# Patient Record
Sex: FEMALE | Race: WHITE | NOT HISPANIC OR LATINO | Employment: UNEMPLOYED | URBAN - METROPOLITAN AREA
[De-identification: names, ages, dates, MRNs, and addresses within clinical notes are randomized per-mention and may not be internally consistent; named-entity substitution may affect disease eponyms.]

---

## 2017-01-13 ENCOUNTER — OFFICE VISIT - HEALTHEAST (OUTPATIENT)
Dept: FAMILY MEDICINE | Facility: CLINIC | Age: 62
End: 2017-01-13

## 2017-01-13 ENCOUNTER — COMMUNICATION - HEALTHEAST (OUTPATIENT)
Dept: FAMILY MEDICINE | Facility: CLINIC | Age: 62
End: 2017-01-13

## 2017-01-13 DIAGNOSIS — Z00.00 ROUTINE GENERAL MEDICAL EXAMINATION AT A HEALTH CARE FACILITY: ICD-10-CM

## 2017-01-13 ASSESSMENT — MIFFLIN-ST. JEOR: SCORE: 1149.17

## 2017-01-18 ENCOUNTER — OFFICE VISIT - HEALTHEAST (OUTPATIENT)
Dept: BEHAVIORAL HEALTH | Facility: CLINIC | Age: 62
End: 2017-01-18

## 2017-01-18 DIAGNOSIS — F41.1 GENERALIZED ANXIETY DISORDER: ICD-10-CM

## 2017-01-18 DIAGNOSIS — F33.1 MODERATE EPISODE OF RECURRENT MAJOR DEPRESSIVE DISORDER (H): ICD-10-CM

## 2017-01-24 ENCOUNTER — RECORDS - HEALTHEAST (OUTPATIENT)
Dept: ADMINISTRATIVE | Facility: OTHER | Age: 62
End: 2017-01-24

## 2017-01-26 ENCOUNTER — OFFICE VISIT - HEALTHEAST (OUTPATIENT)
Dept: BEHAVIORAL HEALTH | Facility: CLINIC | Age: 62
End: 2017-01-26

## 2017-01-26 DIAGNOSIS — F41.1 GENERALIZED ANXIETY DISORDER: ICD-10-CM

## 2017-01-26 DIAGNOSIS — F33.2 SEVERE EPISODE OF RECURRENT MAJOR DEPRESSIVE DISORDER, WITHOUT PSYCHOTIC FEATURES (H): ICD-10-CM

## 2017-01-31 ENCOUNTER — OFFICE VISIT - HEALTHEAST (OUTPATIENT)
Dept: SURGERY | Facility: CLINIC | Age: 62
End: 2017-01-31

## 2017-01-31 DIAGNOSIS — Z85.3 PERSONAL HISTORY OF BREAST CANCER: ICD-10-CM

## 2017-02-02 ENCOUNTER — OFFICE VISIT - HEALTHEAST (OUTPATIENT)
Dept: BEHAVIORAL HEALTH | Facility: CLINIC | Age: 62
End: 2017-02-02

## 2017-02-02 DIAGNOSIS — F41.1 GENERALIZED ANXIETY DISORDER: ICD-10-CM

## 2017-02-02 DIAGNOSIS — F33.2 SEVERE EPISODE OF RECURRENT MAJOR DEPRESSIVE DISORDER, WITHOUT PSYCHOTIC FEATURES (H): ICD-10-CM

## 2017-02-06 ENCOUNTER — COMMUNICATION - HEALTHEAST (OUTPATIENT)
Dept: NURSING | Facility: CLINIC | Age: 62
End: 2017-02-06

## 2017-02-06 ENCOUNTER — AMBULATORY - HEALTHEAST (OUTPATIENT)
Dept: CARE COORDINATION | Facility: CLINIC | Age: 62
End: 2017-02-06

## 2017-02-06 DIAGNOSIS — K59.02 CONSTIPATION BY OUTLET DYSFUNCTION: ICD-10-CM

## 2017-02-07 ENCOUNTER — OFFICE VISIT - HEALTHEAST (OUTPATIENT)
Dept: BEHAVIORAL HEALTH | Facility: CLINIC | Age: 62
End: 2017-02-07

## 2017-02-07 DIAGNOSIS — F33.1 MODERATE EPISODE OF RECURRENT MAJOR DEPRESSIVE DISORDER (H): ICD-10-CM

## 2017-02-07 DIAGNOSIS — F41.1 GENERALIZED ANXIETY DISORDER: ICD-10-CM

## 2017-02-13 ENCOUNTER — AMBULATORY - HEALTHEAST (OUTPATIENT)
Dept: CARE COORDINATION | Facility: CLINIC | Age: 62
End: 2017-02-13

## 2017-02-13 ENCOUNTER — COMMUNICATION - HEALTHEAST (OUTPATIENT)
Dept: CARE COORDINATION | Facility: CLINIC | Age: 62
End: 2017-02-13

## 2017-02-15 ENCOUNTER — OFFICE VISIT - HEALTHEAST (OUTPATIENT)
Dept: BEHAVIORAL HEALTH | Facility: CLINIC | Age: 62
End: 2017-02-15

## 2017-02-15 DIAGNOSIS — F33.1 MODERATE EPISODE OF RECURRENT MAJOR DEPRESSIVE DISORDER (H): ICD-10-CM

## 2017-02-15 DIAGNOSIS — F41.1 GENERALIZED ANXIETY DISORDER: ICD-10-CM

## 2017-02-17 ENCOUNTER — OFFICE VISIT - HEALTHEAST (OUTPATIENT)
Dept: FAMILY MEDICINE | Facility: CLINIC | Age: 62
End: 2017-02-17

## 2017-02-17 DIAGNOSIS — R50.9 FEVER: ICD-10-CM

## 2017-02-17 DIAGNOSIS — J02.9 SORE THROAT: ICD-10-CM

## 2017-02-17 ASSESSMENT — MIFFLIN-ST. JEOR: SCORE: 1171.85

## 2017-02-22 ENCOUNTER — OFFICE VISIT - HEALTHEAST (OUTPATIENT)
Dept: BEHAVIORAL HEALTH | Facility: CLINIC | Age: 62
End: 2017-02-22

## 2017-02-22 ENCOUNTER — RECORDS - HEALTHEAST (OUTPATIENT)
Dept: ADMINISTRATIVE | Facility: OTHER | Age: 62
End: 2017-02-22

## 2017-02-22 ENCOUNTER — OFFICE VISIT - HEALTHEAST (OUTPATIENT)
Dept: FAMILY MEDICINE | Facility: CLINIC | Age: 62
End: 2017-02-22

## 2017-02-22 DIAGNOSIS — F41.1 GENERALIZED ANXIETY DISORDER: ICD-10-CM

## 2017-02-22 DIAGNOSIS — F33.1 MODERATE EPISODE OF RECURRENT MAJOR DEPRESSIVE DISORDER (H): ICD-10-CM

## 2017-02-22 DIAGNOSIS — Z01.818 PRE-OP EVALUATION: ICD-10-CM

## 2017-02-22 LAB — HBA1C MFR BLD: 6.7 % (ref 3.5–6)

## 2017-02-22 ASSESSMENT — MIFFLIN-ST. JEOR: SCORE: 1162.43

## 2017-03-02 ENCOUNTER — COMMUNICATION - HEALTHEAST (OUTPATIENT)
Dept: FAMILY MEDICINE | Facility: CLINIC | Age: 62
End: 2017-03-02

## 2017-03-02 ASSESSMENT — MIFFLIN-ST. JEOR: SCORE: 1158.8

## 2017-03-03 ENCOUNTER — ANESTHESIA - HEALTHEAST (OUTPATIENT)
Dept: SURGERY | Facility: HOSPITAL | Age: 62
End: 2017-03-03

## 2017-03-03 ENCOUNTER — SURGERY - HEALTHEAST (OUTPATIENT)
Dept: SURGERY | Facility: HOSPITAL | Age: 62
End: 2017-03-03

## 2017-04-14 ENCOUNTER — COMMUNICATION - HEALTHEAST (OUTPATIENT)
Dept: SCHEDULING | Facility: CLINIC | Age: 62
End: 2017-04-14

## 2017-05-04 ENCOUNTER — COMMUNICATION - HEALTHEAST (OUTPATIENT)
Dept: BEHAVIORAL HEALTH | Facility: CLINIC | Age: 62
End: 2017-05-04

## 2017-05-16 ENCOUNTER — COMMUNICATION - HEALTHEAST (OUTPATIENT)
Dept: FAMILY MEDICINE | Facility: CLINIC | Age: 62
End: 2017-05-16

## 2017-05-16 ENCOUNTER — RECORDS - HEALTHEAST (OUTPATIENT)
Dept: ADMINISTRATIVE | Facility: OTHER | Age: 62
End: 2017-05-16

## 2017-05-18 ENCOUNTER — COMMUNICATION - HEALTHEAST (OUTPATIENT)
Dept: BEHAVIORAL HEALTH | Facility: CLINIC | Age: 62
End: 2017-05-18

## 2017-06-21 ENCOUNTER — RECORDS - HEALTHEAST (OUTPATIENT)
Dept: ADMINISTRATIVE | Facility: OTHER | Age: 62
End: 2017-06-21

## 2017-06-22 ENCOUNTER — RECORDS - HEALTHEAST (OUTPATIENT)
Dept: ADMINISTRATIVE | Facility: OTHER | Age: 62
End: 2017-06-22

## 2017-06-22 ENCOUNTER — HOSPITAL ENCOUNTER (OUTPATIENT)
Dept: MAMMOGRAPHY | Facility: HOSPITAL | Age: 62
Discharge: HOME | End: 2017-06-22
Attending: PLASTIC SURGERY

## 2017-06-22 DIAGNOSIS — N63.0 BREAST SWELLING: ICD-10-CM

## 2017-09-18 ENCOUNTER — COMMUNICATION - HEALTHEAST (OUTPATIENT)
Dept: FAMILY MEDICINE | Facility: CLINIC | Age: 62
End: 2017-09-18

## 2017-10-09 ENCOUNTER — COMMUNICATION - HEALTHEAST (OUTPATIENT)
Dept: FAMILY MEDICINE | Facility: CLINIC | Age: 62
End: 2017-10-09

## 2017-10-11 ENCOUNTER — OFFICE VISIT - HEALTHEAST (OUTPATIENT)
Dept: FAMILY MEDICINE | Facility: CLINIC | Age: 62
End: 2017-10-11

## 2017-10-11 DIAGNOSIS — M25.511 PAIN IN JOINT OF RIGHT SHOULDER: ICD-10-CM

## 2017-10-11 DIAGNOSIS — M75.81 RIGHT ROTATOR CUFF TENDINITIS: ICD-10-CM

## 2017-10-11 DIAGNOSIS — Z85.3 PERSONAL HISTORY OF BREAST CANCER: ICD-10-CM

## 2017-10-11 DIAGNOSIS — Z23 NEED FOR IMMUNIZATION AGAINST INFLUENZA: ICD-10-CM

## 2017-10-11 DIAGNOSIS — Z01.818 PREOP EXAMINATION: ICD-10-CM

## 2017-10-11 DIAGNOSIS — E11.9 DIABETES MELLITUS (H): ICD-10-CM

## 2017-10-11 LAB — HBA1C MFR BLD: 6.3 % (ref 3.5–6)

## 2017-10-11 ASSESSMENT — MIFFLIN-ST. JEOR: SCORE: 1195.09

## 2017-10-18 ENCOUNTER — RECORDS - HEALTHEAST (OUTPATIENT)
Dept: ADMINISTRATIVE | Facility: OTHER | Age: 62
End: 2017-10-18

## 2017-10-23 ENCOUNTER — RECORDS - HEALTHEAST (OUTPATIENT)
Dept: ADMINISTRATIVE | Facility: OTHER | Age: 62
End: 2017-10-23

## 2017-11-28 ENCOUNTER — OFFICE VISIT - HEALTHEAST (OUTPATIENT)
Dept: FAMILY MEDICINE | Facility: CLINIC | Age: 62
End: 2017-11-28

## 2017-11-28 DIAGNOSIS — Z85.3 PERSONAL HISTORY OF BREAST CANCER: ICD-10-CM

## 2017-11-28 DIAGNOSIS — F41.9 ANXIETY: ICD-10-CM

## 2017-11-28 DIAGNOSIS — E11.9 DIABETES MELLITUS (H): ICD-10-CM

## 2017-11-28 DIAGNOSIS — G89.18 POSTOPERATIVE PAIN: ICD-10-CM

## 2017-11-28 DIAGNOSIS — R07.89 OTHER CHEST PAIN: ICD-10-CM

## 2017-11-28 DIAGNOSIS — S20.01XS: ICD-10-CM

## 2017-11-28 DIAGNOSIS — R47.89: ICD-10-CM

## 2017-11-28 ASSESSMENT — MIFFLIN-ST. JEOR: SCORE: 1196.23

## 2017-12-18 ENCOUNTER — COMMUNICATION - HEALTHEAST (OUTPATIENT)
Dept: FAMILY MEDICINE | Facility: CLINIC | Age: 62
End: 2017-12-18

## 2017-12-18 DIAGNOSIS — E11.9 DIABETES (H): ICD-10-CM

## 2018-01-01 ENCOUNTER — RECORDS - HEALTHEAST (OUTPATIENT)
Dept: ADMINISTRATIVE | Facility: OTHER | Age: 63
End: 2018-01-01

## 2018-01-17 ENCOUNTER — AMBULATORY - HEALTHEAST (OUTPATIENT)
Dept: FAMILY MEDICINE | Facility: CLINIC | Age: 63
End: 2018-01-17

## 2018-01-17 DIAGNOSIS — R73.03 PREDIABETES: ICD-10-CM

## 2018-01-18 ENCOUNTER — OFFICE VISIT - HEALTHEAST (OUTPATIENT)
Dept: FAMILY MEDICINE | Facility: CLINIC | Age: 63
End: 2018-01-18

## 2018-01-18 DIAGNOSIS — F41.9 ANXIETY: ICD-10-CM

## 2018-01-18 DIAGNOSIS — R73.03 PREDIABETES: ICD-10-CM

## 2018-01-18 DIAGNOSIS — C50.211 MALIGNANT NEOPLASM OF UPPER-INNER QUADRANT OF RIGHT FEMALE BREAST, UNSPECIFIED ESTROGEN RECEPTOR STATUS (H): ICD-10-CM

## 2018-01-18 DIAGNOSIS — C50.919 MALIGNANT NEOPLASM OF FEMALE BREAST, UNSPECIFIED ESTROGEN RECEPTOR STATUS, UNSPECIFIED LATERALITY, UNSPECIFIED SITE OF BREAST (H): ICD-10-CM

## 2018-01-18 DIAGNOSIS — E11.9 DIABETES MELLITUS (H): ICD-10-CM

## 2018-01-18 DIAGNOSIS — S20.01XS: ICD-10-CM

## 2018-01-18 DIAGNOSIS — R23.8 SCALP IRRITATION: ICD-10-CM

## 2018-01-18 LAB
BASOPHILS # BLD AUTO: 0 THOU/UL (ref 0–0.2)
BASOPHILS NFR BLD AUTO: 1 % (ref 0–2)
EOSINOPHIL # BLD AUTO: 0.2 THOU/UL (ref 0–0.4)
EOSINOPHIL NFR BLD AUTO: 4 % (ref 0–6)
ERYTHROCYTE [DISTWIDTH] IN BLOOD BY AUTOMATED COUNT: 13.9 % (ref 11–14.5)
HBA1C MFR BLD: 6.2 % (ref 3.5–6)
HCT VFR BLD AUTO: 43.4 % (ref 35–47)
HGB BLD-MCNC: 14.5 G/DL (ref 12–16)
LYMPHOCYTES # BLD AUTO: 2.6 THOU/UL (ref 0.8–4.4)
LYMPHOCYTES NFR BLD AUTO: 40 % (ref 20–40)
MCH RBC QN AUTO: 29.2 PG (ref 27–34)
MCHC RBC AUTO-ENTMCNC: 33.3 G/DL (ref 32–36)
MCV RBC AUTO: 88 FL (ref 80–100)
MONOCYTES # BLD AUTO: 0.5 THOU/UL (ref 0–0.9)
MONOCYTES NFR BLD AUTO: 8 % (ref 2–10)
NEUTROPHILS # BLD AUTO: 3 THOU/UL (ref 2–7.7)
NEUTROPHILS NFR BLD AUTO: 47 % (ref 50–70)
PLATELET # BLD AUTO: 319 THOU/UL (ref 140–440)
PMV BLD AUTO: 6.5 FL (ref 7–10)
RBC # BLD AUTO: 4.96 MILL/UL (ref 3.8–5.4)
WBC: 6.4 THOU/UL (ref 4–11)

## 2018-01-18 ASSESSMENT — MIFFLIN-ST. JEOR: SCORE: 1189.99

## 2018-01-19 ENCOUNTER — COMMUNICATION - HEALTHEAST (OUTPATIENT)
Dept: FAMILY MEDICINE | Facility: CLINIC | Age: 63
End: 2018-01-19

## 2018-01-22 ENCOUNTER — COMMUNICATION - HEALTHEAST (OUTPATIENT)
Dept: FAMILY MEDICINE | Facility: CLINIC | Age: 63
End: 2018-01-22

## 2018-01-30 ENCOUNTER — COMMUNICATION - HEALTHEAST (OUTPATIENT)
Dept: FAMILY MEDICINE | Facility: CLINIC | Age: 63
End: 2018-01-30

## 2018-01-30 RX ORDER — GLUCOSAMINE HCL/CHONDROITIN SU 500-400 MG
1 CAPSULE ORAL PRN
Qty: 100 STRIP | Refills: 2 | Status: SHIPPED | OUTPATIENT
Start: 2018-01-30 | End: 2023-08-18

## 2018-02-27 ENCOUNTER — COMMUNICATION - HEALTHEAST (OUTPATIENT)
Dept: FAMILY MEDICINE | Facility: CLINIC | Age: 63
End: 2018-02-27

## 2018-03-14 ENCOUNTER — COMMUNICATION - HEALTHEAST (OUTPATIENT)
Dept: FAMILY MEDICINE | Facility: CLINIC | Age: 63
End: 2018-03-14

## 2018-03-14 ENCOUNTER — COMMUNICATION - HEALTHEAST (OUTPATIENT)
Dept: SCHEDULING | Facility: CLINIC | Age: 63
End: 2018-03-14

## 2018-03-14 ENCOUNTER — AMBULATORY - HEALTHEAST (OUTPATIENT)
Dept: FAMILY MEDICINE | Facility: CLINIC | Age: 63
End: 2018-03-14

## 2018-03-14 DIAGNOSIS — E11.9 DIABETES (H): ICD-10-CM

## 2018-04-20 ENCOUNTER — AMBULATORY - HEALTHEAST (OUTPATIENT)
Dept: FAMILY MEDICINE | Facility: CLINIC | Age: 63
End: 2018-04-20

## 2018-04-20 ENCOUNTER — OFFICE VISIT - HEALTHEAST (OUTPATIENT)
Dept: FAMILY MEDICINE | Facility: CLINIC | Age: 63
End: 2018-04-20

## 2018-04-20 DIAGNOSIS — C50.919 MALIGNANT NEOPLASM OF FEMALE BREAST, UNSPECIFIED ESTROGEN RECEPTOR STATUS, UNSPECIFIED LATERALITY, UNSPECIFIED SITE OF BREAST (H): ICD-10-CM

## 2018-04-20 DIAGNOSIS — F32.A DEPRESSION: ICD-10-CM

## 2018-04-20 DIAGNOSIS — E11.9 DIABETES MELLITUS (H): ICD-10-CM

## 2018-04-20 DIAGNOSIS — E11.9 DIABETES (H): ICD-10-CM

## 2018-04-20 DIAGNOSIS — F41.9 ANXIETY: ICD-10-CM

## 2018-04-20 DIAGNOSIS — C50.211 MALIGNANT NEOPLASM OF UPPER-INNER QUADRANT OF RIGHT FEMALE BREAST, UNSPECIFIED ESTROGEN RECEPTOR STATUS (H): ICD-10-CM

## 2018-04-20 DIAGNOSIS — S20.01XS: ICD-10-CM

## 2018-04-20 DIAGNOSIS — F32.2 SEVERE MAJOR DEPRESSIVE DISORDER (H): ICD-10-CM

## 2018-04-20 LAB
ANION GAP SERPL CALCULATED.3IONS-SCNC: 14 MMOL/L (ref 5–18)
BASOPHILS # BLD AUTO: 0 THOU/UL (ref 0–0.2)
BASOPHILS NFR BLD AUTO: 1 % (ref 0–2)
BUN SERPL-MCNC: 9 MG/DL (ref 8–22)
CALCIUM SERPL-MCNC: 9.5 MG/DL (ref 8.5–10.5)
CHLORIDE BLD-SCNC: 106 MMOL/L (ref 98–107)
CO2 SERPL-SCNC: 21 MMOL/L (ref 22–31)
CREAT SERPL-MCNC: 0.7 MG/DL (ref 0.6–1.1)
CREAT UR-MCNC: 155 MG/DL
EOSINOPHIL # BLD AUTO: 0.3 THOU/UL (ref 0–0.4)
EOSINOPHIL NFR BLD AUTO: 8 % (ref 0–6)
ERYTHROCYTE [DISTWIDTH] IN BLOOD BY AUTOMATED COUNT: 13.1 % (ref 11–14.5)
GFR SERPL CREATININE-BSD FRML MDRD: >60 ML/MIN/1.73M2
GLUCOSE BLD-MCNC: 129 MG/DL (ref 70–125)
HBA1C MFR BLD: 6.4 % (ref 3.5–6)
HCT VFR BLD AUTO: 43 % (ref 35–47)
HGB BLD-MCNC: 14.2 G/DL (ref 12–16)
LYMPHOCYTES # BLD AUTO: 1.8 THOU/UL (ref 0.8–4.4)
LYMPHOCYTES NFR BLD AUTO: 44 % (ref 20–40)
MCH RBC QN AUTO: 28.4 PG (ref 27–34)
MCHC RBC AUTO-ENTMCNC: 32.9 G/DL (ref 32–36)
MCV RBC AUTO: 86 FL (ref 80–100)
MICROALBUMIN UR-MCNC: 9.41 MG/DL (ref 0–1.99)
MICROALBUMIN/CREAT UR: 60.7 MG/G
MONOCYTES # BLD AUTO: 0.4 THOU/UL (ref 0–0.9)
MONOCYTES NFR BLD AUTO: 9 % (ref 2–10)
NEUTROPHILS # BLD AUTO: 1.5 THOU/UL (ref 2–7.7)
NEUTROPHILS NFR BLD AUTO: 39 % (ref 50–70)
PLATELET # BLD AUTO: 336 THOU/UL (ref 140–440)
PMV BLD AUTO: 6.9 FL (ref 7–10)
POTASSIUM BLD-SCNC: 3.8 MMOL/L (ref 3.5–5)
RBC # BLD AUTO: 4.98 MILL/UL (ref 3.8–5.4)
SODIUM SERPL-SCNC: 141 MMOL/L (ref 136–145)
WBC: 4 THOU/UL (ref 4–11)

## 2018-04-20 ASSESSMENT — MIFFLIN-ST. JEOR: SCORE: 1204.73

## 2018-04-23 LAB
25(OH)D3 SERPL-MCNC: 19.9 NG/ML (ref 30–80)
CHOLEST SERPL-MCNC: 300 MG/DL
HDLC SERPL-MCNC: 68 MG/DL
LDLC SERPL CALC-MCNC: 205 MG/DL
TRIGL SERPL-MCNC: 136 MG/DL
TSH SERPL DL<=0.005 MIU/L-ACNC: 0.29 UIU/ML (ref 0.3–5)

## 2018-04-25 ENCOUNTER — OFFICE VISIT - HEALTHEAST (OUTPATIENT)
Dept: BEHAVIORAL HEALTH | Facility: HOSPITAL | Age: 63
End: 2018-04-25

## 2018-04-25 DIAGNOSIS — F43.12 CHRONIC POST-TRAUMATIC STRESS DISORDER (PTSD): ICD-10-CM

## 2018-04-25 DIAGNOSIS — F33.2 SEVERE EPISODE OF RECURRENT MAJOR DEPRESSIVE DISORDER, WITHOUT PSYCHOTIC FEATURES (H): ICD-10-CM

## 2018-05-17 ENCOUNTER — COMMUNICATION - HEALTHEAST (OUTPATIENT)
Dept: FAMILY MEDICINE | Facility: CLINIC | Age: 63
End: 2018-05-17

## 2018-05-25 ENCOUNTER — OFFICE VISIT - HEALTHEAST (OUTPATIENT)
Dept: FAMILY MEDICINE | Facility: CLINIC | Age: 63
End: 2018-05-25

## 2018-05-25 DIAGNOSIS — Z98.890 H/O BREAST SURGERY: ICD-10-CM

## 2018-05-25 DIAGNOSIS — S20.01XS: ICD-10-CM

## 2018-05-25 DIAGNOSIS — M75.100 ROTATOR CUFF TEAR: ICD-10-CM

## 2018-05-25 DIAGNOSIS — E11.9 DIABETES MELLITUS (H): ICD-10-CM

## 2018-05-25 DIAGNOSIS — C50.919 MALIGNANT NEOPLASM OF FEMALE BREAST, UNSPECIFIED ESTROGEN RECEPTOR STATUS, UNSPECIFIED LATERALITY, UNSPECIFIED SITE OF BREAST (H): ICD-10-CM

## 2018-05-25 DIAGNOSIS — E11.9 DIABETES (H): ICD-10-CM

## 2018-05-25 ASSESSMENT — MIFFLIN-ST. JEOR: SCORE: 1202.69

## 2018-05-31 ENCOUNTER — HOSPITAL ENCOUNTER (OUTPATIENT)
Dept: SURGERY | Facility: CLINIC | Age: 63
Discharge: HOME | End: 2018-05-31
Attending: FAMILY MEDICINE

## 2018-05-31 DIAGNOSIS — Z98.890 S/P BREAST RECONSTRUCTION, BILATERAL: ICD-10-CM

## 2018-05-31 DIAGNOSIS — Z85.3 PERSONAL HISTORY OF BREAST CANCER: ICD-10-CM

## 2018-05-31 RX ORDER — NAPROXEN SODIUM 220 MG
440 TABLET ORAL PRN
Status: SHIPPED | COMMUNITY
Start: 2018-05-31

## 2018-05-31 RX ORDER — PHENOL 1.4 %
10 AEROSOL, SPRAY (ML) MUCOUS MEMBRANE AT BEDTIME
Status: SHIPPED | COMMUNITY
Start: 2018-05-31 | End: 2022-06-08

## 2018-05-31 ASSESSMENT — MIFFLIN-ST. JEOR: SCORE: 1203.6

## 2018-06-11 ENCOUNTER — COMMUNICATION - HEALTHEAST (OUTPATIENT)
Dept: FAMILY MEDICINE | Facility: CLINIC | Age: 63
End: 2018-06-11

## 2018-06-11 ENCOUNTER — OFFICE VISIT - HEALTHEAST (OUTPATIENT)
Dept: BEHAVIORAL HEALTH | Facility: CLINIC | Age: 63
End: 2018-06-11

## 2018-06-11 DIAGNOSIS — E11.9 DIABETES (H): ICD-10-CM

## 2018-06-11 DIAGNOSIS — F43.12 CHRONIC POST-TRAUMATIC STRESS DISORDER (PTSD): ICD-10-CM

## 2018-06-11 DIAGNOSIS — F33.2 SEVERE EPISODE OF RECURRENT MAJOR DEPRESSIVE DISORDER, WITHOUT PSYCHOTIC FEATURES (H): ICD-10-CM

## 2018-06-11 DIAGNOSIS — F41.1 GENERALIZED ANXIETY DISORDER: ICD-10-CM

## 2018-06-18 ENCOUNTER — RECORDS - HEALTHEAST (OUTPATIENT)
Dept: ADMINISTRATIVE | Facility: OTHER | Age: 63
End: 2018-06-18

## 2018-06-22 ENCOUNTER — RECORDS - HEALTHEAST (OUTPATIENT)
Dept: ADMINISTRATIVE | Facility: OTHER | Age: 63
End: 2018-06-22

## 2018-06-22 ENCOUNTER — HOSPITAL ENCOUNTER (OUTPATIENT)
Dept: MRI IMAGING | Facility: HOSPITAL | Age: 63
Discharge: HOME | End: 2018-06-22
Attending: SPECIALIST

## 2018-06-22 DIAGNOSIS — Z98.890 S/P BREAST RECONSTRUCTION, BILATERAL: ICD-10-CM

## 2018-06-22 DIAGNOSIS — Z85.3 PERSONAL HISTORY OF BREAST CANCER: ICD-10-CM

## 2018-06-22 LAB
CREAT BLD-MCNC: 0.7 MG/DL
CREAT BLD-MCNC: 0.7 MG/DL (ref 0.6–1.1)
POC GFR AMER AF HE - HISTORICAL: >60 ML/MIN/1.73M2
POC GFR NON AMER AF HE - HISTORICAL: >60 ML/MIN/1.73M2

## 2018-06-26 ENCOUNTER — AMBULATORY - HEALTHEAST (OUTPATIENT)
Dept: SURGERY | Facility: CLINIC | Age: 63
End: 2018-06-26

## 2018-06-26 DIAGNOSIS — Z85.3 PERSONAL HISTORY OF BREAST CANCER: ICD-10-CM

## 2018-06-26 DIAGNOSIS — R92.8 ABNORMAL MRI, BREAST: ICD-10-CM

## 2018-07-10 ENCOUNTER — HOSPITAL ENCOUNTER (OUTPATIENT)
Dept: MAMMOGRAPHY | Facility: CLINIC | Age: 63
Discharge: HOME | End: 2018-07-10
Attending: SPECIALIST | Admitting: RADIOLOGY

## 2018-07-10 ENCOUNTER — HOSPITAL ENCOUNTER (OUTPATIENT)
Dept: MAMMOGRAPHY | Facility: CLINIC | Age: 63
Discharge: HOME | End: 2018-07-10
Attending: SPECIALIST

## 2018-07-10 DIAGNOSIS — Z85.3 PERSONAL HISTORY OF BREAST CANCER: ICD-10-CM

## 2018-07-10 DIAGNOSIS — R92.8 ABNORMAL MRI, BREAST: ICD-10-CM

## 2018-07-11 LAB
LAB AP CHARGES (HE HISTORICAL CONVERSION): NORMAL
LAB AP INITIAL CYTO EVAL (HE HISTORICAL CONVERSION): NORMAL
LAB MED GENERAL PATH INTERP (HE HISTORICAL CONVERSION): NORMAL
PATH REPORT.COMMENTS IMP SPEC: NORMAL
PATH REPORT.COMMENTS IMP SPEC: NORMAL
PATH REPORT.FINAL DX SPEC: NORMAL
PATH REPORT.MICROSCOPIC SPEC OTHER STN: NORMAL
PATH REPORT.RELEVANT HX SPEC: NORMAL
RESULT FLAG (HE HISTORICAL CONVERSION): NORMAL
SPECIMEN DESCRIPTION: NORMAL

## 2018-07-12 ENCOUNTER — COMMUNICATION - HEALTHEAST (OUTPATIENT)
Dept: MAMMOGRAPHY | Facility: CLINIC | Age: 63
End: 2018-07-12

## 2018-07-13 ENCOUNTER — COMMUNICATION - HEALTHEAST (OUTPATIENT)
Dept: MAMMOGRAPHY | Facility: CLINIC | Age: 63
End: 2018-07-13

## 2018-07-18 ENCOUNTER — AMBULATORY - HEALTHEAST (OUTPATIENT)
Dept: SURGERY | Facility: CLINIC | Age: 63
End: 2018-07-18

## 2018-07-18 DIAGNOSIS — R92.8 ABNORMAL MRI, BREAST: ICD-10-CM

## 2018-07-25 ENCOUNTER — COMMUNICATION - HEALTHEAST (OUTPATIENT)
Dept: FAMILY MEDICINE | Facility: CLINIC | Age: 63
End: 2018-07-25

## 2018-07-26 ENCOUNTER — COMMUNICATION - HEALTHEAST (OUTPATIENT)
Dept: FAMILY MEDICINE | Facility: CLINIC | Age: 63
End: 2018-07-26

## 2018-07-31 ENCOUNTER — OFFICE VISIT - HEALTHEAST (OUTPATIENT)
Dept: BEHAVIORAL HEALTH | Facility: HOSPITAL | Age: 63
End: 2018-07-31

## 2018-07-31 DIAGNOSIS — F32.9 MDD (MAJOR DEPRESSIVE DISORDER): ICD-10-CM

## 2018-07-31 ASSESSMENT — MIFFLIN-ST. JEOR: SCORE: 1208.13

## 2018-08-15 ENCOUNTER — OFFICE VISIT - HEALTHEAST (OUTPATIENT)
Dept: BEHAVIORAL HEALTH | Facility: HOSPITAL | Age: 63
End: 2018-08-15

## 2018-08-15 DIAGNOSIS — F41.1 GENERALIZED ANXIETY DISORDER: ICD-10-CM

## 2018-08-15 DIAGNOSIS — F32.1 MODERATE SINGLE CURRENT EPISODE OF MAJOR DEPRESSIVE DISORDER (H): ICD-10-CM

## 2018-08-15 DIAGNOSIS — F43.12 CHRONIC POST-TRAUMATIC STRESS DISORDER (PTSD): ICD-10-CM

## 2018-08-24 ENCOUNTER — OFFICE VISIT - HEALTHEAST (OUTPATIENT)
Dept: BEHAVIORAL HEALTH | Facility: HOSPITAL | Age: 63
End: 2018-08-24

## 2018-08-24 DIAGNOSIS — F32.1 MODERATE SINGLE CURRENT EPISODE OF MAJOR DEPRESSIVE DISORDER (H): ICD-10-CM

## 2018-08-24 DIAGNOSIS — F41.1 GENERALIZED ANXIETY DISORDER: ICD-10-CM

## 2018-08-24 DIAGNOSIS — F43.12 CHRONIC POST-TRAUMATIC STRESS DISORDER (PTSD): ICD-10-CM

## 2018-09-05 ENCOUNTER — COMMUNICATION - HEALTHEAST (OUTPATIENT)
Dept: BEHAVIORAL HEALTH | Facility: HOSPITAL | Age: 63
End: 2018-09-05

## 2018-09-18 ENCOUNTER — OFFICE VISIT - HEALTHEAST (OUTPATIENT)
Dept: FAMILY MEDICINE | Facility: CLINIC | Age: 63
End: 2018-09-18

## 2018-09-18 DIAGNOSIS — F41.9 ANXIETY: ICD-10-CM

## 2018-09-18 DIAGNOSIS — Z12.31 VISIT FOR SCREENING MAMMOGRAM: ICD-10-CM

## 2018-09-18 DIAGNOSIS — C50.211 MALIGNANT NEOPLASM OF UPPER-INNER QUADRANT OF RIGHT FEMALE BREAST, UNSPECIFIED ESTROGEN RECEPTOR STATUS (H): ICD-10-CM

## 2018-09-18 DIAGNOSIS — E11.9 DIABETES MELLITUS (H): ICD-10-CM

## 2018-09-18 DIAGNOSIS — G89.18 POSTOPERATIVE PAIN: ICD-10-CM

## 2018-09-18 DIAGNOSIS — C50.919 MALIGNANT NEOPLASM OF FEMALE BREAST, UNSPECIFIED ESTROGEN RECEPTOR STATUS, UNSPECIFIED LATERALITY, UNSPECIFIED SITE OF BREAST (H): ICD-10-CM

## 2018-09-18 DIAGNOSIS — Z23 NEED FOR IMMUNIZATION AGAINST INFLUENZA: ICD-10-CM

## 2018-09-18 DIAGNOSIS — M79.2 NEURALGIA: ICD-10-CM

## 2018-09-18 ASSESSMENT — MIFFLIN-ST. JEOR: SCORE: 1189.08

## 2018-09-27 ENCOUNTER — OFFICE VISIT - HEALTHEAST (OUTPATIENT)
Dept: BEHAVIORAL HEALTH | Facility: HOSPITAL | Age: 63
End: 2018-09-27

## 2018-09-27 ENCOUNTER — AMBULATORY - HEALTHEAST (OUTPATIENT)
Dept: BEHAVIORAL HEALTH | Facility: HOSPITAL | Age: 63
End: 2018-09-27

## 2018-09-27 DIAGNOSIS — F32.1 MODERATE SINGLE CURRENT EPISODE OF MAJOR DEPRESSIVE DISORDER (H): ICD-10-CM

## 2018-09-27 DIAGNOSIS — F41.1 GENERALIZED ANXIETY DISORDER: ICD-10-CM

## 2018-09-27 DIAGNOSIS — F43.12 CHRONIC POST-TRAUMATIC STRESS DISORDER (PTSD): ICD-10-CM

## 2018-09-27 DIAGNOSIS — F32.9 MDD (MAJOR DEPRESSIVE DISORDER): ICD-10-CM

## 2018-10-07 ENCOUNTER — COMMUNICATION - HEALTHEAST (OUTPATIENT)
Dept: BEHAVIORAL HEALTH | Facility: HOSPITAL | Age: 63
End: 2018-10-07

## 2018-10-07 DIAGNOSIS — F32.9 MDD (MAJOR DEPRESSIVE DISORDER): ICD-10-CM

## 2018-11-01 ENCOUNTER — OFFICE VISIT - HEALTHEAST (OUTPATIENT)
Dept: BEHAVIORAL HEALTH | Facility: HOSPITAL | Age: 63
End: 2018-11-01

## 2018-11-01 DIAGNOSIS — F41.1 GENERALIZED ANXIETY DISORDER: ICD-10-CM

## 2018-11-01 DIAGNOSIS — F43.12 CHRONIC POST-TRAUMATIC STRESS DISORDER (PTSD): ICD-10-CM

## 2018-11-01 DIAGNOSIS — F32.1 MODERATE SINGLE CURRENT EPISODE OF MAJOR DEPRESSIVE DISORDER (H): ICD-10-CM

## 2018-11-08 ENCOUNTER — OFFICE VISIT - HEALTHEAST (OUTPATIENT)
Dept: BEHAVIORAL HEALTH | Facility: HOSPITAL | Age: 63
End: 2018-11-08

## 2018-11-08 DIAGNOSIS — F32.A DEPRESSION: ICD-10-CM

## 2018-11-27 ENCOUNTER — HOSPITAL ENCOUNTER (OUTPATIENT)
Dept: MRI IMAGING | Facility: HOSPITAL | Age: 63
Discharge: HOME | End: 2018-11-27
Attending: SPECIALIST

## 2018-11-27 DIAGNOSIS — R92.8 ABNORMAL MRI, BREAST: ICD-10-CM

## 2018-11-27 LAB
CREAT BLD-MCNC: 0.6 MG/DL
CREAT BLD-MCNC: 0.6 MG/DL (ref 0.6–1.1)
POC GFR AMER AF HE - HISTORICAL: >60 ML/MIN/1.73M2
POC GFR NON AMER AF HE - HISTORICAL: >60 ML/MIN/1.73M2

## 2018-11-30 ENCOUNTER — AMBULATORY - HEALTHEAST (OUTPATIENT)
Dept: SURGERY | Facility: CLINIC | Age: 63
End: 2018-11-30

## 2018-11-30 ENCOUNTER — OFFICE VISIT - HEALTHEAST (OUTPATIENT)
Dept: BEHAVIORAL HEALTH | Facility: HOSPITAL | Age: 63
End: 2018-11-30

## 2018-11-30 DIAGNOSIS — F43.12 CHRONIC POST-TRAUMATIC STRESS DISORDER (PTSD): ICD-10-CM

## 2018-11-30 DIAGNOSIS — F32.1 MODERATE SINGLE CURRENT EPISODE OF MAJOR DEPRESSIVE DISORDER (H): ICD-10-CM

## 2018-11-30 DIAGNOSIS — F41.1 GENERALIZED ANXIETY DISORDER: ICD-10-CM

## 2018-11-30 DIAGNOSIS — R93.89 ABNORMAL MRI: ICD-10-CM

## 2018-12-12 ENCOUNTER — COMMUNICATION - HEALTHEAST (OUTPATIENT)
Dept: BEHAVIORAL HEALTH | Facility: CLINIC | Age: 63
End: 2018-12-12

## 2018-12-12 ENCOUNTER — COMMUNICATION - HEALTHEAST (OUTPATIENT)
Dept: BEHAVIORAL HEALTH | Facility: HOSPITAL | Age: 63
End: 2018-12-12

## 2018-12-12 ENCOUNTER — OFFICE VISIT - HEALTHEAST (OUTPATIENT)
Dept: BEHAVIORAL HEALTH | Facility: HOSPITAL | Age: 63
End: 2018-12-12

## 2018-12-12 ENCOUNTER — OFFICE VISIT - HEALTHEAST (OUTPATIENT)
Dept: FAMILY MEDICINE | Facility: CLINIC | Age: 63
End: 2018-12-12

## 2018-12-12 DIAGNOSIS — F41.1 GENERALIZED ANXIETY DISORDER: ICD-10-CM

## 2018-12-12 DIAGNOSIS — F32.A DEPRESSION: ICD-10-CM

## 2018-12-12 DIAGNOSIS — R47.89: ICD-10-CM

## 2018-12-12 DIAGNOSIS — C50.919 MALIGNANT NEOPLASM OF FEMALE BREAST, UNSPECIFIED ESTROGEN RECEPTOR STATUS, UNSPECIFIED LATERALITY, UNSPECIFIED SITE OF BREAST (H): ICD-10-CM

## 2018-12-12 DIAGNOSIS — F32.89 OTHER DEPRESSION: ICD-10-CM

## 2018-12-12 DIAGNOSIS — R23.8 SCALP IRRITATION: ICD-10-CM

## 2018-12-12 DIAGNOSIS — F41.9 ANXIETY: ICD-10-CM

## 2018-12-12 DIAGNOSIS — F43.12 CHRONIC POST-TRAUMATIC STRESS DISORDER (PTSD): ICD-10-CM

## 2018-12-12 DIAGNOSIS — K59.02 CONSTIPATION BY OUTLET DYSFUNCTION: ICD-10-CM

## 2018-12-12 DIAGNOSIS — F32.1 MODERATE SINGLE CURRENT EPISODE OF MAJOR DEPRESSIVE DISORDER (H): ICD-10-CM

## 2018-12-12 ASSESSMENT — MIFFLIN-ST. JEOR: SCORE: 1185.45

## 2019-01-07 ENCOUNTER — AMBULATORY - HEALTHEAST (OUTPATIENT)
Dept: MAMMOGRAPHY | Facility: CLINIC | Age: 64
End: 2019-01-07

## 2019-01-07 RX ORDER — POLYETHYLENE GLYCOL 3350 17 G/17G
17 POWDER, FOR SOLUTION ORAL
Status: SHIPPED | COMMUNITY
Start: 2016-10-18 | End: 2023-10-30

## 2019-01-07 RX ORDER — BLOOD-GLUCOSE METER
EACH MISCELLANEOUS
Refills: 0 | Status: SHIPPED | COMMUNITY
Start: 2018-01-30 | End: 2022-11-15 | Stop reason: ALTCHOICE

## 2019-01-08 ENCOUNTER — HOSPITAL ENCOUNTER (OUTPATIENT)
Dept: SURGERY | Facility: CLINIC | Age: 64
Discharge: HOME | End: 2019-01-08
Attending: SPECIALIST

## 2019-01-08 DIAGNOSIS — R93.89 ABNORMAL MRI: ICD-10-CM

## 2019-01-08 ASSESSMENT — MIFFLIN-ST. JEOR: SCORE: 1185.45

## 2019-01-17 ENCOUNTER — OFFICE VISIT - HEALTHEAST (OUTPATIENT)
Dept: BEHAVIORAL HEALTH | Facility: HOSPITAL | Age: 64
End: 2019-01-17

## 2019-01-17 DIAGNOSIS — F43.12 CHRONIC POST-TRAUMATIC STRESS DISORDER (PTSD): ICD-10-CM

## 2019-01-17 DIAGNOSIS — F32.1 MODERATE SINGLE CURRENT EPISODE OF MAJOR DEPRESSIVE DISORDER (H): ICD-10-CM

## 2019-01-17 DIAGNOSIS — F41.1 GENERALIZED ANXIETY DISORDER: ICD-10-CM

## 2019-02-06 ENCOUNTER — COMMUNICATION - HEALTHEAST (OUTPATIENT)
Dept: BEHAVIORAL HEALTH | Facility: HOSPITAL | Age: 64
End: 2019-02-06

## 2019-02-06 ENCOUNTER — AMBULATORY - HEALTHEAST (OUTPATIENT)
Dept: BEHAVIORAL HEALTH | Facility: HOSPITAL | Age: 64
End: 2019-02-06

## 2019-03-07 ENCOUNTER — OFFICE VISIT - HEALTHEAST (OUTPATIENT)
Dept: BEHAVIORAL HEALTH | Facility: HOSPITAL | Age: 64
End: 2019-03-07

## 2019-03-07 DIAGNOSIS — F32.89 OTHER DEPRESSION: ICD-10-CM

## 2019-03-15 ENCOUNTER — OFFICE VISIT - HEALTHEAST (OUTPATIENT)
Dept: BEHAVIORAL HEALTH | Facility: HOSPITAL | Age: 64
End: 2019-03-15

## 2019-03-15 DIAGNOSIS — F41.1 GENERALIZED ANXIETY DISORDER: ICD-10-CM

## 2019-03-15 DIAGNOSIS — F43.12 CHRONIC POST-TRAUMATIC STRESS DISORDER (PTSD): ICD-10-CM

## 2019-03-15 DIAGNOSIS — F32.1 MODERATE SINGLE CURRENT EPISODE OF MAJOR DEPRESSIVE DISORDER (H): ICD-10-CM

## 2019-04-03 ENCOUNTER — COMMUNICATION - HEALTHEAST (OUTPATIENT)
Dept: FAMILY MEDICINE | Facility: CLINIC | Age: 64
End: 2019-04-03

## 2019-04-05 ENCOUNTER — OFFICE VISIT - HEALTHEAST (OUTPATIENT)
Dept: BEHAVIORAL HEALTH | Facility: HOSPITAL | Age: 64
End: 2019-04-05

## 2019-04-05 ENCOUNTER — AMBULATORY - HEALTHEAST (OUTPATIENT)
Dept: BEHAVIORAL HEALTH | Facility: HOSPITAL | Age: 64
End: 2019-04-05

## 2019-04-05 DIAGNOSIS — F32.89 OTHER DEPRESSION: ICD-10-CM

## 2019-04-05 DIAGNOSIS — F43.12 CHRONIC POST-TRAUMATIC STRESS DISORDER (PTSD): ICD-10-CM

## 2019-04-05 DIAGNOSIS — F41.1 GENERALIZED ANXIETY DISORDER: ICD-10-CM

## 2019-04-05 DIAGNOSIS — F32.1 MODERATE SINGLE CURRENT EPISODE OF MAJOR DEPRESSIVE DISORDER (H): ICD-10-CM

## 2019-04-09 ENCOUNTER — COMMUNICATION - HEALTHEAST (OUTPATIENT)
Dept: BEHAVIORAL HEALTH | Facility: CLINIC | Age: 64
End: 2019-04-09

## 2019-04-09 DIAGNOSIS — F32.89 OTHER DEPRESSION: ICD-10-CM

## 2019-04-23 ENCOUNTER — RECORDS - HEALTHEAST (OUTPATIENT)
Dept: HEALTH INFORMATION MANAGEMENT | Facility: CLINIC | Age: 64
End: 2019-04-23

## 2019-05-04 ENCOUNTER — COMMUNICATION - HEALTHEAST (OUTPATIENT)
Dept: FAMILY MEDICINE | Facility: CLINIC | Age: 64
End: 2019-05-04

## 2019-05-04 DIAGNOSIS — G89.28 OTHER CHRONIC POSTPROCEDURAL PAIN: ICD-10-CM

## 2019-05-04 DIAGNOSIS — F51.02 ADJUSTMENT INSOMNIA: ICD-10-CM

## 2019-05-31 ENCOUNTER — OFFICE VISIT - HEALTHEAST (OUTPATIENT)
Dept: BEHAVIORAL HEALTH | Facility: HOSPITAL | Age: 64
End: 2019-05-31

## 2019-05-31 DIAGNOSIS — F32.1 CURRENT MODERATE EPISODE OF MAJOR DEPRESSIVE DISORDER, UNSPECIFIED WHETHER RECURRENT (H): ICD-10-CM

## 2019-05-31 DIAGNOSIS — F41.1 GENERALIZED ANXIETY DISORDER: ICD-10-CM

## 2019-05-31 DIAGNOSIS — F43.10 PTSD (POST-TRAUMATIC STRESS DISORDER): ICD-10-CM

## 2019-05-31 DIAGNOSIS — F43.12 CHRONIC POST-TRAUMATIC STRESS DISORDER (PTSD): ICD-10-CM

## 2019-06-04 ENCOUNTER — OFFICE VISIT - HEALTHEAST (OUTPATIENT)
Dept: FAMILY MEDICINE | Facility: CLINIC | Age: 64
End: 2019-06-04

## 2019-06-04 DIAGNOSIS — C50.012 BILATERAL MALIGNANT NEOPLASM INVOLVING BOTH NIPPLE AND AREOLA IN FEMALE, UNSPECIFIED ESTROGEN RECEPTOR STATUS (H): ICD-10-CM

## 2019-06-04 DIAGNOSIS — E11.9 DIABETES MELLITUS (H): ICD-10-CM

## 2019-06-04 DIAGNOSIS — F41.9 ANXIETY: ICD-10-CM

## 2019-06-04 DIAGNOSIS — M79.621 PAIN IN RIGHT AXILLA: ICD-10-CM

## 2019-06-04 DIAGNOSIS — C50.011 BILATERAL MALIGNANT NEOPLASM INVOLVING BOTH NIPPLE AND AREOLA IN FEMALE, UNSPECIFIED ESTROGEN RECEPTOR STATUS (H): ICD-10-CM

## 2019-06-04 LAB
ALBUMIN SERPL-MCNC: 4.4 G/DL (ref 3.5–5)
ALP SERPL-CCNC: 95 U/L (ref 45–120)
ALT SERPL W P-5'-P-CCNC: 102 U/L (ref 0–45)
ANION GAP SERPL CALCULATED.3IONS-SCNC: 17 MMOL/L (ref 5–18)
AST SERPL W P-5'-P-CCNC: 75 U/L (ref 0–40)
BASOPHILS # BLD AUTO: 0 THOU/UL (ref 0–0.2)
BASOPHILS NFR BLD AUTO: 1 % (ref 0–2)
BILIRUB SERPL-MCNC: 0.7 MG/DL (ref 0–1)
BUN SERPL-MCNC: 12 MG/DL (ref 8–22)
CALCIUM SERPL-MCNC: 10.3 MG/DL (ref 8.5–10.5)
CHLORIDE BLD-SCNC: 104 MMOL/L (ref 98–107)
CO2 SERPL-SCNC: 18 MMOL/L (ref 22–31)
CREAT SERPL-MCNC: 0.99 MG/DL (ref 0.6–1.1)
EOSINOPHIL # BLD AUTO: 0.2 THOU/UL (ref 0–0.4)
EOSINOPHIL NFR BLD AUTO: 3 % (ref 0–6)
ERYTHROCYTE [DISTWIDTH] IN BLOOD BY AUTOMATED COUNT: 12.2 % (ref 11–14.5)
FERRITIN SERPL-MCNC: 145 NG/ML (ref 10–130)
GFR SERPL CREATININE-BSD FRML MDRD: 57 ML/MIN/1.73M2
GLUCOSE BLD-MCNC: 135 MG/DL (ref 70–125)
HBA1C MFR BLD: 6.6 % (ref 3.5–6)
HCT VFR BLD AUTO: 47.5 % (ref 35–47)
HGB BLD-MCNC: 15.9 G/DL (ref 12–16)
LYMPHOCYTES # BLD AUTO: 2.3 THOU/UL (ref 0.8–4.4)
LYMPHOCYTES NFR BLD AUTO: 39 % (ref 20–40)
MCH RBC QN AUTO: 29.5 PG (ref 27–34)
MCHC RBC AUTO-ENTMCNC: 33.4 G/DL (ref 32–36)
MCV RBC AUTO: 88 FL (ref 80–100)
MONOCYTES # BLD AUTO: 0.5 THOU/UL (ref 0–0.9)
MONOCYTES NFR BLD AUTO: 9 % (ref 2–10)
NEUTROPHILS # BLD AUTO: 2.8 THOU/UL (ref 2–7.7)
NEUTROPHILS NFR BLD AUTO: 49 % (ref 50–70)
PLATELET # BLD AUTO: 335 THOU/UL (ref 140–440)
PMV BLD AUTO: 6.9 FL (ref 7–10)
POTASSIUM BLD-SCNC: 4.4 MMOL/L (ref 3.5–5)
PROT SERPL-MCNC: 7.8 G/DL (ref 6–8)
RBC # BLD AUTO: 5.37 MILL/UL (ref 3.8–5.4)
SODIUM SERPL-SCNC: 139 MMOL/L (ref 136–145)
WBC: 5.8 THOU/UL (ref 4–11)

## 2019-06-04 ASSESSMENT — MIFFLIN-ST. JEOR: SCORE: 1168.59

## 2019-06-05 ENCOUNTER — AMBULATORY - HEALTHEAST (OUTPATIENT)
Dept: FAMILY MEDICINE | Facility: CLINIC | Age: 64
End: 2019-06-05

## 2019-06-05 DIAGNOSIS — R74.8 ELEVATED LIVER ENZYMES: ICD-10-CM

## 2019-06-14 ENCOUNTER — COMMUNICATION - HEALTHEAST (OUTPATIENT)
Dept: FAMILY MEDICINE | Facility: CLINIC | Age: 64
End: 2019-06-14

## 2019-06-17 ENCOUNTER — COMMUNICATION - HEALTHEAST (OUTPATIENT)
Dept: ADMINISTRATIVE | Facility: CLINIC | Age: 64
End: 2019-06-17

## 2019-06-28 ENCOUNTER — COMMUNICATION - HEALTHEAST (OUTPATIENT)
Dept: BEHAVIORAL HEALTH | Facility: CLINIC | Age: 64
End: 2019-06-28

## 2019-06-28 DIAGNOSIS — F32.89 OTHER DEPRESSION: ICD-10-CM

## 2019-07-14 ENCOUNTER — COMMUNICATION - HEALTHEAST (OUTPATIENT)
Dept: FAMILY MEDICINE | Facility: CLINIC | Age: 64
End: 2019-07-14

## 2019-07-14 DIAGNOSIS — G47.00 PERSISTENT INSOMNIA: ICD-10-CM

## 2019-07-19 ENCOUNTER — OFFICE VISIT - HEALTHEAST (OUTPATIENT)
Dept: BEHAVIORAL HEALTH | Facility: HOSPITAL | Age: 64
End: 2019-07-19

## 2019-07-19 DIAGNOSIS — F43.12 CHRONIC POST-TRAUMATIC STRESS DISORDER (PTSD): ICD-10-CM

## 2019-07-19 DIAGNOSIS — F32.89 OTHER DEPRESSION: ICD-10-CM

## 2019-07-19 DIAGNOSIS — F32.1 CURRENT MODERATE EPISODE OF MAJOR DEPRESSIVE DISORDER, UNSPECIFIED WHETHER RECURRENT (H): ICD-10-CM

## 2019-07-19 DIAGNOSIS — F41.1 GENERALIZED ANXIETY DISORDER: ICD-10-CM

## 2019-08-01 ENCOUNTER — COMMUNICATION - HEALTHEAST (OUTPATIENT)
Dept: FAMILY MEDICINE | Facility: CLINIC | Age: 64
End: 2019-08-01

## 2019-08-02 ENCOUNTER — COMMUNICATION - HEALTHEAST (OUTPATIENT)
Dept: FAMILY MEDICINE | Facility: CLINIC | Age: 64
End: 2019-08-02

## 2019-08-05 ASSESSMENT — MIFFLIN-ST. JEOR: SCORE: 1164.13

## 2019-08-07 ENCOUNTER — OFFICE VISIT - HEALTHEAST (OUTPATIENT)
Dept: FAMILY MEDICINE | Facility: CLINIC | Age: 64
End: 2019-08-07

## 2019-08-07 DIAGNOSIS — N63.0 BREAST MASS: ICD-10-CM

## 2019-08-07 DIAGNOSIS — G47.00 PERSISTENT INSOMNIA: ICD-10-CM

## 2019-08-07 DIAGNOSIS — R79.89 ELEVATED FERRITIN: ICD-10-CM

## 2019-08-07 DIAGNOSIS — Z01.818 PRE-OP EXAM: ICD-10-CM

## 2019-08-07 LAB
ALBUMIN UR-MCNC: ABNORMAL MG/DL
ANION GAP SERPL CALCULATED.3IONS-SCNC: 13 MMOL/L (ref 5–18)
APPEARANCE UR: ABNORMAL
BACTERIA #/AREA URNS HPF: ABNORMAL HPF
BASOPHILS # BLD AUTO: 0 THOU/UL (ref 0–0.2)
BASOPHILS NFR BLD AUTO: 1 % (ref 0–2)
BILIRUB UR QL STRIP: NEGATIVE
BUN SERPL-MCNC: 14 MG/DL (ref 8–22)
CALCIUM SERPL-MCNC: 10.1 MG/DL (ref 8.5–10.5)
CHLORIDE BLD-SCNC: 104 MMOL/L (ref 98–107)
CO2 SERPL-SCNC: 19 MMOL/L (ref 22–31)
COLOR UR AUTO: YELLOW
CREAT SERPL-MCNC: 0.94 MG/DL (ref 0.6–1.1)
EOSINOPHIL # BLD AUTO: 0.1 THOU/UL (ref 0–0.4)
EOSINOPHIL NFR BLD AUTO: 2 % (ref 0–6)
ERYTHROCYTE [DISTWIDTH] IN BLOOD BY AUTOMATED COUNT: 11.4 % (ref 11–14.5)
FERRITIN SERPL-MCNC: 98 NG/ML (ref 10–130)
GFR SERPL CREATININE-BSD FRML MDRD: 60 ML/MIN/1.73M2
GLUCOSE BLD-MCNC: 106 MG/DL (ref 70–125)
GLUCOSE UR STRIP-MCNC: NEGATIVE MG/DL
HCT VFR BLD AUTO: 43.3 % (ref 35–47)
HGB BLD-MCNC: 14.5 G/DL (ref 12–16)
HGB UR QL STRIP: NEGATIVE
HYALINE CASTS #/AREA URNS LPF: ABNORMAL LPF
KETONES UR STRIP-MCNC: ABNORMAL MG/DL
LEUKOCYTE ESTERASE UR QL STRIP: ABNORMAL
LYMPHOCYTES # BLD AUTO: 2.4 THOU/UL (ref 0.8–4.4)
LYMPHOCYTES NFR BLD AUTO: 32 % (ref 20–40)
MCH RBC QN AUTO: 29.4 PG (ref 27–34)
MCHC RBC AUTO-ENTMCNC: 33.4 G/DL (ref 32–36)
MCV RBC AUTO: 88 FL (ref 80–100)
MONOCYTES # BLD AUTO: 0.7 THOU/UL (ref 0–0.9)
MONOCYTES NFR BLD AUTO: 9 % (ref 2–10)
MUCOUS THREADS #/AREA URNS LPF: ABNORMAL LPF
NEUTROPHILS # BLD AUTO: 4.3 THOU/UL (ref 2–7.7)
NEUTROPHILS NFR BLD AUTO: 58 % (ref 50–70)
NITRATE UR QL: NEGATIVE
PH UR STRIP: 5.5 [PH] (ref 4.5–8)
PLATELET # BLD AUTO: 337 THOU/UL (ref 140–440)
PMV BLD AUTO: 7.4 FL (ref 7–10)
POTASSIUM BLD-SCNC: 3.9 MMOL/L (ref 3.5–5)
RBC # BLD AUTO: 4.92 MILL/UL (ref 3.8–5.4)
RBC #/AREA URNS AUTO: ABNORMAL HPF
SODIUM SERPL-SCNC: 136 MMOL/L (ref 136–145)
SP GR UR STRIP: 1.01 (ref 1–1.03)
SQUAMOUS #/AREA URNS AUTO: ABNORMAL LPF
TRANS CELLS #/AREA URNS HPF: ABNORMAL LPF
UROBILINOGEN UR STRIP-ACNC: ABNORMAL
WBC #/AREA URNS AUTO: ABNORMAL HPF
WBC: 7.5 THOU/UL (ref 4–11)

## 2019-08-07 ASSESSMENT — MIFFLIN-ST. JEOR: SCORE: 1160.16

## 2019-08-08 ENCOUNTER — COMMUNICATION - HEALTHEAST (OUTPATIENT)
Dept: SCHEDULING | Facility: CLINIC | Age: 64
End: 2019-08-08

## 2019-08-08 ENCOUNTER — ANESTHESIA - HEALTHEAST (OUTPATIENT)
Dept: SURGERY | Facility: AMBULATORY SURGERY CENTER | Age: 64
End: 2019-08-08

## 2019-08-08 LAB — BACTERIA SPEC CULT: NO GROWTH

## 2019-08-09 ENCOUNTER — SURGERY - HEALTHEAST (OUTPATIENT)
Dept: SURGERY | Facility: AMBULATORY SURGERY CENTER | Age: 64
End: 2019-08-09

## 2019-08-09 ASSESSMENT — MIFFLIN-ST. JEOR: SCORE: 1163.34

## 2019-08-22 ENCOUNTER — AMBULATORY - HEALTHEAST (OUTPATIENT)
Dept: BEHAVIORAL HEALTH | Facility: CLINIC | Age: 64
End: 2019-08-22

## 2019-08-30 ENCOUNTER — OFFICE VISIT - HEALTHEAST (OUTPATIENT)
Dept: FAMILY MEDICINE | Facility: CLINIC | Age: 64
End: 2019-08-30

## 2019-08-30 DIAGNOSIS — E55.9 VITAMIN D DEFICIENCY DISEASE: ICD-10-CM

## 2019-08-30 DIAGNOSIS — F41.9 ANXIETY: ICD-10-CM

## 2019-08-30 DIAGNOSIS — S20.01XS: ICD-10-CM

## 2019-08-30 DIAGNOSIS — C50.211 MALIGNANT NEOPLASM OF UPPER-INNER QUADRANT OF RIGHT FEMALE BREAST, UNSPECIFIED ESTROGEN RECEPTOR STATUS (H): ICD-10-CM

## 2019-08-30 ASSESSMENT — MIFFLIN-ST. JEOR: SCORE: 1203.03

## 2019-09-03 ENCOUNTER — COMMUNICATION - HEALTHEAST (OUTPATIENT)
Dept: FAMILY MEDICINE | Facility: CLINIC | Age: 64
End: 2019-09-03

## 2019-09-03 DIAGNOSIS — E11.9 DIABETES (H): ICD-10-CM

## 2019-09-04 ENCOUNTER — AMBULATORY - HEALTHEAST (OUTPATIENT)
Dept: BEHAVIORAL HEALTH | Facility: HOSPITAL | Age: 64
End: 2019-09-04

## 2019-09-05 ENCOUNTER — COMMUNICATION - HEALTHEAST (OUTPATIENT)
Dept: FAMILY MEDICINE | Facility: CLINIC | Age: 64
End: 2019-09-05

## 2019-09-05 DIAGNOSIS — E11.9 DIABETES (H): ICD-10-CM

## 2019-10-25 ENCOUNTER — COMMUNICATION - HEALTHEAST (OUTPATIENT)
Dept: FAMILY MEDICINE | Facility: CLINIC | Age: 64
End: 2019-10-25

## 2019-10-25 DIAGNOSIS — Z23 NEED FOR PNEUMOCOCCAL VACCINATION: ICD-10-CM

## 2019-10-29 ENCOUNTER — AMBULATORY - HEALTHEAST (OUTPATIENT)
Dept: NURSING | Facility: CLINIC | Age: 64
End: 2019-10-29

## 2019-10-29 DIAGNOSIS — Z23 NEED FOR PNEUMOCOCCAL VACCINATION: ICD-10-CM

## 2019-11-01 ENCOUNTER — COMMUNICATION - HEALTHEAST (OUTPATIENT)
Dept: BEHAVIORAL HEALTH | Facility: HOSPITAL | Age: 64
End: 2019-11-01

## 2019-11-01 ENCOUNTER — AMBULATORY - HEALTHEAST (OUTPATIENT)
Dept: BEHAVIORAL HEALTH | Facility: HOSPITAL | Age: 64
End: 2019-11-01

## 2019-11-07 ENCOUNTER — OFFICE VISIT - HEALTHEAST (OUTPATIENT)
Dept: BEHAVIORAL HEALTH | Facility: HOSPITAL | Age: 64
End: 2019-11-07

## 2019-11-07 DIAGNOSIS — F32.89 OTHER DEPRESSION: ICD-10-CM

## 2019-11-07 ASSESSMENT — MIFFLIN-ST. JEOR: SCORE: 1235.57

## 2019-12-06 ENCOUNTER — OFFICE VISIT - HEALTHEAST (OUTPATIENT)
Dept: FAMILY MEDICINE | Facility: CLINIC | Age: 64
End: 2019-12-06

## 2019-12-06 ENCOUNTER — RECORDS - HEALTHEAST (OUTPATIENT)
Dept: GENERAL RADIOLOGY | Facility: CLINIC | Age: 64
End: 2019-12-06

## 2019-12-06 DIAGNOSIS — Z00.00 HEALTHCARE MAINTENANCE: ICD-10-CM

## 2019-12-06 DIAGNOSIS — E55.9 VITAMIN D DEFICIENCY DISEASE: ICD-10-CM

## 2019-12-06 DIAGNOSIS — E11.9 TYPE 2 DIABETES MELLITUS WITHOUT COMPLICATION, WITHOUT LONG-TERM CURRENT USE OF INSULIN (H): ICD-10-CM

## 2019-12-06 DIAGNOSIS — G47.00 PERSISTENT INSOMNIA: ICD-10-CM

## 2019-12-06 DIAGNOSIS — M77.31 HEEL SPUR, RIGHT: ICD-10-CM

## 2019-12-06 DIAGNOSIS — M79.671 PAIN OF RIGHT HEEL: ICD-10-CM

## 2019-12-06 DIAGNOSIS — Z71.89 ADVANCED DIRECTIVES, COUNSELING/DISCUSSION: ICD-10-CM

## 2019-12-06 DIAGNOSIS — M79.672 PAIN OF LEFT HEEL: ICD-10-CM

## 2019-12-06 DIAGNOSIS — M79.671 PAIN IN RIGHT FOOT: ICD-10-CM

## 2019-12-06 LAB
ALBUMIN SERPL-MCNC: 4.2 G/DL (ref 3.5–5)
ALP SERPL-CCNC: 102 U/L (ref 45–120)
ALT SERPL W P-5'-P-CCNC: 138 U/L (ref 0–45)
ANION GAP SERPL CALCULATED.3IONS-SCNC: 15 MMOL/L (ref 5–18)
AST SERPL W P-5'-P-CCNC: 110 U/L (ref 0–40)
BASOPHILS # BLD AUTO: 0.1 THOU/UL (ref 0–0.2)
BASOPHILS NFR BLD AUTO: 1 % (ref 0–2)
BILIRUB SERPL-MCNC: 0.8 MG/DL (ref 0–1)
BUN SERPL-MCNC: 12 MG/DL (ref 8–22)
CALCIUM SERPL-MCNC: 9.8 MG/DL (ref 8.5–10.5)
CHLORIDE BLD-SCNC: 105 MMOL/L (ref 98–107)
CHOLEST SERPL-MCNC: 305 MG/DL
CO2 SERPL-SCNC: 22 MMOL/L (ref 22–31)
CREAT SERPL-MCNC: 0.86 MG/DL (ref 0.6–1.1)
EOSINOPHIL # BLD AUTO: 0.3 THOU/UL (ref 0–0.4)
EOSINOPHIL NFR BLD AUTO: 5 % (ref 0–6)
ERYTHROCYTE [DISTWIDTH] IN BLOOD BY AUTOMATED COUNT: 13.2 % (ref 11–14.5)
FASTING STATUS PATIENT QL REPORTED: YES
GFR SERPL CREATININE-BSD FRML MDRD: >60 ML/MIN/1.73M2
GLUCOSE BLD-MCNC: 155 MG/DL (ref 70–125)
HBA1C MFR BLD: 7.1 % (ref 3.5–6)
HCT VFR BLD AUTO: 46.9 % (ref 35–47)
HDLC SERPL-MCNC: 64 MG/DL
HGB BLD-MCNC: 15.1 G/DL (ref 12–16)
HIV 1+2 AB+HIV1 P24 AG SERPL QL IA: NEGATIVE
LDLC SERPL CALC-MCNC: 213 MG/DL
LYMPHOCYTES # BLD AUTO: 2.4 THOU/UL (ref 0.8–4.4)
LYMPHOCYTES NFR BLD AUTO: 42 % (ref 20–40)
MCH RBC QN AUTO: 28.2 PG (ref 27–34)
MCHC RBC AUTO-ENTMCNC: 32.2 G/DL (ref 32–36)
MCV RBC AUTO: 88 FL (ref 80–100)
MONOCYTES # BLD AUTO: 0.5 THOU/UL (ref 0–0.9)
MONOCYTES NFR BLD AUTO: 8 % (ref 2–10)
NEUTROPHILS # BLD AUTO: 2.4 THOU/UL (ref 2–7.7)
NEUTROPHILS NFR BLD AUTO: 43 % (ref 50–70)
PLATELET # BLD AUTO: 314 THOU/UL (ref 140–440)
PMV BLD AUTO: 9.2 FL (ref 8.5–12.5)
POTASSIUM BLD-SCNC: 4.1 MMOL/L (ref 3.5–5)
PROT SERPL-MCNC: 7.3 G/DL (ref 6–8)
RBC # BLD AUTO: 5.35 MILL/UL (ref 3.8–5.4)
SODIUM SERPL-SCNC: 142 MMOL/L (ref 136–145)
TRIGL SERPL-MCNC: 140 MG/DL
TSH SERPL DL<=0.005 MIU/L-ACNC: 0.84 UIU/ML (ref 0.3–5)
WBC: 5.6 THOU/UL (ref 4–11)

## 2019-12-06 ASSESSMENT — MIFFLIN-ST. JEOR: SCORE: 1216.07

## 2019-12-09 LAB
25(OH)D3 SERPL-MCNC: 7.6 NG/ML (ref 30–80)
25(OH)D3 SERPL-MCNC: 7.6 NG/ML (ref 30–80)

## 2019-12-10 ENCOUNTER — COMMUNICATION - HEALTHEAST (OUTPATIENT)
Dept: FAMILY MEDICINE | Facility: CLINIC | Age: 64
End: 2019-12-10

## 2019-12-10 DIAGNOSIS — N63.0 BREAST MASS: ICD-10-CM

## 2020-01-20 ENCOUNTER — COMMUNICATION - HEALTHEAST (OUTPATIENT)
Dept: FAMILY MEDICINE | Facility: CLINIC | Age: 65
End: 2020-01-20

## 2020-01-28 ENCOUNTER — COMMUNICATION - HEALTHEAST (OUTPATIENT)
Dept: BEHAVIORAL HEALTH | Facility: HOSPITAL | Age: 65
End: 2020-01-28

## 2020-01-28 DIAGNOSIS — F32.89 OTHER DEPRESSION: ICD-10-CM

## 2020-01-30 ENCOUNTER — OFFICE VISIT - HEALTHEAST (OUTPATIENT)
Dept: BEHAVIORAL HEALTH | Facility: HOSPITAL | Age: 65
End: 2020-01-30

## 2020-01-30 DIAGNOSIS — F32.89 OTHER DEPRESSION: ICD-10-CM

## 2020-01-30 ASSESSMENT — MIFFLIN-ST. JEOR: SCORE: 1212.67

## 2020-02-12 ENCOUNTER — COMMUNICATION - HEALTHEAST (OUTPATIENT)
Dept: BEHAVIORAL HEALTH | Facility: CLINIC | Age: 65
End: 2020-02-12

## 2020-02-14 ENCOUNTER — OFFICE VISIT - HEALTHEAST (OUTPATIENT)
Dept: BEHAVIORAL HEALTH | Facility: HOSPITAL | Age: 65
End: 2020-02-14

## 2020-02-14 ENCOUNTER — COMMUNICATION - HEALTHEAST (OUTPATIENT)
Dept: BEHAVIORAL HEALTH | Facility: CLINIC | Age: 65
End: 2020-02-14

## 2020-02-14 DIAGNOSIS — F33.1 MODERATE EPISODE OF RECURRENT MAJOR DEPRESSIVE DISORDER (H): ICD-10-CM

## 2020-02-14 DIAGNOSIS — F41.1 ANXIETY STATE: ICD-10-CM

## 2020-02-14 DIAGNOSIS — F43.10 POSTTRAUMATIC STRESS DISORDER: ICD-10-CM

## 2020-03-04 ENCOUNTER — OFFICE VISIT - HEALTHEAST (OUTPATIENT)
Dept: FAMILY MEDICINE | Facility: CLINIC | Age: 65
End: 2020-03-04

## 2020-03-04 DIAGNOSIS — C50.211 MALIGNANT NEOPLASM OF UPPER-INNER QUADRANT OF RIGHT FEMALE BREAST, UNSPECIFIED ESTROGEN RECEPTOR STATUS (H): ICD-10-CM

## 2020-03-04 DIAGNOSIS — L90.5 PAIN IN SURGICAL SCAR: ICD-10-CM

## 2020-03-04 DIAGNOSIS — R21 RASH: ICD-10-CM

## 2020-03-04 DIAGNOSIS — E11.9 TYPE 2 DIABETES MELLITUS WITHOUT COMPLICATION, WITHOUT LONG-TERM CURRENT USE OF INSULIN (H): ICD-10-CM

## 2020-03-04 DIAGNOSIS — Z00.00 ROUTINE GENERAL MEDICAL EXAMINATION AT A HEALTH CARE FACILITY: ICD-10-CM

## 2020-03-04 DIAGNOSIS — L72.3 SEBACEOUS CYST: ICD-10-CM

## 2020-03-04 DIAGNOSIS — R52 PAIN IN SURGICAL SCAR: ICD-10-CM

## 2020-03-04 LAB
CREAT UR-MCNC: 120.5 MG/DL
HBA1C MFR BLD: 7.7 % (ref 3.5–6)
MICROALBUMIN UR-MCNC: 6.72 MG/DL (ref 0–1.99)
MICROALBUMIN/CREAT UR: 55.8 MG/G

## 2020-03-04 RX ORDER — BETAMETHASONE DIPROPIONATE 0.5 MG/ML
1 LOTION, AUGMENTED TOPICAL 2 TIMES DAILY PRN
Qty: 60 ML | Refills: 3 | Status: SHIPPED | OUTPATIENT
Start: 2020-03-04

## 2020-03-04 ASSESSMENT — PATIENT HEALTH QUESTIONNAIRE - PHQ9: SUM OF ALL RESPONSES TO PHQ QUESTIONS 1-9: 19

## 2020-03-05 LAB — HCV AB SERPL QL IA: NEGATIVE

## 2020-03-11 ENCOUNTER — RECORDS - HEALTHEAST (OUTPATIENT)
Dept: ADMINISTRATIVE | Facility: OTHER | Age: 65
End: 2020-03-11

## 2020-03-11 ENCOUNTER — COMMUNICATION - HEALTHEAST (OUTPATIENT)
Dept: FAMILY MEDICINE | Facility: CLINIC | Age: 65
End: 2020-03-11

## 2020-03-16 ENCOUNTER — RECORDS - HEALTHEAST (OUTPATIENT)
Dept: ADMINISTRATIVE | Facility: OTHER | Age: 65
End: 2020-03-16

## 2020-03-26 ENCOUNTER — OFFICE VISIT - HEALTHEAST (OUTPATIENT)
Dept: BEHAVIORAL HEALTH | Facility: HOSPITAL | Age: 65
End: 2020-03-26

## 2020-03-26 DIAGNOSIS — F32.89 OTHER DEPRESSION: ICD-10-CM

## 2020-03-26 DIAGNOSIS — F41.9 ANXIETY: ICD-10-CM

## 2020-04-21 ENCOUNTER — COMMUNICATION - HEALTHEAST (OUTPATIENT)
Dept: BEHAVIORAL HEALTH | Facility: HOSPITAL | Age: 65
End: 2020-04-21

## 2020-04-21 DIAGNOSIS — F32.89 OTHER DEPRESSION: ICD-10-CM

## 2020-04-23 ENCOUNTER — OFFICE VISIT - HEALTHEAST (OUTPATIENT)
Dept: BEHAVIORAL HEALTH | Facility: HOSPITAL | Age: 65
End: 2020-04-23

## 2020-04-23 DIAGNOSIS — G89.18 POSTOPERATIVE PAIN: ICD-10-CM

## 2020-04-23 DIAGNOSIS — F41.9 ANXIETY: ICD-10-CM

## 2020-05-10 ENCOUNTER — COMMUNICATION - HEALTHEAST (OUTPATIENT)
Dept: FAMILY MEDICINE | Facility: CLINIC | Age: 65
End: 2020-05-10

## 2020-05-10 DIAGNOSIS — N63.0 BREAST MASS: ICD-10-CM

## 2020-05-10 DIAGNOSIS — F51.02 ADJUSTMENT INSOMNIA: ICD-10-CM

## 2020-05-28 ENCOUNTER — OFFICE VISIT - HEALTHEAST (OUTPATIENT)
Dept: BEHAVIORAL HEALTH | Facility: HOSPITAL | Age: 65
End: 2020-05-28

## 2020-05-28 DIAGNOSIS — F32.89 OTHER DEPRESSION: ICD-10-CM

## 2020-05-28 ASSESSMENT — MIFFLIN-ST. JEOR: SCORE: 1253.49

## 2020-07-23 ENCOUNTER — OFFICE VISIT - HEALTHEAST (OUTPATIENT)
Dept: BEHAVIORAL HEALTH | Facility: HOSPITAL | Age: 65
End: 2020-07-23

## 2020-07-23 DIAGNOSIS — F32.89 OTHER DEPRESSION: ICD-10-CM

## 2020-08-13 ENCOUNTER — COMMUNICATION - HEALTHEAST (OUTPATIENT)
Dept: FAMILY MEDICINE | Facility: CLINIC | Age: 65
End: 2020-08-13

## 2020-08-13 DIAGNOSIS — G47.00 PERSISTENT INSOMNIA: ICD-10-CM

## 2020-09-24 ENCOUNTER — OFFICE VISIT - HEALTHEAST (OUTPATIENT)
Dept: BEHAVIORAL HEALTH | Facility: HOSPITAL | Age: 65
End: 2020-09-24

## 2020-09-24 DIAGNOSIS — F32.89 OTHER DEPRESSION: ICD-10-CM

## 2020-09-29 ENCOUNTER — COMMUNICATION - HEALTHEAST (OUTPATIENT)
Dept: FAMILY MEDICINE | Facility: CLINIC | Age: 65
End: 2020-09-29

## 2020-09-29 DIAGNOSIS — F51.02 ADJUSTMENT INSOMNIA: ICD-10-CM

## 2020-10-14 ENCOUNTER — COMMUNICATION - HEALTHEAST (OUTPATIENT)
Dept: FAMILY MEDICINE | Facility: CLINIC | Age: 65
End: 2020-10-14

## 2020-11-10 ENCOUNTER — OFFICE VISIT - HEALTHEAST (OUTPATIENT)
Dept: FAMILY MEDICINE | Facility: CLINIC | Age: 65
End: 2020-11-10

## 2020-11-10 ENCOUNTER — COMMUNICATION - HEALTHEAST (OUTPATIENT)
Dept: FAMILY MEDICINE | Facility: CLINIC | Age: 65
End: 2020-11-10

## 2020-11-10 DIAGNOSIS — Z23 NEED FOR IMMUNIZATION AGAINST INFLUENZA: ICD-10-CM

## 2020-11-10 DIAGNOSIS — E11.9 TYPE 2 DIABETES MELLITUS WITHOUT COMPLICATION, WITHOUT LONG-TERM CURRENT USE OF INSULIN (H): ICD-10-CM

## 2020-11-10 DIAGNOSIS — F41.9 ANXIETY: ICD-10-CM

## 2020-11-10 DIAGNOSIS — Z23 NEED FOR PNEUMOCOCCAL VACCINATION: ICD-10-CM

## 2020-11-10 DIAGNOSIS — E55.9 VITAMIN D DEFICIENCY DISEASE: ICD-10-CM

## 2020-11-10 LAB
ALBUMIN SERPL-MCNC: 4.4 G/DL (ref 3.5–5)
ALBUMIN UR-MCNC: NEGATIVE MG/DL
ALP SERPL-CCNC: 95 U/L (ref 45–120)
ALT SERPL W P-5'-P-CCNC: 168 U/L (ref 0–45)
ANION GAP SERPL CALCULATED.3IONS-SCNC: 10 MMOL/L (ref 5–18)
APPEARANCE UR: CLEAR
AST SERPL W P-5'-P-CCNC: 136 U/L (ref 0–40)
BILIRUB SERPL-MCNC: 0.5 MG/DL (ref 0–1)
BILIRUB UR QL STRIP: NEGATIVE
BUN SERPL-MCNC: 14 MG/DL (ref 8–22)
CALCIUM SERPL-MCNC: 10.4 MG/DL (ref 8.5–10.5)
CHLORIDE BLD-SCNC: 99 MMOL/L (ref 98–107)
CHOLEST SERPL-MCNC: 302 MG/DL
CO2 SERPL-SCNC: 28 MMOL/L (ref 22–31)
COLOR UR AUTO: YELLOW
CREAT SERPL-MCNC: 0.84 MG/DL (ref 0.6–1.1)
CREAT UR-MCNC: 109.8 MG/DL
ERYTHROCYTE [DISTWIDTH] IN BLOOD BY AUTOMATED COUNT: 11.3 % (ref 11–14.5)
FASTING STATUS PATIENT QL REPORTED: ABNORMAL
GFR SERPL CREATININE-BSD FRML MDRD: >60 ML/MIN/1.73M2
GLUCOSE BLD-MCNC: 177 MG/DL (ref 70–125)
GLUCOSE UR STRIP-MCNC: NEGATIVE MG/DL
HBA1C MFR BLD: 8.2 %
HCT VFR BLD AUTO: 44 % (ref 35–47)
HDLC SERPL-MCNC: 70 MG/DL
HGB BLD-MCNC: 15.1 G/DL (ref 12–16)
HGB UR QL STRIP: NEGATIVE
KETONES UR STRIP-MCNC: NEGATIVE MG/DL
LDLC SERPL CALC-MCNC: 201 MG/DL
LEUKOCYTE ESTERASE UR QL STRIP: NEGATIVE
MCH RBC QN AUTO: 30.4 PG (ref 27–34)
MCHC RBC AUTO-ENTMCNC: 34.3 G/DL (ref 32–36)
MCV RBC AUTO: 89 FL (ref 80–100)
MICROALBUMIN UR-MCNC: 4.15 MG/DL (ref 0–1.99)
MICROALBUMIN/CREAT UR: 37.8 MG/G
NITRATE UR QL: NEGATIVE
PH UR STRIP: 5.5 [PH] (ref 5–8)
PLATELET # BLD AUTO: 294 THOU/UL (ref 140–440)
PMV BLD AUTO: 7.4 FL (ref 7–10)
POTASSIUM BLD-SCNC: 4.7 MMOL/L (ref 3.5–5)
PROT SERPL-MCNC: 7.6 G/DL (ref 6–8)
RBC # BLD AUTO: 4.96 MILL/UL (ref 3.8–5.4)
SODIUM SERPL-SCNC: 137 MMOL/L (ref 136–145)
SP GR UR STRIP: 1.01 (ref 1–1.03)
TRIGL SERPL-MCNC: 156 MG/DL
UROBILINOGEN UR STRIP-ACNC: NORMAL
WBC: 7.4 THOU/UL (ref 4–11)

## 2020-11-10 ASSESSMENT — PATIENT HEALTH QUESTIONNAIRE - PHQ9: SUM OF ALL RESPONSES TO PHQ QUESTIONS 1-9: 21

## 2020-11-25 ENCOUNTER — OFFICE VISIT - HEALTHEAST (OUTPATIENT)
Dept: BEHAVIORAL HEALTH | Facility: CLINIC | Age: 65
End: 2020-11-25

## 2020-11-25 DIAGNOSIS — F43.10 POSTTRAUMATIC STRESS DISORDER: ICD-10-CM

## 2020-11-25 DIAGNOSIS — F33.1 MODERATE EPISODE OF RECURRENT MAJOR DEPRESSIVE DISORDER (H): ICD-10-CM

## 2020-11-25 DIAGNOSIS — F41.9 ANXIETY: ICD-10-CM

## 2020-12-12 ENCOUNTER — COMMUNICATION - HEALTHEAST (OUTPATIENT)
Dept: FAMILY MEDICINE | Facility: CLINIC | Age: 65
End: 2020-12-12

## 2020-12-12 DIAGNOSIS — E11.9 TYPE 2 DIABETES MELLITUS WITHOUT COMPLICATION, WITHOUT LONG-TERM CURRENT USE OF INSULIN (H): ICD-10-CM

## 2020-12-14 RX ORDER — LISINOPRIL 2.5 MG/1
TABLET ORAL
Qty: 90 TABLET | Refills: 3 | Status: SHIPPED | OUTPATIENT
Start: 2020-12-14 | End: 2021-09-27

## 2020-12-16 ENCOUNTER — RECORDS - HEALTHEAST (OUTPATIENT)
Dept: GENERAL RADIOLOGY | Facility: CLINIC | Age: 65
End: 2020-12-16

## 2020-12-16 ENCOUNTER — OFFICE VISIT - HEALTHEAST (OUTPATIENT)
Dept: FAMILY MEDICINE | Facility: CLINIC | Age: 65
End: 2020-12-16

## 2020-12-16 DIAGNOSIS — E11.9 DIABETES (H): ICD-10-CM

## 2020-12-16 DIAGNOSIS — E11.9 TYPE 2 DIABETES MELLITUS WITHOUT COMPLICATIONS (H): ICD-10-CM

## 2020-12-16 DIAGNOSIS — R06.09 DOE (DYSPNEA ON EXERTION): ICD-10-CM

## 2020-12-16 DIAGNOSIS — R42 DIZZINESS: ICD-10-CM

## 2020-12-16 DIAGNOSIS — R82.90 ABNORMAL URINE ODOR: ICD-10-CM

## 2020-12-16 DIAGNOSIS — Z12.11 SCREEN FOR COLON CANCER: ICD-10-CM

## 2020-12-16 DIAGNOSIS — I95.0 IDIOPATHIC HYPOTENSION: ICD-10-CM

## 2020-12-16 DIAGNOSIS — F32.1 MODERATE MAJOR DEPRESSION (H): ICD-10-CM

## 2020-12-16 DIAGNOSIS — S06.0X9A CONCUSSION WITH LOSS OF CONSCIOUSNESS, INITIAL ENCOUNTER: ICD-10-CM

## 2020-12-16 DIAGNOSIS — R42 DIZZINESS AND GIDDINESS: ICD-10-CM

## 2020-12-16 LAB
ALBUMIN UR-MCNC: ABNORMAL MG/DL
ANION GAP SERPL CALCULATED.3IONS-SCNC: 17 MMOL/L (ref 5–18)
APPEARANCE UR: CLEAR
BACTERIA #/AREA URNS HPF: ABNORMAL HPF
BILIRUB UR QL STRIP: ABNORMAL
BUN SERPL-MCNC: 22 MG/DL (ref 8–22)
CALCIUM SERPL-MCNC: 9.9 MG/DL (ref 8.5–10.5)
CHLORIDE BLD-SCNC: 98 MMOL/L (ref 98–107)
CO2 SERPL-SCNC: 20 MMOL/L (ref 22–31)
COLOR UR AUTO: YELLOW
CREAT SERPL-MCNC: 1.16 MG/DL (ref 0.6–1.1)
ERYTHROCYTE [DISTWIDTH] IN BLOOD BY AUTOMATED COUNT: 11.3 % (ref 11–14.5)
GFR SERPL CREATININE-BSD FRML MDRD: 47 ML/MIN/1.73M2
GLUCOSE BLD-MCNC: 113 MG/DL (ref 70–125)
GLUCOSE UR STRIP-MCNC: NEGATIVE MG/DL
HCT VFR BLD AUTO: 48.8 % (ref 35–47)
HGB BLD-MCNC: 15.9 G/DL (ref 12–16)
HGB UR QL STRIP: NEGATIVE
KETONES UR STRIP-MCNC: ABNORMAL MG/DL
LEUKOCYTE ESTERASE UR QL STRIP: ABNORMAL
MCH RBC QN AUTO: 29.7 PG (ref 27–34)
MCHC RBC AUTO-ENTMCNC: 32.5 G/DL (ref 32–36)
MCV RBC AUTO: 91 FL (ref 80–100)
NITRATE UR QL: NEGATIVE
PH UR STRIP: 5.5 [PH] (ref 5–8)
PLATELET # BLD AUTO: 287 THOU/UL (ref 140–440)
PMV BLD AUTO: 6.8 FL (ref 7–10)
POTASSIUM BLD-SCNC: 4.7 MMOL/L (ref 3.5–5)
RBC # BLD AUTO: 5.34 MILL/UL (ref 3.8–5.4)
RBC #/AREA URNS AUTO: ABNORMAL HPF
SODIUM SERPL-SCNC: 135 MMOL/L (ref 136–145)
SP GR UR STRIP: 1.02 (ref 1–1.03)
SQUAMOUS #/AREA URNS AUTO: ABNORMAL LPF
UROBILINOGEN UR STRIP-ACNC: ABNORMAL
WBC #/AREA URNS AUTO: ABNORMAL HPF
WBC: 8.4 THOU/UL (ref 4–11)

## 2020-12-16 RX ORDER — SIMVASTATIN 5 MG
5 TABLET ORAL EVERY EVENING
Qty: 90 TABLET | Refills: 4 | Status: SHIPPED | OUTPATIENT
Start: 2020-12-16 | End: 2022-01-13

## 2020-12-16 RX ORDER — METFORMIN HCL 500 MG
2000 TABLET, EXTENDED RELEASE 24 HR ORAL
Qty: 360 TABLET | Refills: 4 | Status: SHIPPED | OUTPATIENT
Start: 2020-12-16 | End: 2022-01-13

## 2020-12-16 ASSESSMENT — MIFFLIN-ST. JEOR: SCORE: 1176.38

## 2020-12-17 ENCOUNTER — COMMUNICATION - HEALTHEAST (OUTPATIENT)
Dept: FAMILY MEDICINE | Facility: CLINIC | Age: 65
End: 2020-12-17

## 2020-12-17 LAB — BACTERIA SPEC CULT: NO GROWTH

## 2020-12-28 ENCOUNTER — COMMUNICATION - HEALTHEAST (OUTPATIENT)
Dept: FAMILY MEDICINE | Facility: CLINIC | Age: 65
End: 2020-12-28

## 2020-12-28 ENCOUNTER — COMMUNICATION - HEALTHEAST (OUTPATIENT)
Dept: SCHEDULING | Facility: CLINIC | Age: 65
End: 2020-12-28

## 2020-12-29 ENCOUNTER — OFFICE VISIT - HEALTHEAST (OUTPATIENT)
Dept: FAMILY MEDICINE | Facility: CLINIC | Age: 65
End: 2020-12-29

## 2020-12-29 DIAGNOSIS — C50.211 MALIGNANT NEOPLASM OF UPPER-INNER QUADRANT OF RIGHT FEMALE BREAST, UNSPECIFIED ESTROGEN RECEPTOR STATUS (H): ICD-10-CM

## 2020-12-29 DIAGNOSIS — M79.2 NEURALGIA: ICD-10-CM

## 2020-12-29 DIAGNOSIS — S06.0X0A CONCUSSION WITHOUT LOSS OF CONSCIOUSNESS, INITIAL ENCOUNTER: ICD-10-CM

## 2020-12-29 DIAGNOSIS — S06.0X9D CONCUSSION WITH LOSS OF CONSCIOUSNESS, SUBSEQUENT ENCOUNTER: ICD-10-CM

## 2020-12-29 DIAGNOSIS — F32.1 MODERATE MAJOR DEPRESSION (H): ICD-10-CM

## 2020-12-29 DIAGNOSIS — F41.9 ANXIETY: ICD-10-CM

## 2020-12-29 DIAGNOSIS — M79.621 PAIN IN RIGHT AXILLA: ICD-10-CM

## 2020-12-29 DIAGNOSIS — E11.9 TYPE 2 DIABETES MELLITUS WITHOUT COMPLICATION, WITHOUT LONG-TERM CURRENT USE OF INSULIN (H): ICD-10-CM

## 2021-01-11 ENCOUNTER — OFFICE VISIT - HEALTHEAST (OUTPATIENT)
Dept: EDUCATION SERVICES | Facility: CLINIC | Age: 66
End: 2021-01-11

## 2021-01-11 DIAGNOSIS — E11.9 TYPE 2 DIABETES MELLITUS WITHOUT COMPLICATION, WITHOUT LONG-TERM CURRENT USE OF INSULIN (H): ICD-10-CM

## 2021-01-11 DIAGNOSIS — E11.9 DIABETES (H): ICD-10-CM

## 2021-01-13 ENCOUNTER — COMMUNICATION - HEALTHEAST (OUTPATIENT)
Dept: ADMINISTRATIVE | Facility: CLINIC | Age: 66
End: 2021-01-13

## 2021-01-20 ENCOUNTER — OFFICE VISIT - HEALTHEAST (OUTPATIENT)
Dept: EDUCATION SERVICES | Facility: CLINIC | Age: 66
End: 2021-01-20

## 2021-01-20 DIAGNOSIS — E11.9 TYPE 2 DIABETES MELLITUS WITHOUT COMPLICATION, WITHOUT LONG-TERM CURRENT USE OF INSULIN (H): ICD-10-CM

## 2021-01-25 ENCOUNTER — OFFICE VISIT - HEALTHEAST (OUTPATIENT)
Dept: BEHAVIORAL HEALTH | Facility: CLINIC | Age: 66
End: 2021-01-25

## 2021-01-25 DIAGNOSIS — F32.89 OTHER DEPRESSION: ICD-10-CM

## 2021-02-12 ENCOUNTER — COMMUNICATION - HEALTHEAST (OUTPATIENT)
Dept: ADMINISTRATIVE | Facility: CLINIC | Age: 66
End: 2021-02-12

## 2021-02-12 DIAGNOSIS — E11.9 TYPE 2 DIABETES MELLITUS WITHOUT COMPLICATION, WITHOUT LONG-TERM CURRENT USE OF INSULIN (H): ICD-10-CM

## 2021-02-24 ENCOUNTER — OFFICE VISIT - HEALTHEAST (OUTPATIENT)
Dept: EDUCATION SERVICES | Facility: CLINIC | Age: 66
End: 2021-02-24

## 2021-02-24 DIAGNOSIS — E11.9 TYPE 2 DIABETES MELLITUS WITHOUT COMPLICATION, WITHOUT LONG-TERM CURRENT USE OF INSULIN (H): ICD-10-CM

## 2021-02-25 ENCOUNTER — COMMUNICATION - HEALTHEAST (OUTPATIENT)
Dept: FAMILY MEDICINE | Facility: CLINIC | Age: 66
End: 2021-02-25

## 2021-02-25 DIAGNOSIS — N63.0 BREAST MASS: ICD-10-CM

## 2021-02-26 RX ORDER — LORAZEPAM 1 MG/1
1 TABLET ORAL EVERY 8 HOURS PRN
Qty: 30 TABLET | Refills: 0 | Status: SHIPPED | OUTPATIENT
Start: 2021-02-26 | End: 2022-02-16

## 2021-02-26 RX ORDER — OXYCODONE HYDROCHLORIDE 5 MG/1
TABLET ORAL
Qty: 20 TABLET | Refills: 0 | Status: SHIPPED | OUTPATIENT
Start: 2021-02-26

## 2021-03-12 ENCOUNTER — COMMUNICATION - HEALTHEAST (OUTPATIENT)
Dept: FAMILY MEDICINE | Facility: CLINIC | Age: 66
End: 2021-03-12

## 2021-03-12 DIAGNOSIS — G47.00 PERSISTENT INSOMNIA: ICD-10-CM

## 2021-03-12 RX ORDER — QUETIAPINE FUMARATE 25 MG/1
TABLET, FILM COATED ORAL
Qty: 135 TABLET | Refills: 4 | Status: SHIPPED | OUTPATIENT
Start: 2021-03-12 | End: 2021-09-03

## 2021-03-22 ENCOUNTER — OFFICE VISIT - HEALTHEAST (OUTPATIENT)
Dept: BEHAVIORAL HEALTH | Facility: CLINIC | Age: 66
End: 2021-03-22

## 2021-03-22 DIAGNOSIS — F32.89 OTHER DEPRESSION: ICD-10-CM

## 2021-03-24 ENCOUNTER — OFFICE VISIT - HEALTHEAST (OUTPATIENT)
Dept: EDUCATION SERVICES | Facility: CLINIC | Age: 66
End: 2021-03-24

## 2021-03-24 DIAGNOSIS — E13.9 DIABETES 1.5, MANAGED AS TYPE 2 (H): ICD-10-CM

## 2021-03-26 ENCOUNTER — AMBULATORY - HEALTHEAST (OUTPATIENT)
Dept: LAB | Facility: CLINIC | Age: 66
End: 2021-03-26

## 2021-03-26 DIAGNOSIS — E11.9 TYPE 2 DIABETES MELLITUS WITHOUT COMPLICATION, WITHOUT LONG-TERM CURRENT USE OF INSULIN (H): ICD-10-CM

## 2021-03-26 LAB — HBA1C MFR BLD: 6.6 %

## 2021-04-15 ENCOUNTER — COMMUNICATION - HEALTHEAST (OUTPATIENT)
Dept: FAMILY MEDICINE | Facility: CLINIC | Age: 66
End: 2021-04-15

## 2021-04-26 ENCOUNTER — COMMUNICATION - HEALTHEAST (OUTPATIENT)
Dept: FAMILY MEDICINE | Facility: CLINIC | Age: 66
End: 2021-04-26

## 2021-05-17 ENCOUNTER — OFFICE VISIT - HEALTHEAST (OUTPATIENT)
Dept: BEHAVIORAL HEALTH | Facility: CLINIC | Age: 66
End: 2021-05-17

## 2021-05-17 DIAGNOSIS — F32.89 OTHER DEPRESSION: ICD-10-CM

## 2021-05-17 RX ORDER — DULOXETIN HYDROCHLORIDE 30 MG/1
CAPSULE, DELAYED RELEASE ORAL
Qty: 90 CAPSULE | Refills: 1 | Status: SHIPPED | OUTPATIENT
Start: 2021-05-17 | End: 2021-07-14

## 2021-05-27 ASSESSMENT — PATIENT HEALTH QUESTIONNAIRE - PHQ9
SUM OF ALL RESPONSES TO PHQ QUESTIONS 1-9: 19
SUM OF ALL RESPONSES TO PHQ QUESTIONS 1-9: 21

## 2021-05-27 NOTE — TELEPHONE ENCOUNTER
Called patient. Patient did not answer, left voicemail message with call back number. Patient is due to a diabetes follow up appt.  Please assist patient in scheduling an appt, Thanks.

## 2021-05-27 NOTE — PATIENT INSTRUCTIONS - HE
Continue medications as prescribed  Have your pharmacy contact us for a refill if you are running low on medications (We may ask you to come into clinic to get a refill from the nurse  No Alcohol or drug use  No driving if sedated  Call the clinic with any questions or concerns   Reach out for help if you feel like hurting yourself or others ([facility] [phone]: [address] [facility], [phone] or [facility] Suicide Hotline [phone] , call 911 or go to nearest Emergency room    Follow up as directed, for your appointments, per your After Visit Summary Form.

## 2021-05-27 NOTE — PROGRESS NOTES
"Mental Health Visit Note    4/5/2019    Start time: 14:05    Stop Time: 15:00 Session # 3    Session Type: Patient is presenting for an Individual session.    Alexus Garcia is a 63 y.o. female is being seen today for    Chief Complaint   Patient presents with      Follow Up     Anxiety     Depression     PTSD     Psycho social issues     Follow up in regards to ongoing symptom management of anxiety and depression.     New symptoms or complaints:\" I have been busy with my son's living situation. I have not contacted my surgeon yet. Winter was rough on me and I don't know if I should go for this again\"    Functional Impairment:   Personal: 4  Family: 4  Social: 4  Work: 0    Clinical assessment of mental status:   I've re-evaluated this with the patient and no changes were noted from 3/15/2019 to today 4/05/2019.Alexus Garcia presented on time.   She was oriented x3, open and cooperative, and dressed appropriately for this session and weather. Her memory was Normal cognitive functioning .  Her speech was  Within normal.  Language was appropriate.  Concentration and focus is Within normal. Psychosis is not noted or reported. She reports her mood is tired and sleepy.  Affect is congruent with speech and is Congruent w/content of speech.  Fund of knowledge is adequate. Insight is adequate for therapy.    Suicidal/Homicidal Ideation present: Patient denies suicidal and homicidal ideations/means or plans.     Patient's impression of their current status: Patient reported she has been pulled into some conversation with a wife of the son's ILS worker. She notes son is being cared for and his needs are being met but some how there was some misunderstanding with the wife the ILS worker. This has been concerning the patient as she feels son might be sent out. Patient notes this would be hard on her as she has no place for him. Patient agreed to work with this family regarding their differences in opinion as long as she " "believes the son is well treated despite those differences. Patient has been debating around scheduling to see her surgeon regarding her\" surgery or just leaving it\". She has shared some ambivalence around the surgery. She feel it won't be successful and that she will miss the whole summer in bed. Patient completed her updated treatment plan. This has been over due to many concerns she brings in. Per her update, she still has severe anxiety and severe depression. though shared she was safe. Will return in 2 weeks.    Therapist impression of patients current state: This 63 y.o. White or  female presented on time. She had a lot to say about her own health and her son's living arrangement. She was receptive to day's intervention which allowed her to share her feelings and concerns. Writer provided empathy and challenged the patient's thoughts about wanting to decline surgery for what seems to be a cancer. Writer challenged her around her eunice and her\"decision to let it go and not bother with surgery\". Was challenged around her [facility] eunice and decision to not seeking help stating that  \"God knows my problems; I won't have a surgery\". A the end of the session, patient was agreeable to follow through her surgery process. Writer also reminded the patient about her self determination for her care. Patient will need consistence in the sessions to work in skills that will help with her anxiety and depression. Per her update today, they are still high.     Assessment tools used today include:  ROBERT-7: 15, PHQ-9: 17, CAGE-AID:0, WHODAS 2:0 35 %: C-SSRS: 0    Type of psychotherapeutic technique provided: Client centered, Solution-focused and CBT; updated treatment plan    Progress toward short term goals: still hesitant to connect with her surgeon team.     Review of long term goals: Treatment Plan updated: 4/05/2019. Next review: 7/5/2019  Diagnosis:   1. Moderate single current episode of major depressive disorder " (H)    2. Chronic post-traumatic stress disorder (PTSD)    3. Generalized anxiety disorder    No change    Plan and Follow-up:  Goals reviewed and updated today:4/05/2019  1. Patient plans to continue taking the medications as prescribed by her NP/MD  2. Patient plans to contact her surgeons about her readiness for surgery.   3. Patient will contact [facility] oncology with questions related to their support group.    5.Patient plans to follow up with therapy in 2 weeks    Discharge Criteria/Planning: Client has chronic symptoms and ongoing therapy for maintenance stability recommended.     Performed and documented by CHAD Freeman- KATHE; [facility] 4/5/2019

## 2021-05-27 NOTE — PROGRESS NOTES
Outpatient Mental Health Treatment Plan    Name:  Alexus Garcia  :  1955  MRN:  [id number]    Treatment Plan:  Initial Treatment Plan  Intake/initial treatment plan date:  2018  Benefit and risks and alternatives have been discussed: Yes  Is this treatment appropriate with minimal intrusion/restrictions: Yes  Estimated duration of treatment:  10 +  Anticipated frequency of services:  Every 2 weeks  Necessity for frequency: This frequency is needed to establish therapeutic goals and for continuity of care in order to monitor progress.  Necessity for treatment: To address cognitive, behavioral, and/or emotional barriers in order to work toward goals and to improve quality of life.    Plan:      Depression    Goal:  Decrease average depression level from 3 to 2.   Strategies:    ?[x] Decrease social isolation     [x] Increase involvement in meaningful activities     ?[x] Discuss sleep hygiene     ?[x] Explore thoughts and expectations about self and others     ?[x] Process grief (loss of significant person, independence, role,  etc.)     ?[x] Assess for suicide risk     ?[x] Implement physical activity routine (with physician approval)     [] Consider introduction of bibliotherapy and/or videos     [x] Continue compliance with medical treatment plan (or explore barriers)       Degree to which this is a problem (1-4): 3     Degree to which goal is met (1-4):2    Date of Review:2019  ?   ? Anxiety    Goal:  Decrease average anxiety level from 4 to 3.   Strategies: ? [x]Learn and practice relaxation techniques and other coping strategies (e.g., thought stopping, reframing, meditation)     ? [x] Increase involvement in meaningful activities     ? [x] Discuss sleep hygiene     ? [x] Explore thoughts and expectations about self and others     ? [x] Identify and monitor triggers for panic/anxiety symptoms     ? [x] Implement physical activity routine (with physician approval)     ? [] Consider introduction  of bibliotherapy and/or videos     ? [x] Continue compliance with medical treatment plan (or explore barriers)      Degree to which this is a problem (1-4): 3   Degree to which goal is met (1-4): 1  Date of Review:7/05/2019    Functional Impairment: 1=Not at all/Rarely  2=Some days  3=Most Days  4=Every Day   Personal: 4  Family: 4  Social: 3  Work: 0    Diagnosis:  1.Severe episode of recurrent major depressive disorder, without psychotic features  2. Chronic post-traumatic stress disorder (PTSD)     WHODAS 2.0 12-item version= 17  H1= 28  H2= 25  H3= 30    Clinical assessments completed: ROBERT-7:15; PHQ-9:17; C-SSRS: 0;  CAGE-AID: 0/4; Whodas 2.0: 35 %    Strengths: Self awareness, resilient    Limitations: Mental health and medical health issues.     Cultural Considerations:  . Navigates the system on her own. Uses [employer]. Has medical coverage.     Persons responsible for this plan:  ? [x] Patient ? [x] Provider ? [] Other: __________________    Provider:Performed and documented by OG Freeman; [facility] 4/5/2019  Date:  4/5/2019  Time:  02: 44 PM      Patient Signature:____________________________________ Date: ______________     Guardian Signature: __________________________________ Date: ______________     Therapist Signature: __________________________________ Date: ______________

## 2021-05-27 NOTE — PATIENT INSTRUCTIONS - HE
Goals reviewed and updated today:4/05/2019  1. Patient plans to continue taking the medications as prescribed by her NP/MD  2. Patient plans to contact her surgeons about her readiness for surgery.   3. Patient will contact [facility] with questions related to their support group.    5.Patient plans to follow up with therapy in 2 weeks

## 2021-05-27 NOTE — PROGRESS NOTES
"Psychiatric  Progress Note  Date of visit:4/5/2019         Discussion of Care and Treatment Recommendations:   This is a 63 y.o. female with a history of depression and PTSD who presents to the clinic for a follow up appointment.      PCP managing sleep :  Prescribing  Lorazepam ,Seroquel and Ambien   PCP managing Gabapentin for neurological pain        Last visit 3/7/19  Recommendation at last visit .  1 .Continue with  Psychotherapy   2.Highly recommend : Community Support groups   3. Restart  Cymbalta at  30 mg daily    4. RTC- 4 weeks, call in between visit with any question     Patient and I reviewed diagnosis and treatment plan and patient agrees with following recommendations:  Ongoing education given regarding diagnostic and treatment options with adequate verbalization of understanding.  Plan   1 .Continue with  Psychotherapy   2.Highly recommend : Community Support groups   3. Co ntinue   Cymbalta at  30 mg daily    4. 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  She is feeling less dizzy and thinks that Cymbalta may have been making her dizzy because she has continued to take all her other medications including Seroquel and benzodiazepines.  As far as depression and anxiety is concerned but she reports that current medications are managing psychiatric " symptoms well despite the fact that her cancer is back and she is here to schedule the surgery.  She has been procrastinating scheduling her exploratory surgery and giving excuses of winter weather but now she knows that it is warmer and she has to do with and has no choice.  She is a little bit frustrated because is the third time she is having constant but she reports that she remains strongly undetermined.  She is in psychotherapy and finds it helpful.  She denies suicidal homicidal ideations and if.  She verbally contracts for safety.  Symptoms.  She is sleeping well and is looking at everything in a positive way which is helpful for her.  She may consider increasing Cymbalta after her surgery but for now she would like to remain on the current dose.  I will have her return to the clinic in 4 weeks for follow-up appointment encouraged her to call in between visits any questions or concerns.      Mental Status Examination:   General: Adequate hygiene, cooperative  Speech: Normal in rate and tone  Language: Intact  Thought process: Coherent  Thought content:                           Auditory hallucinations-Denies                          Visual Hallucinations - Denies                         Delusions Absent                           Loose Associations:  No                          Suicidal thoughts: Denies                          Homicidal thoughts:Denies                        Affect: Neutral  Mood: Neutral   Intellectual functioning: Within normal limits  Memory: Within normal limits  Fund of knowledge: Average  Attention and concentration: Within normal limits  Gait: Steady  Psychomotor activity: Calm, no agitation  Muscles: No atrophy, no abnormal movements  InSight and judgment: Fair      Drug/treatment history and current pattern of use:   Denies       Medication changes: See Above   Medication adherence: compliant  Medication side effects: absent  Information about medications: Side effects, benefits and  alternative treatments discussed and patient agrees with capacity to do so.    Psychotherapy: Supportive therapy day-to-day living    Education: Diet, exercise, abstinence from drugs and alcohol, patient will not drive if sedated and medications or  under influence of any substance    Lab Results:   Personally reviewed and discussed with the patient    Lab Results   Component Value Date    WBC 4.0 04/20/2018    HGB 14.2 04/20/2018    HCT 43.0 04/20/2018     04/20/2018    CHOL 300 (H) 04/20/2018    TRIG 136 04/20/2018    HDL 68 04/20/2018    ALT 27 10/11/2017    AST 21 10/11/2017     04/20/2018    K 3.8 04/20/2018     04/20/2018    CREATININE 0.70 04/20/2018    BUN 9 04/20/2018    CO2 21 (L) 04/20/2018    TSH 0.29 (L) 04/20/2018    INR 0.96 09/20/2016    HGBA1C 6.4 (H) 04/20/2018    MICROALBUR 9.41 (H) 04/20/2018       Vital signs:  LMP 09/16/2006     Allergies:   Allergies   Allergen Reactions     Aztreonam Hives     Castor Oil Hives     Cefaclor Anaphylaxis     Cephalexin Anaphylaxis     Cephalexin Monohydrate Hives     Chlorhexidine Hives     White clear stuff that you lather during surgery that dries unto the skin and stays on the skin that's almost like a superglue. It was very itchy and skin gets very red.     Ciprofloxacin Anaphylaxis     Clarithromycin Anaphylaxis, Anxiety, Dizziness, Hives, Itching, Nausea And Vomiting, Rash and Shortness Of Breath     Clindamycin Hives     Penicillins Anaphylaxis     Propofol Nausea Only and Headache     Scopolamine Anaphylaxis, Hives and Swelling     Swollen tongue, eye swelled closed     Sulfa (Sulfonamide Antibiotics) Anaphylaxis     Sulfasalazine Hives     Tetracyclines Anaphylaxis     Vancomycin Hives     Adhesive Itching     Irritation where the tape had contact     Cytoxan [Cyclophosphamide] Hives     severe     Erythromycin Base Hives     Hydrocodone Nausea And Vomiting     Neupogen [Filgrastim] Hives     severe     Paper Tape Other (See Comments)      Surgical paper tape - redness. States if left on too long leaves little cuts on her skin     Taxol [Paclitaxel] Hives     severe     Taxotere [Docetaxel] Hives          Medications:       Current Outpatient Medications on File Prior to Visit   Medication Sig Dispense Refill     acetaminophen (TYLENOL) 500 MG tablet Take 1,000 mg by mouth 3 (three) times a day as needed for pain.       aspirin-acetaminophen-caffeine (EXCEDRIN MIGRAINE) 250-250-65 mg per tablet Take 2 tablets by mouth every 8 (eight) hours as needed for pain (headaches).       betamethasone, augmented, (DIPROLENE) 0.05 % lotion Apply 1 application topically 2 (two) times a day as needed. 60 mL 3     blood glucose test strips Use 1 each As Directed as needed. Dispense brand per patient's insurance at pharmacy discretion. 100 strip 2     blood-glucose meter Misc Use daily as directed 100 each 4     calcium citrate-vitamin D (CITRACAL+D) 315-200 mg-unit per tablet Take 1 tablet by mouth.       cholecalciferol, vitamin D3, 1,000 unit tablet Take 2,000 Units by mouth 2 (two) times a day.       DULoxetine (CYMBALTA) 30 MG capsule Take 1 capsule (30 mg total) by mouth daily. 30 capsule 1     gabapentin (NEURONTIN) 400 MG capsule TAKE 1 CAPSULE (400 MG TOTAL) BY MOUTH 3 (THREE) TIMES A DAY. 270 capsule 4     generic lancets Use 1 each As Directed as needed. Dispense brand per patient's insurance at pharmacy discretion. 100 each 4     hydrOXYzine HCl (ATARAX) 25 MG tablet Take 25 mg by mouth.       LORazepam (ATIVAN) 1 MG tablet Take 1 tablet (1 mg total) by mouth every 8 (eight) hours as needed for anxiety. 90 tablet 0     melatonin 10 mg Tab Take 10 mg by mouth at bedtime.       melatonin 3 mg Tab tablet Take 10 mg by mouth.       metFORMIN (GLUCOPHAGE-XR) 500 MG 24 hr tablet Take 500 mg by mouth.       multivitamin therapeutic w/minerals (THERAPEUTIC-M) 27-0.4 mg Tab tablet Take 100 capsules by mouth.       naproxen sodium (ALEVE) 220 MG tablet Take 440  mg by mouth as needed.       ONETOUCH DELICA LANCETS 30 gauge Misc Use 1 each As Directed as needed. DISPENSE BRAND PER PATIENT'S INSURANCE AT PHARMACY DISCRETION.  4     ONETOUCH ULTRA2 USE DAILY AS DIRECTED  0     oxyCODONE (ROXICODONE) 5 MG immediate release tablet Take 15 mg by mouth.       polyethylene glycol (MIRALAX) 17 gram packet Take 17 g by mouth.       QUEtiapine (SEROQUEL) 25 MG tablet        zolpidem (AMBIEN) 10 mg tablet Take 1 tablet (10 mg total) by mouth at bedtime as needed for sleep. 90 tablet 0     No current facility-administered medications on file prior to visit.                 Review of Systems:      ROS:       10 point ROS was negative except for the items listed in HPI.    review of Systems - General ROS: negative for - chills, fatigue, night sweats, sleep disturbance or weight loss  Respiratory ROS: no cough, shortness of breath, or wheezing  negative for - cough or shortness of breath  Cardiovascular ROS: no chest pain or dyspnea on exertion  Gastrointestinal ROS: no abdominal pain, change in bowel habits, or black or bloody stools  Musculoskeletal ROS: negative  negative for - gait disturbance, joint pain, joint stiffness, muscle pain or muscular weakness  Neurological ROS: negative for - confusion, gait disturbance, headaches or seizures    Coordination of Care:   More than 25 minutes spent on this visit  with more than 50% of time spent on coordination of care including: Educating patient about diagnosis, prognosis, side effects and benefits of medications, diet, exercise.  Time also spent providing supportive therapy regarding above issues.    This note was created using a dictation system. All typing errors or contextual distortion is unintentional and software inherent.

## 2021-05-27 NOTE — PROGRESS NOTES
Patient is here for follow up appointment.  She states her depression managed. Anxiety is mild, but daily.  Sleep is okay.  She sees speciose for therapy.  Her PMD manages her anxiety and sleep.  She denies SI.  She states it is more of a feeling of frustration due to having cancer three times.    Correct pharmacy verified with patient and confirmed in snapshot? [x] yes []no    Charge captured ? [x] yes  [] no    Medications Phoned  to Pharmacy [] yes [x]no  Name of Pharmacist:  List Medications, including dose, quantity and instructions      Medication Prescriptions given to patient   [] yes  [x] no   List the name of the drug the prescription was written for.       Medications ordered this visit were e-scribed.  Verified by order class [x] yes  [] no  Cymbalta refill     Medication changes or discontinuations were communicated to patient's pharmacy: [] yes  [x] NA    UA collected [] yes  [x] no    [place] Prescription Monitoring Program Reviewed? [] yes  [x] no    Referrals were made to:  NA    Future appointment was made: [x] yes  [] no    Dictation completed at time of chart check: [x] yes  [] no    I have checked the documentation for today s encounters and the above information has been reviewed and completed.

## 2021-05-28 NOTE — TELEPHONE ENCOUNTER
CMT left message x 3. Please review message thread below and advise the patient as indicated. Please schedule if necessary or indicated in message thread. Per clinic outreach policy, writer will call three times before postponing 3 months. CMT will then repeat call cycle (3 calls) and send letter if unable to reach the patient.

## 2021-05-28 NOTE — TELEPHONE ENCOUNTER
Controlled Substance Refill Request  Medication:   Requested Prescriptions     Pending Prescriptions Disp Refills     zolpidem (AMBIEN) 10 mg tablet 90 tablet 0     Sig: Take 1 tablet (10 mg total) by mouth at bedtime as needed for sleep.     Date Last Fill: 3/14/18  Pharmacy: [facility]    Submit electronically to pharmacy    Controlled Substance Agreement on File:   Encounter-Level CSA Scan Date:    There are no encounter-level csa scan date.         Last office visit: 12/12/18

## 2021-05-28 NOTE — TELEPHONE ENCOUNTER
RN cannot approve Refill Request    RN can NOT refill this medication Please provide linking Dx for this medication  . Last office visit: 12/12/2018 Daxa Morales MD Last Physical: 10/11/2017 Last MTM visit: Visit date not found Last visit same specialty: 12/12/2018 Daxa Morales MD.  Next visit within 3 mo: Visit date not found  Next physical within 3 mo: Visit date not found      Avelino Santos [facility] Triage/Med Refill 5/4/2019    Requested Prescriptions   Pending Prescriptions Disp Refills     gabapentin (NEURONTIN) 400 MG capsule [Pharmacy Med Name: GABAPENTIN 400 MG CAPSULE] 270 capsule 3     Sig: TAKE 1 CAPSULE (400 MG TOTAL) BY MOUTH 3 (THREE) TIMES A DAY.       Gabapentin/Levetiracetam/Tiagabine Refill Protocol  Passed - 5/4/2019 12:27 AM        Passed - PCP or prescribing provider visit in past 12 months or next 3 months     Last office visit with prescriber/PCP: 12/12/2018 Daxa Morales MD OR same dept: 12/12/2018 Daxa Morales MD OR same specialty: 12/12/2018 Daxa Morales MD  Last physical: 10/11/2017 Last MTM visit: Visit date not found   Next visit within 3 mo: Visit date not found  Next physical within 3 mo: Visit date not found  Prescriber OR PCP: Daxa Morales MD  Last diagnosis associated with med order: There are no diagnoses linked to this encounter.  If protocol passes may refill for 12 months if within 3 months of last provider visit (or a total of 15 months).

## 2021-05-29 NOTE — PATIENT INSTRUCTIONS - HE
Goals reviewed and updated today:5/31/2019  1. Patient plans to continue taking the medications as prescribed by her NP/MD  2. Patient plans to contact her surgeons about her readiness for surgery. Encouraged today   3.Patient plans to follow up with therapy in 2 weeks

## 2021-05-29 NOTE — PROGRESS NOTES
"Psychiatric  Progress Note  Date of visit:5/31/2019         Discussion of Care and Treatment Recommendations:   This is a 63 y.o. female with a history of depression and PTSD who presents to the clinic for a follow up appointment.      PCP managing sleep :  Prescribing  Lorazepam ,Seroquel and Ambien   PCP managing Gabapentin for neurological pain        Last visit 4/5/19  Recommendation at last visit .  1 .Continue with  Psychotherapy   2.Highly recommend : Community Support groups   3. Co ntinue   Cymbalta at  30 mg daily    4. RTC- 4 weeks, call in between visit with any question   Patient and I reviewed diagnosis and treatment plan and patient agrees with following recommendations:  Ongoing education given regarding diagnostic and treatment options with adequate verbalization of understanding.  Plan   1 .Continue with  Psychotherapy   2.Highly recommend : Community Support groups   3. Continue   Cymbalta at  30 mg daily    4. 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  Sleep has been a major issue for her but mainly due to noises created by a train.  [place] and this w[place] her up several times in the night.  Her primary does prescribe pain medications for her.  She is here to schedule her surgery call the procedure for breast cancer.  She has been " intentionally procrastinating on this because she really does not want to go through it but she knows it is time to do it and she is now ready to call and schedule it.  She is hoping that this time the procedure will go better and most more than in the past.  Current medications have been helpful in managing depression and anxiety.  She recently had family dynamics that were bit complicated and anxiety provoking but they have since resolved.  She would like to continue with the current medications and return to the clinic in a month.  I encouraged her to call in between visits any questions or concerns.                Mental Status Examination:   General: Adequate hygiene, cooperative  Speech: Normal in rate and tone  Language: Intact  Thought process: Coherent  Thought content:                           Auditory hallucinations-Denies                          Visual Hallucinations - Denies                         Delusions Absent                           Loose Associations:  No                          Suicidal thoughts: Denies                          Homicidal thoughts:Denies                        Affect: Neutral  Mood: Neutral   Intellectual functioning: Within normal limits  Memory: Within normal limits  Fund of knowledge: Average  Attention and concentration: Within normal limits  Gait: Steady  Psychomotor activity: Calm, no agitation  Muscles: No atrophy, no abnormal movements  InSight and judgment: Fair      Drug/treatment history and current pattern of use:   Denies     Medication changes: See Above   Medication adherence: compliant  Medication side effects: absent  Information about medications: Side effects, benefits and alternative treatments discussed and patient agrees .    Psychotherapy: Supportive therapy day-to-day living    Education: Diet, exercise, abstinence from drugs and alcohol, patient will not drive if sedated and medications or  under influence of any substance    Lab Results:    Personally reviewed and discussed with the patient    Lab Results   Component Value Date    WBC 4.0 04/20/2018    HGB 14.2 04/20/2018    HCT 43.0 04/20/2018     04/20/2018    CHOL 300 (H) 04/20/2018    TRIG 136 04/20/2018    HDL 68 04/20/2018    ALT 27 10/11/2017    AST 21 10/11/2017     04/20/2018    K 3.8 04/20/2018     04/20/2018    CREATININE 0.70 04/20/2018    BUN 9 04/20/2018    CO2 21 (L) 04/20/2018    TSH 0.29 (L) 04/20/2018    INR 0.96 09/20/2016    HGBA1C 6.4 (H) 04/20/2018    MICROALBUR 9.41 (H) 04/20/2018       Vital signs:  /90   Pulse (!) 126   Temp 98.3  F (36.8  C) (Oral)   Wt 148 lb (67.1 kg)   LMP 09/16/2006   BMI 26.22 kg/m      Allergies:   Allergies   Allergen Reactions     Aztreonam Hives     Castor Oil Hives     Cefaclor Anaphylaxis     Cephalexin Anaphylaxis     Cephalexin Monohydrate Hives     Chlorhexidine Hives     White clear stuff that you lather during surgery that dries unto the skin and stays on the skin that's almost like a superglue. It was very itchy and skin gets very red.     Ciprofloxacin Anaphylaxis     Clarithromycin Anaphylaxis, Anxiety, Dizziness, Hives, Itching, Nausea And Vomiting, Rash and Shortness Of Breath     Clindamycin Hives     Penicillins Anaphylaxis     Propofol Nausea Only and Headache     Scopolamine Anaphylaxis, Hives and Swelling     Swollen tongue, eye swelled closed     Sulfa (Sulfonamide Antibiotics) Anaphylaxis     Sulfasalazine Hives     Tetracyclines Anaphylaxis     Vancomycin Hives     Adhesive Itching     Irritation where the tape had contact     Cytoxan [Cyclophosphamide] Hives     severe     Erythromycin Base Hives     Hydrocodone Nausea And Vomiting     Neupogen [Filgrastim] Hives     severe     Paper Tape Other (See Comments)     Surgical paper tape - redness. States if left on too long leaves little cuts on her skin     Taxol [Paclitaxel] Hives     severe     Taxotere [Docetaxel] Hives          Medications:          Current Outpatient Medications on File Prior to Visit   Medication Sig Dispense Refill     acetaminophen (TYLENOL) 500 MG tablet Take 1,000 mg by mouth 3 (three) times a day as needed for pain.       aspirin-acetaminophen-caffeine (EXCEDRIN MIGRAINE) 250-250-65 mg per tablet Take 2 tablets by mouth every 8 (eight) hours as needed for pain (headaches).       betamethasone, augmented, (DIPROLENE) 0.05 % lotion Apply 1 application topically 2 (two) times a day as needed. 60 mL 3     blood glucose test strips Use 1 each As Directed as needed. Dispense brand per patient's insurance at pharmacy discretion. 100 strip 2     blood-glucose meter Misc Use daily as directed 100 each 4     calcium citrate-vitamin D (CITRACAL+D) 315-200 mg-unit per tablet Take 1 tablet by mouth.       cholecalciferol, vitamin D3, 1,000 unit tablet Take 2,000 Units by mouth 2 (two) times a day.       DULoxetine (CYMBALTA) 30 MG capsule Take 1 capsule (30 mg total) by mouth daily. 90 capsule 0     gabapentin (NEURONTIN) 400 MG capsule TAKE 1 CAPSULE (400 MG TOTAL) BY MOUTH 3 (THREE) TIMES A DAY. 270 capsule 3     generic lancets Use 1 each As Directed as needed. Dispense brand per patient's insurance at pharmacy discretion. 100 each 4     hydrOXYzine HCl (ATARAX) 25 MG tablet Take 25 mg by mouth.       LORazepam (ATIVAN) 1 MG tablet Take 1 tablet (1 mg total) by mouth every 8 (eight) hours as needed for anxiety. 90 tablet 0     melatonin 10 mg Tab Take 10 mg by mouth at bedtime.       melatonin 3 mg Tab tablet Take 10 mg by mouth.       metFORMIN (GLUCOPHAGE-XR) 500 MG 24 hr tablet Take 500 mg by mouth.       multivitamin therapeutic w/minerals (THERAPEUTIC-M) 27-0.4 mg Tab tablet Take 100 capsules by mouth.       naproxen sodium (ALEVE) 220 MG tablet Take 440 mg by mouth as needed.       ONETOUCH DELICA LANCETS 30 gauge Misc Use 1 each As Directed as needed. DISPENSE BRAND PER PATIENT'S INSURANCE AT PHARMACY DISCRETION.  4     ONETOUCH  ULTRA2 USE DAILY AS DIRECTED  0     oxyCODONE (ROXICODONE) 5 MG immediate release tablet Take 15 mg by mouth.       polyethylene glycol (MIRALAX) 17 gram packet Take 17 g by mouth.       QUEtiapine (SEROQUEL) 25 MG tablet        zolpidem (AMBIEN) 10 mg tablet Take 1 tablet (10 mg total) by mouth at bedtime as needed for sleep. 90 tablet 0     No current facility-administered medications on file prior to visit.               Review of Systems:      ROS:       10 point ROS was negative except for the items listed in HPI.    Review of Systems - General ROS: negative for - chills, fatigue, night sweats, sleep disturbance or weight loss    Respiratory ROS: no cough, shortness of breath, or wheezing  negative for - cough or shortness of breath  Cardiovascular ROS: no chest pain or dyspnea on exertion  Gastrointestinal ROS: no abdominal pain, change in bowel habits, or black or bloody stools  Musculoskeletal ROS: negative  negative for - gait disturbance, joint pain, joint stiffness, muscle pain or muscular weakness  Neurological ROS: negative for - confusion, gait disturbance, headaches or seizures    Coordination of Care:   More than 25 minutes spent on this visit  with more than 50% of time spent on coordination of care including: Educating patient about diagnosis, prognosis, side effects and benefits of medications, diet, exercise.  Time also spent providing supportive therapy regarding above issues.    This note was created using a dictation system. All typing errors or contextual distortion is unintentional and software inherent.

## 2021-05-29 NOTE — PROGRESS NOTES
OFFICE VISIT NOTE      Assessment & Plan   Alexus Garcia is a 63 y.o. female with diabetes and a h/o breast cancer for which she's had numerous surgeries and treatments. She is facing another surgery but has put it off, finding herself to need a break from so much medical intervention. I tried to console her on this, as her actions are appropriate.      Diagnoses and all orders for this visit:    Diabetes mellitus (H)  -     Ambulatory referral to Ophthalmology  -     Glycosylated Hemoglobin A1c  -     Comprehensive Metabolic Panel  -     HM1(CBC and Differential)  -     Ferritin  -     HM1 (CBC with Diff)    Bilateral malignant neoplasm involving both nipple and areola in female, unspecified estrogen receptor status (H)  -     Comprehensive Metabolic Panel  -     HM1(CBC and Differential)  -     HM1 (CBC with Diff)    Anxiety    Pain in right axilla  -     HM1(CBC and Differential)  -     HM1 (CBC with Diff)        Daxa Morales MD    The following are part of a depression follow up plan for the patient:  coping support assessment and coping support management          Subjective:   Chief Complaint:  Follow-up (diabetes)    63 y.o. female.     1) has not done surgery - scared since she always has complications  Dr. Handley said last time she does not have to do it, BUT she said she'd do it.  She's been having pain in the arm pit/lymph node area.  Knows she also needs plastic surgeries, too.      2) sleep - a bit worse than usual plus family issues and train activity (increased in the summer)    3) wants to move - has been on a wait list for 7 years; if she could use a second bedroom for exercise therapies - which she needs - for her pilates machine, dumbells, balance ball and treadmill; ab mccoy (some of these are in storage because her one-bedroom does not have space for it). Could get in sooner into a two-bedroom.    Current Outpatient Medications   Medication Sig Note     acetaminophen (TYLENOL) 500 MG  tablet Take 1,000 mg by mouth 3 (three) times a day as needed for pain.      aspirin-acetaminophen-caffeine (EXCEDRIN MIGRAINE) 250-250-65 mg per tablet Take 2 tablets by mouth every 8 (eight) hours as needed for pain (headaches).      betamethasone, augmented, (DIPROLENE) 0.05 % lotion Apply 1 application topically 2 (two) times a day as needed.      blood glucose test strips Use 1 each As Directed as needed. Dispense brand per patient's insurance at pharmacy discretion.      blood-glucose meter Misc Use daily as directed      calcium citrate-vitamin D (CITRACAL+D) 315-200 mg-unit per tablet Take 1 tablet by mouth.      cholecalciferol, vitamin D3, 1,000 unit tablet Take 2,000 Units by mouth 2 (two) times a day.      DULoxetine (CYMBALTA) 30 MG capsule Take 1 capsule (30 mg total) by mouth daily.      gabapentin (NEURONTIN) 400 MG capsule TAKE 1 CAPSULE (400 MG TOTAL) BY MOUTH 3 (THREE) TIMES A DAY.      generic lancets Use 1 each As Directed as needed. Dispense brand per patient's insurance at pharmacy discretion.      hydrOXYzine HCl (ATARAX) 25 MG tablet Take 25 mg by mouth.      LORazepam (ATIVAN) 1 MG tablet Take 1 tablet (1 mg total) by mouth every 8 (eight) hours as needed for anxiety.      melatonin 10 mg Tab Take 10 mg by mouth at bedtime. 5/31/2018: Received from: [facility] Received Sig: Take 10 mg by mouth.     metFORMIN (GLUCOPHAGE-XR) 500 MG 24 hr tablet Take 500 mg by mouth.      multivitamin therapeutic w/minerals (THERAPEUTIC-M) 27-0.4 mg Tab tablet Take 100 capsules by mouth.      naproxen sodium (ALEVE) 220 MG tablet Take 440 mg by mouth as needed. 5/31/2018: Received from: [facility] Received Sig: Take 440 mg by mouth.     ONETOUCH DELICA LANCETS 30 gauge Misc Use 1 each As Directed as needed. DISPENSE BRAND PER PATIENT'S INSURANCE AT PHARMACY DISCRETION.      ONETOUCH ULTRA2 USE DAILY AS DIRECTED      oxyCODONE (ROXICODONE) 5 MG immediate release tablet Take 15 mg by mouth.       "polyethylene glycol (MIRALAX) 17 gram packet Take 17 g by mouth.      QUEtiapine (SEROQUEL) 25 MG tablet       zolpidem (AMBIEN) 10 mg tablet Take 1 tablet (10 mg total) by mouth at bedtime as needed for sleep.      melatonin 3 mg Tab tablet Take 10 mg by mouth. 6/4/2019: Patient unable to delete via [employer].       PSFHx: appropriate sections of PMH completed/filled in   Tobacco Status:  She  reports that she quit smoking about 8 years ago. Her smoking use included cigarettes. She smoked 1.00 pack per day. She has never used smokeless tobacco.    Review of Systems:  No fever.  Right plantar faciitis. All other systems negative except as noted above.    Objective:    /78 (Patient Site: Right Arm, Patient Position: Sitting, Cuff Size: Adult Small) Comment (Patient Site): Lower/forearm  Pulse 74   Temp 98.1  F (36.7  C) (Oral)   Ht 5' 2.8\" (1.595 m)   Wt 145 lb (65.8 kg)   LMP 09/16/2006   SpO2 94%   BMI 25.85 kg/m    GENERAL: No acute distress.  Mood: low to fair  Insight: good  Judgment: good  Affect: a bit flat    Spent 40 min face to face with patient with more the 50% spent in counseling, education and coordination of care and discussing mood/anxiety, surgery, self-care and self-compassion, meds.    "

## 2021-05-29 NOTE — PROGRESS NOTES
Patient is here for follow up appointment.  She states her depression is managed with the Cymbalta.  Her PMD manages her anxiety and sleep.  She sees speciose for therapy.      Correct pharmacy verified with patient and confirmed in snapshot? [x] yes []no    Charge captured ? [x] yes  [] no    Medications Phoned  to Pharmacy [] yes [x]no  Name of Pharmacist:  List Medications, including dose, quantity and instructions      Medication Prescriptions given to patient   [] yes  [x] no   List the name of the drug the prescription was written for.       Medications ordered this visit were e-scribed.  Verified by order class [] yes  [x] no  No medications ordered at this appointment    Medication changes or discontinuations were communicated to patient's pharmacy: [] yes  [x] NA    UA collected [] yes  [x] no    [place] Prescription Monitoring Program Reviewed? [] yes  [x] no    Referrals were made to:  [facility]    Future appointment was made: [x] yes  [] no    Dictation completed at time of chart check: [x] yes  [] no    I have checked the documentation for today s encounters and the above information has been reviewed and completed.

## 2021-05-29 NOTE — PROGRESS NOTES
"  Mental Health Visit Note    5/31/2019    Start time: 14:05    Stop Time:14:55   Session #4    Session Type: Patient is presenting for an Individual session.    Alexus Garcia is a 63 y.o. female is being seen today for    Chief Complaint   Patient presents with      Follow Up     Anxiety     PTSD     Depression     Interpersonal stress   .     Follow up in regards to ongoing symptom management of anxiety and depression.     New symptoms or complaints:  \" I don't know what to do. I don't want to have a new surgery. I am broken in pieces already. I don't want to enter in relationship. None would love me. I don't trust anyone anymore. I am hurt enough\"    Functional Impairment:   Personal: 3  Family: 3  Social: 3  Work: 4     Clinical assessment of mental status:   Alexus Garcia presented on time.   She was oriented x3, open and cooperative, and dressed appropriately for this session and weather. Her memory was Normal cognitive functioning .  Her speech was  Within normal.  Language was appropriate.  Concentration and focus is Within normal. Psychosis is not noted or reported. She reports her mood is Irritable, Anxious and Depressed.  Affect is congruent with speech and is Congruent w/content of speech.  Fund of knowledge is adequate. Insight is adequate for therapy.    Suicidal/Homicidal Ideation present: Patient denies suicidal and homicidal ideations/means or plans.     Patient's impression of their current status: Patient stated, \" I have not made my decision but I feel I should not do a surgery. I don't see myself with another surgery. I am broken already.  I am also not going to allow anyone in my life again. They have hurt me. I am going to be on my own.\" She will return in 2 weeks.    Therapist impression of patients current state: This 63 y.o. White or  female presented on time for her session. Writer processed the patient's feelings around her health issues and relationship. Invited her to " weight the pros and cons of not seeing her oncologist. Patient also expressed interest to continue wit therapy consistently so she can process her past trauma.      Assessment tools used today include: How do you feel today?     Type of psychotherapeutic technique provided: Insight oriented, Client centered, Solution-focused and CBT    Progress toward short term goals:Made it to her appointment today    Review of long term goals: Treatment Plan updated: 4/05/2019 next review: 7/05/2019    Diagnosis:   1. Current moderate episode of major depressive disorder, unspecified whether recurrent (H)    2. Chronic post-traumatic stress disorder (PTSD)    3. Generalized anxiety disorder     No change    Plan and Follow-up:     Goals reviewed and updated today:5/31/2019  1. Patient plans to continue taking the medications as prescribed by her NP/MD  2. Patient plans to contact her surgeons about her readiness for surgery. Encouraged today   3.Patient plans to follow up with therapy in 2 weeks    Discharge Criteria/Planning: Client has chronic symptoms and ongoing therapy for maintenance stability recommended.    Performed and documented by CHAD Freeman- KATHE; [facility] 5/31/2019

## 2021-05-29 NOTE — PATIENT INSTRUCTIONS - HE
Continue medications as prescribed  Have your pharmacy contact us for a refill if you are running low on medications (We may ask you to come into clinic to get a refill from the nurse  No Alcohol or drug use  No driving if sedated  Call the clinic with any questions or concerns   Reach out for help if you feel like hurting yourself or others ([facility] [phone]: [address] [facility], [address] or [facility] Suicide Hotline [phone] , call 911 or go to nearest Emergency room    Follow up as directed, for your appointments, per your After Visit Summary Form.

## 2021-05-30 VITALS — HEIGHT: 63 IN | WEIGHT: 143.8 LBS | BODY MASS INDEX: 25.48 KG/M2

## 2021-05-30 VITALS — HEIGHT: 63 IN | BODY MASS INDEX: 25.69 KG/M2 | WEIGHT: 145 LBS

## 2021-05-30 VITALS — BODY MASS INDEX: 24.8 KG/M2 | WEIGHT: 140 LBS | HEIGHT: 63 IN

## 2021-05-30 VITALS — WEIGHT: 145.5 LBS | BODY MASS INDEX: 25.77 KG/M2

## 2021-05-30 VITALS — HEIGHT: 63 IN | WEIGHT: 143 LBS | BODY MASS INDEX: 25.34 KG/M2

## 2021-05-30 NOTE — PROGRESS NOTES
"Psychiatric  Progress Note  Date of visit:7/19/2019         Discussion of Care and Treatment Recommendations:   This is a 63 y.o. female with  a history of depression and PTSD who presents to the clinic for a follow up appointment.      PCP managing sleep :  Prescribing  Lorazepam ,Seroquel and Ambien   PCP managing Gabapentin for neurological pain           Last visit  5/31/19.  Recommendation at last visit .  1 .Continue with  Psychotherapy   2.Highly recommend : Community Support groups   3. Continue   Cymbalta at  30 mg daily    4. RTC- 4 weeks, call in between visit with any question     Patient and I reviewed diagnosis and treatment plan and patient agrees with following recommendations:  Ongoing education given regarding diagnostic and treatment options with adequate verbalization of understanding.  Plan   1 .Continue with  Psychotherapy   2.Highly recommend : Community Support groups   3. Continue   Cymbalta at  30 mg daily    4. 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  She is anxious and feeling a bit depressed but has no choice but to do it.  She thinks the medications are helpful and would not make any changes or adjustments.  She does receive benzodiazepines prescribed by PCP to help with sleep and anxiety.  She is disappointed that the mass in her breast " came back and she had a very bad experience last time she had a mastectomy and is dreading her repeat to the experience but also is somehow hopeful.  We spent some time talking to listen to her that she vented and I wished her well for the surgery.  She will be making an appointment after her surgery but for now she would like to continue the same medications.  Mental Status Examination:   General: Adequate hygiene, cooperative  Speech: Normal in rate and tone  Language: Intact  Thought process: Coherent  Thought content:         Auditory hallucinations-Denies          Visual Hallucinations - Denies                         Delusions Absent                           Loose Associations:  No                          Suicidal thoughts: Denies                          Homicidal thoughts:Denies                        Affect: Neutral  Mood: Neutral   Intellectual functioning: Within normal limits  Memory: Within normal limits  Fund of knowledge: Average  Attention and concentration: Within normal limits  Gait: Steady  Psychomotor activity: Calm, no agitation  Muscles: No atrophy, no abnormal movements  InSight and judgment: Fair      Drug/treatment history and current pattern of use:   Denies :     Medication changes: See Above   Medication adherence: compliant  Medication side effects: absent  Information about medications: Side effects, benefits and alternative treatments discussed and patient agrees .    Psychotherapy: Supportive therapy day-to-day living    Education: Diet, exercise, abstinence from drugs and alcohol, patient will not drive if sedated and medications or  under influence of any substance    Lab Results:   Personally reviewed and discussed with the patient    Lab Results   Component Value Date    WBC 5.8 06/04/2019    HGB 15.9 06/04/2019    HCT 47.5 (H) 06/04/2019     06/04/2019    CHOL 300 (H) 04/20/2018    TRIG 136 04/20/2018    HDL 68 04/20/2018     (H) 06/04/2019    AST 75 (H)  06/04/2019     06/04/2019    K 4.4 06/04/2019     06/04/2019    CREATININE 0.99 06/04/2019    BUN 12 06/04/2019    CO2 18 (L) 06/04/2019    TSH 0.29 (L) 04/20/2018    INR 0.96 09/20/2016    HGBA1C 6.6 (H) 06/04/2019    MICROALBUR 9.41 (H) 04/20/2018       Vital signs:    Vitals:    07/19/19 1309   BP: 118/66   Pulse: (!) 126   Temp: 97.9  F (36.6  C)   TempSrc: Oral   Weight: 144 lb (65.3 kg)     Allergies:   Allergies   Allergen Reactions     Aztreonam Hives     Castor Oil Hives     Cefaclor Anaphylaxis     Cephalexin Anaphylaxis     Cephalexin Monohydrate Hives     Chlorhexidine Hives     White clear stuff that you lather during surgery that dries unto the skin and stays on the skin that's almost like a superglue. It was very itchy and skin gets very red.     Ciprofloxacin Anaphylaxis     Clarithromycin Anaphylaxis, Anxiety, Dizziness, Hives, Itching, Nausea And Vomiting, Rash and Shortness Of Breath     Clindamycin Hives     Penicillins Anaphylaxis     Propofol Nausea Only and Headache     Scopolamine Anaphylaxis, Hives and Swelling     Swollen tongue, eye swelled closed     Sulfa (Sulfonamide Antibiotics) Anaphylaxis     Sulfasalazine Hives     Tetracyclines Anaphylaxis     Vancomycin Hives     Adhesive Itching     Irritation where the tape had contact     Cytoxan [Cyclophosphamide] Hives     severe     Erythromycin Base Hives     Hydrocodone Nausea And Vomiting     Neupogen [Filgrastim] Hives     severe     Paper Tape Other (See Comments)     Surgical paper tape - redness. States if left on too long leaves little cuts on her skin     Taxol [Paclitaxel] Hives     severe     Taxotere [Docetaxel] Hives          Medications:       Current Outpatient Medications on File Prior to Visit   Medication Sig Dispense Refill     acetaminophen (TYLENOL) 500 MG tablet Take 1,000 mg by mouth 3 (three) times a day as needed for pain.       aspirin-acetaminophen-caffeine (EXCEDRIN MIGRAINE) 250-250-65 mg per tablet  Take 2 tablets by mouth every 8 (eight) hours as needed for pain (headaches).       betamethasone, augmented, (DIPROLENE) 0.05 % lotion Apply 1 application topically 2 (two) times a day as needed. 60 mL 3     blood glucose test strips Use 1 each As Directed as needed. Dispense brand per patient's insurance at pharmacy discretion. 100 strip 2     blood-glucose meter Misc Use daily as directed 100 each 4     calcium citrate-vitamin D (CITRACAL+D) 315-200 mg-unit per tablet Take 1 tablet by mouth.       cholecalciferol, vitamin D3, 1,000 unit tablet Take 2,000 Units by mouth 2 (two) times a day.       DULoxetine (CYMBALTA) 30 MG capsule TAKE 1 CAPSULE BY MOUTH EVERY DAY 90 capsule 0     gabapentin (NEURONTIN) 400 MG capsule TAKE 1 CAPSULE (400 MG TOTAL) BY MOUTH 3 (THREE) TIMES A DAY. 270 capsule 3     generic lancets Use 1 each As Directed as needed. Dispense brand per patient's insurance at pharmacy discretion. 100 each 4     hydrOXYzine HCl (ATARAX) 25 MG tablet Take 25 mg by mouth.       LORazepam (ATIVAN) 1 MG tablet Take 1 tablet (1 mg total) by mouth every 8 (eight) hours as needed for anxiety. 90 tablet 0     melatonin 10 mg Tab Take 10 mg by mouth at bedtime.       melatonin 3 mg Tab tablet Take 10 mg by mouth.       metFORMIN (GLUCOPHAGE-XR) 500 MG 24 hr tablet Take 500 mg by mouth.       multivitamin therapeutic w/minerals (THERAPEUTIC-M) 27-0.4 mg Tab tablet Take 100 capsules by mouth.       naproxen sodium (ALEVE) 220 MG tablet Take 440 mg by mouth as needed.       ONETOUCH DELICA LANCETS 30 gauge Misc Use 1 each As Directed as needed. DISPENSE BRAND PER PATIENT'S INSURANCE AT PHARMACY DISCRETION.  4     ONETOUCH ULTRA2 USE DAILY AS DIRECTED  0     oxyCODONE (ROXICODONE) 5 MG immediate release tablet Take 15 mg by mouth.       polyethylene glycol (MIRALAX) 17 gram packet Take 17 g by mouth.       QUEtiapine (SEROQUEL) 25 MG tablet        QUEtiapine (SEROQUEL) 25 MG tablet TAKE 1/2 TABLET (25 MG TOTAL) BY  MOUTH AT BEDTIME. 45 tablet 4     zolpidem (AMBIEN) 10 mg tablet Take 1 tablet (10 mg total) by mouth at bedtime as needed for sleep. 90 tablet 0     No current facility-administered medications on file prior to visit.                 Review of Systems:      ROS:       10 point ROS was negative except for the items listed in HPI.    Review of Systems - General ROS: negative for - chills, fatigue, night sweats, sleep disturbance or weight loss    Respiratory ROS: no cough, shortness of breath, or wheezing  negative for - cough or shortness of breath  Cardiovascular ROS: no chest pain or dyspnea on exertion  Gastrointestinal ROS: no abdominal pain, change in bowel habits, or black or bloody stools  Musculoskeletal ROS: negative  negative for - gait disturbance, joint pain, joint stiffness, muscle pain or muscular weakness  Neurological ROS: negative for - confusion, gait disturbance, headaches or seizures    Coordination of Care:   More than 25 minutes spent on this visit  with more than 50% of time spent on coordination of care including: Educating patient about diagnosis, prognosis, side effects and benefits of medications, diet, exercise.  Time also spent providing supportive therapy regarding above issues.    This note was created using a dictation system. All typing errors or contextual distortion is unintentional and software inherent.

## 2021-05-30 NOTE — PROGRESS NOTES
"Mental Health Visit Note    7/19/2019    Start time: 14:05    Stop Time: 15:00   Session # 5    Session Type: Patient is presenting for an Individual session.  A 53+ minutes session was necessary patient has lots going that needed to be processed.     Alexus Garcia is a 63 y.o. female is being seen today for    Chief Complaint   Patient presents with      Follow Up     Anxiety     PTSD     Depression     medical issues     Follow up in regards to ongoing symptom management of anxiety/PTSD, and depression.     New symptoms or complaints: \" I feel sad and depressed, I am confused\"     Functional Impairment:   Personal: 4  Family: 4  Social: 4  Work: 0    Clinical assessment of mental status:   Alexus Garcia presented on time.   She was oriented x3, open and cooperative, and dressed appropriately for this session and weather. Her memory was Normal cognitive functioning .  Her speech was  Excessive.  Language was appropriate.  Concentration and focus is Brief. Psychosis is not noted or reported. She reports her mood is Anxious and Depressed.  Affect is congruent with speech and is Tearful, Anxious and Depressed.  Fund of knowledge is adequate. Insight is adequate for therapy.    Suicidal/Homicidal Ideation present: Patient denies suicidal and homicidal ideations/means or plans.     Patient's impression of their current status: The patient reported,\" I am stressed, I don't know what to do about my problems. I can't go through the surgery again. i don't want massectomy\". Patient also stated she is planning to schedule for surgery but she is not sure if it is worth it. She believe, \" based on my experience, it won't work. It won't be taken out. I might just die\"  Patient indicated she won't return to session until after the surgery. She will then complete her updated TP.    Therapist impression of patients current state: This 63 y.o. White or  female presented on time, with sadness, anger and frustration " "around her \"complicated health\" Writer processed the patient's feelings, reinforced the fact she made it today for both therapy and psychiatry.  Validated her feelings as appropriate and invited her to problem solving. It appears that pt is discouraged and is anticipating difficulties with her breast care which is based on her past multiple health issues.     Assessment tools used today include: How do you feel today?    Type of psychotherapeutic technique provided: Insight oriented, Client centered, Solution-focused and CBT    Progress toward short term goals:made it to her session    Review of long term goals: Treatment Plan updated: 4/5/2019 next review: 7/05/2019- patient had lots in mind that needed to be processed today. Her update will be completed at the next visit.     Diagnosis:   1. Current moderate episode of major depressive disorder, unspecified whether recurrent (H)    2. Chronic post-traumatic stress disorder (PTSD)    3. Generalized anxiety disorder    No change    Plan and Follow-up:     1. Patient will follow through with scheduling to see her surgeon   2.  Patient will continue to practice coping skills learned today to review to the symptoms of anxiety and depression.  3.  Patient plans to return in the therapy after her surgery.    Discharge Criteria/Planning: Patient will continue with follow-up until therapy can be discontinued without return of signs and symptoms.    Performed and documented by CHAD Freeman- KATHE; [facility] 7/19/2019  "

## 2021-05-30 NOTE — TELEPHONE ENCOUNTER
Please review ASAP, pt is awaiting at the pharmacy, going out of town    Date of Last Office Visit: 5/31/19  Date of Next Office Visit: 7/19/19  No shows since last visit: no  Cancellations since last visit: 6/28/19 - Cx per pt - vacation  ED visits since last visit: no    Medication Cymbalta 30 date last ordered: 4/12/19  Qty: 90  Refills: 0    DULoxetine (CYMBALTA) 30 MG capsule 90 capsule 0 4/12/2019  No   Sig - Route: Take 1 capsule (30 mg total) by mouth daily. - Oral       Lapse in therapy greater than 7 days: no  Medication refill request verified as identical to current order: yes  Result of Last DAM, VPA, Li+ Level, CBC, or Carbamazepine Level (at or since last visit): N/A        []Eligibility - not seen in last year    []Supervision - no future appointment    []Compliance     []Verification - order discrepancy    []Controlled Medication    []90 - day supply request    [x]Other LPN pending medications    Current Medication list:    acetaminophen (TYLENOL) 500 MG tablet Take 1,000 mg by mouth 3 (three) times a day as needed for pain.   aspirin-acetaminophen-caffeine (EXCEDRIN MIGRAINE) 250-250-65 mg per tablet Take 2 tablets by mouth every 8 (eight) hours as needed for pain (headaches).   betamethasone, augmented, (DIPROLENE) 0.05 % lotion Apply 1 application topically 2 (two) times a day as needed.   blood glucose test strips Use 1 each As Directed as needed. Dispense brand per patient's insurance at pharmacy discretion.   blood-glucose meter Misc Use daily as directed   calcium citrate-vitamin D (CITRACAL+D) 315-200 mg-unit per tablet Take 1 tablet by mouth.   cholecalciferol, vitamin D3, 1,000 unit tablet Take 2,000 Units by mouth 2 (two) times a day.   DULoxetine (CYMBALTA) 30 MG capsule Take 1 capsule (30 mg total) by mouth daily.   gabapentin (NEURONTIN) 400 MG capsule TAKE 1 CAPSULE (400 MG TOTAL) BY MOUTH 3 (THREE) TIMES A DAY.   generic lancets Use 1 each As Directed as needed. Dispense brand per  patient's insurance at pharmacy discretion.   hydrOXYzine HCl (ATARAX) 25 MG tablet Take 25 mg by mouth.   LORazepam (ATIVAN) 1 MG tablet Take 1 tablet (1 mg total) by mouth every 8 (eight) hours as needed for anxiety.   melatonin 10 mg Tab Take 10 mg by mouth at bedtime.   melatonin 3 mg Tab tablet Take 10 mg by mouth.   metFORMIN (GLUCOPHAGE-XR) 500 MG 24 hr tablet Take 500 mg by mouth.   multivitamin therapeutic w/minerals (THERAPEUTIC-M) 27-0.4 mg Tab tablet Take 100 capsules by mouth.   naproxen sodium (ALEVE) 220 MG tablet Take 440 mg by mouth as needed.   ONETOUCH DELICA LANCETS 30 gauge Misc Use 1 each As Directed as needed. DISPENSE BRAND PER PATIENT'S INSURANCE AT PHARMACY DISCRETION.   ONETOUCH ULTRA2 USE DAILY AS DIRECTED   oxyCODONE (ROXICODONE) 5 MG immediate release tablet Take 15 mg by mouth.   polyethylene glycol (MIRALAX) 17 gram packet Take 17 g by mouth.   QUEtiapine (SEROQUEL) 25 MG tablet    zolpidem (AMBIEN) 10 mg tablet Take 1 tablet (10 mg total) by mouth at bedtime as needed for sleep.         Medication Plan of Care at last office visit with MD/CNP:    PLAN:  1 .Continue with  Psychotherapy   2.Highly recommend : Community Support groups   3. Continue   Cymbalta at  30 mg daily    4. RTC- 4 weeks, call in between visit with any question     [place], [place], [place], and [place] : NA

## 2021-05-30 NOTE — PATIENT INSTRUCTIONS - HE
1. Patient will follow through with scheduling to see her surgeon   2.  Patient will continue to practice coping skills learned today to review to the symptoms of anxiety and depression.  3.  Patient plans to return in the therapy after her surgery.

## 2021-05-30 NOTE — PATIENT INSTRUCTIONS - HE
Continue medications as prescribed  Have your pharmacy contact us for a refill if you are running low on medications (We may ask you to come into clinic to get a refill from the nurse  No Alcohol or drug use  No driving if sedated  Call the clinic with any questions or concerns   Reach out for help if you feel like hurting yourself or others ([facility] [phone]: [address], [address] or [facility] Suicide Hotline [phone] , call 911 or go to nearest Emergency room    Follow up as directed, for your appointments, per your After Visit Summary Form.

## 2021-05-30 NOTE — TELEPHONE ENCOUNTER
RN cannot approve Refill Request    RN can NOT refill this medication med is not covered by policy/route to provider. Last office visit: 6/4/2019 Daxa Morales MD Last Physical: 10/11/2017 Last MTM visit: Visit date not found Last visit same specialty: 6/4/2019 Daxa Morales MD.  Next visit within 3 mo: Visit date not found  Next physical within 3 mo: Visit date not found      Edelmira Morrissey, [facility] Triage/Med Refill 7/14/2019    Requested Prescriptions   Pending Prescriptions Disp Refills     QUEtiapine (SEROQUEL) 25 MG tablet [Pharmacy Med Name: QUETIAPINE FUMARATE 25 MG TAB] 45 tablet 4     Sig: TAKE 1/2 TABLET (25 MG TOTAL) BY MOUTH AT BEDTIME.       There is no refill protocol information for this order

## 2021-05-30 NOTE — PROGRESS NOTES
Patient is here for follow up appointment.  She states her depression is situational due to her left breast mass.  Her PMD manages her anxiety and sleep.  She sees [facility] for therapy.      Correct pharmacy verified with patient and confirmed in snapshot? [x] yes []no    Charge captured ? [x] yes  [] no    Medications Phoned  to Pharmacy [] yes [x]no  Name of Pharmacist:  List Medications, including dose, quantity and instructions      Medication Prescriptions given to patient   [] yes  [x] no   List the name of the drug the prescription was written for.       Medications ordered this visit were e-scribed.  Verified by order class [x] yes  [] no    Medication changes or discontinuations were communicated to patient's pharmacy: [] yes  [x] NA    UA collected [] yes  [x] no    [place] Prescription Monitoring Program Reviewed? [] yes  [x] no    Referrals were made to:  NA    Future appointment was made: [] yes  [x] no  She will call for a follow up appointment as she is having surgery    Dictation completed at time of chart check: [x] yes  [] no    I have checked the documentation for today s encounters and the above information has been reviewed and completed.

## 2021-05-31 VITALS — BODY MASS INDEX: 26.8 KG/M2 | HEIGHT: 63 IN | WEIGHT: 151.25 LBS

## 2021-05-31 VITALS — WEIGHT: 149 LBS | BODY MASS INDEX: 26.4 KG/M2 | HEIGHT: 63 IN

## 2021-05-31 VITALS — BODY MASS INDEX: 26.75 KG/M2 | WEIGHT: 151 LBS | HEIGHT: 63 IN

## 2021-05-31 NOTE — ANESTHESIA CARE TRANSFER NOTE
Last vitals:   Vitals:    08/09/19 1235   BP: 101/65   Pulse: 111   Resp: 16   Temp: 97.8 F   SpO2: 97%     Patient's level of consciousness is drowsy  Spontaneous respirations: yes  Maintains airway independently: yes  Dentition unchanged: yes  Oropharynx: oropharynx clear of all foreign objects    QCDR Measures:  ASA# 20 - Surgical Safety Checklist: WHO surgical safety checklist completed prior to induction    PQRS# 430 - Adult PONV Prevention: 4558F - Pt received => 2 anti-emetic agents (different classes) preop & intraop  ASA# 8 - Peds PONV Prevention: NA - Not pediatric patient, not GA or 2 or more risk factors NOT present  PQRS# 424 - Rosy-op Temp Management: 4559F - At least one body temp DOCUMENTED => 35.5C or 95.9F within required timeframe  PQRS# 426 - PACU Transfer Protocol: - Transfer of care checklist used  ASA# 14 - Acute Post-op Pain: ASA14B - Patient did NOT experience pain >= 7 out of 10

## 2021-05-31 NOTE — TELEPHONE ENCOUNTER
Patient Returning Call  Reason for call:  Patient calling back  Information relayed to patient:  Relayed message below from [place]equ[place].  Patient would really prefer to have her PCP for this visit.   Patient has additional questions:  Yes  If YES, what are your questions/concerns:  Any possible way Dr. Morales could squeeze patient in on 08/06,08/07 or 08/08  Okay to leave a detailed message?: Yes

## 2021-05-31 NOTE — ANESTHESIA POSTPROCEDURE EVALUATION
Patient: Alexus Garcia  Left Breast Biopsy  Anesthesia type: MAC    Patient location: Phase II Recovery  Last vitals:   Vitals Value Taken Time   /77 8/9/2019 12:45 PM   Temp 36.3  C (97.4  F) 8/9/2019 12:31 PM   Pulse 102 8/9/2019 12:45 PM   Resp 16 8/9/2019 12:45 PM   SpO2 95 % 8/9/2019 12:45 PM     Post vital signs: stable  Level of consciousness: awake, alert and oriented  Post-anesthesia pain: pain controlled  Post-anesthesia nausea and vomiting: no  Pulmonary: unassisted, return to baseline  Cardiovascular: stable and blood pressure at baseline  Hydration: adequate  Anesthetic events: no    QCDR Measures:  ASA# 11 - Rosy-op Cardiac Arrest: ASA11B - Patient did NOT experience unanticipated cardiac arrest  ASA# 12 - Rosy-op Mortality Rate: ASA12B - Patient did NOT die  ASA# 13 - PACU Re-Intubation Rate: NA - No ETT / LMA used for case  ASA# 10 - Composite Anes Safety: ASA10A - No serious adverse event    Additional Notes:

## 2021-05-31 NOTE — TELEPHONE ENCOUNTER
This is take care of  She will come next Wed 8/7 at 10:45 for a quick pre-op. She is double-booked at my 11am slot.

## 2021-05-31 NOTE — PROGRESS NOTES
left a voice mail in the patient's phone asking whether she would like to see this writer today.

## 2021-05-31 NOTE — TELEPHONE ENCOUNTER
New Appointment Needed  What is the reason for the visit:    Pre-Op Appt Request  When is the surgery? :  8/9/19  Where is the surgery?:   [facility]   Who is the surgeon? :  Dr. Veronique Danielle  What type of surgery is being done?: Left Breast Biopsy  Provider Preference: PCP only  How soon do you need to be seen?: next week  Waitlist offered?: No  Okay to leave a detailed message:  Yes [phone]

## 2021-05-31 NOTE — TELEPHONE ENCOUNTER
Who is calling:  [facility]   Reason for Call:  8.8.19 arrival time 11am breast biopsy, patient never had a pre opp physical info filled out. Pt did have a physical but nothing was entered by provider  Date of last appointment with primary care: n\a  Okay to leave a detailed message: Yes

## 2021-05-31 NOTE — PROGRESS NOTES
"OFFICE VISIT NOTE      Assessment & Plan   Alexus Garcia is a 63 y.o. female with recurrent breast cancer who had another biopsy which came back NEGATIVE. She was thrilled to hear the result. She's been enjoying fresh produce and going to the [employer] as means of celebration. She knows she's gained some weight, but I encouraged her to mostly celebrate her great news AND increase her exercise more.      Diagnoses and all orders for this visit:    Vitamin D deficiency disease  -     Vitamin D, Total (25-Hydroxy); Future; Expected date: 08/30/2019    Malignant neoplasm of upper-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, unspecified estrogen receptor status (H)    Posttraumatic hematoma of right breast, sequela    Anxiety        Daxa Morales MD              Subjective:   Chief Complaint:  Follow-up    63 y.o. female.     1) good news on the surgery  Been eating a lot at the fair, at the [employer], and to celebrate good news    2) 2 weeks with formed stools - these are actually more difficult for her to pass, so she's a little worried  This new and unusual for her but with the harvest - she's eating lots of fresh fruit  She usually has \"hard nuggets\"  She wants to know if she needs immodium  With vegetables - she prefers them raw    3) sister and son are both going through difficult times and want a lot of support from her. She's found she has to put up boundaries so she can maintain her health.    Current Outpatient Medications   Medication Sig Note     acetaminophen (TYLENOL) 500 MG tablet Take 1,000 mg by mouth 3 (three) times a day as needed for pain.      aspirin-acetaminophen-caffeine (EXCEDRIN MIGRAINE) 250-250-65 mg per tablet Take 2 tablets by mouth every 8 (eight) hours as needed for pain (headaches).      betamethasone, augmented, (DIPROLENE) 0.05 % lotion Apply 1 application topically 2 (two) times a day as needed.      blood glucose test strips Use 1 each As Directed as needed. Dispense brand per " patient's insurance at pharmacy discretion.      calcium citrate-vitamin D (CITRACAL+D) 315-200 mg-unit per tablet Take 1 tablet by mouth.      cholecalciferol, vitamin D3, 1,000 unit tablet Take 2,000 Units by mouth 2 (two) times a day.      DULoxetine (CYMBALTA) 30 MG capsule TAKE 1 CAPSULE BY MOUTH EVERY DAY      gabapentin (NEURONTIN) 400 MG capsule TAKE 1 CAPSULE (400 MG TOTAL) BY MOUTH 3 (THREE) TIMES A DAY.      generic lancets Use 1 each As Directed as needed. Dispense brand per patient's insurance at pharmacy discretion.      hydrOXYzine HCl (ATARAX) 25 MG tablet Take 25 mg by mouth.      LORazepam (ATIVAN) 1 MG tablet Take 1 tablet (1 mg total) by mouth every 8 (eight) hours as needed for anxiety.      melatonin 10 mg Tab Take 10 mg by mouth at bedtime. 5/31/2018: Received from: [facility] Received Sig: Take 10 mg by mouth.     metFORMIN (GLUCOPHAGE-XR) 500 MG 24 hr tablet Take 500 mg by mouth.      naproxen sodium (ALEVE) 220 MG tablet Take 440 mg by mouth as needed. 5/31/2018: Received from: [facility] Received Sig: Take 440 mg by mouth.     ONETOUCH DELICA LANCETS 30 gauge Misc Use 1 each As Directed as needed. DISPENSE BRAND PER PATIENT'S INSURANCE AT PHARMACY DISCRETION.      ONETOUCH ULTRA2 USE DAILY AS DIRECTED      oxyCODONE (ROXICODONE) 5 MG immediate release tablet Take 1-3 tabs PO every 6 hours as needed for pain      polyethylene glycol (MIRALAX) 17 gram packet Take 17 g by mouth.      QUEtiapine (SEROQUEL) 25 MG tablet Take 1 tab PO at bedtime      QUEtiapine (SEROQUEL) 25 MG tablet Take 1 tablet by mouth at bedtime.      zolpidem (AMBIEN) 10 mg tablet Take 1 tablet (10 mg total) by mouth at bedtime as needed for sleep.        PSFHx: appropriate sections of PMH completed/filled in   Tobacco Status:  She  reports that she quit smoking about 8 years ago. Her smoking use included cigarettes. She smoked 1.00 pack per day. She has never used smokeless tobacco.    Review of Systems:  No  "fever.  No rash. All other systems negative except as noted above.    Objective:    /82   Pulse (!) 108   Temp 98.4  F (36.9  C) (Oral)   Resp 16   Ht 5' 2.75\" (1.594 m)   Wt 152 lb 12 oz (69.3 kg)   LMP 09/16/2006   BMI 27.27 kg/m    GENERAL: No acute distress.  Left breast - under guaze, the steri strips were barely attached, I removed them to find no visible stitches, no erythema or swelling; I replaced the guaze.    Mood: good  Insight: good  Judgment: fair to good   affect: appropriate    meds reviewed    Spent 40 min face to face with patient with more the 50% spent in counseling, education and coordination of care and discussing surgical recovery, pathology results, resumption of activity and enjoying life, releasing anxiety.    "

## 2021-05-31 NOTE — TELEPHONE ENCOUNTER
Patient Returning Call  Reason for call:  Returning missed call  Information relayed to patient:  yes  Patient has additional questions:  Yes  If YES, what are your questions/concerns:  Patient is okay with seeing another provider but there are no openings for her preferred days next week of Monday, Tuesday or Wednesday. Please call patient back and assist with scheduling for Monday, Tuesday or Wednesday next week. Thank you.  Okay to leave a detailed message?: Yes   none

## 2021-05-31 NOTE — ANESTHESIA PREPROCEDURE EVALUATION
Anesthesia Evaluation      Patient summary reviewed   History of anesthetic complications (PONV)     Airway   Mallampati: II  Neck ROM: full   Pulmonary - negative ROS and normal exam                          Cardiovascular - normal exam  Exercise tolerance: > or = 4 METS   Neuro/Psych    (+) depression, anxiety/panic attacks,     Endo/Other    (+) diabetes mellitus (Prediabetes), arthritis,      Comments: Left breast Ca, lymph edema LE arm    GI/Hepatic/Renal - negative ROS           Dental - normal exam                        Anesthesia Plan  Planned anesthetic: MAC  Pt has received propofol sedation without PONV on several occasions.      Decadron  Zofran  ASA 2     Anesthetic plan and risks discussed with: patient  Anesthesia plan special considerations: antiemetics,   Post-op plan: routine recovery

## 2021-06-01 VITALS — BODY MASS INDEX: 27.11 KG/M2 | HEIGHT: 63 IN | WEIGHT: 153 LBS

## 2021-06-01 VITALS — HEIGHT: 63 IN | BODY MASS INDEX: 26.9 KG/M2 | WEIGHT: 151.8 LBS

## 2021-06-01 VITALS — WEIGHT: 152 LBS | HEIGHT: 63 IN | BODY MASS INDEX: 26.93 KG/M2

## 2021-06-01 VITALS — WEIGHT: 152.25 LBS | BODY MASS INDEX: 26.98 KG/M2 | HEIGHT: 63 IN

## 2021-06-01 NOTE — TELEPHONE ENCOUNTER
Calling to inquire if diabetic eye exam was done or scheduled.  Referral was placed 06/04/19.  Patient reports she has not had eye exam as insurance does not cover this year.    Patient inquiring about status of Metformin refill.  Per 9/3/19 RN note, it could not be filled.    Informed patient we will call her with update ASAP.

## 2021-06-01 NOTE — PROGRESS NOTES
Writer left another voice mail for the patient to call with update on how she has been doing and to schedule for her session and

## 2021-06-02 ENCOUNTER — RECORDS - HEALTHEAST (OUTPATIENT)
Dept: ADMINISTRATIVE | Facility: CLINIC | Age: 66
End: 2021-06-02

## 2021-06-02 VITALS — BODY MASS INDEX: 26.22 KG/M2 | HEIGHT: 63 IN | WEIGHT: 148 LBS

## 2021-06-02 VITALS — BODY MASS INDEX: 26.22 KG/M2 | WEIGHT: 148 LBS

## 2021-06-02 VITALS — HEIGHT: 63 IN | BODY MASS INDEX: 26.22 KG/M2 | WEIGHT: 148 LBS

## 2021-06-02 VITALS — WEIGHT: 145 LBS | BODY MASS INDEX: 25.69 KG/M2

## 2021-06-02 VITALS — BODY MASS INDEX: 26.36 KG/M2 | WEIGHT: 148.8 LBS | HEIGHT: 63 IN

## 2021-06-02 VITALS — BODY MASS INDEX: 26.75 KG/M2 | WEIGHT: 151 LBS

## 2021-06-02 VITALS — WEIGHT: 152 LBS | BODY MASS INDEX: 26.93 KG/M2

## 2021-06-02 NOTE — PROGRESS NOTES
Writer called the patient about her missed appt. Patient indicated that she has cancelled her appt through my chart. No clear why she is still on today's schedule. Will call to reschedule.

## 2021-06-02 NOTE — TELEPHONE ENCOUNTER
Patient scheduled to receive pneumovax and flu vaccines at 10/29 \[facility]\"" appointment.    Will Provider please evaluate and place order if indicated?  Thank you.

## 2021-06-03 ENCOUNTER — RECORDS - HEALTHEAST (OUTPATIENT)
Dept: ADMINISTRATIVE | Facility: CLINIC | Age: 66
End: 2021-06-03

## 2021-06-03 VITALS
HEIGHT: 63 IN | SYSTOLIC BLOOD PRESSURE: 138 MMHG | TEMPERATURE: 97.6 F | HEART RATE: 98 BPM | WEIGHT: 158 LBS | BODY MASS INDEX: 28 KG/M2 | DIASTOLIC BLOOD PRESSURE: 73 MMHG

## 2021-06-03 VITALS — HEIGHT: 63 IN | WEIGHT: 145 LBS | BODY MASS INDEX: 25.69 KG/M2

## 2021-06-03 VITALS — WEIGHT: 144 LBS | BODY MASS INDEX: 25.68 KG/M2

## 2021-06-03 VITALS — WEIGHT: 148 LBS | BODY MASS INDEX: 26.22 KG/M2

## 2021-06-03 VITALS — BODY MASS INDEX: 25.36 KG/M2 | HEIGHT: 63 IN | WEIGHT: 143.13 LBS

## 2021-06-03 VITALS — WEIGHT: 144 LBS | HEIGHT: 63 IN | BODY MASS INDEX: 25.52 KG/M2

## 2021-06-03 VITALS — HEIGHT: 63 IN | WEIGHT: 152.75 LBS | BODY MASS INDEX: 27.07 KG/M2

## 2021-06-03 NOTE — PROGRESS NOTES
"Psychiatric  Progress Note  Date of visit:11/7/2019         Discussion of Care and Treatment Recommendations:   This is a 64 y.o. female with a history of depression and PTSD who presents to the clinic for a follow up appointment.          PCP managing sleep :  Prescribing  Lorazepam ,Seroquel and Ambien   PCP managing Gabapentin for neurological pain          Last visit   Recommendation at last visit *.  1 .Continue with  Psychotherapy   2.Highly recommend : Community Support groups   3. Continue   Cymbalta at  30 mg daily    4. RTC-  weeks, call in between visit with any question      Patient and I reviewed diagnosis and treatment plan and patient agrees with following recommendations:  Ongoing education given regarding diagnostic and treatment options with adequate verbalization of understanding.  Plan   1 .Continue with  Psychotherapy   2.Highly recommend : Community Support groups   3. Continue   Cymbalta at  30 mg daily    4. RTC-  weeks, call in between visit with any question             DIagnoses:   MDD  PTSD       Patient Active Problem List   Diagnosis     Neuralgia     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)     Malignant neoplasm of breast (H)     Posttraumatic hematoma of right breast     Postoperative pain     Pain in right axilla     Constipation due to outlet dysfunction     Logorrhea     Type 2 diabetes mellitus without complication, without long-term current use of insulin (H)     Rotator cuff tear     Anxiety     Scalp irritation     Abnormal MRI     Diabetes 1.5, managed as type 2 (H)     H/O breast surgery             Chief Complaint / Subjective:    Chief complaint: \" I feel so much better\"     History of Present Illness:   Per patient statement-she had her exploratory surgery.  She has breast cancer history and they remove the tumor I was able to remove the whole tumor therefore she does not have to worry about the cancer recurring.  She feels relieved.  I am " happy for her.  This has been a major stressor for her.  She states depression and anxiety are well managed.  She is living in the moment and is grateful that this is behind her.  She is spending holiday with her son positive and enjoying life taking one day at a time.  She is happy with her current medications denies side effects.  Denies psychiatric symptoms offers no new concerns for now.  I will have her return in 8 weeks for follow-up appointment and call in between visits with any questions or concerns.    Mental Status Examination:   General: Adequate hygiene, cooperative  Speech: Normal in rate and tone  Language: Intact  Thought process: Coherent  Thought content:                           Auditory hallucinations-Denies                          Visual Hallucinations - Denies                         Delusions Absent                           Loose Associations:  No                          Suicidal thoughts: Denies                          Homicidal thoughts:Denies                        Affect: Neutral  Mood: Neutral   Intellectual functioning: Within normal limits  Memory: Within normal limits  Fund of knowledge: Average  Attention and concentration: Within normal limits  Gait: Steady  Psychomotor activity: Calm, no agitation  Muscles: No atrophy, no abnormal movements  InSight and judgment: Fair      Drug/treatment history and current pattern of use:   Denies     Medication changes: See Above   Medication adherence: compliant  Medication side effects: absent  Information about medications: Side effects, benefits and alternative treatments discussed and patient agrees .    Psychotherapy: Supportive therapy day-to-day living    Education: Diet, exercise, abstinence from drugs and alcohol, patient will not drive if sedated and medications or  under influence of any substance    Lab Results:   Personally reviewed and discussed with the patient    Lab Results   Component Value Date    WBC 7.5 08/07/2019    HGB  "14.5 08/07/2019    HCT 43.3 08/07/2019     08/07/2019    CHOL 300 (H) 04/20/2018    TRIG 136 04/20/2018    HDL 68 04/20/2018     (H) 06/04/2019    AST 75 (H) 06/04/2019     08/07/2019    K 3.9 08/07/2019     08/07/2019    CREATININE 0.94 08/07/2019    BUN 14 08/07/2019    CO2 19 (L) 08/07/2019    TSH 0.29 (L) 04/20/2018    INR 0.96 09/20/2016    HGBA1C 6.6 (H) 06/04/2019    MICROALBUR 9.41 (H) 04/20/2018       Vital signs:    Vitals:    11/07/19 1327   BP: 138/73   Patient Site: Left Arm   Patient Position: Sitting   Cuff Size: Adult Regular   Pulse: 98   Temp: 97.6  F (36.4  C)   TempSrc: Oral   Weight: 158 lb (71.7 kg)   Height: 5' 3.3\" (1.608 m)     Allergies:  Allergies   Allergen Reactions     Aztreonam Hives     Castor Oil Hives     Cefaclor Anaphylaxis     Cephalexin Anaphylaxis     Cephalexin Monohydrate Hives     Chlorhexidine Hives     White clear stuff that you lather during surgery that dries unto the skin and stays on the skin that's almost like a superglue. It was very itchy and skin gets very red.     Ciprofloxacin Anaphylaxis     Clarithromycin Anaphylaxis, Anxiety, Dizziness, Hives, Itching, Nausea And Vomiting, Rash and Shortness Of Breath     Clindamycin Hives     Penicillins Anaphylaxis     Propofol Nausea Only and Headache     Scopolamine Anaphylaxis, Hives and Swelling     Swollen tongue, eye swelled closed  Eye and tongue swelling      Sulfa (Sulfonamide Antibiotics) Anaphylaxis and Hives     Sulfasalazine Hives     Tetracyclines Anaphylaxis     Vancomycin Hives     Adhesive Itching     Irritation where the tape had contact     Cytoxan [Cyclophosphamide] Hives     severe     Erythromycin Base Hives     Hydrocodone Nausea And Vomiting     Neupogen [Filgrastim] Hives     severe     Paper Tape Other (See Comments)     Surgical paper tape - redness. States if left on too long leaves little cuts on her skin     Tapentadol Other (See Comments)     bleeding     Taxol " [Paclitaxel] Hives     severe     Taxotere [Docetaxel] Hives          Medications:       Current Outpatient Medications on File Prior to Visit   Medication Sig Dispense Refill     acetaminophen (TYLENOL) 500 MG tablet Take 1,000 mg by mouth 3 (three) times a day as needed for pain.       aspirin-acetaminophen-caffeine (EXCEDRIN MIGRAINE) 250-250-65 mg per tablet Take 2 tablets by mouth every 8 (eight) hours as needed for pain (headaches).       betamethasone, augmented, (DIPROLENE) 0.05 % lotion Apply 1 application topically 2 (two) times a day as needed. 60 mL 3     blood glucose test strips Use 1 each As Directed as needed. Dispense brand per patient's insurance at pharmacy discretion. 100 strip 2     calcium citrate-vitamin D (CITRACAL+D) 315-200 mg-unit per tablet Take 1 tablet by mouth.       cholecalciferol, vitamin D3, 1,000 unit tablet Take 2,000 Units by mouth 2 (two) times a day.       DULoxetine (CYMBALTA) 30 MG capsule TAKE 1 CAPSULE BY MOUTH EVERY DAY 90 capsule 0     gabapentin (NEURONTIN) 400 MG capsule TAKE 1 CAPSULE (400 MG TOTAL) BY MOUTH 3 (THREE) TIMES A DAY. 270 capsule 3     hydrOXYzine HCl (ATARAX) 25 MG tablet Take 25 mg by mouth.       LORazepam (ATIVAN) 1 MG tablet Take 1 tablet (1 mg total) by mouth every 8 (eight) hours as needed for anxiety. 90 tablet 0     melatonin 10 mg Tab Take 10 mg by mouth at bedtime.       metFORMIN (GLUCOPHAGE-XR) 500 MG 24 hr tablet Take 500 mg by mouth.       metFORMIN (GLUCOPHAGE-XR) 500 MG 24 hr tablet TAKE 1 TABLET BY MOUTH DAILY WITH SUPPER 90 tablet 4     naproxen sodium (ALEVE) 220 MG tablet Take 440 mg by mouth as needed.       ONETOUCH DELICA LANCETS 30 gauge Misc Use 1 each As Directed as needed. DISPENSE BRAND PER PATIENT'S INSURANCE AT PHARMACY DISCRETION.  4     ONETOUCH ULTRA2 USE DAILY AS DIRECTED  0     oxyCODONE (ROXICODONE) 5 MG immediate release tablet Take 1-3 tabs PO every 6 hours as needed for pain 20 tablet 0     polyethylene glycol  (MIRALAX) 17 gram packet Take 17 g by mouth.       QUEtiapine (SEROQUEL) 25 MG tablet Take 1 tab PO at bedtime 90 tablet 4     zolpidem (AMBIEN) 10 mg tablet Take 1 tablet (10 mg total) by mouth at bedtime as needed for sleep. 90 tablet 0     generic lancets Use 1 each As Directed as needed. Dispense brand per patient's insurance at pharmacy discretion. 100 each 4     [DISCONTINUED] QUEtiapine (SEROQUEL) 25 MG tablet Take 1 tablet by mouth at bedtime.       No current facility-administered medications on file prior to visit.                 Review of Systems:      ROS:       10 point ROS was negative except for the items listed in HPI.    Review of Systems - General ROS: negative for - chills, fatigue, night sweats, sleep disturbance or weight loss    Respiratory ROS: no cough, shortness of breath, or wheezing  negative for - cough or shortness of breath  Cardiovascular ROS: no chest pain or dyspnea on exertion  Gastrointestinal ROS: no abdominal pain, change in bowel habits, or black or bloody stools  Musculoskeletal ROS: negative  negative for - gait disturbance, joint pain, joint stiffness, muscle pain or muscular weakness  Neurological ROS: negative for - confusion, gait disturbance, headaches or seizures    Coordination of Care:   More than 25 minutes spent on this visit  with more than 50% of time spent on coordination of care including: Educating patient about diagnosis, prognosis, side effects and benefits of medications, diet, exercise.  Time also spent providing supportive therapy regarding above issues.    This note was created using a dictation system. All typing errors or contextual distortion is unintentional and software inherent.

## 2021-06-03 NOTE — PATIENT INSTRUCTIONS - HE
Continue medications as prescribed  Have your pharmacy contact us for a refill if you are running low on medications (We may ask you to come into clinic to get a refill from the nurse  No Alcohol or drug use  No driving if sedated  Call the clinic with any questions or concerns   Reach out for help if you feel like hurting yourself or others ([facility] [phone]: [address], [address] or [facility] Suicide Hotline [phone] , call 911 or go to nearest Emergency room    Follow up as directed, for your appointments, per your After Visit Summary Form.

## 2021-06-03 NOTE — PROGRESS NOTES
Correct pharmacy verified with patient and confirmed in snapshot? [x] yes []no    Charge captured ? [x] yes  [] no    Medications Phoned  to Pharmacy [] yes [x]no  Name of Pharmacist:  List Medications, including dose, quantity and instructions      Medication Prescriptions given to patient   [] yes  [x] no   List the name of the drug the prescription was written for.       Medications ordered this visit were e-scribed.  Verified by order class [x] yes  [] no  CymbaltaMedication changes or discontinuations were communicated to patient's pharmacy: [] yes  [x] no    UA collected [] yes  [x] no    [place] Prescription Monitoring Program Reviewed? [x] yes  [] no    Referrals were made to:  none    Future appointment was made: [x] yes  [] no  1/30/20  Dictation completed at time of chart check: [] yes  [x] no    I have checked the documentation for today s encounters and the above information has been reviewed and completed.

## 2021-06-04 ENCOUNTER — RECORDS - HEALTHEAST (OUTPATIENT)
Dept: ADMINISTRATIVE | Facility: CLINIC | Age: 66
End: 2021-06-04

## 2021-06-04 VITALS
SYSTOLIC BLOOD PRESSURE: 107 MMHG | TEMPERATURE: 97.3 F | DIASTOLIC BLOOD PRESSURE: 59 MMHG | BODY MASS INDEX: 27.29 KG/M2 | HEIGHT: 63 IN | WEIGHT: 154 LBS | HEART RATE: 121 BPM

## 2021-06-04 VITALS
HEART RATE: 103 BPM | SYSTOLIC BLOOD PRESSURE: 132 MMHG | BODY MASS INDEX: 28.17 KG/M2 | TEMPERATURE: 97.6 F | WEIGHT: 159 LBS | DIASTOLIC BLOOD PRESSURE: 69 MMHG

## 2021-06-04 VITALS — BODY MASS INDEX: 28.88 KG/M2 | HEIGHT: 63 IN | WEIGHT: 163 LBS

## 2021-06-04 VITALS
WEIGHT: 163.5 LBS | DIASTOLIC BLOOD PRESSURE: 82 MMHG | HEART RATE: 104 BPM | RESPIRATION RATE: 18 BRPM | TEMPERATURE: 97.9 F | SYSTOLIC BLOOD PRESSURE: 118 MMHG | OXYGEN SATURATION: 96 % | BODY MASS INDEX: 28.96 KG/M2

## 2021-06-04 VITALS
DIASTOLIC BLOOD PRESSURE: 82 MMHG | BODY MASS INDEX: 27.42 KG/M2 | SYSTOLIC BLOOD PRESSURE: 123 MMHG | RESPIRATION RATE: 16 BRPM | HEART RATE: 100 BPM | HEIGHT: 63 IN | WEIGHT: 154.75 LBS | TEMPERATURE: 97.7 F

## 2021-06-04 NOTE — TELEPHONE ENCOUNTER
2. Breast mass  Biopsy needed. I have prescribed extra pain medication for post-procedure  - LORazepam (ATIVAN) 1 MG tablet; Take 1 tablet (1 mg total) by mouth every 8 (eight) hours as needed for anxiety.  Dispense: 90 tablet; Refill: 0  - oxyCODONE (ROXICODONE) 5 MG immediate release tablet; Take 1-3 tabs PO every 6 hours as needed for pain  Dispense: 20 tablet; Refill: 0  - HM1(CBC and Differential)  - Basic Metabolic Panel  - HM1 (CBC with Diff)  - Urinalysis-UC if Indicated  - Culture, Urine

## 2021-06-04 NOTE — PROGRESS NOTES
OFFICE VISIT NOTE      Assessment & Plan   Alexus Garcia is a 64 y.o. female who has had a very complicated breast cancer course but is doing OK now, has diabetes, and depression/anxiety.    moodwise -she's OK, even well.  Increase seroquel to 1 1/2 at bedtime since it seems 25 no longer helps.    Refer for orthotics to try to help her diabetic feet, especially with the bone spur. I believe she needs these.    Adv directive info documented verbally. She hopes to still complete the paperwork and bring it in for her chart    Diagnoses and all orders for this visit:    Type 2 diabetes mellitus without complication, without long-term current use of insulin (H)  -     Glycosylated Hemoglobin A1c  -     Lipid Cascade FASTING  -     HM1(CBC and Differential)  -     Comprehensive Metabolic Panel  -     HM1 (CBC with Diff)  -     Microalbumin, Random Urine    Pain of left heel  -     Cancel: XR Calcaneus Left 2 or More Views; Future; Expected date: 12/06/2019    Heel spur, right  -     Custom Foot Orthotics    Pain of right heel  -     Custom Foot Orthotics  -     XR Calcaneus Right 2 or More Views; Future; Expected date: 12/06/2019    Persistent insomnia  -     QUEtiapine (SEROQUEL) 25 MG tablet; Take 1 tab PO at bedtime  Dispense: 135 tablet; Refill: 4    Healthcare maintenance  -     HIV Antigen/Antibody Diagnostic [place]    Vitamin D deficiency disease  -     Vitamin D, Total (25-Hydroxy)    Advanced directives, counseling/discussion  Comments:  she wants resuscitation  if she cannot answer for herself, ask her son - Tyler [phone]        Daxa Morales MD    I have had an Advance Directives discussion with the patient.  The following are part of a depression follow up plan for the patient:  coping support assessment and coping support management  The following high BMI interventions were performed this visit: encouragement to exercise, exercise promotion: strength training, exercise promotion: stretching and  exercise promotion: walking/step counting          Subjective:   Chief Complaint:  Follow-up    64 y.o. female.     1) pain in left heel, on bottom  Uses crutches a few days and it improves.  Pain is worse recently but has been present for a year  Dr. Carlson's insoles even customized are no longer helping  Left foot is starting to hurt from compensation and left hip hurts at night    2) diabetes - Thanksgiving and a birthday feasts  3) sleep is only at 5hrs    4) HPOA - resus? yes  Son Brittany Scott [phone]    Current Outpatient Medications   Medication Sig Note     LORazepam (ATIVAN) 1 MG tablet Take 1 tablet (1 mg total) by mouth every 8 (eight) hours as needed for anxiety.      metFORMIN (GLUCOPHAGE-XR) 500 MG 24 hr tablet TAKE 1 TABLET BY MOUTH DAILY WITH SUPPER      acetaminophen (TYLENOL) 500 MG tablet Take 1,000 mg by mouth 3 (three) times a day as needed for pain.      aspirin-acetaminophen-caffeine (EXCEDRIN MIGRAINE) 250-250-65 mg per tablet Take 2 tablets by mouth every 8 (eight) hours as needed for pain (headaches).      betamethasone, augmented, (DIPROLENE) 0.05 % lotion Apply 1 application topically 2 (two) times a day as needed.      blood glucose test strips Use 1 each As Directed as needed. Dispense brand per patient's insurance at pharmacy discretion.      calcium citrate-vitamin D (CITRACAL+D) 315-200 mg-unit per tablet Take 1 tablet by mouth.      cholecalciferol, vitamin D3, 1,000 unit tablet Take 2,000 Units by mouth 2 (two) times a day.      DULoxetine (CYMBALTA) 30 MG capsule TAKE 1 CAPSULE BY MOUTH EVERY DAY      gabapentin (NEURONTIN) 400 MG capsule TAKE 1 CAPSULE (400 MG TOTAL) BY MOUTH 3 (THREE) TIMES A DAY.      generic lancets Use 1 each As Directed as needed. Dispense brand per patient's insurance at pharmacy discretion.      hydrOXYzine HCl (ATARAX) 25 MG tablet Take 25 mg by mouth.      melatonin 10 mg Tab Take 10 mg by mouth at bedtime. 5/31/2018: Received from: [facility]  "Received Sig: Take 10 mg by mouth.     naproxen sodium (ALEVE) 220 MG tablet Take 440 mg by mouth as needed. 5/31/2018: Received from: [facility] Received Sig: Take 440 mg by mouth.     ONETOUCH DELICA LANCETS 30 gauge Misc Use 1 each As Directed as needed. DISPENSE BRAND PER PATIENT'S INSURANCE AT PHARMACY DISCRETION.      ONETOUCH ULTRA2 USE DAILY AS DIRECTED      oxyCODONE (ROXICODONE) 5 MG immediate release tablet Take 1-3 tabs PO every 6 hours as needed for pain      polyethylene glycol (MIRALAX) 17 gram packet Take 17 g by mouth.      QUEtiapine (SEROQUEL) 25 MG tablet Take 1 tab PO at bedtime      zolpidem (AMBIEN) 10 mg tablet Take 1 tablet (10 mg total) by mouth at bedtime as needed for sleep.        PSFHx: appropriate sections of PMH completed/filled in   Tobacco Status:  She  reports that she quit smoking about 8 years ago. Her smoking use included cigarettes. She smoked 1.00 pack per day. She has never used smokeless tobacco.    Review of Systems:  No fever.  No rash, lots of scaring. All other systems negative except as noted above.    Objective:    /82 (Patient Position: Sitting, Cuff Size: Adult Small) Comment (Patient Site): left wrist  Pulse 100   Temp 97.7  F (36.5  C) (Oral)   Resp 16   Ht 5' 3\" (1.6 m)   Wt 154 lb 12 oz (70.2 kg)   LMP 09/16/2006   BMI 27.41 kg/m    GENERAL: No acute distress  Ht: reg s1s2  Lungs: clear  Feet: warm, easily found dorsal pedal pulses, toenails trimmed; left heel has no swelling or bruising, but laterally there is pain with palpation; no sores or wounds on the feet    Mood: fair to good  Insight: fair  Judgment: good  Affect: appropriate    Xray: + bone spur    Spent 55 min face to face with patient with more the 50% spent in counseling, education and coordination of care and discussing diabetes, heel pain and orthotics, slep, mood and depression, adv directive.    "

## 2021-06-04 NOTE — TELEPHONE ENCOUNTER
" reports indicate that the patient needs colon cancer screening. If non-English speaking, CMT will schedule patient to discuss colon cancer screening options with PCP. Writer intends to contact the patient to assist in scheduling and appointing for this purpose. Per clinic policy, writer will three times prior to closing encounter.      indicates that patient had ColoGuard completed 12/2017 (not due until 12/2020). There are no charts in the record and thus triggering our metric. [facility] called [employer] to request a copy of the report so that we can scan into chart and abstract. The representative states that she cannot find this order for last name \"Resja.\" CMT not sure why results for this test cannot be found and why [employer] does not have the order (there is no order in chart), yet showing complete. Patient tells KALEE that her doctor received the result and advised that it was normal. CMT will forward to MD for review.   "

## 2021-06-04 NOTE — TELEPHONE ENCOUNTER
Metformin ordered.  Please call her pharmacy to be certain the metformin ordered will be covered. If not, which type is covered?

## 2021-06-04 NOTE — TELEPHONE ENCOUNTER
CMT sending message to clarify. CMT was not referencing note from LPN below. CMT was referencing colon cancer screening and ColoGuard. LPN note regarding metformin unrelated (CMT opened addendum as requested by nursing supervisor in this note, but LPN note unrelated to CMT work). Can MD advise regarding where we are at with the ColoGuard test as [facility] [facility] has no record of a test ever being done and there is nothing in the chart, but patient states that she had this test done and that provider told her that the report was normal. CMT cannot find a report in the chart, thus, this is triggering her colon cancer metric prompting CMT follow-up. Thanks.

## 2021-06-05 VITALS
SYSTOLIC BLOOD PRESSURE: 84 MMHG | TEMPERATURE: 97.4 F | BODY MASS INDEX: 25.87 KG/M2 | WEIGHT: 146 LBS | RESPIRATION RATE: 16 BRPM | HEIGHT: 63 IN | OXYGEN SATURATION: 96 % | DIASTOLIC BLOOD PRESSURE: 58 MMHG | HEART RATE: 121 BPM

## 2021-06-05 VITALS
OXYGEN SATURATION: 96 % | WEIGHT: 151 LBS | TEMPERATURE: 98.1 F | SYSTOLIC BLOOD PRESSURE: 98 MMHG | BODY MASS INDEX: 26.75 KG/M2 | DIASTOLIC BLOOD PRESSURE: 68 MMHG | HEART RATE: 114 BPM

## 2021-06-05 NOTE — PATIENT INSTRUCTIONS - HE
Continue medications as prescribed  Have your pharmacy contact us for a refill if you are running low on medications (We may ask you to come into clinic to get a refill from the nurse  No Alcohol or drug use  No driving if sedated  Call the clinic with any questions or concerns   Reach out for help if you feel like hurting yourself or others ([facility] [phone]: [address] [facility], [id number] or [facility] Suicide Hotline [phone] , call 911 or go to nearest Emergency room    Follow up as directed, for your appointments, per your After Visit Summary Form.

## 2021-06-05 NOTE — TELEPHONE ENCOUNTER
*Per Pharmacy: Pt took last dose 1/27/2020 and is out.*    Date of Last Office Visit: 11/07/2019  Date of Next Office Visit: 01/30/2020  No shows since last visit: 0  Cancellations since last visit: 0  ED visits since last visit:  None  Medication duloxetine 30 mg capsule date last ordered: 11/07/2019  Qty: 90  Refills: 0  Lapse in therapy greater than 7 days: No  Medication refill request verified as identical to current order: Yes   Result of Last DAM, VPA, Li+ Level, CBC, or Carbamazepine Level (at or since last visit):  NA     [] Medication refilled per [facility] M-1.   [] Medication unable to be refilled by RN due to criteria not met as indicated below:     []Eligibility - not seen in last year    []Supervision - no future appointment    []Compliance     []Verification - order discrepancy    []Controlled Medication    []Medication not included in RN Protocol    []90 - day supply request    [x]Other CMA pending medication refills    Current Medication list:    acetaminophen (TYLENOL) 500 MG tablet Take 1,000 mg by mouth 3 (three) times a day as needed for pain.   aspirin-acetaminophen-caffeine (EXCEDRIN MIGRAINE) 250-250-65 mg per tablet Take 2 tablets by mouth every 8 (eight) hours as needed for pain (headaches).   betamethasone, augmented, (DIPROLENE) 0.05 % lotion Apply 1 application topically 2 (two) times a day as needed.   blood glucose test strips Use 1 each As Directed as needed. Dispense brand per patient's insurance at pharmacy discretion.   calcium citrate-vitamin D (CITRACAL+D) 315-200 mg-unit per tablet Take 1 tablet by mouth.   cholecalciferol, vitamin D3, 1,000 unit tablet Take 2,000 Units by mouth 2 (two) times a day.   DULoxetine (CYMBALTA) 30 MG capsule TAKE 1 CAPSULE BY MOUTH EVERY DAY   hydrOXYzine HCl (ATARAX) 25 MG tablet Take 25 mg by mouth.   LORazepam (ATIVAN) 1 MG tablet TAKE 1 TABLET BY MOUTH EVERY 8 HOURS AS NEEDED FOR ANXIETY.NEEDS TO BE SEEN FOR FURTHER REFILLS   melatonin 10 mg Tab Take  10 mg by mouth at bedtime.   metFORMIN (GLUCOPHAGE-XR) 500 MG 24 hr tablet Take 1 tablet (500 mg total) by mouth daily with supper.   naproxen sodium (ALEVE) 220 MG tablet Take 440 mg by mouth as needed.   ONETOUCH DELICA LANCETS 30 gauge Misc Use 1 each As Directed as needed. DISPENSE BRAND PER PATIENT'S INSURANCE AT PHARMACY DISCRETION.   ONETOUCH ULTRA2 USE DAILY AS DIRECTED   oxyCODONE (ROXICODONE) 5 MG immediate release tablet Take 1-3 tabs PO every 6 hours as needed for pain   polyethylene glycol (MIRALAX) 17 gram packet Take 17 g by mouth.   QUEtiapine (SEROQUEL) 25 MG tablet Take 1 tab PO at bedtime   zolpidem (AMBIEN) 10 mg tablet Take 1 tablet (10 mg total) by mouth at bedtime as needed for sleep.       Medication Plan of Care at last office visit with MD/CNP:    Plan   1 .Continue with  Psychotherapy   2.Highly recommend : Community Support groups   3. Continue   Cymbalta at  30 mg daily    4. RTC-  weeks, call in between visit with any question.  I will have her return in 8 weeks for follow-up appointment and call in between visits with any questions or concerns.

## 2021-06-05 NOTE — TELEPHONE ENCOUNTER
Called pt who did not complete her micro urine albumin on 12/6/19 when here for her apt with PCP.  Ok to schedule for lab when calls back.  Will call back in a week.  Thanks.

## 2021-06-05 NOTE — PROGRESS NOTES
"Psychiatric  Progress Note  Date of visit:1/30/2020           Discussion of Care and Treatment Recommendations:   This is a 64 y.o. female with a history of depression and PTSD who presents to the clinic for a follow up appointment.            PCP managing sleep :  Prescribing  Lorazepam ,Seroquel and Ambien   PCP managing Gabapentin for neurological pain        Last visit  11/07/19  Recommendation at last visit   1 .Continue with  Psychotherapy   2.Highly recommend : Community Support groups   3. Continue   Cymbalta at  30 mg daily    4. RTC-  weeks, call in between visit with any question   Patient and I reviewed diagnosis and treatment plan and patient agrees with following recommendations:  Ongoing education given regarding diagnostic and treatment options with adequate verbalization of understanding.  Plan   1 .Continue with  Psychotherapy   2.Highly recommend : Community Support groups   3. Continue   Cymbalta at  30 mg daily    4. 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)     Malignant neoplasm of breast (H)     Posttraumatic hematoma of right breast     Postoperative pain     Pain in right axilla     Constipation due to outlet dysfunction     Logorrhea     Type 2 diabetes mellitus without complication, without long-term current use of insulin (H)     Rotator cuff tear     Anxiety     Scalp irritation     Abnormal MRI     Diabetes 1.5, managed as type 2 (H)     H/O breast surgery     Advanced directives, counseling/discussion     Vitamin D deficiency disease             Chief Complaint / Subjective:    Chief complaint: \"     History of Present Illness:   Per patient's statement : She states that as long as she takes her medication she is more stabilized.  Her primary doctor did increase her Seroquel recently.  Probably will prescribe " Seroquel for sleep.  Patient was a little bit anxious because she was all of her cervical for few days out of miscommunication with her pharmacy but she does have a pending prescription awaiting for her at her pharmacy and she will be Picking that up today.  Other than that she is also anxious about her recent appointment for jury duty.  States she does not feel comfortable doing it due to anxiety and PTSD.  I did let her know that if they request a letter from her doctor that I will be writing one for her.  Await to hear from her.  She denies all other psychiatric issues and offers no new concerns.    Mental Status Examination:   General: Adequate hygiene, cooperative  Speech: Normal in rate and tone  Language: Intact  Thought process: Coherent  Thought content:                           Auditory hallucinations-Denies                          Visual Hallucinations - Denies                         Delusions Absent                           Loose Associations:  No                          Suicidal thoughts: Denies                          Homicidal thoughts:Denies                        Affect: Neutral  Mood: Neutral   Intellectual functioning: Within normal limits  Memory: Within normal limits  Fund of knowledge: Average  Attention and concentration: Within normal limits  Gait: Steady  Psychomotor activity: Calm, no agitation  Muscles: No atrophy, no abnormal movements  InSight and judgment: Fair      Drug/treatment history and current pattern of use:   Denies     Medication changes: See Above   Medication adherence: compliant  Medication side effects: absent  Information about medications: Side effects, benefits and alternative treatments discussed and patient agrees .    Psychotherapy: Supportive therapy day-to-day living    Education: Diet, exercise, abstinence from drugs and alcohol, patient will not drive if sedated and medications or  under influence of any substance    Lab Results:  Personally reviewed and  discussed with the patient    Lab Results   Component Value Date    WBC 5.6 12/06/2019    HGB 15.1 12/06/2019    HCT 46.9 12/06/2019     12/06/2019    CHOL 305 (H) 12/06/2019    TRIG 140 12/06/2019    HDL 64 12/06/2019     (H) 12/06/2019     (H) 12/06/2019     12/06/2019    K 4.1 12/06/2019     12/06/2019    CREATININE 0.86 12/06/2019    BUN 12 12/06/2019    CO2 22 12/06/2019    TSH 0.84 12/06/2019    INR 0.96 09/20/2016    HGBA1C 7.1 (H) 12/06/2019    MICROALBUR 9.41 (H) 04/20/2018       Vital signs:  LMP 09/16/2006   There were no vitals filed for this visit.  Allergies:   Allergies   Allergen Reactions     Aztreonam Hives     Castor Oil Hives     Cefaclor Anaphylaxis     Cephalexin Anaphylaxis     Cephalexin Monohydrate Hives     Chlorhexidine Hives     White clear stuff that you lather during surgery that dries unto the skin and stays on the skin that's almost like a superglue. It was very itchy and skin gets very red.     Ciprofloxacin Anaphylaxis     Clarithromycin Anaphylaxis, Anxiety, Dizziness, Hives, Itching, Nausea And Vomiting, Rash and Shortness Of Breath     Clindamycin Hives     Penicillins Anaphylaxis     Propofol Nausea Only and Headache     Scopolamine Anaphylaxis, Hives and Swelling     Swollen tongue, eye swelled closed  Eye and tongue swelling      Sulfa (Sulfonamide Antibiotics) Anaphylaxis and Hives     Sulfasalazine Hives     Tetracyclines Anaphylaxis     Vancomycin Hives     Adhesive Itching     Irritation where the tape had contact     Cytoxan [Cyclophosphamide] Hives     severe     Erythromycin Base Hives     Hydrocodone Nausea And Vomiting     Neupogen [Filgrastim] Hives     severe     Paper Tape Other (See Comments)     Surgical paper tape - redness. States if left on too long leaves little cuts on her skin     Tapentadol Other (See Comments)     bleeding     Taxol [Paclitaxel] Hives     severe     Taxotere [Docetaxel] Hives          Medications:      Current Outpatient Medications on File Prior to Visit   Medication Sig Dispense Refill     acetaminophen (TYLENOL) 500 MG tablet Take 1,000 mg by mouth 3 (three) times a day as needed for pain.       aspirin-acetaminophen-caffeine (EXCEDRIN MIGRAINE) 250-250-65 mg per tablet Take 2 tablets by mouth every 8 (eight) hours as needed for pain (headaches).       betamethasone, augmented, (DIPROLENE) 0.05 % lotion Apply 1 application topically 2 (two) times a day as needed. 60 mL 3     blood glucose test strips Use 1 each As Directed as needed. Dispense brand per patient's insurance at pharmacy discretion. 100 strip 2     calcium citrate-vitamin D (CITRACAL+D) 315-200 mg-unit per tablet Take 1 tablet by mouth.       cholecalciferol, vitamin D3, 1,000 unit tablet Take 2,000 Units by mouth 2 (two) times a day.       DULoxetine (CYMBALTA) 30 MG capsule TAKE 1 CAPSULE BY MOUTH EVERY DAY 30 capsule 0     hydrOXYzine HCl (ATARAX) 25 MG tablet Take 25 mg by mouth.       LORazepam (ATIVAN) 1 MG tablet TAKE 1 TABLET BY MOUTH EVERY 8 HOURS AS NEEDED FOR ANXIETY.NEEDS TO BE SEEN FOR FURTHER REFILLS 90 tablet 0     melatonin 10 mg Tab Take 10 mg by mouth at bedtime.       metFORMIN (GLUCOPHAGE-XR) 500 MG 24 hr tablet Take 1 tablet (500 mg total) by mouth daily with supper. 90 tablet 4     naproxen sodium (ALEVE) 220 MG tablet Take 440 mg by mouth as needed.       ONETOUCH DELICA LANCETS 30 gauge Misc Use 1 each As Directed as needed. DISPENSE BRAND PER PATIENT'S INSURANCE AT PHARMACY DISCRETION.  4     ONETOUCH ULTRA2 USE DAILY AS DIRECTED  0     oxyCODONE (ROXICODONE) 5 MG immediate release tablet Take 1-3 tabs PO every 6 hours as needed for pain 20 tablet 0     polyethylene glycol (MIRALAX) 17 gram packet Take 17 g by mouth.       QUEtiapine (SEROQUEL) 25 MG tablet Take 1 tab PO at bedtime 135 tablet 4     zolpidem (AMBIEN) 10 mg tablet Take 1 tablet (10 mg total) by mouth at bedtime as needed for sleep. 90 tablet 0     No  current facility-administered medications on file prior to visit.                 Review of Systems:      ROS:       10 point ROS was negative except for the items listed in HPI.    Review of Systems - General ROS: negative for - chills, fatigue, night sweats, sleep disturbance or weight loss    Respiratory ROS: no cough, shortness of breath, or wheezing  negative for - cough or shortness of breath  Cardiovascular ROS: no chest pain or dyspnea on exertion  Gastrointestinal ROS: no abdominal pain, change in bowel habits, or black or bloody stools  Musculoskeletal ROS: negative  negative for - gait disturbance, joint pain, joint stiffness, muscle pain or muscular weakness  Neurological ROS: negative for - confusion, gait disturbance, headaches or seizures    Coordination of Care:   More than 25 minutes spent on this visit  with more than 50% of time spent on coordination of care including: Educating patient about diagnosis, prognosis, side effects and benefits of medications, diet, exercise.  Time also spent providing supportive therapy regarding above issues.    This note was created using a dictation system. All typing errors or contextual distortion is unintentional and software inherent.

## 2021-06-06 NOTE — TELEPHONE ENCOUNTER
LM for patient to call the clinic back regarding her appointment Friday with BROOKS Carey.  BROOKS Carey wants clarification on if the patient made the appointment just for the paperwork to be filled out and no other issues.  If it is just to fill out the paperwork for Jury Duty she can leave it at [place] tomorrow and BROOKS Carey will complete it.

## 2021-06-06 NOTE — PROGRESS NOTES
Patient is here for Jury duty paperwork to get filled out.  She states her anxiety and depression are due to thinking of having to serve on jury duty.  Denies SI.      Correct pharmacy verified with patient and confirmed in snapshot? [x] yes []no    Charge captured ? [x] yes  [] no    Medications Phoned  to Pharmacy [] yes [x]no  Name of Pharmacist:  List Medications, including dose, quantity and instructions      Medication Prescriptions given to patient   [] yes  [x] no   List the name of the drug the prescription was written for.       Medications ordered this visit were e-scribed.  Verified by order class [] yes  [x] no  No medications ordered    Medication changes or discontinuations were communicated to patient's pharmacy: [] yes  [x] NA    UA collected [] yes  [x] no    [place] Prescription Monitoring Program Reviewed? [] yes  [x] no    Referrals were made to:  [facility]    Future appointment was made: [x] yes  [] no    Dictation completed at time of chart check: [x] yes  [] no    I have checked the documentation for today s encounters and the above information has been reviewed and completed.

## 2021-06-06 NOTE — TELEPHONE ENCOUNTER
Patient Quality Outreach      Summary:    Patient is due/failing the following:   Adult/Adolescent PAP needed.  Appt needs to be scheduled on or around 06/04/2020.    Type of outreach:    Phone, left message for patient/parent to call back.    Questions for provider review:    None                                                                                   **Start Working phrase here:**  Patient has the following on her problem list/HM: None                                                     Kalyn Pritchard     Chart routed to not routed.

## 2021-06-06 NOTE — PROGRESS NOTES
"Psychiatric  Progress Note  Date of visit:         Discussion of Care and Treatment Recommendations:   This is a 64 y.o. female with a history of depression and PTSD who presents to the clinic with a request for a letter to excuse her from [employer]          PCP managing sleep :  Prescribing  Lorazepam ,Seroquel and Ambien   PCP managing Gabapentin for neurological pain        Last visit 01/30/19  Recommendation at last visit .  1 .Continue with  Psychotherapy   2.Highly recommend : Community Support groups   3. Continue   Cymbalta at  30 mg daily    4. RTC-  weeks, call in between visit with any question   Patient and I reviewed diagnosis and treatment plan and patient agrees with following recommendations:  Ongoing education given regarding diagnostic and treatment options with adequate verbalization of understanding.  Plan   1 .Continue with  Psychotherapy   2.Highly recommend : Community Support groups   3. Continue   Cymbalta at  30 mg daily    4. RTC- 8  weeks, call in between visit with any question   5. 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)     Malignant neoplasm of breast (H)     Posttraumatic hematoma of right breast     Postoperative pain     Pain in right axilla     Constipation due to outlet dysfunction     Logorrhea     Type 2 diabetes mellitus without complication, without long-term current use of insulin (H)     Rotator cuff tear     Anxiety     Scalp irritation     Abnormal MRI     Diabetes 1.5, managed as type 2 (H)     H/O breast surgery     Advanced directives, counseling/discussion     Vitamin D deficiency disease             Chief Complaint / Subjective:    Chief complaint: \" I am very anxious\"     History of Present Illness:   Per patient statement-she has been experiencing a " "lot of anxiety almost a panic attack she is thinking about the jury duty issue.  She would like me to complete paperwork to exempt her.  She has not been sleeping well due to this.  Given her current level of anxiety and current history of major depressive disorder posttraumatic stress disorder and recent treatment for breast cancer I did agree to write a letter recommending exemption at this time from jury duty.  I did let her know that it is entirely up to the courts to decide whether she will be exempted but I did give my my recommendations.  Letter was written fax and also I assisted patient email the letter to jury duty to so she has a copy.  Copies of the letters were given to patient also patient was grateful.  Mental Status Examination:   General: Adequate hygiene, cooperative  Speech: Normal in rate and tone  Language: Intact  Thought process: Coherent  Thought content:                           Auditory hallucinations-Denies                          Visual Hallucinations - Denies                         Delusions Absent                           Loose Associations:  No                          Suicidal thoughts: Denies                          Homicidal thoughts:Denies                        Affect: Neutral  Mood: Anxious, pacing , states \" my heart  is racing \"   Intellectual functioning: Within normal limits  Memory: Within normal limits  Fund of knowledge: Average  Attention and concentration: Within normal limits  Gait: Steady  Psychomotor activity: Calm, no agitation  Muscles: No atrophy, no abnormal movements  InSight and judgment: Fair      Drug/treatment history and current pattern of use:   Denies     Medication changes: See Above   Medication adherence: compliant  Medication side effects: absent  Information about medications: Side effects, benefits and alternative treatments discussed and patient agrees .    Psychotherapy: Supportive therapy day-to-day living    Education: Diet, exercise, " abstinence from drugs and alcohol, patient will not drive if sedated and medications or  under influence of any substance    Lab Results:  Personally reviewed and discussed with the patient    Lab Results   Component Value Date    WBC 5.6 12/06/2019    HGB 15.1 12/06/2019    HCT 46.9 12/06/2019     12/06/2019    CHOL 305 (H) 12/06/2019    TRIG 140 12/06/2019    HDL 64 12/06/2019     (H) 12/06/2019     (H) 12/06/2019     12/06/2019    K 4.1 12/06/2019     12/06/2019    CREATININE 0.86 12/06/2019    BUN 12 12/06/2019    CO2 22 12/06/2019    TSH 0.84 12/06/2019    INR 0.96 09/20/2016    HGBA1C 7.1 (H) 12/06/2019    MICROALBUR 9.41 (H) 04/20/2018       Vital signs:    Vitals:    02/14/20 1056   BP: 132/69   Pulse: (!) 103   Temp: 97.6  F (36.4  C)   TempSrc: Oral   Weight: 159 lb (72.1 kg)     Allergies:  Allergies   Allergen Reactions     Aztreonam Hives     Castor Oil Hives     Cefaclor Anaphylaxis     Cephalexin Anaphylaxis     Cephalexin Monohydrate Hives     Chlorhexidine Hives     White clear stuff that you lather during surgery that dries unto the skin and stays on the skin that's almost like a superglue. It was very itchy and skin gets very red.     Ciprofloxacin Anaphylaxis     Clarithromycin Anaphylaxis, Anxiety, Dizziness, Hives, Itching, Nausea And Vomiting, Rash and Shortness Of Breath     Clindamycin Hives     Penicillins Anaphylaxis     Propofol Nausea Only and Headache     Scopolamine Anaphylaxis, Hives and Swelling     Swollen tongue, eye swelled closed  Eye and tongue swelling      Sulfa (Sulfonamide Antibiotics) Anaphylaxis and Hives     Sulfasalazine Hives     Tetracyclines Anaphylaxis     Vancomycin Hives     Adhesive Itching     Irritation where the tape had contact     Cytoxan [Cyclophosphamide] Hives     severe     Erythromycin Base Hives     Hydrocodone Nausea And Vomiting     Neupogen [Filgrastim] Hives     severe     Paper Tape Other (See Comments)     Surgical  paper tape - redness. States if left on too long leaves little cuts on her skin     Tapentadol Other (See Comments)     bleeding     Taxol [Paclitaxel] Hives     severe     Taxotere [Docetaxel] Hives          Medications:     Current Outpatient Medications on File Prior to Visit   Medication Sig Dispense Refill     acetaminophen (TYLENOL) 500 MG tablet Take 1,000 mg by mouth 3 (three) times a day as needed for pain.       aspirin-acetaminophen-caffeine (EXCEDRIN MIGRAINE) 250-250-65 mg per tablet Take 2 tablets by mouth every 8 (eight) hours as needed for pain (headaches).       betamethasone, augmented, (DIPROLENE) 0.05 % lotion Apply 1 application topically 2 (two) times a day as needed. 60 mL 3     blood glucose test strips Use 1 each As Directed as needed. Dispense brand per patient's insurance at pharmacy discretion. 100 strip 2     calcium citrate-vitamin D (CITRACAL+D) 315-200 mg-unit per tablet Take 1 tablet by mouth.       cholecalciferol, vitamin D3, 1,000 unit tablet Take 2,000 Units by mouth 2 (two) times a day.       DULoxetine (CYMBALTA) 30 MG capsule TAKE 1 CAPSULE BY MOUTH EVERY DAY 30 capsule 0     hydrOXYzine HCl (ATARAX) 25 MG tablet Take 25 mg by mouth.       LORazepam (ATIVAN) 1 MG tablet TAKE 1 TABLET BY MOUTH EVERY 8 HOURS AS NEEDED FOR ANXIETY.NEEDS TO BE SEEN FOR FURTHER REFILLS 90 tablet 0     melatonin 10 mg Tab Take 10 mg by mouth at bedtime.       metFORMIN (GLUCOPHAGE-XR) 500 MG 24 hr tablet Take 1 tablet (500 mg total) by mouth daily with supper. 90 tablet 4     naproxen sodium (ALEVE) 220 MG tablet Take 440 mg by mouth as needed.       ONETOUCH DELICA LANCETS 30 gauge Misc Use 1 each As Directed as needed. DISPENSE BRAND PER PATIENT'S INSURANCE AT PHARMACY DISCRETION.  4     ONETOUCH ULTRA2 USE DAILY AS DIRECTED  0     oxyCODONE (ROXICODONE) 5 MG immediate release tablet Take 1-3 tabs PO every 6 hours as needed for pain 20 tablet 0     polyethylene glycol (MIRALAX) 17 gram packet Take  17 g by mouth.       QUEtiapine (SEROQUEL) 25 MG tablet Take 1 tab PO at bedtime 135 tablet 4     zolpidem (AMBIEN) 10 mg tablet Take 1 tablet (10 mg total) by mouth at bedtime as needed for sleep. 90 tablet 0     No current facility-administered medications on file prior to visit.                     Review of Systems:      ROS:       10 point ROS was negative except for the items listed in HPI.    Review of Systems - General ROS: negative for - chills, fatigue, night sweats, sleep disturbance or weight loss    Respiratory ROS: no cough, shortness of breath, or wheezing  negative for - cough or shortness of breath  Cardiovascular ROS: no chest pain or dyspnea on exertion  Gastrointestinal ROS: no abdominal pain, change in bowel habits, or black or bloody stools  Musculoskeletal ROS: negative  negative for - gait disturbance, joint pain, joint stiffness, muscle pain or muscular weakness  Neurological ROS: negative for - confusion, gait disturbance, headaches or seizures    Coordination of Care:   More than 25 minutes spent on this visit  with more than 50% of time spent on coordination of care including: Educating patient about diagnosis, prognosis, side effects and benefits of medications, diet, exercise.  Time also spent providing supportive therapy regarding above issues.    This note was created using a dictation system. All typing errors or contextual distortion is unintentional and software inherent.

## 2021-06-06 NOTE — PATIENT INSTRUCTIONS - HE
Continue medications as prescribed  Have your pharmacy contact us for a refill if you are running low on medications (We may ask you to come into clinic to get a refill from the nurse  No Alcohol or drug use  No driving if sedated  Call the clinic with any questions or concerns   Reach out for help if you feel like hurting yourself or others ([facility] [phone]: [address], [address] or [facility] Suicide Hotline [phone] , call 911 or go to nearest Emergency room    Follow up as directed, for your appointments, per your After Visit Summary Form.

## 2021-06-06 NOTE — PROGRESS NOTES
"OFFICE VISIT NOTE      Assessment & Plan   Alexus Garcia is a 64 y.o. female with some pain on her left upper flank. I believe it is likely from scar tissue or neuromas due to multiple surgeries related to her breast cancer, including a lat-flap reconstruction. She wonders if surgery is needed \"to remove scar tissue\" but I do not think more surgery is a good idea. She might see her surgeon to see what her opinion is. Meanwhile, I encourage her to stretch and move that area. This might have come up or been exacerbated by her recent weight gain.  Family stress is hard - she does not have many coping strategies.    Diagnoses and all orders for this visit:    Type 2 diabetes mellitus without complication, without long-term current use of insulin (H)  -     Glycosylated Hemoglobin A1c  -     Microalbumin, Random Urine  -     Ambulatory referral to Optometry    Routine general medical examination at a health care facility  -     Hepatitis C Antibody (Anti-HCV)        Daxa Morales MD    The following are part of a depression follow up plan for the patient:  coping support assessment and coping support management  The following high BMI interventions were performed this visit: encouragement to exercise          Subjective:   Chief Complaint:  Diabetes    64 y.o. female.     1) family stressors lately  Eating more, gaining weight  Gets out some with her son, but has been down in the dumps    2) knows her diabetes is worse  3) rash on back of her neck is bumpy and itching not draining  Cyst keeps filling up - has been there since 2008    4) left breast  Since last surgery - she gets \"alexandria horses\" - takes her breath away, lasts moments  2013 latissimus flap done; now it hurts at times  Pain is worse in the past year; does some stretches. She wonders if surgery would release the scar tissue possibly causing this?    Current Outpatient Medications   Medication Sig Note     acetaminophen (TYLENOL) 500 MG tablet Take " 1,000 mg by mouth 3 (three) times a day as needed for pain.      aspirin-acetaminophen-caffeine (EXCEDRIN MIGRAINE) 250-250-65 mg per tablet Take 2 tablets by mouth every 8 (eight) hours as needed for pain (headaches).      betamethasone, augmented, (DIPROLENE) 0.05 % lotion Apply 1 application topically 2 (two) times a day as needed.      blood glucose test strips Use 1 each As Directed as needed. Dispense brand per patient's insurance at pharmacy discretion.      calcium citrate-vitamin D (CITRACAL+D) 315-200 mg-unit per tablet Take 1 tablet by mouth.      cholecalciferol, vitamin D3, 1,000 unit tablet Take 2,000 Units by mouth 2 (two) times a day.      DULoxetine (CYMBALTA) 30 MG capsule TAKE 1 CAPSULE BY MOUTH EVERY DAY      hydrOXYzine HCl (ATARAX) 25 MG tablet Take 25 mg by mouth.      LORazepam (ATIVAN) 1 MG tablet TAKE 1 TABLET BY MOUTH EVERY 8 HOURS AS NEEDED FOR ANXIETY.NEEDS TO BE SEEN FOR FURTHER REFILLS      melatonin 10 mg Tab Take 10 mg by mouth at bedtime. 5/31/2018: Received from: [facility] Received Sig: Take 10 mg by mouth.     metFORMIN (GLUCOPHAGE-XR) 500 MG 24 hr tablet Take 1 tablet (500 mg total) by mouth daily with supper.      naproxen sodium (ALEVE) 220 MG tablet Take 440 mg by mouth as needed. 5/31/2018: Received from: [facility] Received Sig: Take 440 mg by mouth.     ONETOUCH DELICA LANCETS 30 gauge Misc Use 1 each As Directed as needed. DISPENSE BRAND PER PATIENT'S INSURANCE AT PHARMACY DISCRETION.      ONETOUCH ULTRA2 USE DAILY AS DIRECTED      oxyCODONE (ROXICODONE) 5 MG immediate release tablet Take 1-3 tabs PO every 6 hours as needed for pain      polyethylene glycol (MIRALAX) 17 gram packet Take 17 g by mouth.      QUEtiapine (SEROQUEL) 25 MG tablet Take 1 tab PO at bedtime      zolpidem (AMBIEN) 10 mg tablet Take 1 tablet (10 mg total) by mouth at bedtime as needed for sleep.        PSFHx: appropriate sections of PMH completed/filled in   Tobacco Status:  She  reports  that she quit smoking about 8 years ago. Her smoking use included cigarettes. She smoked 1.00 pack per day. She has never used smokeless tobacco.    Review of Systems:  No fever. Itchy rash on neck. All other systems negative except as noted above.    Objective:    /82   Pulse (!) 104   Temp 97.9  F (36.6  C) (Oral)   Resp 18   Wt 163 lb 8 oz (74.2 kg)   LMP 09/16/2006   SpO2 96%   BMI 28.96 kg/m    GENERAL: No acute distress.  Skin: on the back of her neck, slightly into the hairline and down onto her back a little, too, there is lightly erythematous rash with small urticaria; no scratch marks or excoriation  Chest/flank: on her left, there is no erythema, swelling or eccymosis; with palpation, there area areas that are quite sensitive, no masses felt    Mood: low  Insight: fair  Judgment: fair  Affect: appropriate  Spent 25 min face to face with patient with more the 50% spent in counseling, education and coordination of care and discussing pain mgmt and meds, rash treatment, coping, mood, exercise.

## 2021-06-07 NOTE — PROGRESS NOTES
"Alexus Garcia is a 64 y.o. female who is being evaluated via a billable telephone visit.      The patient has been notified of following:     \"This telephone visit will be conducted via a call between you and your physician/provider. We have found that certain health care needs can be provided without the need for a physical exam.  This service lets us provide the care you need with a short phone conversation.  If a prescription is necessary we can send it directly to your pharmacy.  If lab work is needed we can place an order for that and you can then stop by our lab to have the test done at a later time.    If during the course of the call the physician/provider feels a telephone visit is not appropriate, you will not be charged for this service.\"     Alexus Garcia complains of    Chief Complaint   Patient presents with     Follow-up     Medication Management       I have reviewed and updated the patient's Past Medical History, Social History, Family History and Medication List.        Additional provider notes:   Psychiatric  Progress Note  Date of visit:3/26/2020         Discussion of Care and Treatment Recommendations:   This is a 64 y.o. female with history of depression and PTSD .       PCP managing sleep :  Prescribing  Lorazepam ,Seroquel and Ambien   PCP managing Gabapentin for neurological pain        Last visit 2/14/2020  Recommendation at last visit .  1 .Continue with  Psychotherapy   2.Highly recommend : Community Support groups   3. Continue   Cymbalta at  30 mg daily    4. RTC- 8  weeks, call in between visit with any question   Patient and I reviewed diagnosis and treatment plan and patient agrees with following recommendations:  Ongoing education given regarding diagnostic and treatment options with adequate verbalization of understanding.  Plan   1 .Continue with  Psychotherapy   2.Highly recommend : Community Support groups   3. Continue   Cymbalta at  30 mg daily    4. 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)     Malignant neoplasm of breast (H)     Posttraumatic hematoma of right breast     Postoperative pain     Pain in right axilla     Constipation due to outlet dysfunction     Logorrhea     Type 2 diabetes mellitus without complication, without long-term current use of insulin (H)     Rotator cuff tear     Anxiety     Scalp irritation     Abnormal MRI     Diabetes 1.5, managed as type 2 (H)     H/O breast surgery     Advanced directives, counseling/discussion     Vitamin D deficiency disease             Chief Complaint / Subjective:    Chief complaint: \" I am a little bit nervous '     History of Present Illness:   Per patient statement-she is a little bit nervous due to coronavirus given that she is a high risk due to her history of cancer.  She also had a biopsy done  about 2 weeks ago and is nervously and anxiously waiting for results.  She has been trying to stay indoors and isolate herself.  She has been trying to stay positive and utilize nonpharmacological ways to keep herself calm.  She is still on Cymbalta 30 mg and states that it is helpful.  She is praying and meditating which also is very helpful for her per her statement.  She is not interested in medication changes for now.  She denies all other psychiatric issues states she feels safe and fine at home will continue the same medications.  We will follow-up with her 4 weeks  and recommend she call in between visits any questions or concerns.    Mental Status Examination:   General: Unable to assess telephone  Speech: Normal in rate and tone  Language: Intact  Thought process: Coherent  Thought content:                           Auditory hallucinations-Denies                          Visual Hallucinations - Denies                         Delusions " Absent                           Loose Associations:  No                          Suicidal thoughts: Denies                          Homicidal thoughts:Denies                        Affect: Neutral  Mood: Neutral   Intellectual functioning: Within normal limits  Memory: Within normal limits  Fund of knowledge: Average  Attention and concentration: Within normal limits  Gait: Steady  Psychomotor activity: Unable to assess telephone visit  Muscles: Unable to assess telephone visit  InSight and judgment: Fair      Drug/treatment history and current pattern of use:   Denies     Medication changes: See Above   Medication adherence: compliant  Medication side effects: absent  Information about medications: Side effects, benefits and alternative treatments discussed and patient agrees .    Psychotherapy: Supportive therapy day-to-day living    Education: Diet, exercise, abstinence from drugs and alcohol, patient will not drive if sedated and medications or  under influence of any substance    Lab Results:   Personally reviewed and discussed with the patient    Lab Results   Component Value Date    WBC 5.6 12/06/2019    HGB 15.1 12/06/2019    HCT 46.9 12/06/2019     12/06/2019    CHOL 305 (H) 12/06/2019    TRIG 140 12/06/2019    HDL 64 12/06/2019     (H) 12/06/2019     (H) 12/06/2019     12/06/2019    K 4.1 12/06/2019     12/06/2019    CREATININE 0.86 12/06/2019    BUN 12 12/06/2019    CO2 22 12/06/2019    TSH 0.84 12/06/2019    INR 0.96 09/20/2016    HGBA1C 7.7 (H) 03/04/2020    MICROALBUR 6.72 (H) 03/04/2020       Vital signs:  LMP 09/16/2006   Unable to assess telephone visit  Allergies: Aztreonam; Castor oil; Cefaclor; Cephalexin; Cephalexin monohydrate; Chlorhexidine; Ciprofloxacin; Clarithromycin; Clindamycin; Penicillins; Propofol; Scopolamine; Sulfa (sulfonamide antibiotics); Sulfasalazine; Tetracyclines; Vancomycin; Adhesive; Cytoxan [cyclophosphamide]; Erythromycin base;  Hydrocodone; Neupogen [filgrastim]; Paper tape; Tapentadol; Taxol [paclitaxel]; and Taxotere [docetaxel]         Medications:     Current Outpatient Medications on File Prior to Visit   Medication Sig Dispense Refill     acetaminophen (TYLENOL) 500 MG tablet Take 1,000 mg by mouth 3 (three) times a day as needed for pain.       aspirin-acetaminophen-caffeine (EXCEDRIN MIGRAINE) 250-250-65 mg per tablet Take 2 tablets by mouth every 8 (eight) hours as needed for pain (headaches).       betamethasone, augmented, (DIPROLENE) 0.05 % lotion Apply 1 application topically 2 (two) times a day as needed. Apply to the back of the neck and upper back 60 mL 3     blood glucose test strips Use 1 each As Directed as needed. Dispense brand per patient's insurance at pharmacy discretion. 100 strip 2     calcium citrate-vitamin D (CITRACAL+D) 315-200 mg-unit per tablet Take 1 tablet by mouth.       cholecalciferol, vitamin D3, 1,000 unit tablet Take 2,000 Units by mouth 2 (two) times a day.       DULoxetine (CYMBALTA) 30 MG capsule TAKE 1 CAPSULE BY MOUTH EVERY DAY 30 capsule 0     hydrOXYzine HCl (ATARAX) 25 MG tablet Take 25 mg by mouth.       lisinopriL (ZESTRIL) 2.5 MG tablet Take 1 tablet (2.5 mg total) by mouth daily. 30 tablet 11     LORazepam (ATIVAN) 1 MG tablet TAKE 1 TABLET BY MOUTH EVERY 8 HOURS AS NEEDED FOR ANXIETY.NEEDS TO BE SEEN FOR FURTHER REFILLS 90 tablet 0     melatonin 10 mg Tab Take 10 mg by mouth at bedtime.       metFORMIN (GLUCOPHAGE-XR) 500 MG 24 hr tablet Take 1 tablet (500 mg total) by mouth daily with supper. 90 tablet 4     naproxen sodium (ALEVE) 220 MG tablet Take 440 mg by mouth as needed.       ONETOUCH DELICA LANCETS 30 gauge Misc Use 1 each As Directed as needed. DISPENSE BRAND PER PATIENT'S INSURANCE AT PHARMACY DISCRETION.  4     ONETOUCH ULTRA2 USE DAILY AS DIRECTED  0     oxyCODONE (ROXICODONE) 5 MG immediate release tablet Take 1-3 tabs PO every 6 hours as needed for pain 20 tablet 0      polyethylene glycol (MIRALAX) 17 gram packet Take 17 g by mouth.       QUEtiapine (SEROQUEL) 25 MG tablet Take 1 tab PO at bedtime 135 tablet 4     zolpidem (AMBIEN) 10 mg tablet Take 1 tablet (10 mg total) by mouth at bedtime as needed for sleep. 90 tablet 0     No current facility-administered medications on file prior to visit.          This note was created using a dictation system. All typing errors or contextual distortion is unintentional and software inherent.    Phone call duration:  16minutes    Lucy Carey NP

## 2021-06-07 NOTE — PROGRESS NOTES
This video/telephone visit will be conducted via a call between you and your physician/provider. We have found that certain health care needs can be provided without the need for an in-person physical exam. This service lets us provide the care you need with a video /telephone conversation. If a prescription is necessary we can send it directly to your pharmacy. If lab work is needed we can place an order for that and you can then stop by our lab to have the test done at a later time.    Just as we bill insurance for in-person visits, we also bill insurance for video/telephone visits. If you have questions about your insurance coverage, we recommend that you speak with your insurance company.    Patient has given verbal consent for video/Telephone visit? Yes   Patient would like the video visit invitation sent by: Text to cell phone:  [phone]  Katie Velasco CMA  Send [facility]S to [email or web address]  Patient verified allergies, medications and pharmacy via phone.  Patient states she is ready for visit.    [place] :  12/20/2019 lorazepam 1 mg Qty: 90 Provider: Daxa Morales MD

## 2021-06-07 NOTE — PROGRESS NOTES
"Alexus Garcia is a 64 y.o. female who is being evaluated via a billable telephone visit.      The patient has been notified of following:     \"This telephone visit will be conducted via a call between you and your physician/provider. We have found that certain health care needs can be provided without the need for a physical exam.  This service lets us provide the care you need with a short phone conversation.  If a prescription is necessary we can send it directly to your pharmacy.  If lab work is needed we can place an order for that and you can then stop by our lab to have the test done at a later time.    Telephone visits are billed at different rates depending on your insurance coverage. During this emergency period, for some insurers they may be billed the same as an in-person visit.  Please reach out to your insurance provider with any questions.    If during the course of the call the physician/provider feels a telephone visit is not appropriate, you will not be charged for this service.\"    Patient has given verbal consent to a Telephone visit? Yes    Patient would like to receive their AVS by AVS Preference: Mail a copy.      Psychiatric  Progress Note  Date of visit:4/23/2020         Discussion of Care and Treatment Recommendations:   This is a 64 y.o. female with a with history of depression and PTSD .    PCP managing sleep :  Prescribing  Lorazepam ,Seroquel and Ambien   PCP managing Gabapentin for neurological pain      Last visit 3/26/2020  Recommendation at last visit   1 .Continue with  Psychotherapy   2.Highly recommend : Community Support groups   3. Continue   Cymbalta at  30 mg daily    4. RTC- 8  weeks, call in between visit with any question   Patient and I reviewed diagnosis and treatment plan and patient agrees with following recommendations:  Ongoing education given regarding diagnostic and treatment options with adequate verbalization of understanding.  Plan   1 .Continue " "with  Psychotherapy   2.Highly recommend : Community Support groups   3. Continue   Cymbalta at  30 mg daily    4. 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)     Malignant neoplasm of breast (H)     Posttraumatic hematoma of right breast     Postoperative pain     Pain in right axilla     Constipation due to outlet dysfunction     Logorrhea     Type 2 diabetes mellitus without complication, without long-term current use of insulin (H)     Rotator cuff tear     Anxiety     Scalp irritation     Abnormal MRI     Diabetes 1.5, managed as type 2 (H)     H/O breast surgery     Advanced directives, counseling/discussion     Vitamin D deficiency disease             Chief Complaint / Subjective:    Chief complaint:\" The last couple of days have been tough\"   History of Present Illness:   Per patient's statement : Patient reports that the last couple of days have been tough and has she has been under a lot of stress and anxiety.  Her adult son who struggles with Asperger's syndrome had moved back to the house for 5 days after a fight with his girlfriend.  The son has not been observing very good hygiene practices and has also been out in the community and she was so scared he would bring the coronavirus to her and she is high risk due to her history of cancer.  This then left last night and she is hoping to be able to rest her brain and body from all the anxiety and mental torture she experienced.  Current medications have been helpful.  She is not looking into any medication changes but rather would utilize relaxation techniques and try to get some rest and hopefully she will feel better.  She denies all other psychiatric issues.  Denies suicidal homicidal ideations.  States she feels safe and verbally contracts for safety.    Mental Status " Examination:   General: Alert and oriented x 3   Speech: Normal in rate and tone  Language: Intact  Thought process: Coherent  Thought content:                           Auditory hallucinations-Denies                          Visual Hallucinations - Denies                         Delusions Absent                           Loose Associations:  No                          Suicidal thoughts: Denies                          Homicidal thoughts:Denies                        Affect: Neutral  Mood: Neutral   Intellectual functioning: Within normal limits  Memory: Within normal limits  Fund of knowledge: Average  Attention and concentration: Within normal limits  Gait: Unable to assess telephone visit  Psychomotor activity: Unable to assess telephone visit  Muscles: Unable to assess telephone visit  InSight and judgment: Fair      Drug/treatment history and current pattern of use:   Denies     Medication changes: See Above   Medication adherence: compliant  Medication side effects: absent  Information about medications: Side effects, benefits and alternative treatments discussed and patient agrees .    Psychotherapy: Supportive therapy day-to-day living    Education: Diet, exercise, abstinence from drugs and alcohol, patient will not drive if sedated and medications or  under influence of any substance    Lab Results:   Personally reviewed and discussed with the patient    Lab Results   Component Value Date    WBC 5.6 12/06/2019    HGB 15.1 12/06/2019    HCT 46.9 12/06/2019     12/06/2019    CHOL 305 (H) 12/06/2019    TRIG 140 12/06/2019    HDL 64 12/06/2019     (H) 12/06/2019     (H) 12/06/2019     12/06/2019    K 4.1 12/06/2019     12/06/2019    CREATININE 0.86 12/06/2019    BUN 12 12/06/2019    CO2 22 12/06/2019    TSH 0.84 12/06/2019    INR 0.96 09/20/2016    HGBA1C 7.7 (H) 03/04/2020    MICROALBUR 6.72 (H) 03/04/2020       Vital signs:  LMP 09/16/2006   Unable to assess telephone  visit    Allergies:  Allergies   Allergen Reactions     Aztreonam Hives     Castor Oil Hives     Cefaclor Anaphylaxis     Cephalexin Anaphylaxis     Cephalexin Monohydrate Hives     Chlorhexidine Hives     White clear stuff that you lather during surgery that dries unto the skin and stays on the skin that's almost like a superglue. It was very itchy and skin gets very red.     Ciprofloxacin Anaphylaxis     Clarithromycin Anaphylaxis, Anxiety, Dizziness, Hives, Itching, Nausea And Vomiting, Rash and Shortness Of Breath     Clindamycin Hives     Penicillins Anaphylaxis     Propofol Nausea Only and Headache     Scopolamine Anaphylaxis, Hives and Swelling     Swollen tongue, eye swelled closed  Eye and tongue swelling      Sulfa (Sulfonamide Antibiotics) Anaphylaxis and Hives     Sulfasalazine Hives     Tetracyclines Anaphylaxis     Vancomycin Hives     Adhesive Itching     Irritation where the tape had contact     Cytoxan [Cyclophosphamide] Hives     severe     Erythromycin Base Hives     Hydrocodone Nausea And Vomiting     Neupogen [Filgrastim] Hives     severe     Paper Tape Other (See Comments)     Surgical paper tape - redness. States if left on too long leaves little cuts on her skin     Tapentadol Other (See Comments)     bleeding     Taxol [Paclitaxel] Hives     severe     Taxotere [Docetaxel] Hives          Medications:     Current Outpatient Medications on File Prior to Visit   Medication Sig Dispense Refill     acetaminophen (TYLENOL) 500 MG tablet Take 1,000 mg by mouth 3 (three) times a day as needed for pain.       aspirin-acetaminophen-caffeine (EXCEDRIN MIGRAINE) 250-250-65 mg per tablet Take 2 tablets by mouth every 8 (eight) hours as needed for pain (headaches).       betamethasone, augmented, (DIPROLENE) 0.05 % lotion Apply 1 application topically 2 (two) times a day as needed. Apply to the back of the neck and upper back 60 mL 3     blood glucose test strips Use 1 each As Directed as needed. Dispense  brand per patient's insurance at pharmacy discretion. 100 strip 2     calcium citrate-vitamin D (CITRACAL+D) 315-200 mg-unit per tablet Take 1 tablet by mouth.       cholecalciferol, vitamin D3, 1,000 unit tablet Take 2,000 Units by mouth 2 (two) times a day.       DULoxetine (CYMBALTA) 30 MG capsule TAKE 1 CAPSULE BY MOUTH EVERY DAY 90 capsule 0     hydrOXYzine HCl (ATARAX) 25 MG tablet Take 25 mg by mouth.       lisinopriL (ZESTRIL) 2.5 MG tablet Take 1 tablet (2.5 mg total) by mouth daily. 30 tablet 11     LORazepam (ATIVAN) 1 MG tablet TAKE 1 TABLET BY MOUTH EVERY 8 HOURS AS NEEDED FOR ANXIETY.NEEDS TO BE SEEN FOR FURTHER REFILLS 90 tablet 0     melatonin 10 mg Tab Take 10 mg by mouth at bedtime.       metFORMIN (GLUCOPHAGE-XR) 500 MG 24 hr tablet Take 1 tablet (500 mg total) by mouth daily with supper. 90 tablet 4     naproxen sodium (ALEVE) 220 MG tablet Take 440 mg by mouth as needed.       ONETOUCH DELICA LANCETS 30 gauge Misc Use 1 each As Directed as needed. DISPENSE BRAND PER PATIENT'S INSURANCE AT PHARMACY DISCRETION.  4     ONETOUCH ULTRA2 USE DAILY AS DIRECTED  0     oxyCODONE (ROXICODONE) 5 MG immediate release tablet Take 1-3 tabs PO every 6 hours as needed for pain 20 tablet 0     polyethylene glycol (MIRALAX) 17 gram packet Take 17 g by mouth.       QUEtiapine (SEROQUEL) 25 MG tablet Take 1 tab PO at bedtime 135 tablet 4     zolpidem (AMBIEN) 10 mg tablet Take 1 tablet (10 mg total) by mouth at bedtime as needed for sleep. 90 tablet 0     No current facility-administered medications on file prior to visit.                Coordination of Care:   More than 25 minutes spent on this visit  with more than 50% of time spent on coordination of care including: Educating patient about diagnosis, prognosis, side effects and benefits of medications, diet, exercise.  Time also spent providing supportive therapy regarding above issues.    This note was created using a dictation system. All typing errors or  contextual distortion is unintentional and software inherent.  Phone call duration: 26  minutes    Lucy Carey, NP

## 2021-06-07 NOTE — PROGRESS NOTES
Medications Phoned  to Pharmacy [] yes [x]no  Name of Pharmacist:  List Medications, including dose, quantity and instructions    Medications ordered this visit were e-scribed.  Verified by order class [x] yes  [] no  Cymbalta 30 mg   Medication changes or discontinuations were communicated to patient's pharmacy: [] yes  [x] no    Future appointment was made: [x] yes  [] No  4/23/2020  Dictation completed at time of chart check: [x] yes  [] no    I have checked the documentation for today s encounters and the above information has been reviewed and completed.

## 2021-06-07 NOTE — PROGRESS NOTES
________________________________________  Medications Phoned  to Pharmacy [] yes [x]no  Name of Pharmacist:  List Medications, including dose, quantity and instructions    Medications ordered this visit were e-scribed.  Verified by order class [] yes  [x] no    Medication changes or discontinuations were communicated to patient's pharmacy: [] yes  [x] no    Dictation completed at time of chart check: [x] yes  [] no    I have checked the documentation for today s encounters and the above information has been reviewed and completed.

## 2021-06-07 NOTE — TELEPHONE ENCOUNTER
Cymbalta refill request refused as BROOKS Carey sent a script to the pharmacy for cymbalta 3-26-20 for a 90 day supply.

## 2021-06-07 NOTE — PATIENT INSTRUCTIONS - HE
Continue medications as prescribed  Have your pharmacy contact us for a refill if you are running low on medications (We may ask you to come into clinic to get a refill from the nurse  No Alcohol or drug use  No driving if sedated  Call the clinic with any questions or concerns   Reach out for help if you feel like hurting yourself or others ([facility] [phone]: [address] [facility], [address] or [facility] Suicide Hotline [phone] , call 911 or go to nearest Emergency room    Follow up as directed, for your appointments, per your After Visit Summary Form.

## 2021-06-08 NOTE — PROGRESS NOTES
"Clinic Care Coordination RN Assessment:  Patient is dressed appropriately and able to express her health needs and concerns. She was going to [facility] and plan to switching to [facility] clinic for primary care with Dr. ARGENTINA Smith. She mentioned of having breast cancer and this will be \" the 9th\" surgery procedure. Planning to have surgery in March \" removing the expander- breast reconstructive\" at [facility]. She does not anticipate requiring to go to a short term facility and hoping to be in her home after hospital discharge.  Other medical concerns are chronic pain, anxiety, and PTSD. Mentioned of having two brothers, both diagnosed with cancer. States her son who she has a good relationship with is currently incarcerated at [place]. And that he also have a mental illness and currently in \" protected custody\". She is here wanting to enroll in CCC for identified needs for support of  and FRG. States had been collecting Social Security Disability since 2012 and thinks that the SSA is trying to terminate her disability status and fearing that she will be homeless and without health insurance. States independent with medication self management and denies forgetting or skipping medication dose. Patient was greeted by Care Guide and assisted with scheduling  visit.     Diabetic population of intervention patient  A1C: 6.2 on 5/2/16.   Last Diabetic Eye Exam: Need to schedule.   Last Diabetic Foot Exam: Yearly     1. What have blood sugars been running? Seen Diabetes Educator in May 2016.   2. How often does the patient check blood sugars? Rare- manage with diet and exercise.   3. Has the patient felt symptomatic with low readings?  No  4. What does the patient do to feel better or raise blood sugars? Denies of hyperglycemic events.     Action Plan:  Total PCAM Score:20  Little to no RN or  intervention needed. Recommend standard Care Guide outreach for " "coordination needs.    RN Will:  1)  Not add to RN panel for monitoring. Not reach out to patient at this time.   2) Defer to Care Guide for outreach. The Care Guide can reach out to RN/SW as needed for support or with new concerns.    Care Guide Will:  Care Guide Delegation:  1) Due Date:  scheduled for 17 at 3:30pm. Remind patient to bring Social Security documents.   Delegation: Help the patient to coordinate goal setting visit if not already coordinated.     2) Due Date: At Goal setting visit      Delegation: Review goals set at PCAM visit and create or add to action plan.     Goals        Patient Stated      I wan tto have my breast surgery and be safe. (pt-stated)            Action steps to achieve this goal  1.  I will go to my medical appointments and bring health questions I have to discuss with my providers.  2.  I will continue going to therapy.  3.  I will stay in contact with my Care Guide to help me understand CCC.    Date goal set:  17         Other      Diabetes            Goal 0531.16:  Nutrition-  Watch carb intake at breakfast  Ensure all meals and snacks have a carb and a healthy fat and/or protein  Other-  Continue to be as active as tolerated          PCAM (Patient Centered Assessment Method)     Health and Well-Bein    Physical health needs:    HX of Bladder and bowel problems.  ( emptying bladder, constipation, IBS, ...etc)     \"Takes off the edge\" pain medications.     Last dental exam- over 10 years.     Wears cheaters.     Mental Health Needs:    Attends weekly therapy.     HX of anxiety and panic attacks.     Severe insomnia and [place]     Lifestyle/Habits    Prefers to keep to herself.    Difficult to be around people - causing anxiety.     Social Environment:5    Home Environment:    Lives at the [employer] apartments for seniors or disabled. Elevator acces and Handicapped apt. Subzidized housing ( internal). Laundry on site.    Cell phone. " Denies access of  internet or personal PC for use.     Drive.     Daily Activities:    Talk or check in in he jeffery who is sick with lung cancer,     Talk with son when he can call her.     Social Network:    Limited- anxiety and panic attacks. Avoid crowds or being around people she does not know.       Financial Status and Services:    Disability $1218/ mo. State pays for Part A, paying own part B. Enrolled in [employer]- [employer].     Had a  at [facility] assisted with applying for insurance.     Have Medical Bills. Need FRG assistance.     Need - Resource and support with current ongoing issues with SSA and her SSDI benefit and Health Care Directive. Free to low cost dental service.     Health Literacy and Communication:4    Understanding of Health and Wellbeing:    Fair.  Able to verbalize her needs and concerns. Can benefit of CCC support.   Engagement:    History of attending appointments.   Compliance with Medical Recommendations to date:    See above. At Risk possibly due to changes to  finance and health insurance status.     Service Coordination:2    Services Needed:    FRG- Have Medical bills and resources of Free to low cost dental service.      - Provide resource and support with  Social Security Disability status and  Health Care Directive.     Care Guide provide assistance and support to patient with their Goal.     Coordination of Services:    Care Guide scheduled SW visit - completed.     Care Guide assist with scheduling FRG visit.     Emergency Plan:  Managing Chronic Pain: Medicines  Medicines can help you live better with chronic pain. You may use over-the-counter or prescription medicines. It can take some time and trial and error to work out the best treatment plan for you. Work with your health care provider to find the best medicines for you, and to use them safely and effectively.  Tell your health care provider about all medicines you`re  taking, including herbs and vitamins.   A part of your treatment plan  Depending on your situation and the type of pain, you may take medicines:    At times when pain is more intense than usual.    For pain relief throughout the day.    Before activities that tend to trigger pain, like going shopping or doing physical therapy.     To decrease sensitivity to pain and help you sleep.  There are 4 major groupings of medicines for the treatment of chronic pain:  Non-opioids. These include the commonly used medicine acetaminophen as well as the non-steroidal anti-inflammatory drugs (NSAIDs), like aspirin and ibuprofen, naproxen sodium, and ketoprofen. These all help control pain but NSAIDs also help relieve inflammation. These drugs are available over-the-counter. Some NSAIDS are available by prescription only.  Acetaminophen can cause liver damage if taken above the recommended dose. NSAIDs may cause stomach problems like bleeding ulcers. Using them over the long-term can cause heart problems and stroke in a very small number of people. None of these drugs is addictive.  Opioids. This includes drugs, like morphine, oxycodone, codeine, fentanyl, and methadone. Opioids may be used to treat breakthrough pain or severe chronic pain. Opioids are available only by prescription. These medicines may be effective for managing chronic pain. But they are controversial because of their side effects and because they can be addictive.    Adjuvants. This group includes medicines that were originally made to treat other conditions, but were also found to relieve pain. Examples of adjuvant drugs include antidepressants and anticonvulsants.  Antidepressants. These help pain by working on the same brain chemicals that play a role in depression. They also help improve sleep. Tricyclic antidepressants are 1 group of antidepressants used for treating chronic pain caused by nerve injury (neuropathic pain). Examples include amitriptyline,  nortriptyline, and desipramine. Serotonin-norepinephrine reuptake inhibitors (SNRIs), like duloxetine and milnacipran, are also used.  Some types of antidepressants are used in low doses for sleep problems. They may also be prescribed if you are very sensitive to pain or some kinds of nerve pain.  Anticonvulsants, developed to prevent seizures, can help certain pain conditions, particularly nerve (neuropathic) pain. Examples include gabapentin and pregabalin.  Other pain medicines    Topical. These medicines, like lidocaine or capsaicin, are applied to the skin to treat pain in one location.    Muscle relaxants. These may be used to stop painful muscle spasms. Drugs like cyclobenzaprine can be sedating.  Taking medicine safely    Take your medicine on time and in the right dose as prescribed.    Tell your health care professional if your medicine doesn't relieve your pain or work for a long enough time, or if you have side effects.    Don't take other people's medicines. They may not be safe for you.  Avoid alcohol, tobacco, and illegal drugs. These may interact with your medicines causing you harm.    Constipation (Adult)   Constipation means that you have bowel movements that are less frequent than usual. Stools often become very hard and difficult to pass.  Constipation is very common. At some point in life it affects almost everyone. Since everyone's bowel habits are different, what is constipation to one person may not be to another. Your health care provider may do tests to diagnose constipation. It depends on what he or she finds when evaluating you.  When to seek medical advice  Call your health care provider right away if any of these occur:    Fever over 100.4 F (38 C)    Failure to resume normal bowel movements    Pain in your abdomen or back gets worse    Nausea or vomiting    Swelling in your abdomen    Blood in the stool    Weakness  Call 911  Call 911 if any of these occur:    Trouble  breathing    Stiff, rigid abdomen that is severely painful to touch    Confusion    Fainting or loss of consciousness    Rapid heart rate    Knowing When to Seek Treatment  Knowing when to seek treatment for mental health disorders is important for parents and families. Many times, families, spouses, or friends are the first to suspect that their loved one is challenged by feelings, behaviors, and/or environmental conditions that cause them to act disruptive, rebellious, or sad. This may include, but is not limited to, problems with relationships with friends and/or family members, work, school, sleeping, eating, substance abuse, emotional expression, development, coping, attentiveness, and responsiveness. It's also important to know that people of different ages will exhibit different symptoms and behaviors. Familiarizing yourself with the common behaviors of children, adolescents, and adults that make it hard for them to adapt to situations will often help to identify any problems early when they can be treated. It's important for families who suspect a problem in one, or more, of these areas to seek treatment as soon as possible. Treatment for mental health disorders is available.    What are the symptoms of a potential problem in an adult?  The following are the most common symptoms of a potential emotional, behavioral, and/or developmental problem in an adult, which necessitates a psychiatric evaluation. However, each individual may experience symptoms differently. Symptoms may include:  Significant decline in work performance, poor work attendance, and/or lack of productivity  Social withdrawal from activities, friends, and/or family  Substance (alcohol and drugs) abuse  Sleep disturbances (like persistent nightmares, insomnia, hypersomnia, or flashbacks)  Depression (poor mood, negativity, or mood swings)  Appetite changes (like significant weight gain or loss)  Continuous or frequent aggression  Continuous  "or frequent anger (for periods longer than 6 months)  Excessive worry and/or anxiety  Threats to self or others  Thoughts of death  Thoughts and/or talk of suicide  Destructive behaviors (like criminal activity, or stealing)  Sexually \"acting out\"  Lying and/or cheating  Many physical complaints, including being constantly tense and/or frequent aches and pains that cannot be traced to a physical cause or injury  Sudden feelings of panic, dizziness, or increased heartbeat  Increased feelings of guilt, helplessness, and/or hopelessness  Decreased energy  The symptoms of a potential emotional, behavioral, and/or developmental problem may look like other conditions. Always talk with your child's health care provider for a diagnosis.                "

## 2021-06-08 NOTE — TELEPHONE ENCOUNTER
Controlled Substance Refill Request  Medication Name:     Date Last Fill: 12/10/19 lorazepam; 5/7/19 zolpidem  Requested Pharmacy: [facility]  Submit electronically to pharmacy  Controlled Substance Agreement on file:   Encounter-Level CSA Scan Date:    There are no encounter-level csa scan date.        Last office visit:  3/4/20

## 2021-06-08 NOTE — TELEPHONE ENCOUNTER
Last office visit: 08/07/2019 AND 05/20/2015 for Zolpidem  Last refill: Ativan (12/10/2019), Zolpidem (05/07/2019)  Last lab check: 12/06/2019 CMP   Next appointment: None.     Chart reviewed. Please review findings below.     2. Breast mass  Biopsy needed. I have prescribed extra pain medication for post-procedure  - LORazepam (ATIVAN) 1 MG tablet; Take 1 tablet (1 mg total) by mouth every 8 (eight) hours as needed for anxiety.  Dispense: 90 tablet; Refill: 0    No CSA on file.     PLAN:   1. Routine general medical examination at a health care facility  We'll increase the dosage of her sleeping pill  - zolpidem (AMBIEN) 10 mg tablet; Take 1 tablet (10 mg total) by mouth bedtime as needed for sleep.  Dispense: 90 tablet; Refill: 1     2. Depression  Patient is stable, may continue to take her trazodone in addition to zolpidem for sleep  - zolpidem (AMBIEN) 10 mg tablet; Take 1 tablet (10 mg total) by mouth bedtime as needed for sleep.  Dispense: 90 tablet; Refill: 1     3. Breast cancer, female, left  We'll follow with her oncologist  - zolpidem (AMBIEN) 10 mg tablet; Take 1 tablet (10 mg total) by mouth bedtime as needed for sleep.  Dispense: 90 tablet; Refill: 1
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
## 2021-06-08 NOTE — PROGRESS NOTES
This is a 61 y.o. woman who comes in for  continued follow-up of her right breast cancer.  She is now 4 months  status post right total mastectomy with reconstruction.  She has not done with reconstruction yet just finishing up with the tissue expanders.  She is feeling well.  She does complain of pain and tightness.  She is also wondering if I can state that she is disabled.  She is under the impression that I told her because this was triple negative that she was disabled and I told her I never did say that.      Please see the chart review for PMH, Meds, allergies, FH and SH.    ROS:  A 12 point comprehensive review of systems was negative except as noted.      Physical Exam:  Visit Vitals     BP (!) 146/102 (Patient Site: Left Leg)     Pulse 100     Wt 145 lb 8 oz (66 kg)     LMP 09/16/2006     SpO2 96%     BMI 25.77 kg/m2     General appearance: alert, appears stated age and cooperative  Breasts: There are no palpable masses. There are minimal radiation changes on the left. The scars have healed up well.  Lymph nodes: Cervical, supraclavicular, and axillary nodes normal.  Neurologic: Grossly normal      Impression: Personal History of breast cancer. TOMEKA Vaughan is highly anxious.  She is complaining of headaches and I told her she should talk to her oncologist about that.  I explained to her that her risk of recurrence is very small in the right breast.  She doesn't need any further imaging on the right.  She does need mammograms on the left since she only had a lumpectomy on that side.  At this point however she has tissue expanders in both sides so I told her she should just wait until she has her permanent implants put in the Inc. at the mammogram.  She can follow up with me at that time.    Plan: Follow up in about 6 months after she has her permanent implants placed.  She'll have a mammogram on her left side and then follow-up with me.
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
## 2021-06-08 NOTE — PROGRESS NOTES
"Alexus Garcia is a 64 y.o. female who is being evaluated via a billable telephone visit.      The patient has been notified of following:     \"This telephone visit will be conducted via a call between you and your physician/provider. We have found that certain health care needs can be provided without the need for a physical exam.  This service lets us provide the care you need with a short phone conversation.  If a prescription is necessary we can send it directly to your pharmacy.  If lab work is needed we can place an order for that and you can then stop by our lab to have the test done at a later time.    Telephone visits are billed at different rates depending on your insurance coverage. During this emergency period, for some insurers they may be billed the same as an in-person visit.  Please reach out to your insurance provider with any questions.    If during the course of the call the physician/provider feels a telephone visit is not appropriate, you will not be charged for this service.\"    Patient has given verbal consent to a Telephone visit? Yes    Patient would like to receive their AVS by AVS Preference: Mail a copy.    Psychiatric  Progress Note  Date of visit:5/28/2020         Discussion of Care and Treatment Recommendations:   This is a 64 y.o. female with a with history of depression and PTSD .        PCP managing sleep :  Prescribing  Lorazepam ,Seroquel and Ambien   PCP managing Gabapentin for neurological pain        Last visit  4/23/2020  Recommendation at last visit..  1 .Continue with  Psychotherapy   2.Highly recommend : Community Support groups   3. Continue   Cymbalta at  30 mg daily    4. RTC- 8  weeks, call in between visit with any question     Patient and I reviewed diagnosis and treatment plan and patient agrees with following recommendations:  Ongoing education given regarding diagnostic and treatment options with adequate verbalization of understanding.  Plan   1 .Continue " "with  Psychotherapy   2.Highly recommend : Community Support groups   3. Continue   Cymbalta at  30 mg daily    4. 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)     Malignant neoplasm of breast (H)     Posttraumatic hematoma of right breast     Postoperative pain     Pain in right axilla     Constipation due to outlet dysfunction     Logorrhea     Type 2 diabetes mellitus without complication, without long-term current use of insulin (H)     Rotator cuff tear     Anxiety     Scalp irritation     Abnormal MRI     Diabetes 1.5, managed as type 2 (H)     H/O breast surgery     Advanced directives, counseling/discussion     Vitamin D deficiency disease             Chief Complaint / Subjective:    Chief complaint: \" I am a bit anxious about COVID-19 but otherwise doing ok\"    History of Present Illness: She reports compliance with current medications and denies side effects.  Reports medications have been helpful in managing her depression and anxiety.  She has been a little worried about COVID-19 as she has a lot of pre-existing condition and she is a high risk and is afraid to go outside as most people do not wear mask or maintain physical distance.  Other than that she is otherwise doing okay and is grateful to be healthy.  She would like to continue with the same medication      Mental Status Examination:   General: Adequate hygiene, cooperative  Speech: Normal in rate and tone  Language: Intact  Thought process: Coherent  Thought content:                           Auditory hallucinations-Denies                          Visual Hallucinations - Denies                         Delusions Absent                           Loose Associations:  No                          Suicidal thoughts: Denies                          Homicidal " "thoughts:Denies                        Affect: Neutral  Mood: Neutral   Intellectual functioning: Within normal limits  Memory: Within normal limits  Fund of knowledge: Average  Attention and concentration: Within normal limits  Gait: Steady  Psychomotor activity: Calm, no agitation  Muscles: No atrophy, no abnormal movements  InSight and judgment: Fair      Drug/treatment history and current pattern of use:   Denies     Medication changes: See Above   Medication adherence: compliant  Medication side effects: absent  Information about medications: Side effects, benefits and alternative treatments discussed and patient agrees .    Psychotherapy: Supportive therapy day-to-day living    Education: Diet, exercise, abstinence from drugs and alcohol, patient will not drive if sedated and medications or  under influence of any substance    Lab Results:   Personally reviewed and discussed with the patient    Lab Results   Component Value Date    WBC 5.6 12/06/2019    HGB 15.1 12/06/2019    HCT 46.9 12/06/2019     12/06/2019    CHOL 305 (H) 12/06/2019    TRIG 140 12/06/2019    HDL 64 12/06/2019     (H) 12/06/2019     (H) 12/06/2019     12/06/2019    K 4.1 12/06/2019     12/06/2019    CREATININE 0.86 12/06/2019    BUN 12 12/06/2019    CO2 22 12/06/2019    TSH 0.84 12/06/2019    INR 0.96 09/20/2016    HGBA1C 7.7 (H) 03/04/2020    MICROALBUR 6.72 (H) 03/04/2020       Vital signs:  Ht 5' 3\" (1.6 m)   Wt 163 lb (73.9 kg) Comment: Pt reported  LMP 09/16/2006   BMI 28.87 kg/m      Allergies: Aztreonam; Castor oil; Cefaclor; Cephalexin; Cephalexin monohydrate; Chlorhexidine; Ciprofloxacin; Clarithromycin; Clindamycin; Penicillins; Propofol; Scopolamine; Sulfa (sulfonamide antibiotics); Sulfasalazine; Tetracyclines; Vancomycin; Adhesive; Cytoxan [cyclophosphamide]; Erythromycin base; Hydrocodone; Neupogen [filgrastim]; Paper tape; Tapentadol; Taxol [paclitaxel]; and Taxotere [docetaxel]     [place] " :  5/11/2020 Oxycodone HCl 5 mg Qty: 20  Provider: Dr. Morales  5/11/2020 Lorazepam 1 mg  Qty: 30   Provider:  Dr. Morales  5/11/2020  Zolpidem tartrate 10 mg  Qty: 30  Provider:  Dr. Morales             Medications:     Current Outpatient Medications on File Prior to Visit   Medication Sig Dispense Refill     acetaminophen (TYLENOL) 500 MG tablet Take 1,000 mg by mouth 3 (three) times a day as needed for pain.       aspirin-acetaminophen-caffeine (EXCEDRIN MIGRAINE) 250-250-65 mg per tablet Take 2 tablets by mouth every 8 (eight) hours as needed for pain (headaches).       betamethasone, augmented, (DIPROLENE) 0.05 % lotion Apply 1 application topically 2 (two) times a day as needed. Apply to the back of the neck and upper back 60 mL 3     blood glucose test strips Use 1 each As Directed as needed. Dispense brand per patient's insurance at pharmacy discretion. 100 strip 2     calcium citrate-vitamin D (CITRACAL+D) 315-200 mg-unit per tablet Take 1 tablet by mouth.       cholecalciferol, vitamin D3, 1,000 unit tablet Take 2,000 Units by mouth 2 (two) times a day.       hydrOXYzine HCl (ATARAX) 25 MG tablet Take 25 mg by mouth.       lisinopriL (ZESTRIL) 2.5 MG tablet Take 1 tablet (2.5 mg total) by mouth daily. 30 tablet 11     LORazepam (ATIVAN) 1 MG tablet TAKE 1 TABLET BY MOUTH EVERY 8 HOURS AS NEEDED FOR ANXIETY.NEEDS TO BE SEEN FOR FURTHER REFILLS 30 tablet 0     melatonin 10 mg Tab Take 10 mg by mouth at bedtime.       metFORMIN (GLUCOPHAGE-XR) 500 MG 24 hr tablet Take 1 tablet (500 mg total) by mouth daily with supper. 90 tablet 4     naproxen sodium (ALEVE) 220 MG tablet Take 440 mg by mouth as needed.       ONETOUCH DELICA LANCETS 30 gauge Misc Use 1 each As Directed as needed. DISPENSE BRAND PER PATIENT'S INSURANCE AT PHARMACY DISCRETION.  4     ONETOUCH ULTRA2 USE DAILY AS DIRECTED  0     oxyCODONE (ROXICODONE) 5 MG immediate release tablet Take 1-3 tabs PO every 6 hours as needed for pain 20 tablet 0      polyethylene glycol (MIRALAX) 17 gram packet Take 17 g by mouth.       QUEtiapine (SEROQUEL) 25 MG tablet Take 1 tab PO at bedtime 135 tablet 4     zolpidem (AMBIEN) 10 mg tablet Take 1 tablet (10 mg total) by mouth at bedtime as needed for sleep. 30 tablet 0     [DISCONTINUED] DULoxetine (CYMBALTA) 30 MG capsule TAKE 1 CAPSULE BY MOUTH EVERY DAY 90 capsule 0     No current facility-administered medications on file prior to visit.                   Review of Systems:      ROS:       10 point ROS was negative except for the items listed in HPI.    Review of Systems - General ROS: negative for - chills, fatigue, night sweats, sleep disturbance or weight loss    Respiratory ROS: no cough, shortness of breath, or wheezing  negative for - cough or shortness of breath  Cardiovascular ROS: no chest pain or dyspnea on exertion  Gastrointestinal ROS: no abdominal pain, change in bowel habits, or black or bloody stools  Musculoskeletal ROS: negative  negative for - gait disturbance, joint pain, joint stiffness, muscle pain or muscular weakness  Neurological ROS: negative for - confusion, gait disturbance, headaches or seizures    Coordination of Care:   More than 25 minutes spent on this visit  with more than 50% of time spent on coordination of care including: Educating patient about diagnosis, prognosis, side effects and benefits of medications, diet, exercise.  Time also spent providing supportive therapy regarding above issues.    This note was created using a dictation system. All typing errors or contextual distortion is unintentional and software inherent.    Phone call duration:  25 minutes    Lucy Carey NP

## 2021-06-08 NOTE — PROGRESS NOTES
________________________________________  Medications Phoned  to Pharmacy [] yes [x]no  Name of Pharmacist:  List Medications, including dose, quantity and instructions    Medications ordered this visit were e-scribed.  Verified by order class [x] yes  [] no  Cymbalta 30 mg   Medication changes or discontinuations were communicated to patient's pharmacy: [] yes  [x] no    Dictation completed at time of chart check: [x] yes  [] no    I have checked the documentation for today s encounters and the above information has been reviewed and completed.

## 2021-06-08 NOTE — PROGRESS NOTES
The Clinic Care Guide met with the patient in clinic today at the request of the PCP to discuss possible clinic care coordination enrollment. Clinic care coordination was described to the patient and immediate needs were discussed. The patient agreed to enrollment in clinic care coordination and future appointments were scheduled for an RN care coordination assessment and an enrollment visit with the primary care physician and care guide. The patient was provided with an informational packet describing clinic care coordination and given contact information for the clinic care guide.     Patient met with [facility] RN today at 1:00pm. Patient has appt with JOHANA WASHINGTON on 2/13 at 3:30pm.
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
## 2021-06-08 NOTE — PROGRESS NOTES
This video/telephone visit will be conducted via a call between you and your physician/provider. We have found that certain health care needs can be provided without the need for an in-person physical exam. This service lets us provide the care you need with a video /telephone conversation. If a prescription is necessary we can send it directly to your pharmacy. If lab work is needed we can place an order for that and you can then stop by our lab to have the test done at a later time.    Just as we bill insurance for in-person visits, we also bill insurance for video/telephone visits. If you have questions about your insurance coverage, we recommend that you speak with your insurance company.    Patient has given verbal consent for video/Telephone visit? Yes   Patient would like telephone visit please call: [phone]  Katie FUNES CMA   AVS: Nabeel please   Patient verified allergies, medications and pharmacy via phone.  Patient states she is ready for visit.    [place] :  5/11/2020 Oxycodone HCl 5 mg Qty: 20  Provider: Dr. Morales  5/11/2020 Lorazepam 1 mg  Qty: 30   Provider:  Dr. Morales  5/11/2020  Zolpidem tartrate 10 mg  Qty: 30  Provider:  Dr. Morales
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
## 2021-06-08 NOTE — PATIENT INSTRUCTIONS - HE
Continue medications as prescribed  Have your pharmacy contact us for a refill if you are running low on medications (We may ask you to come into clinic to get a refill from the nurse  No Alcohol or drug use  No driving if sedated  Call the clinic with any questions or concerns   Reach out for help if you feel like hurting yourself or others ([facility] [phone]: [address], [address] or [facility] Suicide Hotline [phone] , call 911 or go to nearest Emergency room    Follow up as directed, for your appointments, per your After Visit Summary Form.

## 2021-06-08 NOTE — PROGRESS NOTES
Assessment/Plan:      Visit for Preoperative Exam.    Pre-op exam postponed due to URI with some fever initially --right now I cannot clear her for surgery. Her health has been too fragile and her situation too complex to say she will be ready for surgery.  She must take Tamiflu and rest and return for another Pre-op check    Recommend strep and flu Labs will be done as indicated.           Subjective:     Scheduled Procedure: bilateral breast revision  Surgery Date:  03/03/2017  Surgery Location: [facility]  Surgeon:  Dr. Rose Alcantara    Current Outpatient Prescriptions   Medication Sig Dispense Refill     betamethasone, augmented, (DIPROLENE) 0.05 % lotion Apply 1 application topically 2 (two) times a day as needed.       clindamycin (CLEOCIN) 300 MG capsule Take 300 mg by mouth every 6 (six) hours.       diazepam (VALIUM) 5 MG tablet Take 5 mg by mouth every 12 (twelve) hours as needed for muscle spasms.       gabapentin (NEURONTIN) 300 MG capsule Take 2 capsules (600 mg total) by mouth bedtime. 7 capsule 0     gabapentin (NEURONTIN) 300 MG capsule Take 1 capsule (300 mg total) by mouth 2 (two) times a day. 28 capsule 0     lidocaine (LIDODERM) 5 % Place 1-3 patches on the skin daily as needed. Remove & Discard patch within 12 hours or as directed by MD       LORAZEPAM ORAL daily.       melatonin 10 mg Tab Take 10 mg by mouth bedtime as needed. Uses CVS Melatonin 10 mg       multivitamin therapeutic w/minerals (THERAPEUTIC-M) 27-0.4 mg Tab tablet Take by mouth.       ondansetron (ZOFRAN-ODT) 4 MG disintegrating tablet Take 4 mg by mouth every 8 (eight) hours as needed for nausea.       oxyCODONE (ROXICODONE) 5 MG immediate release tablet Take 5-10 mg by mouth every 6 (six) hours as needed for pain.       oxyCODONE (ROXICODONE) 5 MG immediate release tablet Take 5-10 mg by mouth.       polyethylene glycol (GLYCOLAX) 17 gram/dose powder MIX 17 GRAMS IN 4 TO 8 OUNCES OF LIQUID AND DRINK QD AS DIRECTED  0      senna-docusate (SENNOSIDES-DOCUSATE SODIUM) 8.6-50 mg tablet Take 1 tablet by mouth 2 (two) times a day. 30 tablet 0     traZODone (DESYREL) 100 MG tablet Take 100 mg by mouth bedtime as needed.   1     UNABLE TO FIND Take 1 tablet by mouth daily. Med Name:CVS natural water pill       vehicle base no.11, bulk, (PLO GEL [facility]) CGLL Use 1 application As Directed 3 (three) times a day. 20 mL 0     zolpidem (AMBIEN) 10 mg tablet TAKE 1 TABLET BY MOUTH AT BEDTIME AS NEEDED FOR SLEEP 90 tablet 0     oseltamivir (TAMIFLU) 75 MG capsule Take 1 capsule (75 mg total) by mouth daily for 10 days. 10 capsule 0     No current facility-administered medications for this visit.        Allergies   Allergen Reactions     Cefaclor Anaphylaxis     Cephalexin Anaphylaxis     Ciprofloxacin Anaphylaxis     Clarithromycin Anaphylaxis     Penicillins Anaphylaxis     Propofol Nausea Only     Scopolamine Anaphylaxis, Hives and Swelling     Swollen tongue, eye swelled closed     Sulfa (Sulfonamide Antibiotics) Anaphylaxis     Tetracyclines Anaphylaxis     Cytoxan [Cyclophosphamide] Hives     severe     Erythromycin Base Hives     Hydrocodone Nausea And Vomiting     Neupogen [Filgrastim] Hives     severe     Paper Tape      Surgical paper tape     Taxol [Paclitaxel] Hives     severe     Taxotere [Docetaxel] Hives       Immunization History   Administered Date(s) Administered     Influenza,seasonal quad, PF, 36+MOS 11/13/2015, 09/22/2016     Tdap 01/13/2017       Patient Active Problem List   Diagnosis     Joint Pain, Localized In The Elbow     Atypical Chest Pain     Joint Pain, Localized In The Shoulder     Neuralgia     Malignant neoplasm of left breast     Malignant neoplasm of upper-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     Breast cancer     Posttraumatic hematoma of right breast     Postoperative pain     Pain in right axilla     Constipation by outlet dysfunction     Logorrhea       Past Medical History:   Diagnosis Date     Breast  cancer 2011    left     Breast cancer 2016    right     History of transfusion      Hx antineoplastic chemotherapy 2011    left     Hx of radiation therapy 2011    left     Lymphedema     left chest wall and left arm     Neuropathy     left upper torso and chest     PONV (postoperative nausea and vomiting)      Prediabetes      Rectocele      Urination disorder     has to manually push to get urine out       Social History     Social History     Marital status: Single     Spouse name: N/A     Number of children: N/A     Years of education: N/A     Occupational History     Not on file.     Social History Main Topics     Smoking status: Former Smoker     Packs/day: 1.00     Types: Cigarettes     Quit date: 5/3/2011     Smokeless tobacco: Never Used     Alcohol use No     Drug use: No     Sexual activity: Not on file     Other Topics Concern     Not on file     Social History Narrative       Past Surgical History:   Procedure Laterality Date     AUGMENTATION MAMMAPLASTY Left 2013    TRAM flap with implant     AUGMENTATION MAMMAPLASTY Right 2013    implant placed     BREAST BIOPSY Left 2011     BREAST BIOPSY Right 2016     BREAST IMPLANT REMOVAL Right 9/20/2016    Procedure: EVACUATION RIGHT BREAST HEMATOMA;  Surgeon: Josr Alcantara MD;  Location: [facility];  Service:      MASTECTOMY Right 2016    reconstructive expanders bilaterally     MS ARTHRODESIS ANT INTERBODY MIN DISCECTOMY,LUMBAR      Description: Lumbar Vertebral Fusion;  Recorded: 09/25/2013;     MS BREAST RECONSTRUC W LAT DORSI FLAP      Description: Breast Surgery Reconstruction With Latissimus Dorsi Flap;  Recorded: 09/25/2013;     MS BREAST RECONSTRUC W TISS EXPANDR Bilateral 8/18/2016    Procedure: BILATERAL BREAST RECONSTRUCTION, REMOVAL OF LESION RIGHT AXILLA, REMOVAL OF LESION RIGHT NECK;  Surgeon: Josr Alcantara MD;  Location: [facility];  Service: Plastics     MS EXCISE BREAST CYST Left 2011    Description: Breast Surgery Lumpectomy;   "Recorded: 09/25/2013;     TX LAP,DIAGNOSTIC ABDOMEN      Description: Laparoscopy (Diagnostic);  Recorded: 09/25/2013;     TX MASTECTOMY, SIMPLE, COMPLETE Right 8/18/2016    Procedure: RIGHT MASTECTOMY;  Surgeon: Veronique Danielle MD;  Location: [facility];  Service: General     Woke with a sore throat  Mild fever  Myalgias  Flu?      History of Present Illness  Recent Health  Fever: yes, initially she felt a mild fever  Chills: yes  Fatigue: yes  Chest Pain: no  Cough: yes  Dyspnea: no  Urinary Frequency: no  Nausea: yes  Vomiting: no  Diarrhea: no  Abdominal Pain: no  Easy Bruising: no  Lower Extremity Swelling: no  Poor Exercise Tolerance: yes    Most recent Health Maintenance Visit:  several year(s) ago            Objective:         Vitals:    02/17/17 1333   BP: 118/66   Pulse: 76   Resp: 16   Temp: 98.7  F (37.1  C)   TempSrc: Oral   SpO2: 97%   Weight: 145 lb (65.8 kg)   Height: 5' 3\" (1.6 m)       Physical Exam:  HEENT: eyes clear; nose with clear/white congestion and mucous membrane swelling; TMs with some clear fluid behind; throat with erythema but no exudate and not much swelling  NECK: supple, mild LA  LUNGS: clear with good aeration  Ht: reg s1s2    Strep neg  Flu neg       "

## 2021-06-08 NOTE — PROGRESS NOTES
"Assessment  SW met with pt to discuss financial resources. Pt fears she may lose/be denied SSDI.  She has received benefits since 2012.  She was denied 1x at that time and then won the appeal.  Pt was homeless living in her car, in the middle of chemo txs and working with an  at the time.  Pt has upcoming surgery in March and she fears timing of potential denial will happen when she is recovering from surgery.  SW shared that unfortunately there is not much that pt can do right now to be proactive.  FELIX provided pt with contact information for Disability Specialists and encouraged her to call and consult them.  FELIX provided pt with number for [facility] Housing Assistance for people at risk of homelessness.  SW also suggested she talk with [facility] about her situation if she felt comfortable doing so.  Pt will schedule f/u appt with SW if needed. FELIX encouraged pt to try to be as positive as possible regarding her situation and continue with therapist appts for support.       Action Plan:    will:  1) Available as needed      Care Guide will:  Care Guide Delegation:   1)  Due Date:         Delegation:  Schedule f/u SW visit if needed      Subjective     \"I am worried about losing my Social Security and having no place to live.\"    Objective    Appearance: Well groomed      Behavior:  Within normal limits, but slightly anxious      Speech:  Normal      Emotion/Affect: Normal       Thought Processes:  Perseverative       Orientation: x3      Memory: Seems intact       General Intellectual Abilities: Did not assess      Judgment: No concers      Insight: slightly impaired re: own emotions      Clinical Recommendations:  Continue with therapist          "

## 2021-06-08 NOTE — PROGRESS NOTES
OFFICE VISIT NOTE      Assessment & Plan   Alexus Garcia is a 61 y.o. female who has h/o cancers, currently getting reconstructive breast surgeries.    She is considering changing from Dr. Dexter at [facility] to me at [facility].    She wants help with disability papers, saying she cannot work while she's getting this surgery and recovering. She needs more paperwork done. I'll have [employer] get involved in case they can help her with the realities of her disability, work and care + appointments.    Tdap due.  Skin lesion checked, reassurance given, but I offered to remove it, too.      Diagnoses and all orders for this visit:    Routine general medical examination at a health care facility  -     Tdap vaccine,  6yo or older,  IM        Daxa Morales MD              Subjective:   Chief Complaint:  Follow-up (labs); Nevus (skin tag on shoulder has increased in Sz x 3wk ago); and Paperwork (stating that she is unable to work with her disability)    61 y.o. female.     1) she has a skin lesion on her left shoulder that is growing and which she's worried about. It has not bled.    2) she does not feel she can work while she's getting this surgery done for reconstruction. She keeps getting papers that she is about to be dropped from her insurance because she is not working, so she wants help with more paperwork. There is a Functional capacity form she believes will help her. She wants me to do it plus her surgeon to do it. She says, for example, sedentary work is defined as being able to lift up to 10 lbs. She says her surgeon told her to only lift 3-4 pounds. Furthermore, having had chemo, she believes she cannot focus and concentrate like she would need to.     3) did not get to review her labs due to time    Current Outpatient Prescriptions   Medication Sig Note     betamethasone, augmented, (DIPROLENE) 0.05 % lotion Apply 1 application topically 2 (two) times a day as needed.      clindamycin (CLEOCIN) 300 MG capsule Take  300 mg by mouth every 6 (six) hours. 9/20/2016: Patient does't know how long left to take     diazepam (VALIUM) 5 MG tablet Take 5 mg by mouth every 12 (twelve) hours as needed for muscle spasms.      gabapentin (NEURONTIN) 300 MG capsule Take 2 capsules (600 mg total) by mouth bedtime.      gabapentin (NEURONTIN) 300 MG capsule Take 1 capsule (300 mg total) by mouth 2 (two) times a day.      lidocaine (LIDODERM) 5 % Place 1-3 patches on the skin daily as needed. Remove & Discard patch within 12 hours or as directed by MD      LORAZEPAM ORAL daily. 1/13/2017: Received from: [facility]     melatonin 10 mg Tab Take 10 mg by mouth bedtime as needed. Uses [facility] Melatonin 10 mg      multivitamin therapeutic w/minerals (THERAPEUTIC-M) 27-0.4 mg Tab tablet Take by mouth. 1/13/2017: Received from: [facility]     ondansetron (ZOFRAN-ODT) 4 MG disintegrating tablet Take 4 mg by mouth every 8 (eight) hours as needed for nausea.      oxyCODONE (ROXICODONE) 5 MG immediate release tablet Take 5-10 mg by mouth every 6 (six) hours as needed for pain. 9/20/2016: Took 1 tab yest.     oxyCODONE (ROXICODONE) 5 MG immediate release tablet Take 5-10 mg by mouth. 1/13/2017: Received from: [facility]     polyethylene glycol (GLYCOLAX) 17 gram/dose powder MIX 17 GRAMS IN 4 TO 8 OUNCES OF LIQUID AND DRINK QD AS DIRECTED 1/13/2017: Received from: External Pharmacy     senna-docusate (SENNOSIDES-DOCUSATE SODIUM) 8.6-50 mg tablet Take 1 tablet by mouth 2 (two) times a day.      traZODone (DESYREL) 100 MG tablet Take 100 mg by mouth bedtime as needed.       UNABLE TO FIND Take 1 tablet by mouth daily. Med Name:[facility] natural water pill      vehicle base no.11, bulk, (PLO GEL MEDIFLO) CGLL Use 1 application As Directed 3 (three) times a day.        PSFHx: appropriate sections of PMH completed/filled in   Tobacco Status:  She  reports that she quit smoking about 5 years ago. Her smoking use included Cigarettes. She smoked 1.00 pack per day.  "She has never used smokeless tobacco.    Review of Systems:  No fever.  No rash.    Objective:      Visit Vitals     /78 (Patient Site: Left Arm, Patient Position: Sitting, Cuff Size: Adult Regular)     Pulse 92     Temp 98.4  F (36.9  C) (Oral)     Resp 16     Ht 5' 3\" (1.6 m)     Wt 140 lb (63.5 kg)     LMP 09/16/2006     BMI 24.8 kg/m2     GENERAL: No acute distress.  Left shoulder - has about 5mm in diameter mole that is flesh-colored with no superficial vessels, no pearly surface; it is soft and has a slightly bumpy surface    Greater than 25 minutes spent with patient, with greater than 50% of time spent in counseling, consultation and care coordination regarding problems detailed above.          "
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
## 2021-06-09 NOTE — PATIENT INSTRUCTIONS - HE
Continue medications as prescribed  Have your pharmacy contact us for a refill if you are running low on medications (We may ask you to come into clinic to get a refill from the nurse  No Alcohol or drug use  No driving if sedated  Call the clinic with any questions or concerns   Reach out for help if you feel like hurting yourself or others ([facility] [phone]: [address] [facility], [address] or [facility] Suicide Hotline [phone] , call 911 or go to nearest Emergency room    Follow up as directed, for your appointments, per your After Visit Summary Form.

## 2021-06-09 NOTE — ANESTHESIA CARE TRANSFER NOTE
Last vitals:   Vitals:    03/03/17 1550   BP: 146/68   Pulse: 96   Resp: 20   Temp: 36.4  C (97.6  F)   SpO2: 98%     Patient's level of consciousness is drowsy  Spontaneous respirations: yes  Maintains airway independently: yes  Dentition unchanged: yes  Oropharynx: oropharynx clear of all foreign objects    QCDR Measures:  ASA# 20 - Surgical Safety Checklist: ASA20A - Safety Checks Done  PQRS# 430 - Adult PONV Prevention: 4558F - Pt received => 2 anti-emetic agents (different classes) preop & intraop  ASA# 8 - Peds PONV Prevention: NA - Not pediatric patient, not GA or 2 or more risk factors NOT present  PQRS# 424 - Rosy-op Temp Management: 4559F - At least one body temp DOCUMENTED => 35.5C or 95.9F within required timeframe  PQRS# 426 - PACU Transfer Protocol: - Transfer of care checklist used  ASA# 14 - Acute Post-op Pain: ASA14B - Patient did NOT experience pain >= 7 out of 10    I completed my SBAR handoff to the receiving nurse per policy and procedure.

## 2021-06-09 NOTE — ANESTHESIA POSTPROCEDURE EVALUATION
Patient: Alexus Garcia  BILATERAL TISSUE EXPANDER REMOVAL FOR PERMANENT IMPLANTS, BILATERAL FAT GRAFTING FROM ABDOMEN TO BREASTS  Anesthesia type: general    Patient location: PACU  Last vitals:   Vitals:    03/04/17 0423   BP: 137/82   Pulse:    Resp: 16   Temp: 37  C (98.6  F)   SpO2: 97%     Post vital signs: stable  Level of consciousness: awake and responds to simple questions  Post-anesthesia pain: pain controlled  Post-anesthesia nausea and vomiting: no  Pulmonary: unassisted, return to baseline  Cardiovascular: stable and blood pressure at baseline  Hydration: adequate  Anesthetic events: no    QCDR Measures:  ASA# 11 - Rosy-op Cardiac Arrest: ASA11B - Patient did NOT experience unanticipated cardiac arrest  ASA# 12 - Rosy-op Mortality Rate: ASA12B - Patient did NOT die  ASA# 13 - PACU Re-Intubation Rate: ASA13B - Patient did NOT require a new airway mgmt  ASA# 10 - Composite Anes Safety: ASA10A - No serious adverse event  ASA# 38 - New Corneal Injury: ASA38A - No new exposure keratitis or corneal abrasion in PACU    Additional Notes:

## 2021-06-09 NOTE — PROGRESS NOTES
This video/telephone visit will be conducted via a call between you and your physician/provider. We have found that certain health care needs can be provided without the need for an in-person physical exam. This service lets us provide the care you need with a video /telephone conversation. If a prescription is necessary we can send it directly to your pharmacy. If lab work is needed we can place an order for that and you can then stop by our lab to have the test done at a later time.    Just as we bill insurance for in-person visits, we also bill insurance for video/telephone visits. If you have questions about your insurance coverage, we recommend that you speak with your insurance company.    Patient has given verbal consent for video/Telephone visit? yes  Patient would like the video visit invitation sent by: Text to cell phone: [phone]  ELISEO/ABAD/SHARON CARCAMO    Patient verified allergies, medications and pharmacy via phone.  Patient states she is ready for visit.

## 2021-06-09 NOTE — PROGRESS NOTES
Assessment/Plan:      Visit for Preoperative Exam.     Patient approved for surgery with general or local anesthesia. Recommend start Metformin XR 500mg daily           Subjective:     Scheduled Procedure: BILATERAL TISSUE EXPANDER REMOVAL FOR PERMANENT IMPLANTS BILATERAL FAT GRAFTING FROM ABDOMEN TO BREASTS  Surgery Date:  3/3/2017  Surgery Location:  [facility]  Surgeon:  DR MICHELLE RAMIREZ    Current Outpatient Prescriptions   Medication Sig Dispense Refill     betamethasone, augmented, (DIPROLENE) 0.05 % lotion Apply 1 application topically 2 (two) times a day as needed.       diazepam (VALIUM) 5 MG tablet Take 5 mg by mouth every 12 (twelve) hours as needed for muscle spasms.       gabapentin (NEURONTIN) 400 MG capsule Take 400 mg by mouth 3 (three) times a day.       LORAZEPAM ORAL daily.       melatonin 10 mg Tab Take 10 mg by mouth bedtime as needed. Uses [facility] Melatonin 10 mg       multivitamin therapeutic w/minerals (THERAPEUTIC-M) 27-0.4 mg Tab tablet Take by mouth.       oseltamivir (TAMIFLU) 75 MG capsule Take 1 capsule (75 mg total) by mouth daily for 10 days. 10 capsule 0     oxyCODONE (ROXICODONE) 5 MG immediate release tablet Take 5-10 mg by mouth every 6 (six) hours as needed for pain.       polyethylene glycol (GLYCOLAX) 17 gram/dose powder MIX 17 GRAMS IN 4 TO 8 OUNCES OF LIQUID AND DRINK QD AS DIRECTED  0     traZODone (DESYREL) 100 MG tablet Take 100 mg by mouth bedtime as needed.   1     zolpidem (AMBIEN) 10 mg tablet TAKE 1 TABLET BY MOUTH AT BEDTIME AS NEEDED FOR SLEEP 90 tablet 0     clindamycin (CLEOCIN) 300 MG capsule Take 300 mg by mouth every 6 (six) hours.       metFORMIN (GLUCOPHAGE XR) 500 MG 24 hr tablet Take 1 tablet (500 mg total) by mouth daily with supper. 30 tablet 6     UNABLE TO FIND Take 1 tablet by mouth daily. Med Name:[facility] natural water pill       No current facility-administered medications for this visit.        Allergies   Allergen Reactions     Cefaclor Anaphylaxis      Cephalexin Anaphylaxis     Ciprofloxacin Anaphylaxis     Clarithromycin Anaphylaxis     Penicillins Anaphylaxis     Propofol Nausea Only     Scopolamine Anaphylaxis, Hives and Swelling     Swollen tongue, eye swelled closed     Sulfa (Sulfonamide Antibiotics) Anaphylaxis     Tetracyclines Anaphylaxis     Cytoxan [Cyclophosphamide] Hives     severe     Erythromycin Base Hives     Hydrocodone Nausea And Vomiting     Neupogen [Filgrastim] Hives     severe     Paper Tape      Surgical paper tape     Taxol [Paclitaxel] Hives     severe     Taxotere [Docetaxel] Hives       Immunization History   Administered Date(s) Administered     Influenza,seasonal quad, PF, 36+MOS 11/13/2015, 09/22/2016     Tdap 01/13/2017       Patient Active Problem List   Diagnosis     Joint Pain, Localized In The Elbow     Atypical Chest Pain     Joint Pain, Localized In The Shoulder     Neuralgia     Malignant neoplasm of left breast     Malignant neoplasm of upper-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     Breast cancer     Posttraumatic hematoma of right breast     Postoperative pain     Pain in right axilla     Constipation by outlet dysfunction     Logorrhea       Past Medical History:   Diagnosis Date     Breast cancer 2011    left     Breast cancer 2016    right     History of transfusion      Hx antineoplastic chemotherapy 2011    left     Hx of radiation therapy 2011    left     Lymphedema     left chest wall and left arm     Neuropathy     left upper torso and chest     PONV (postoperative nausea and vomiting)      Prediabetes      Rectocele      Urination disorder     has to manually push to get urine out       Social History     Social History     Marital status: Single     Spouse name: N/A     Number of children: N/A     Years of education: N/A     Occupational History     Not on file.     Social History Main Topics     Smoking status: Former Smoker     Packs/day: 1.00     Types: Cigarettes     Quit date: 5/3/2011     Smokeless  tobacco: Never Used     Alcohol use No     Drug use: No     Sexual activity: Not on file     Other Topics Concern     Not on file     Social History Narrative       Past Surgical History:   Procedure Laterality Date     AUGMENTATION MAMMAPLASTY Left 2013    TRAM flap with implant     AUGMENTATION MAMMAPLASTY Right 2013    implant placed     BREAST BIOPSY Left 2011     BREAST BIOPSY Right 2016     BREAST IMPLANT REMOVAL Right 9/20/2016    Procedure: EVACUATION RIGHT BREAST HEMATOMA;  Surgeon: Josr Alcantara MD;  Location: [facility];  Service:      MASTECTOMY Right 2016    reconstructive expanders bilaterally     GA ARTHRODESIS ANT INTERBODY MIN DISCECTOMY,LUMBAR      Description: Lumbar Vertebral Fusion;  Recorded: 09/25/2013;     GA BREAST RECONSTRUC W LAT DORSI FLAP      Description: Breast Surgery Reconstruction With Latissimus Dorsi Flap;  Recorded: 09/25/2013;     GA BREAST RECONSTRUC W TISS EXPANDR Bilateral 8/18/2016    Procedure: BILATERAL BREAST RECONSTRUCTION, REMOVAL OF LESION RIGHT AXILLA, REMOVAL OF LESION RIGHT NECK;  Surgeon: Josr Alcantara MD;  Location: [facility];  Service: Plastics     GA EXCISE BREAST CYST Left 2011    Description: Breast Surgery Lumpectomy;  Recorded: 09/25/2013;     GA LAP,DIAGNOSTIC ABDOMEN      Description: Laparoscopy (Diagnostic);  Recorded: 09/25/2013;     GA MASTECTOMY, SIMPLE, COMPLETE Right 8/18/2016    Procedure: RIGHT MASTECTOMY;  Surgeon: Veronique Danielle MD;  Location: [facility];  Service: General       History of Present Illness  Recent Health  Fever: no  Chills: no  Fatigue: no  Chest Pain: no  Cough: yes  Dyspnea: no  Urinary Frequency: no  Nausea: no  Vomiting: no  Diarrhea: no  Abdominal Pain: no  Easy Bruising: no  Lower Extremity Swelling: no  Poor Exercise Tolerance: yes    Most recent Health Maintenance Visit:  1 week(s) ago    Pertinent History  Prior Anesthesia: yes  Previous Anesthesia Reaction:  Post anesthesia nasea and vomiting  "  Diabetes: yes  Cardiovascular Disease: no  Pulmonary Disease: no  Renal Disease: no  GI Disease: no  Sleep Apnea: no  Thromboembolic Problems: no  Clotting Disorder: no  Bleeding Disorder: no  Transfusion Reaction: no  Impaired Immunity: no  Steroid use in the last 6 months: yes  Frequent Aspirin use: no    Family history of none    Social history of there are no concerns regarding care after surgery    After surgery, the patient plans to recover at home alone.    Review of Systems  Review of Systems          Objective:         Vitals:    02/22/17 1506   BP: 110/64   Pulse: 94   Resp: 14   Temp: 97.9  F (36.6  C)   TempSrc: Oral   SpO2: 94%   Weight: 143 lb 12.8 oz (65.2 kg)   Height: 5' 2.75\" (1.594 m)       Physical Exam:  Physical Exam      Head - Normal; atraumatic  Eyes- symmetric red reflex, normal eye exam.  ENT-tympanic membranes are clear bilaterally.  Mouth shows clean teeth, and oropharynx is clear.  Neck-supple, no palpable mass or lymphadenopathy.  CV-regular rate and rhythm with no murmur  Respiratory-lungs clear to auscultation.  Abdomen-soft, nontender, no palpable masses or organomegaly.  Genitourinary- deferred  Extremities-warm with no edema.  Neurologic-strength and sensation are symmetric  Skin-no atypical appearing lesions, sebaceous cyst on back of neck, no rash.  "
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
## 2021-06-09 NOTE — PROGRESS NOTES
"Alexus Garcia is a 64 y.o. female who is being evaluated via a billable telephone visit.      The patient has been notified of following:     \"This telephone visit will be conducted via a call between you and your physician/provider. We have found that certain health care needs can be provided without the need for a physical exam.  This service lets us provide the care you need with a short phone conversation.  If a prescription is necessary we can send it directly to your pharmacy.  If lab work is needed we can place an order for that and you can then stop by our lab to have the test done at a later time.    Telephone visits are billed at different rates depending on your insurance coverage. During this emergency period, for some insurers they may be billed the same as an in-person visit.  Please reach out to your insurance provider with any questions.    If during the course of the call the physician/provider feels a telephone visit is not appropriate, you will not be charged for this service.\"    Patient has given verbal consent to a Telephone visit? Yes    Patient would like to receive their AVS by AVS Preference: Mail a copy.    Psychiatric  Out patient Follow Up Progress Note  Date of visit:7/23/2020         Discussion of Care and Treatment Recommendations:   This is a 64 y.o. female with a with history of depression and PTSD .         PCP managing sleep :  Prescribing  Lorazepam ,Seroquel and Ambien   PCP managing Gabapentin for neurological pain           Last visit 05/25/2020  Recommendation at last visit .  1 .Continue with  Psychotherapy   2.Highly recommend : Community Support groups   3. Continue   Cymbalta at  30 mg daily    4. RTC- 8  weeks, call in between visit with any question      Patient and I reviewed diagnosis and treatment plan and patient agrees with following recommendations:  Ongoing education given regarding diagnostic and treatment options with adequate verbalization of " "understanding.  Plan   1 .Continue with  Psychotherapy   2.Highly recommend : Community Support groups   3. Continue   Cymbalta at  30 mg daily    4. RTC- 8  weeks, call in between visit with any question             DIagnoses:     MDD  PTSD  Anxiety     Patient Active Problem List   Diagnosis     Neuralgia     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)     Malignant neoplasm of breast (H)     Posttraumatic hematoma of right breast     Postoperative pain     Pain in right axilla     Constipation due to outlet dysfunction     Logorrhea     Type 2 diabetes mellitus without complication, without long-term current use of insulin (H)     Rotator cuff tear     Anxiety     Scalp irritation     Abnormal MRI     Diabetes 1.5, managed as type 2 (H)     H/O breast surgery     Advanced directives, counseling/discussion     Vitamin D deficiency disease             Chief Complaint / Subjective:    Chief complaint: \" I doing ok but anxious about the pandemic '     History of Present Illness:   Per patient's statement : Feels like she is doing okay even though she is overly anxious about the pandemic because she is high risk.  Has been avoiding the house and was to grocery shopping if she must.  She is bored and lonely but feels that she needs to stay safe because she is immunocompromised and is trying to do everything to avoid daniel the virus.  Her son checks in on her quite frequently.  She is getting intact with friends and family via social media platforms.  She is VH to engage in any community virtual support groups that she feels that she is from sticking home.  She is happy with current medications.  She like to continue the plan.  Mental Status Examination:   General: Alert and oriented x 3   Speech: Normal in rate and tone  Language: Intact  Thought process: Coherent  Thought content:                           Auditory hallucinations-Denies                          Visual Hallucinations - " Denies                         Delusions Absent                           Loose Associations:  No                          Suicidal thoughts: Denies                          Homicidal thoughts:Denies                        Affect: Neutral  Mood: Neutral   Intellectual functioning: Within normal limits  Memory: Within normal limits  Fund of knowledge: Average  Attention and concentration: Within normal limits  Gait: Unable to assess telephone visit  Psychomotor activity: Unable to assess telephone visit  Muscles: Unable to assess telephone visit  InSight and judgment: Fair      Drug/treatment history and current pattern of use:   Denies     Medication changes: See Above   Medication adherence: compliant  Medication side effects: absent  Information about medications: Side effects, benefits and alternative treatments discussed and patient agrees .    Psychotherapy: Supportive therapy day-to-day living    Education: Diet, exercise, abstinence from drugs and alcohol, patient will not drive if sedated and medications or  under influence of any substance    Lab Results:   Personally reviewed and discussed with the patient    Lab Results   Component Value Date    WBC 5.6 12/06/2019    HGB 15.1 12/06/2019    HCT 46.9 12/06/2019     12/06/2019    CHOL 305 (H) 12/06/2019    TRIG 140 12/06/2019    HDL 64 12/06/2019     (H) 12/06/2019     (H) 12/06/2019     12/06/2019    K 4.1 12/06/2019     12/06/2019    CREATININE 0.86 12/06/2019    BUN 12 12/06/2019    CO2 22 12/06/2019    TSH 0.84 12/06/2019    INR 0.96 09/20/2016    HGBA1C 7.7 (H) 03/04/2020    MICROALBUR 6.72 (H) 03/04/2020       Vital signs:  LMP 09/16/2006   Unable to assess telephone visit  Allergies: Aztreonam; Castor oil; Cefaclor; Cephalexin; Cephalexin monohydrate; Chlorhexidine; Ciprofloxacin; Clarithromycin; Clindamycin; Penicillins; Propofol; Scopolamine; Sulfa (sulfonamide antibiotics); Sulfasalazine; Tetracyclines; Vancomycin;  Adhesive; Cytoxan [cyclophosphamide]; Erythromycin base; Hydrocodone; Neupogen [filgrastim]; Paper tape; Tapentadol; Taxol [paclitaxel]; and Taxotere [docetaxel]           Review of Systems:      ROS:       10 point ROS was negative except for the items listed in HPI- Subjective data only              Medications:     Current Outpatient Medications on File Prior to Visit   Medication Sig Dispense Refill     acetaminophen (TYLENOL) 500 MG tablet Take 1,000 mg by mouth 3 (three) times a day as needed for pain.       aspirin-acetaminophen-caffeine (EXCEDRIN MIGRAINE) 250-250-65 mg per tablet Take 2 tablets by mouth every 8 (eight) hours as needed for pain (headaches).       betamethasone, augmented, (DIPROLENE) 0.05 % lotion Apply 1 application topically 2 (two) times a day as needed. Apply to the back of the neck and upper back 60 mL 3     blood glucose test strips Use 1 each As Directed as needed. Dispense brand per patient's insurance at pharmacy discretion. 100 strip 2     calcium citrate-vitamin D (CITRACAL+D) 315-200 mg-unit per tablet Take 1 tablet by mouth.       cholecalciferol, vitamin D3, 1,000 unit tablet Take 2,000 Units by mouth 2 (two) times a day.       DULoxetine (CYMBALTA) 30 MG capsule TAKE 1 CAPSULE BY MOUTH EVERY DAY 90 capsule 1     hydrOXYzine HCl (ATARAX) 25 MG tablet Take 25 mg by mouth.       lisinopriL (ZESTRIL) 2.5 MG tablet Take 1 tablet (2.5 mg total) by mouth daily. 30 tablet 11     LORazepam (ATIVAN) 1 MG tablet TAKE 1 TABLET BY MOUTH EVERY 8 HOURS AS NEEDED FOR ANXIETY.NEEDS TO BE SEEN FOR FURTHER REFILLS 30 tablet 0     melatonin 10 mg Tab Take 10 mg by mouth at bedtime.       metFORMIN (GLUCOPHAGE-XR) 500 MG 24 hr tablet Take 1 tablet (500 mg total) by mouth daily with supper. 90 tablet 4     naproxen sodium (ALEVE) 220 MG tablet Take 440 mg by mouth as needed.       ONETOUCH DELICA LANCETS 30 gauge Misc Use 1 each As Directed as needed. DISPENSE BRAND PER PATIENT'S INSURANCE AT  PHARMACY DISCRETION.  4     ONETOUCH ULTRA2 USE DAILY AS DIRECTED  0     oxyCODONE (ROXICODONE) 5 MG immediate release tablet Take 1-3 tabs PO every 6 hours as needed for pain 20 tablet 0     polyethylene glycol (MIRALAX) 17 gram packet Take 17 g by mouth.       QUEtiapine (SEROQUEL) 25 MG tablet Take 1 tab PO at bedtime 135 tablet 4     zolpidem (AMBIEN) 10 mg tablet Take 1 tablet (10 mg total) by mouth at bedtime as needed for sleep. 30 tablet 0     No current facility-administered medications on file prior to visit.                Coordination of Care:   More than 25 minutes spent on this visit  with more than 50% of time spent on coordination of care including: Educating patient about diagnosis, prognosis, side effects and benefits of medications, diet, exercise.  Time also spent providing supportive therapy regarding above issues.    This note was created using a dictation system. All typing errors or contextual distortion is unintentional and software inherent.  Phone call duration: 30 minutes    Lucy Carey NP

## 2021-06-09 NOTE — ANESTHESIA PREPROCEDURE EVALUATION
Anesthesia Evaluation      History of anesthetic complications     Airway   Mallampati: II  Neck ROM: full   Pulmonary - negative ROS and normal exam                          Cardiovascular - negative ROS and normal exam  ECG reviewed        Neuro/Psych    (+) depression,     Endo/Other    (+) diabetes mellitus type 2 well controlled,      GI/Hepatic/Renal - negative ROS      Other findings: Ca Breast  Scopalamine and propofol produce postop nausea      Dental                         Anesthesia Plan  Planned anesthetic: general endotracheal  Plan to use Etomidate for induction.  No nausea postop noted when used at  in  Sept.  ASA 2   Induction: intravenous   Anesthetic plan and risks discussed with: patient    Post-op plan: routine recovery

## 2021-06-09 NOTE — PROGRESS NOTES
Patient states she is taking her medications are prescribed.  No new issues or concerns.  She said it hard to have to be at home so much due to the COVID.  The has not started any Community Support Groups yet.       ________________________________________  Medications Phoned  to Pharmacy [] yes [x]no  Name of Pharmacist:  List Medications, including dose, quantity and instructions    Medications ordered this visit were e-scribed.  Verified by order class [] yes  [x] no  None ordered at this visit    Medication changes or discontinuations were communicated to patient's pharmacy: [] yes  [x] NA    Dictation completed at time of chart check: [x] yes  [] no    I have checked the documentation for today s encounters and the above information has been reviewed and completed.

## 2021-06-10 ENCOUNTER — COMMUNICATION - HEALTHEAST (OUTPATIENT)
Dept: FAMILY MEDICINE | Facility: CLINIC | Age: 66
End: 2021-06-10

## 2021-06-10 NOTE — TELEPHONE ENCOUNTER
RN cannot approve Refill Request    RN can NOT refill this medication med is not covered by policy/route to provider. Last office visit: 3/4/2020 Daxa Morales MD Last Physical: 8/7/2019 Last MTM visit: Visit date not found Last visit same specialty: 3/4/2020 Daxa Morales MD.  Next visit within 3 mo: Visit date not found  Next physical within 3 mo: Visit date not found      Lula Torrez, [facility] Triage/Med Refill 8/14/2020    Requested Prescriptions   Pending Prescriptions Disp Refills     QUEtiapine (SEROQUEL) 25 MG tablet [Pharmacy Med Name: QUETIAPINE FUMARATE 25 MG TAB] 90 tablet 4     Sig: TAKE 1 TABLET BY MOUTH AT BEDTIME       There is no refill protocol information for this order

## 2021-06-11 NOTE — PROGRESS NOTES
________________________________________  Medications Phoned  to Pharmacy [] yes [x]no  Name of Pharmacist:  List Medications, including dose, quantity and instructions    Medications ordered this visit were e-scribed.  Verified by order class [x] yes  [] no  Cymbalta  Medication changes or discontinuations were communicated to patient's pharmacy: [] yes  [x] no    Dictation completed at time of chart check: [x] yes  [] no    I have checked the documentation for today s encounters and the above information has been reviewed and completed.

## 2021-06-11 NOTE — PATIENT INSTRUCTIONS - HE
Continue medications as prescribed  Have your pharmacy contact us for a refill if you are running low on medications (We may ask you to come into clinic to get a refill from the nurse  No Alcohol or drug use  No driving if sedated  Call the clinic with any questions or concerns   Reach out for help if you feel like hurting yourself or others ([facility] [phone]: [address] [address] [facility] [address] [facility], [address] or [facility] Suicide Hotline [phone] , call 911 or go to nearest Emergency room    Follow up as directed, for your appointments, per your After Visit Summary Form.

## 2021-06-11 NOTE — PROGRESS NOTES
"Alexus Garcia is a 64 y.o. female who is being evaluated via a billable telephone visit.      The patient has been notified of following:     \"This telephone visit will be conducted via a call between you and your physician/provider. We have found that certain health care needs can be provided without the need for a physical exam.  This service lets us provide the care you need with a short phone conversation.  If a prescription is necessary we can send it directly to your pharmacy.  If lab work is needed we can place an order for that and you can then stop by our lab to have the test done at a later time.    Telephone visits are billed at different rates depending on your insurance coverage. During this emergency period, for some insurers they may be billed the same as an in-person visit.  Please reach out to your insurance provider with any questions.    If during the course of the call the physician/provider feels a telephone visit is not appropriate, you will not be charged for this service.\"    Patient has given verbal consent to a Telephone visit? Yes      Patient would like to receive their AVS by AVS Preference: Mail a copy.    Psychiatric  Out patient Follow Up Progress Note  Date of visit:9/24/2020         Discussion of Care and Treatment Recommendations:   This is a 64 y.o. female with a with history of depression and PTSD .         PCP managing sleep :  Prescribing  Lorazepam ,Seroquel and Ambien   PCP managing Gabapentin for neurological pain        Last visit  07/23/2020.  Recommendation at last visit .  1 .Continue with  Psychotherapy   2.Highly recommend : Community Support groups   3. Continue   Cymbalta at  30 mg daily    4. RTC- 8  weeks, call in between visit with any question   Patient and I reviewed diagnosis and treatment plan and patient agrees with following recommendations:  Ongoing education given regarding diagnostic and treatment options with adequate verbalization of " "understanding.  Plan   1 .Continue with  Psychotherapy   2.Highly recommend : Community Support groups   3. Continue   Cymbalta at  30 mg daily    4. 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)     Malignant neoplasm of breast (H)     Posttraumatic hematoma of right breast     Postoperative pain     Pain in right axilla     Constipation due to outlet dysfunction     Logorrhea     Type 2 diabetes mellitus without complication, without long-term current use of insulin (H)     Rotator cuff tear     Anxiety     Scalp irritation     Abnormal MRI     Diabetes 1.5, managed as type 2 (H)     H/O breast surgery     Advanced directives, counseling/discussion     Vitamin D deficiency disease             Chief Complaint / Subjective:    Chief complaint: \" I am ok \"     History of Present Illness:  Per patient's statement : He has been compliant with current medications and denies side effects.  Still anxious about the pandemic and finds herself washing her hands religiously.  She is also now also she has some surgery to neck tumors however recently discovered.  Reports that for the most likely benign but her doctor just want to make sure due to her history of recurring cancer.  She lost her brother and another family friend been therefore had a little bit of low movement but is declined that.  Seen compliant medication and denies side effects.  She inquired about increasing her dose of Cymbalta to 60 mg for a few days to see how she will do and if she feels this is more therapeutic she will give us a call and let us know.  Am okay with her doing so as she is only on 30 mg of duloxetine at this time.  She does not necessarily want to try anything else for anxiety happy with current treatment plan at this point.  Mental Status Examination: "   General: Alert and oriented x 3   Speech: Normal in rate and tone  Language: Intact  Thought process: Coherent  Thought content:                           Auditory hallucinations-Denies                          Visual Hallucinations - Denies                         Delusions Absent                           Loose Associations:  No                          Suicidal thoughts: Denies                          Homicidal thoughts:Denies                        Affect: Neutral  Mood: Neutral   Intellectual functioning: Within normal limits  Memory: Within normal limits  Fund of knowledge: Average  Attention and concentration: Within normal limits  Gait: Unable to assess telephone visit  Psychomotor activity: Unable to assess telephone visit  Muscles: Unable to assess telephone visit  InSight and judgment: Fair      Drug/treatment history and current pattern of use:   Denies    Medication changes: See Above   Medication adherence: compliant  Medication side effects: absent  Information about medications: Side effects, benefits and alternative treatments discussed and patient agrees .    Psychotherapy: Supportive therapy day-to-day living    Education: Diet, exercise, abstinence from drugs and alcohol, patient will not drive if sedated and medications or  under influence of any substance    Lab Results:   Personally reviewed and discussed with the patient    Lab Results   Component Value Date    WBC 5.6 12/06/2019    HGB 15.1 12/06/2019    HCT 46.9 12/06/2019     12/06/2019    CHOL 305 (H) 12/06/2019    TRIG 140 12/06/2019    HDL 64 12/06/2019     (H) 12/06/2019     (H) 12/06/2019     12/06/2019    K 4.1 12/06/2019     12/06/2019    CREATININE 0.86 12/06/2019    BUN 12 12/06/2019    CO2 22 12/06/2019    TSH 0.84 12/06/2019    INR 0.96 09/20/2016    HGBA1C 7.7 (H) 03/04/2020    MICROALBUR 6.72 (H) 03/04/2020       Vital signs:  LMP 09/16/2006   Unable to assess telephone visit  Allergies:  Aztreonam; Castor oil; Cefaclor; Cephalexin; Cephalexin monohydrate; Chlorhexidine; Ciprofloxacin; Clarithromycin; Clindamycin; Penicillins; Propofol; Scopolamine; Sulfa (sulfonamide antibiotics); Sulfasalazine; Tetracyclines; Vancomycin; Adhesive; Cytoxan [cyclophosphamide]; Erythromycin base; Hydrocodone; Neupogen [filgrastim]; Paper tape; Tapentadol; Taxol [paclitaxel]; and Taxotere [docetaxel]           Review of Systems:      ROS:  Subjective data only - Tele-Health  Visit   10 point ROS was negative except for the items listed in HPI-              Medications:     Current Outpatient Medications on File Prior to Visit   Medication Sig Dispense Refill     acetaminophen (TYLENOL) 500 MG tablet Take 1,000 mg by mouth 3 (three) times a day as needed for pain.       aspirin-acetaminophen-caffeine (EXCEDRIN MIGRAINE) 250-250-65 mg per tablet Take 2 tablets by mouth every 8 (eight) hours as needed for pain (headaches).       betamethasone, augmented, (DIPROLENE) 0.05 % lotion Apply 1 application topically 2 (two) times a day as needed. Apply to the back of the neck and upper back 60 mL 3     blood glucose test strips Use 1 each As Directed as needed. Dispense brand per patient's insurance at pharmacy discretion. 100 strip 2     calcium citrate-vitamin D (CITRACAL+D) 315-200 mg-unit per tablet Take 1 tablet by mouth.       cholecalciferol, vitamin D3, 1,000 unit tablet Take 2,000 Units by mouth 2 (two) times a day.       hydrOXYzine HCl (ATARAX) 25 MG tablet Take 25 mg by mouth.       lisinopriL (ZESTRIL) 2.5 MG tablet Take 1 tablet (2.5 mg total) by mouth daily. 30 tablet 11     LORazepam (ATIVAN) 1 MG tablet TAKE 1 TABLET BY MOUTH EVERY 8 HOURS AS NEEDED FOR ANXIETY.NEEDS TO BE SEEN FOR FURTHER REFILLS 30 tablet 0     melatonin 10 mg Tab Take 10 mg by mouth at bedtime.       metFORMIN (GLUCOPHAGE-XR) 500 MG 24 hr tablet Take 1 tablet (500 mg total) by mouth daily with supper. 90 tablet 4     naproxen sodium (ALEVE)  220 MG tablet Take 440 mg by mouth as needed.       ONETOUCH DELICA LANCETS 30 gauge Misc Use 1 each As Directed as needed. DISPENSE BRAND PER PATIENT'S INSURANCE AT PHARMACY DISCRETION.  4     ONETOUCH ULTRA2 USE DAILY AS DIRECTED  0     oxyCODONE (ROXICODONE) 5 MG immediate release tablet Take 1-3 tabs PO every 6 hours as needed for pain 20 tablet 0     polyethylene glycol (MIRALAX) 17 gram packet Take 17 g by mouth.       QUEtiapine (SEROQUEL) 25 MG tablet TAKE 1 TABLET BY MOUTH AT BEDTIME 90 tablet 4     zolpidem (AMBIEN) 10 mg tablet Take 1 tablet (10 mg total) by mouth at bedtime as needed for sleep. 30 tablet 0     [DISCONTINUED] DULoxetine (CYMBALTA) 30 MG capsule TAKE 1 CAPSULE BY MOUTH EVERY DAY 90 capsule 1     No current facility-administered medications on file prior to visit.                Coordination of Care:   More than 25 minutes spent on this visit  with more than 50% of time spent on coordination of care including: Educating patient about diagnosis, prognosis, side effects and benefits of medications, diet, exercise.  Time also spent providing supportive therapy regarding above issues.    This note was created using a dictation system. All typing errors or contextual distortion is unintentional and software inherent.  Phone call duration: 25 minutes    Lucy Carey NP

## 2021-06-12 NOTE — TELEPHONE ENCOUNTER
Called pt to relay CDC recommends hi dose flu injection to persons over 65 yrs.  Pt has apt on 11/10 and would like the hi dose flu, Shingrix, and Pneumovax.  Added to apt notes.  Thanks.

## 2021-06-12 NOTE — TELEPHONE ENCOUNTER
Controlled Substance Refill Request  Medication Name:   Requested Prescriptions     Pending Prescriptions Disp Refills     zolpidem (AMBIEN) 10 mg tablet [Pharmacy Med Name: ZOLPIDEM TARTRATE 10 MG TABLET] 30 tablet 0     Sig: TAKE 1 TABLET (10 MG TOTAL) BY MOUTH AT BEDTIME AS NEEDED FOR SLEEP.     Date Last Fill: 5/11/20  Requested Pharmacy: [facility]  Submit electronically to pharmacy  Controlled Substance Agreement on file:   Encounter-Level CSA Scan Date:    There are no encounter-level csa scan date.        Last office visit:  3/4/20  Pricila Sesay RN, MA  [facility]    Triage Nurse Advisor

## 2021-06-12 NOTE — TELEPHONE ENCOUNTER
New Appointment Needed  What is the reason for the visit:    Same Date/Next Day Appt Request  What is the reason for your visit?: Needs a flu shot- pharmacy will not give her high dose as she is not 65 years old. Clinics offering flu shots are not convenient to the patient    Provider Preference: PCP only  How soon do you need to be seen?: this week  Waitlist offered?: No  Okay to leave a detailed message:  Yes

## 2021-06-13 NOTE — TELEPHONE ENCOUNTER
Refill Approved    Rx renewed per Medication Renewal Policy. Medication was last renewed on 3/5/20.    Lula Torrez, [facility] Triage/Med Refill 12/14/2020     Requested Prescriptions   Pending Prescriptions Disp Refills     lisinopriL (PRINIVIL,ZESTRIL) 2.5 MG tablet [Pharmacy Med Name: LISINOPRIL 2.5 MG TABLET] 90 tablet 3     Sig: TAKE 1 TABLET BY MOUTH EVERY DAY       [employer] Inhibitors Refill Protocol Passed - 12/12/2020  4:36 PM        Passed - PCP or prescribing provider visit in past 12 months       Last office visit with prescriber/PCP: 11/10/2020 Daxa Morales MD OR same dept: 11/10/2020 Daxa Morales MD OR same specialty: 11/10/2020 Daxa Morales MD  Last physical: 8/7/2019 Last MTM visit: Visit date not found   Next visit within 3 mo: Visit date not found  Next physical within 3 mo: Visit date not found  Prescriber OR PCP: Daxa Morales MD  Last diagnosis associated with med order: 1. Type 2 diabetes mellitus without complication, without long-term current use of insulin (H)  - lisinopriL (PRINIVIL,ZESTRIL) 2.5 MG tablet [Pharmacy Med Name: LISINOPRIL 2.5 MG TABLET]; TAKE 1 TABLET BY MOUTH EVERY DAY  Dispense: 90 tablet; Refill: 3    If protocol passes may refill for 12 months if within 3 months of last provider visit (or a total of 15 months).             Passed - Serum Potassium in past 12 months     Lab Results   Component Value Date    Potassium 4.7 11/10/2020             Passed - Blood pressure filed in past 12 months     BP Readings from Last 1 Encounters:   11/10/20 98/68             Passed - Serum Creatinine in past 12 months     Creatinine   Date Value Ref Range Status   11/10/2020 0.84 0.60 - 1.10 mg/dL Final

## 2021-06-13 NOTE — PROGRESS NOTES
Assessment:        61 y.o. female with planned breast reconstruction surgery 10/30/17.    Known risk factors for perioperative complications: multiple surgeries causing poor skin healing    No contraindication for planned procedure.  Discussed NPO night prior and no NSAIDS.     Diabetes - on meds, she is not very physically active - hoping to increase activity after surgery; check A1-C    Pain management - she states her new oncologist will not prescribe her lorazepam, oxycodone or zolpidem. She requests that I fill them. I said Dr. Alcantara, the surgeon, should provide post-op pain control meds. That should cover Oct. 30-Nov. 30. We were out of time to do a full pain medication contract. I agreed to provide ONE fill with the understanding that she'd come back for a follow up check after her surgery to sign a contract regarding the pain meds. She agreed to do this.    Right shoulder - she wants a steroid injection in her right shoulder to help her pain PRIOR to her surgery. I put in a referral to ortho and she'll hope to get in ASAP for injection.            Plan:        1. Preoperative workup as follows: labs, ortho for injection, meds, set up post-op visits.     2. Change in medication regimen before surgery:                    Subjective:      Alexus Garcia is a 61 y.o. female who presents to the office today for a preoperative consultation, and help with diabetes and pain med management.    Indication:  Reconstruction revision  Surgery: Bilateral Breast Implant Exchange  Surgeon  Dr Rose-Shyanne Alcantara  Location and FAX #: TBD- [facility] or [facility]  Date and time:   10/30/17 and time- TBD    Planned anesthesia:    general  The patient has the following known anesthesia issues: Yes (propofol caused headache and nausea)  Patients bleeding risk: No    Habits:  Tobacco: No  Etoh: No  Drugs: No  Frequent aspirin or NSAID: No  Recent steroids in last 6 months: Yes (right shoulder injection in May or June  2017)    Past Medical History:   Diagnosis Date     Breast cancer 2011    left     Breast cancer 2016    right     Diabetes mellitus      History of transfusion      Hx antineoplastic chemotherapy 2011    left     Hx of radiation therapy 2011    left     Lymphedema     left chest wall and left arm     Neuropathy     left upper torso and chest     PONV (postoperative nausea and vomiting)      Prediabetes      Rectocele      Urination disorder     has to manually push to get urine out     Past Surgical History:   Procedure Laterality Date     AUGMENTATION MAMMAPLASTY Left 2013    TRAM flap with implant     AUGMENTATION MAMMAPLASTY Right 2013    implant placed     BREAST BIOPSY Left 2011     BREAST BIOPSY Right 2016     BREAST IMPLANT REMOVAL Right 9/20/2016    Procedure: EVACUATION RIGHT BREAST HEMATOMA;  Surgeon: Josr Alcantara MD;  Location: [facility];  Service:      MASTECTOMY Right 2016    reconstructive expanders bilaterally     [place] ARTHRODESIS ANT INTERBODY MIN DISCECTOMY,LUMBAR      Description: Lumbar Vertebral Fusion;  Recorded: 09/25/2013;     [place] BREAST RECONSTRUC W LAT DORSI FLAP      Description: Breast Surgery Reconstruction With Latissimus Dorsi Flap;  Recorded: 09/25/2013;     WV BREAST RECONSTRUC W TISS EXPANDR Bilateral 8/18/2016    Procedure: BILATERAL BREAST RECONSTRUCTION, REMOVAL OF LESION RIGHT AXILLA, REMOVAL OF LESION RIGHT NECK;  Surgeon: Josr Alcantara MD;  [place] [facility]  Service: Plastics     WV EXCISE BREAST CYST Left 2011    Description: Breast Surgery Lumpectomy;  Recorded: 09/25/2013;     WV EXCISE EXCESS SKIN TISSUE,ABDOMEN N/A 3/3/2017    Procedure: BILATERAL FAT GRAFTING FROM ABDOMEN TO BREASTS;  Surgeon: Josr Alcantara MD;  [place] [facility]  Service: Plastics     WV LAP,DIAGNOSTIC ABDOMEN      Description: Laparoscopy (Diagnostic);  Recorded: 09/25/2013;     WV MASTECTOMY, SIMPLE, COMPLETE Right 8/18/2016    Procedure: RIGHT MASTECTOMY;  Surgeon:  Veronique Danielle MD;  [place] [facility]  Service: General     [place] REPLACE TISSUE EXPANDER Bilateral 3/3/2017    Procedure: BILATERAL TISSUE EXPANDER REMOVAL FOR PERMANENT IMPLANTS;  Surgeon: Josr Alcantara MD;  [place] [facility]  Service: Plastics     Social History     Social History     Marital status: Single     Spouse name: N/A     Number of children: N/A     Years of education: N/A     Occupational History     Not on file.     Social History Main Topics     Smoking status: Former Smoker     Packs/day: 1.00     Types: Cigarettes     Quit date: 5/3/2011     Smokeless tobacco: Never Used     Alcohol use No     Drug use: No     Sexual activity: Not on file     Other Topics Concern     Not on file     Social History Narrative     Allergies   Allergen Reactions     Cefaclor Anaphylaxis     Cephalexin Anaphylaxis     Ciprofloxacin Anaphylaxis     Clarithromycin Anaphylaxis     Penicillins Anaphylaxis     Propofol Nausea Only and Headache     Scopolamine Anaphylaxis, Hives and Swelling     Swollen tongue, eye swelled closed     Sulfa (Sulfonamide Antibiotics) Anaphylaxis     Tetracyclines Anaphylaxis     Cytoxan [Cyclophosphamide] Hives     severe     Erythromycin Base Hives     Hydrocodone Nausea And Vomiting     Neupogen [Filgrastim] Hives     severe     Paper Tape Other (See Comments)     Surgical paper tape - redness. States if left on too long leaves little cuts on her skin     Taxol [Paclitaxel] Hives     severe     Taxotere [Docetaxel] Hives     Family History   Problem Relation Age of Onset     Adopted: Yes         REVIEW OF SYSTEMS  General: no weight changes, fatigue  HEENT:  no HA,  no vision changes, URI sx  Respiratory:  no cough, dyspnea  Cardiovascular: no chest pain, palpitations  Gastrointestinal: no nausea/vomiting, diarrhea/constipation, melena  : no dysuria  Neurologic: no seizures, focal weakness, tremors  Skin: no rashes            Objective:          OBJECTIVE:  Vitals  listed above within normal limits  General appearance: well groomed, pleasant, well hydrated, nontoxic appearing  ENT: PERRL, throat clear  Neck: neck supple, no lymphadenopathy, no thyromegaly  Chest - wound present under bandage (by surgeon, so not removed)  Lungs: lungs clear to auscultation bilaterally, no wheezes or rhonchi  Heart: regular rate and rhythm, no murmurs  Abdomen: soft, nontender  Extremities: no edema  Neuro: no focal deficits, CN II-XII grossly intact, alert and oriented, talkative, 2+/4 DTRs at patellar tendon  Psych:  mood stable, appears to have good insight and judgment      Cardiographics  EC normal  Echocardiogram:  n/a    Imaging  Chest x-ray: many chest imaging studies previously done    Lab Review     Labs are pending    Greater than 40 minutes spent with patient, with greater than 50% of time spent in counseling, consultation and care coordination regarding problems detailed above.

## 2021-06-13 NOTE — PROGRESS NOTES
OFFICE VISIT NOTE      Assessment & Plan   Alexus Garcia is a 65 y.o. female here to catch up on immunizations which she wants.pneumo 23 and flu today.  Need for lisinopril for kidneys  Diabetes mgmt - have to increase metformin to 2000mg daily.  Avoid dental work once she's on a bone-strengthening med - so get dental work done!  Do shingles shots at the pharmacy to save money    Diagnoses and all orders for this visit:    Type 2 diabetes mellitus without complication, without long-term current use of insulin (H)  -     Glycosylated Hemoglobin A1c  -     cholecalciferol, vitamin D3, 1,000 unit (25 mcg) tablet; Take 2 tablets (2,000 Units total) by mouth 2 (two) times a day.  Dispense: 90 tablet; Refill: 4  -     Pneumococcal polysaccharide vaccine 23-valent 1 yo or older, subq/IM  -     HM2(CBC w/o Differential)  -     Comprehensive Metabolic Panel  -     Urinalysis-UC if Indicated  -     Cancel: Microalbumin, Random Urine  -     Lipid Cascade RANDOM    Anxiety    Vitamin D deficiency disease  -     cholecalciferol, vitamin D3, 1,000 unit (25 mcg) tablet; Take 2 tablets (2,000 Units total) by mouth 2 (two) times a day.  Dispense: 90 tablet; Refill: 4    Need for pneumococcal vaccination  -     Pneumococcal polysaccharide vaccine 23-valent 1 yo or older, subq/IM    Need for immunization against influenza  -     Influenza,Quad,High Dose,PF 65 YR+        Daxa Morales MD              Subjective:   Chief Complaint:  Immunizations (PCV23, High Dose Flu and Shingrix. )    65 y.o. female.     1) struggling with Covid and isolating; knows her mood is low; she does not know anyone who does not have major problems going on. She asked someone without a mask to put one on and it did not go well-- they shot back a mean comment.    2) she wants her immunizations - flu, pneumonia and shingles.  3) dental work and bone health - how are these connected?  4) pain and sleep are reasonable right now  5) she wants to know why I  started her on lisinopril when she does not have high BP?    Current Outpatient Medications   Medication Sig Note     acetaminophen (TYLENOL) 500 MG tablet Take 1,000 mg by mouth 3 (three) times a day as needed for pain.      betamethasone, augmented, (DIPROLENE) 0.05 % lotion Apply 1 application topically 2 (two) times a day as needed. Apply to the back of the neck and upper back      blood glucose test strips Use 1 each As Directed as needed. Dispense brand per patient's insurance at pharmacy discretion.      calcium citrate-vitamin D (CITRACAL+D) 315-200 mg-unit per tablet Take 1 tablet by mouth.      cholecalciferol, vitamin D3, 1,000 unit (25 mcg) tablet Take 2 tablets (2,000 Units total) by mouth 2 (two) times a day.      DULoxetine (CYMBALTA) 30 MG capsule TAKE 1 CAPSULE BY MOUTH EVERY DAY      hydrOXYzine HCl (ATARAX) 25 MG tablet Take 25 mg by mouth.      lisinopriL (ZESTRIL) 2.5 MG tablet Take 1 tablet (2.5 mg total) by mouth daily.      LORazepam (ATIVAN) 1 MG tablet TAKE 1 TABLET BY MOUTH EVERY 8 HOURS AS NEEDED FOR ANXIETY.NEEDS TO BE SEEN FOR FURTHER REFILLS      melatonin 10 mg Tab Take 10 mg by mouth at bedtime. 5/31/2018: Received from: [facility] Received Sig: Take 10 mg by mouth.     metFORMIN (GLUCOPHAGE-XR) 500 MG 24 hr tablet Take 1 tablet (500 mg total) by mouth daily with supper.      naproxen sodium (ALEVE) 220 MG tablet Take 440 mg by mouth as needed. 5/31/2018: Received from: [facility] Received Sig: Take 440 mg by mouth.     ONETOUCH DELICA LANCETS 30 gauge Misc Use 1 each As Directed as needed. DISPENSE BRAND PER PATIENT'S INSURANCE AT PHARMACY DISCRETION.      ONETOUCH ULTRA2 USE DAILY AS DIRECTED      oxyCODONE (ROXICODONE) 5 MG immediate release tablet Take 1-3 tabs PO every 6 hours as needed for pain      polyethylene glycol (MIRALAX) 17 gram packet Take 17 g by mouth.      QUEtiapine (SEROQUEL) 25 MG tablet TAKE 1 TABLET BY MOUTH AT BEDTIME      zolpidem (AMBIEN) 10 mg  tablet TAKE 1 TABLET (10 MG TOTAL) BY MOUTH AT BEDTIME AS NEEDED FOR SLEEP.        PSFHx: appropriate sections of PMH completed/filled in   Tobacco Status:  She  reports that she quit smoking about 9 years ago. Her smoking use included cigarettes. She smoked 1.00 pack per day. She has never used smokeless tobacco.    Review of Systems:  No fever.  No rash. All other systems negative except as noted above.    Objective:    BP 98/68   Pulse (!) 114   Temp 98.1  F (36.7  C) (Oral)   Wt 151 lb (68.5 kg)   LMP 09/16/2006   SpO2 96%   BMI 26.75 kg/m    GENERAL: No acute distress.  Mood: low  Affect: congruent  Ht: reg s1s2  Lungs; CLear    Labs pending    Spent 40 min face to face with patient with more the 50% spent in counseling, education and coordination of care and discussing mood, isolation, counseling, immunizations, costs.

## 2021-06-13 NOTE — PATIENT INSTRUCTIONS - HE
Continue medications as prescribed  Have your pharmacy contact us for a refill if you are running low on medications (We may ask you to come into clinic to get a refill from the nurse  No Alcohol or drug use  No driving if sedated  Call the clinic with any questions or concerns   Reach out for help if you feel like hurting yourself or others ([facility] [phone]: [address] [facility], [address] or [facility] Suicide Hotline [phone] , call 911 or go to nearest Emergency room    Follow up as directed, for your appointments, per your After Visit Summary Form.

## 2021-06-13 NOTE — TELEPHONE ENCOUNTER
Please call Anny and let her know we have to increase her diabetes medication. I have that she's been taking 500mg daily. She needs to increase that to 2000mg daily (4 tabs). Her A1-c increased to 8.2. If she increases the med, keeps up some daily activity and watches her carbohydrate intake, she should be able to reduce the A1-c. A new prescription was sent in with the new dose, so she can start it right away.+

## 2021-06-13 NOTE — PROGRESS NOTES
This video/telephone visit will be conducted via a call between you and your physician/provider. We have found that certain health care needs can be provided without the need for an in-person physical exam. This service lets us provide the care you need with a video /telephone conversation. If a prescription is necessary we can send it directly to your pharmacy. If lab work is needed we can place an order for that and you can then stop by our lab to have the test done at a later time.    Just as we bill insurance for in-person visits, we also bill insurance for video/telephone visits. If you have questions about your insurance coverage, we recommend that you speak with your insurance company.    Patient has given verbal consent for video/Telephone visit? yes  Patient would like the video visit invitation sent by: Text to cell phone: NA or Send to email: NA  Patient would like telephone visit, please call: [phone]  ELISEO/ABAD : Demarco ALLEN LPN  Patient verified allergies, medications and pharmacy via phone.Patient states she  is ready for visit.    :    Fill Date ID Written Sold Drug Qty Days Prescriber Rx # Pharmacy Refill Daily Dose * Pymt Type    10/05/2020 1 10/05/2020 10/09/2020 Zolpidem Tartrate 10 Mg Tablet  30.00 30 Je Barbara [id number] Gra (1427) 0/0 0.50 [place] [place]   05/11/2020 1 05/11/2020  Zolpidem Tartrate 10 Mg Tablet  30.00 30 Je Barbara [id number] Gra (1427) 0/0 0.50 [place]   05/11/2020 1 05/11/2020  Lorazepam 1 Mg Tablet  30.00 10 Je Barbara [id number] Gra (1427) 0/0 3.00 [place]   05/11/2020 1 05/11/2020  Oxycodone Hcl 5 Mg Tablet  20.00 4 Je Barbara [id number] Gra (1427) 0/0 37.50 [place]   12/10/2019 1 12/10/2019  Lorazepam 1 Mg Tablet  90.00 30 Je Barbara [id number] Gra (1427) 0/0 3.00 [facility]     ________________________________________  Medications Phoned  to Pharmacy [] yes [x]no  Name of Pharmacist:  List Medications, including dose, quantity and instructions    Medications ordered  this visit were e-scribed.  Verified by order class [x] yes  [] no    Medication changes or discontinuations were communicated to patient's pharmacy: [] yes  [x] no    Dictation completed at time of chart check: [] yes  [x] no    I have checked the documentation for today s encounters and the above information has been reviewed and completed.

## 2021-06-13 NOTE — PROGRESS NOTES
OFFICE VISIT NOTE      Assessment & Plan   Alexus Garcia is a 65 y.o. female with a history of breast cancer here for follow up on her diabetes and recent dizzy spell which caused her to fall and loose consciousness last week. She clearly had a concussion and has subsequent headaches. However,  It is 3:15pm and she has not eaten today. She claims to be drinking water but her orthostatic hypotension would seem to say she is not drinking enough. My impression is that she is confused about good choices for a diabetic as far as eating and drinking water. I'll refer her to the diabetic educator as she really needs a lot of education.    BP is low, pulse is high= dehydrated    Concussion - expect headaches to improve; do a lot of brain rest; avoid aggrevating sounds and situations --she's doing all this due to COvid    Diagnoses and all orders for this visit:    Screen for colon cancer  -     Cologuard    Diabetes (H)  -     metFORMIN (GLUCOPHAGE-XR) 500 MG 24 hr tablet; Take 4 tablets (2,000 mg total) by mouth daily with supper.  Dispense: 360 tablet; Refill: 4  -     XR Chest 2 Views  -     XR Abdomen AP; Future; Expected date: 12/16/2020  -     HM2(CBC w/o Differential)  -     Basic Metabolic Panel  -     Ambulatory referral to Diabetes Education Program (CDE)  -     simvastatin (ZOCOR) 5 MG tablet; Take 1 tablet (5 mg total) by mouth every evening.  Dispense: 90 tablet; Refill: 4    Moderate major depression (H)    Abnormal urine odor  -     Urinalysis-UC if Indicated  -     Culture, Urine    Dizziness  -     XR Chest 2 Views  -     XR Abdomen AP; Future; Expected date: 12/16/2020  -     HM2(CBC w/o Differential)  -     Basic Metabolic Panel    Idiopathic hypotension  -     XR Chest 2 Views  -     XR Abdomen AP; Future; Expected date: 12/16/2020  -     HM2(CBC w/o Differential)  -     Basic Metabolic Panel    FREDERICK (dyspnea on exertion)  -     HM2(CBC w/o Differential)  -     Basic Metabolic Panel    Concussion with  loss of consciousness, initial encounter    Other orders  -     Cancel: Ambulatory referral for Colonoscopy  -     Cancel: Occult Blood(ICT)  -     Cancel: Ambulatory Referral for Flex Sig        Daxa Morales MD    The following are part of a depression follow up plan for the patient:  coping support assessment and coping support management          Subjective:   Chief Complaint:  Follow-up (labs), Diabetes, and Fall (was showering and fainted x20 mins last week, has been having headaches since that happened)    65 y.o. female.     1) took a fall last week in the shower - was likely out for about 30min. The room was spinning.  Having headaches since then.  Slept all night except for getting up to feed her cat at 9pm.   Neck is stiff- worst on the sides  Back of her head is tender even to lie on a pillow    Trying to lose weight  Trying to drink water - gets 3-8 small bottles    2) frustrated with constipation    3) likely will have another breast surgery - not sure when due to Covid19    Current Outpatient Medications   Medication Sig Note     acetaminophen (TYLENOL) 500 MG tablet Take 1,000 mg by mouth 3 (three) times a day as needed for pain.      betamethasone, augmented, (DIPROLENE) 0.05 % lotion Apply 1 application topically 2 (two) times a day as needed. Apply to the back of the neck and upper back      blood glucose test strips Use 1 each As Directed as needed. Dispense brand per patient's insurance at pharmacy discretion.      calcium citrate-vitamin D (CITRACAL+D) 315-200 mg-unit per tablet Take 1 tablet by mouth.      cholecalciferol, vitamin D3, 1,000 unit (25 mcg) tablet Take 2 tablets (2,000 Units total) by mouth 2 (two) times a day.      DULoxetine (CYMBALTA) 30 MG capsule TAKE 1 CAPSULE BY MOUTH EVERY DAY      lisinopriL (PRINIVIL,ZESTRIL) 2.5 MG tablet TAKE 1 TABLET BY MOUTH EVERY DAY      LORazepam (ATIVAN) 1 MG tablet TAKE 1 TABLET BY MOUTH EVERY 8 HOURS AS NEEDED FOR ANXIETY.NEEDS TO BE SEEN  "FOR FURTHER REFILLS      melatonin 10 mg Tab Take 10 mg by mouth at bedtime. 5/31/2018: Received from: [facility] Received Sig: Take 10 mg by mouth.     metFORMIN (GLUCOPHAGE-XR) 500 MG 24 hr tablet Take 4 tablets (2,000 mg total) by mouth daily with supper.      naproxen sodium (ALEVE) 220 MG tablet Take 440 mg by mouth as needed. 5/31/2018: Received from: [facility] Received Sig: Take 440 mg by mouth.     ONETOUCH DELICA LANCETS 30 gauge Misc Use 1 each As Directed as needed. DISPENSE BRAND PER PATIENT'S INSURANCE AT PHARMACY DISCRETION.      ONETOUCH ULTRA2 USE DAILY AS DIRECTED      oxyCODONE (ROXICODONE) 5 MG immediate release tablet Take 1-3 tabs PO every 6 hours as needed for pain      polyethylene glycol (MIRALAX) 17 gram packet Take 17 g by mouth.      QUEtiapine (SEROQUEL) 25 MG tablet TAKE 1 TABLET BY MOUTH AT BEDTIME      simvastatin (ZOCOR) 5 MG tablet Take 1 tablet (5 mg total) by mouth every evening.        PSFHx: appropriate sections of PMH completed/filled in   Tobacco Status:  She  reports that she quit smoking about 9 years ago. Her smoking use included cigarettes. She smoked 1.00 pack per day. She has never used smokeless tobacco.    Review of Systems:  No fever.  No rash. All other systems negative except as noted above.    Objective:    BP (!) 84/58   Pulse (!) 121   Temp 97.4  F (36.3  C) (Oral)   Resp 16   Ht 5' 3\" (1.6 m)   Wt 146 lb (66.2 kg)   LMP 09/16/2006   SpO2 96% Comment: ra  Breastfeeding No   BMI 25.86 kg/m    GENERAL: No acute distress, interaction is good, speech is clear  HEENT: PERRL, EOMI; tongue protrudes in the middle  Neck;  Supple, no LA, no muscle tension, some tenderness behind the sternocleidomastoids bilaterally  Ht: tachy, s1s2  Lungs: clear with good aeration  Abd: +BS, soft, ND, slightly tender with palpation generally  Skin: warm, dry  Gait: she looks for something to touch/support herself with as she walks    UA is not clear, UC pending  CXR " clear; KUB with some constipation  Other labs pending    Spent 40 min face to face with patient with more the 50% spent in counseling, education and coordination of care and discussing dehydration, diabetes, healthy eating for a diabetic, UTIs, concussion.

## 2021-06-13 NOTE — PROGRESS NOTES
"Alexus Garcia is a 65 y.o. female who is being evaluated via a billable telephone visit.      The patient has been notified of following:     \"This telephone visit will be conducted via a call between you and your physician/provider. We have found that certain health care needs can be provided without the need for a physical exam.  This service lets us provide the care you need with a short phone conversation.  If a prescription is necessary we can send it directly to your pharmacy.  If lab work is needed we can place an order for that and you can then stop by our lab to have the test done at a later time.    Telephone visits are billed at different rates depending on your insurance coverage. During this emergency period, for some insurers they may be billed the same as an in-person visit.  Please reach out to your insurance provider with any questions.    If during the course of the call the physician/provider feels a telephone visit is not appropriate, you will not be charged for this service.\"    Patient has given verbal consent to a Telephone visit? Yes    Patient would like to receive their AVS by AVS Preference: Mail a copy.    Psychiatric  Out patient Follow Up Progress Note  Date of visit:11/25/2020           Discussion of Care and Treatment Recommendations:   This is a 65 y.o. female with  a with history of depression and PTSD .         PCP managing sleep :  Prescribing  Lorazepam ,Seroquel and Ambien   PCP managing Gabapentin for neurological pain        Last visit 09/24/2020  Recommendation at last visit .  1 .Continue with  Psychotherapy   2.Highly recommend : Community Support groups   3. Continue   Cymbalta at  30 mg daily    4. RTC- 8  weeks, call in between visit with any question   Patient and I reviewed diagnosis and treatment plan and patient agrees with following recommendations:  Ongoing education given regarding diagnostic and treatment options with adequate verbalization of " "understanding.  Plan   1 .Continue with  Psychotherapy   2.Highly recommend : Community Support groups   3. Continue   Cymbalta at  30 mg daily    4. 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)     Malignant neoplasm of breast (H)     Posttraumatic hematoma of right breast     Postoperative pain     Pain in right axilla     Constipation due to outlet dysfunction     Logorrhea     Type 2 diabetes mellitus without complication, without long-term current use of insulin (H)     Rotator cuff tear     Anxiety     Scalp irritation     Abnormal MRI     Diabetes 1.5, managed as type 2 (H)     H/O breast surgery     Advanced directives, counseling/discussion     Vitamin D deficiency disease             Chief Complaint / Subjective:    Chief complaint: \" I am feeling a bit down \"     History of Present Illness:   Per patient's statement : Feeling isolated due to the pandemic . Lost her broter tocancer a few weeks ago , Her son's girlfriend is pregnant and will be having a baby in Feb. Is staying home for [facility] as she is high risk and does not want to interact other family members due to her history.  As per  medical issues are concerned she did meet with her doctors recently and was informed that she is having advanced side effects from previous radiation therapy and they have to change treatment plan 4 weeks ago for she however denied suicidal homicidal ideations.  She states that she has been able to relax and meditate which has been helpful.  She does not want any medication changes for now.    Mental Status Examination:   General: Alert and oriented x 3   Speech: Normal in rate and tone  Language: Intact  Thought process: Coherent  Thought content:                           Auditory hallucinations-Denies                         "  Visual Hallucinations - Denies                         Delusions Absent                           Loose Associations:  No                          Suicidal thoughts: Denies                          Homicidal thoughts:Denies                        Affect: Neutral  Mood: Neutral   Intellectual functioning: Within normal limits  Memory: Within normal limits  Fund of knowledge: Average  Attention and concentration: Within normal limits  Gait: Unable to assess telephone visit  Psychomotor activity: Unable to assess telephone visit  Muscles: Unable to assess telephone visit  InSight and judgment: Fair      Drug/treatment history and current pattern of use:   Denies     Medication changes: See Above   Medication adherence: compliant  Medication side effects: absent  Information about medications: Side effects, benefits and alternative treatments discussed and patient agrees .    Psychotherapy: Supportive therapy day-to-day living    Education: Diet, exercise, abstinence from drugs and alcohol, patient will not drive if sedated and medications or  under influence of any substance    Lab Results:   Personally reviewed and discussed with the patient    Lab Results   Component Value Date    WBC 7.4 11/10/2020    HGB 15.1 11/10/2020    HCT 44.0 11/10/2020     11/10/2020    CHOL 302 (H) 11/10/2020    TRIG 156 (H) 11/10/2020    HDL 70 11/10/2020     (H) 11/10/2020     (H) 11/10/2020     11/10/2020    K 4.7 11/10/2020    CL 99 11/10/2020    CREATININE 0.84 11/10/2020    BUN 14 11/10/2020    CO2 28 11/10/2020    TSH 0.84 12/06/2019    INR 0.96 09/20/2016    HGBA1C 8.2 (H) 11/10/2020    MICROALBUR 4.15 (H) 11/10/2020       Vital signs:  LMP 09/16/2006   Unable to assess telephone visit  Allergies: Aztreonam, Castor oil, Cefaclor, Cephalexin, Cephalexin monohydrate, Chlorhexidine, Ciprofloxacin, Clarithromycin, Clindamycin, Penicillins, Propofol, Scopolamine, Sulfa (sulfonamide antibiotics), Sulfasalazine,  Tetracyclines, Vancomycin, Adhesive, Cytoxan [cyclophosphamide], Erythromycin base, Hydrocodone, Neupogen [filgrastim], Paper tape, Tapentadol, Taxol [paclitaxel], and Taxotere [docetaxel]           Review of Systems:      ROS:  Subjective data only - Tele-Health  Visit   10 point ROS was negative except for the items listed in HPI-              Medications:     Current Outpatient Medications on File Prior to Visit   Medication Sig Dispense Refill     acetaminophen (TYLENOL) 500 MG tablet Take 1,000 mg by mouth 3 (three) times a day as needed for pain.       betamethasone, augmented, (DIPROLENE) 0.05 % lotion Apply 1 application topically 2 (two) times a day as needed. Apply to the back of the neck and upper back 60 mL 3     blood glucose test strips Use 1 each As Directed as needed. Dispense brand per patient's insurance at pharmacy discretion. 100 strip 2     calcium citrate-vitamin D (CITRACAL+D) 315-200 mg-unit per tablet Take 1 tablet by mouth.       cholecalciferol, vitamin D3, 1,000 unit (25 mcg) tablet Take 2 tablets (2,000 Units total) by mouth 2 (two) times a day. 90 tablet 4     DULoxetine (CYMBALTA) 30 MG capsule TAKE 1 CAPSULE BY MOUTH EVERY DAY 90 capsule 1     hydrOXYzine HCl (ATARAX) 25 MG tablet Take 25 mg by mouth.       lisinopriL (ZESTRIL) 2.5 MG tablet Take 1 tablet (2.5 mg total) by mouth daily. 30 tablet 11     LORazepam (ATIVAN) 1 MG tablet TAKE 1 TABLET BY MOUTH EVERY 8 HOURS AS NEEDED FOR ANXIETY.NEEDS TO BE SEEN FOR FURTHER REFILLS 30 tablet 0     melatonin 10 mg Tab Take 10 mg by mouth at bedtime.       metFORMIN (GLUCOPHAGE-XR) 500 MG 24 hr tablet Take 1 tablet (500 mg total) by mouth daily with supper. 90 tablet 4     naproxen sodium (ALEVE) 220 MG tablet Take 440 mg by mouth as needed.       ONETOUCH DELICA LANCETS 30 gauge Misc Use 1 each As Directed as needed. DISPENSE BRAND PER PATIENT'S INSURANCE AT PHARMACY DISCRETION.  4     ONETOUCH ULTRA2 USE DAILY AS DIRECTED  0     oxyCODONE  (ROXICODONE) 5 MG immediate release tablet Take 1-3 tabs PO every 6 hours as needed for pain 20 tablet 0     polyethylene glycol (MIRALAX) 17 gram packet Take 17 g by mouth.       QUEtiapine (SEROQUEL) 25 MG tablet TAKE 1 TABLET BY MOUTH AT BEDTIME 90 tablet 4     zolpidem (AMBIEN) 10 mg tablet TAKE 1 TABLET (10 MG TOTAL) BY MOUTH AT BEDTIME AS NEEDED FOR SLEEP. 30 tablet 0     No current facility-administered medications on file prior to visit.                Coordination of Care:   More than 25 minutes spent on this visit  with more than 50% of time spent on coordination of care including: Educating patient about diagnosis, prognosis, side effects and benefits of medications, diet, exercise.  Time also spent providing supportive therapy regarding above issues.    This note was created using a dictation system. All typing errors or contextual distortion is unintentional and software inherent.  Phone call duration: 25  minutes    Lucy Carey NP

## 2021-06-14 NOTE — PROGRESS NOTES
"OFFICE VISIT NOTE      Assessment & Plan   Alexus Garcia is a 62 y.o. female with cancer and complicated reconstruction. I encouraged her to remain as active as she can, even if only with her legs.    Insomnia - will see if her insurance will cover zolpidem 10mg for her.   Continue to use lorazepam and narcotic minimally to avoid medication problems/addiction.    Diagnoses and all orders for this visit:    Postoperative pain    Diabetes mellitus    Personal history of breast cancer    Logorrhea    Posttraumatic hematoma of right breast, sequela    Atypical Chest Pain    Anxiety        Daxa Morales MD              Subjective:   Chief Complaint:  Follow-up (sleep and pain medications)    62 y.o. female.     1) sleep - zolpidem x 5 years  She is open to trying something else, but says she's tried lots of other meds    2) surgery - she's had MANY  Likely will need another surgery to sever the nerve to the muscle so it stops daniel (in 6 mon)  Did botox injections to try to help while she heals.  Might have to move the implant.  PT did not help even with 2 trials.    3) anxiety - lorazepam - does not take them regularly;   Has only taken 1 pain med since I prescribed them - only refills about once per year  Sometimes pain is incapacitating, then she takes meds.  Pain clinics have said \"more meds\" and \"that's not quality of life\" and she can't drive well    Insurance choices - she's looked at open enrollment  As of Jan 1 she has to change from [facility] to [facility]    Current Outpatient Prescriptions   Medication Sig     acetaminophen (TYLENOL) 500 MG tablet Take 1,000 mg by mouth 3 (three) times a day as needed for pain.     aspirin-acetaminophen-caffeine (EXCEDRIN MIGRAINE) 250-250-65 mg per tablet Take 2 tablets by mouth every 8 (eight) hours as needed for pain (headaches).     betamethasone, augmented, (DIPROLENE) 0.05 % lotion Apply 1 application topically 2 (two) times a day as needed.     cholecalciferol, " "vitamin D3, 1,000 unit tablet Take 2,000 Units by mouth 2 (two) times a day.     diazepam (VALIUM) 5 MG tablet Take 5 mg by mouth every 6 (six) hours as needed for muscle spasms.      LORazepam (ATIVAN) 1 MG tablet Take 1 tablet (1 mg total) by mouth every 8 (eight) hours as needed for anxiety.     melatonin 10 mg Tab Take 10 mg by mouth bedtime as needed. [facility] Mel[facility] 10 mg     metFORMIN (GLUCOPHAGE-XR) 500 MG 24 hr tablet TAKE 1 TABLET(500 MG) BY MOUTH DAILY WITH SUPPER     multivitamin therapeutic w/minerals (THERAPEUTIC-M) 27-0.4 mg Tab tablet Take by mouth.     naproxen sodium (ALEVE) 220 MG tablet Take 440 mg by mouth every 12 (twelve) hours as needed for headaches.     oxyCODONE (ROXICODONE) 15 MG immediate release tablet Take 1 tablet (15 mg total) by mouth every 8 (eight) hours as needed for pain.     zolpidem (AMBIEN) 10 mg tablet Take 1 tablet (10 mg total) by mouth at bedtime.       PSFHx: appropriate sections of PMH completed/filled in   Tobacco Status:  She  reports that she quit smoking about 6 years ago. Her smoking use included Cigarettes. She smoked 1.00 pack per day. She has never used smokeless tobacco.    Review of Systems:  No fever.  No .    Objective:    /88 (Patient Position: Sitting, Cuff Size: Child) Comment (Patient Site): right wrist  Pulse 98  Temp 98  F (36.7  C) (Oral)   Ht 5' 2.75\" (1.594 m)  Wt 151 lb 4 oz (68.6 kg)  LMP 09/16/2006  SpO2 97%  BMI 27.01 kg/m2  GENERAL: No acute distress.  Mood: good  Judgment:fair  Insight: fair    Greater than 25 minutes spent with patient, with greater than 50% of time spent in counseling, consultation and care coordination regarding problems detailed above.          "

## 2021-06-14 NOTE — TELEPHONE ENCOUNTER
New Appointment Needed  What is the reason for the visit:    Same Date/Next Day Appt Request  What is the reason for your visit?: Patient had a fall & concussion sx. See triage notes    Provider Preference: PCP only  How soon do you need to be seen?: tomorrow  Waitlist offered?: No  Okay to leave a detailed message:  Yes

## 2021-06-14 NOTE — PROGRESS NOTES
"Alexus Garcia is a 65 y.o. female who is being evaluated via a billable telephone visit.      The patient has been notified of following:     \"This telephone visit will be conducted via a call between you and your physician/provider. We have found that certain health care needs can be provided without the need for a physical exam.  This service lets us provide the care you need with a short phone conversation.  If a prescription is necessary we can send it directly to your pharmacy.  If lab work is needed we can place an order for that and you can then stop by our lab to have the test done at a later time.    Telephone visits are billed at different rates depending on your insurance coverage. During this emergency period, for some insurers they may be billed the same as an in-person visit.  Please reach out to your insurance provider with any questions.    If during the course of the call the physician/provider feels a telephone visit is not appropriate, you will not be charged for this service.\"    Patient has given verbal consent to a Telephone visit? Yes    What phone number would you like to be contacted at? Main    Patient would like to receive their AVS by AVS Preference: Nabeel.    Additional provider notes: headaches     Concussion problem noted at her last visit.  Yesterday morning, she was having a very vivid dream during which she \"flung herself\" --and she actually fell out of bed hitting her head on the bedside stand and on the cement of the floor. Yet, she had a pet emergency and could therefore not go to the ER. She afterward had a headache and neck soreness.  She took a diazepam and slept - she woke a while ago with a stiff neck. She's done cold packs on her neck and on the bump on her head. Yesterday the bump was 2.5 inches.    She's concerned about tingling down her arms and into her hands. She did not have this with her last fall - it is new with this fall. Her headache is severe and has not " gotten any better.    Assessment/Plan:  New concussion - from a fall out of bed - she hit her bedside stand and the floor. This might have caused a concussion if she started with a normal head/brain, but because she was healing from a concussion, the injury is worse. Unable to assess this by phone other than saying she can make sentences and interact appropriately. Neck pain with new tingling in arms - could be a new injury or an exacerbation of previous problems (below)  She agrees to go to Urgent care or the ED tonight.    1. Type 2 diabetes mellitus without complication, without long-term current use of insulin (H)  Reasonably controlled, continue same    2. Neuralgia  This has been an ongoing problem    3. Malignant neoplasm of upper-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, unspecified estrogen receptor status (H)  Currently in remission    4. Pain in right axilla  ?I wonder if this is related to her arm tingling now    5. Anxiety  Exacerbating this problem right now    6. Moderate major depression (H)  continues    7. Concussion with loss of consciousness, subsequent encounter  Previous concussion now has had it's healing interrupted and even likely partly reversed by another head injury/concussion.        Phone call duration:  22 minutes  3:47- 5:09    Daxa Morales MD

## 2021-06-14 NOTE — TELEPHONE ENCOUNTER
My schedule is full today, however with the weather warnings, we well might have some patients not show up.  If Alexus can remain available for a phone visit at any time today, I will call her if I have a no-show. If there are no no-shows, I will call her at the end of the day. If our phone call determines that she must be seen today - will send her to the ER. If she can wait until tomorrow, I could fit her in then.

## 2021-06-14 NOTE — PROGRESS NOTES
"Alexus Garcia is a 65 y.o. female who is being evaluated via a billable telephone visit.      The patient has been notified of following:     \"This telephone visit will be conducted via a call between you and your physician/provider. We have found that certain health care needs can be provided without the need for a physical exam.  This service lets us provide the care you need with a short phone conversation.  If a prescription is necessary we can send it directly to your pharmacy.  If lab work is needed we can place an order for that and you can then stop by our lab to have the test done at a later time.    Telephone visits are billed at different rates depending on your insurance coverage. During this emergency period, for some insurers they may be billed the same as an in-person visit.  Please reach out to your insurance provider with any questions.    If during the course of the call the physician/provider feels a telephone visit is not appropriate, you will not be charged for this service.\"    Patient has given verbal consent to a Telephone visit? Yes      Patient would like to receive their AVS by AVS Preference: Mail a copy.    Psychiatric  Out patient Follow Up Progress Note  Date of visit:1/25/2021         Discussion of Care and Treatment Recommendations:   This is a 65 y.o. female with  a with history of depression and PTSD .         PCP managing sleep :  Prescribing  Lorazepam ,Seroquel and Ambien   PCP managing Gabapentin for neurological pain           Last visit 11/25/2020  Recommendation at last visit .  1 .Continue with  Psychotherapy   2.Highly recommend : Community Support groups   3. Continue   Cymbalta at  30 mg daily    4. RTC- 8  weeks, call in between visit with any question   Patient and I reviewed diagnosis and treatment plan and patient agrees with following recommendations:  Ongoing education given regarding diagnostic and treatment options with adequate verbalization of " "understanding.  Plan     1 .Continue with  Psychotherapy   2.Highly recommend : Community Support groups   3. Continue   Cymbalta at  30 mg daily    4. RTC- 8  weeks, call in between visit with any question        DIagnoses:     MDD  PTSD  Anxiety    Patient Active Problem List   Diagnosis     Neuralgia     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)     Malignant neoplasm of breast (H)     Posttraumatic hematoma of right breast     Postoperative pain     Pain in right axilla     Constipation due to outlet dysfunction     Logorrhea     Type 2 diabetes mellitus without complication, without long-term current use of insulin (H)     Rotator cuff tear     Anxiety     Scalp irritation     Abnormal MRI     Diabetes 1.5, managed as type 2 (H)     H/O breast surgery     Advanced directives, counseling/discussion     Vitamin D deficiency disease     Moderate major depression (H)     Concussion with loss of consciousness     CKD (chronic kidney disease) stage 3, GFR 30-59 ml/min     Concussion without loss of consciousness             Chief Complaint / Subjective:    Chief complaint: \" I am dong better now \"     History of Present Illness:   Per patient's statement : She did not celebrate the holidays this past year and felt isolated.  She did keep in touch with friends and family via telephone calls and video calls.  She was feeling a little bit depressed over the holidays due to the isolation and also having had her brother  last year she was still grieving.  She is feeling a lot better this year and takes her depression is well managed on current medications.  She completed her radiation  therapy treatment   She is looking forward to getting COVID-19 vaccinations already open to new people over 6500 with pre-existing conditions.  Depression and anxiety are well managed at this time.  States current medications are helpful.  She has not been able to engage in any much community support groups " "but hopes that as it warms up she will become proactive in finding a group.  She denies all other psychiatric issues offers no new other concerns.      Mental Status Examination:     Appearance: unable to assess  Orientation: Patient alert and oriented to person, place, time, and situation  Reliability:  Patient appears to be an adequate historian.    Behavior: unable to assess  Speech: Speech is spontaneous and coherent, with a normal rate, rhythm and tone.    Language:There are no difficulties with expressive or receptive language as observed throughout the interview.    Mood: Described as\" I  am dong better now \"    Affect: unable to assess  Judgement: Able to make basic decision regarding safety.  Insight: Good awareness of physical and mental health conditions and aware of needs around care for these.  Gait and station: unable to assess  Thought process: Logical   Hallucinations : No evidence of any hallucination  Thought content: No evidence of delusions or paranoia.   Suicidal /Homical Ideations:  No thoughts of self harm or suicide. No thoughts of harming others.  Associations: Connected  Fund of knowledge: Average  Attention / Concentration: Able to remain focused during the interview with minimal distractibility or need for redirection.  Short Term Memory: Grossly intact as evidence by client recalling themes and ideas discussed.  Long Term Memory: Intact  Motor Status: unable to asse      Drug/treatment history and current pattern of use:   Denies     Medication changes: See Above   Medication adherence: compliant  Medication side effects: absent  Information about medications: Side effects, benefits and alternative treatments discussed and patient agrees .    Psychotherapy: Supportive therapy day-to-day living    Education: Diet, exercise, abstinence from drugs and alcohol, patient will not drive if sedated and medications or  under influence of any substance    Lab Results:   Personally reviewed and " discussed with the patient    Lab Results   Component Value Date    WBC 8.4 12/16/2020    HGB 15.9 12/16/2020    HCT 48.8 (H) 12/16/2020     12/16/2020    CHOL 302 (H) 11/10/2020    TRIG 156 (H) 11/10/2020    HDL 70 11/10/2020     (H) 11/10/2020     (H) 11/10/2020     (L) 12/16/2020    K 4.7 12/16/2020    CL 98 12/16/2020    CREATININE 1.16 (H) 12/16/2020    BUN 22 12/16/2020    CO2 20 (L) 12/16/2020    TSH 0.84 12/06/2019    INR 0.96 09/20/2016    HGBA1C 8.2 (H) 11/10/2020    MICROALBUR 4.15 (H) 11/10/2020       Vital signs:  LMP 09/16/2006   Unable to assess telephone visit  Allergies: '    Allergies   Allergen Reactions     Aztreonam Hives     Castor Oil Hives     Cefaclor Anaphylaxis     Cephalexin Anaphylaxis     Cephalexin Monohydrate Hives     Chlorhexidine Hives     White clear stuff that you lather during surgery that dries unto the skin and stays on the skin that's almost like a superglue. It was very itchy and skin gets very red.     Ciprofloxacin Anaphylaxis     Clarithromycin Anaphylaxis, Anxiety, Dizziness, Hives, Itching, Nausea And Vomiting, Rash and Shortness Of Breath     Clindamycin Hives     Penicillins Anaphylaxis     Propofol Nausea Only and Headache     Scopolamine Anaphylaxis, Hives and Swelling     Swollen tongue, eye swelled closed  Eye and tongue swelling      Sulfa (Sulfonamide Antibiotics) Anaphylaxis and Hives     Sulfasalazine Hives     Tetracyclines Anaphylaxis     Vancomycin Hives     Adhesive Itching     Irritation where the tape had contact     Cytoxan [Cyclophosphamide] Hives     severe     Erythromycin Base Hives     Hydrocodone Nausea And Vomiting     Neupogen [Filgrastim] Hives     severe     Paper Tape Other (See Comments)     Surgical paper tape - redness. States if left on too long leaves little cuts on her skin     Tapentadol Other (See Comments)     bleeding     Taxol [Paclitaxel] Hives     severe     Taxotere [Docetaxel] Hives          Review of  Systems:      ROS:  Subjective data only - Tele-Health  Visit   10 point ROS was negative except for the items listed in HPI-              Medications:     Current Outpatient Medications on File Prior to Visit   Medication Sig Dispense Refill     acetaminophen (TYLENOL) 500 MG tablet Take 1,000 mg by mouth 3 (three) times a day as needed for pain.       betamethasone, augmented, (DIPROLENE) 0.05 % lotion Apply 1 application topically 2 (two) times a day as needed. Apply to the back of the neck and upper back 60 mL 3     blood glucose test (CONTOUR NEXT TEST STRIPS) strips Use 1 each As Directed daily. 100 strip 3     blood glucose test strips Use 1 each As Directed as needed. Dispense brand per patient's insurance at pharmacy discretion. 100 strip 2     blood-glucose meter (CONTOUR NEXT METER) Misc Check BG twice daily. 1 each 0     calcium citrate-vitamin D (CITRACAL+D) 315-200 mg-unit per tablet Take 1 tablet by mouth.       cholecalciferol, vitamin D3, 1,000 unit (25 mcg) tablet Take 2 tablets (2,000 Units total) by mouth 2 (two) times a day. 90 tablet 4     generic lancets (FINGERSTIX LANCETS) 1 each by In Vitro route 2 (two) times a day. 100 each 3     lisinopriL (PRINIVIL,ZESTRIL) 2.5 MG tablet TAKE 1 TABLET BY MOUTH EVERY DAY 90 tablet 3     LORazepam (ATIVAN) 1 MG tablet TAKE 1 TABLET BY MOUTH EVERY 8 HOURS AS NEEDED FOR ANXIETY.NEEDS TO BE SEEN FOR FURTHER REFILLS 30 tablet 0     melatonin 10 mg Tab Take 10 mg by mouth at bedtime.       metFORMIN (GLUCOPHAGE-XR) 500 MG 24 hr tablet Take 4 tablets (2,000 mg total) by mouth daily with supper. 360 tablet 4     naproxen sodium (ALEVE) 220 MG tablet Take 440 mg by mouth as needed.       ONETOUCH DELICA LANCETS 30 gauge Misc Use 1 each As Directed as needed. DISPENSE BRAND PER PATIENT'S INSURANCE AT PHARMACY DISCRETION.  4     ONETOUCH ULTRA2 USE DAILY AS DIRECTED  0     oxyCODONE (ROXICODONE) 5 MG immediate release tablet Take 1-3 tabs PO every 6 hours as needed  for pain 20 tablet 0     polyethylene glycol (MIRALAX) 17 gram packet Take 17 g by mouth.       QUEtiapine (SEROQUEL) 25 MG tablet TAKE 1 TABLET BY MOUTH AT BEDTIME 90 tablet 4     simvastatin (ZOCOR) 5 MG tablet Take 1 tablet (5 mg total) by mouth every evening. 90 tablet 4     DULoxetine (CYMBALTA) 30 MG capsule TAKE 1 CAPSULE BY MOUTH EVERY DAY 90 capsule 1     No current facility-administered medications on file prior to visit.                Coordination of Care:   More than 30 minutes spent on this visit  with more than 50% of time spent on coordination of care including: Educating patient about diagnosis, prognosis, side effects and benefits of medications, diet, exercise.  Time also spent providing supportive therapy regarding above issues.    This note was created using a dictation system. All typing errors or contextual distortion is unintentional and software inherent.      Lucy Carey NP

## 2021-06-14 NOTE — TELEPHONE ENCOUNTER
Called pt. No answer.   Left a detailed message explaining she may go to any [facility] and get the [employer] meter, strips and supplies at an affordable cost w/o a prescription. Advised her to ask the pharmacist at [facility] if she needs help finding things.     Advised pt to call us back if she wanted to talk about this more or had any other questions/concerns.

## 2021-06-14 NOTE — TELEPHONE ENCOUNTER
Writer attempted to reach Alexus but the phone number is not working. If the patient returns call please advise her per MD note below. Thanks.

## 2021-06-14 NOTE — TELEPHONE ENCOUNTER
Toya or any available CDE    She was informed by her pharmacy that Medicare Part B will only cover for 80% of the cost of the meter and testing supplies.     She is not sure what to do since it's pretty expensive for her to be paying oop for the meter and supplies.    Please advise on what to do

## 2021-06-14 NOTE — TELEPHONE ENCOUNTER
Patient called back.  Scheduled her with Dr. Morales today for a short phone visit and checked her in.    done

## 2021-06-14 NOTE — TELEPHONE ENCOUNTER
Pt called in states she diagnosed with concussion last time on 12/16/20.  Pt had head injury today at 9:30 am.  There was no loss of concussion today.  Pt is alert at this time.   There is bump on her head,  Bruises on her him or swelling.  The Pt has neck stiffness.  Pt states she has headache 7/10 on the scale.  No vomit.  The disposition is to be seen with in the today.  Pt ask to make appointment for tomorrow to see the PCP.  inform the caller there no appointment to see the PCP tomorrow.  Pt ask to send message for the PCP if she can see her tomorrow.  The message sent.  Care advice given per protocol.  Patient agrees with care advice given.   Agreed to call back if he has additional symptoms or questions.      Mark Larry RN, [facility] Triage/Med Refill 12/28/2020 5:31 PM        Reason for Disposition    Concussion symptoms staying SAME (not worse) and age > 60 years    Additional Information    Negative: ACUTE NEUROLOGIC SYMPTOM and symptom present now    Negative: Knocked out (unconscious) > 1 minute    Negative: Seizure (convulsion) occurred (Exception: prior history of seizures and now alert and without Acute Neurologic Symptoms)    Negative: Neck pain after dangerous injury (e.g., MVA, diving, trampoline, contact sports, fall > 10 feet or 3 meters) (Exception: neck pain began > 1 hour after injury)    Negative: Major bleeding (actively dripping or spurting) that can't be stopped    Negative: Penetrating head injury (e.g., knife, gunshot wound, metal object)    Negative: Sounds like a life-threatening emergency to the triager    Recently examined and diagnosed with a concussion by a healthcare provider and has questions about concussion symptoms    Negative: Weakness (i.e., paralysis, loss of muscle strength) of the face, arm or leg on one side of the body    Negative: Loss of speech or garbled speech    Negative: Difficult to awaken or acting confused (e.g., disoriented, slurred speech)     Negative: Sounds like a life-threatening emergency to the triager    Negative: Concussion suspected and has not been examined by a HCP    Negative: Mild traumatic brain injury (mTBI; concussion) and more than 14 days since head injury    Negative: Black eyes on both sides and onset within 24 hours of head injury    Negative: Concussion symptoms are worsening    Negative: Knocked out (unconscious) > 1 minute and no head CT Scan or MRI has been performed    Negative: Vomiting once or more and no head CT Scan or MRI has been performed    Negative: Unsteady on feet (e.g., unable to stand or requires support to walk) and no head CT Scan or MRI has been performed    Negative: Neck pain after dangerous injury (e.g., MVA, diving, trampoline, contact sports, fall > 10 feet or 3 meters) and no neck xray has been performed (e.g., c-spine xray or CT)    Negative: Patient sounds very sick or weak to the triager    Negative: SEVERE headache (e.g., excruciating, pain scale 8-10) and not improved after pain medications    Negative: Concussion symptoms SAME (not worse) and known bleeding disorder (e.g., thrombocytopenia)    Negative: Concussion symptoms SAME (not worse) AND taking Coumadin (warfarin) or other strong blood thinner    Protocols used: CONCUSSION (MTBI) LESS THAN 14 DAYS AGO FOLLOW-UP CALL-A-OH, HEAD INJURY-A-OH

## 2021-06-14 NOTE — TELEPHONE ENCOUNTER
"CMT attempted additional call and is notified with recording that \"the call cannot be completed at this time.\"   "

## 2021-06-15 NOTE — TELEPHONE ENCOUNTER
Controlled Substance Refill Request  Medication Name:   Requested Prescriptions     Pending Prescriptions Disp Refills     LORazepam (ATIVAN) 1 MG tablet 30 tablet 0     Date Last Fill: 5/11/2020  Requested [facility]: [facility]  Submit electronically to pharmacy  Controlled Substance Agreement on file:   Encounter-Level CSA Scan Date:    There are no encounter-level csa scan date.        Last office visit:  1/20/21         Twila Mendes RN, BSN Nurse Triage Advisor 4:44 PM 2/25/2021

## 2021-06-15 NOTE — TELEPHONE ENCOUNTER
Pt called. [facility] reports they rx the got yesterday still said to check two times a day.   The Rx that I sent yesterday states ONE per day.     [facility]ed [facility] - they claim Rx yesterday still says two times a day. They are unable to take a verbal order because of medicare. Resent Rx again.

## 2021-06-15 NOTE — TELEPHONE ENCOUNTER
Last office visit: 08/07/2019  Last ordered: 05/11/2020 #30, R-0   Last lab check: 12/16/2020 BMP   Next appointment: [facility] 03/24/2021    Chart reviewed. Please review findings below.   2. Breast mass  Biopsy needed. I have prescribed extra pain medication for post-procedure  - LORazepam (ATIVAN) 1 MG tablet; Take 1 tablet (1 mg total) by mouth every 8 (eight) hours as needed for anxiety.  Dispense: 90 tablet; Refill: 0  - oxyCODONE (ROXICODONE) 5 MG immediate release tablet; Take 1-3 tabs PO every 6 hours as needed for pain  Dispense: 20 tablet; Refill: 0  - HM1(CBC and Differential)  - Basic Metabolic Panel  - HM1 (CBC with Diff)  - Urinalysis-UC if Indicated  - Culture, Urine

## 2021-06-15 NOTE — TELEPHONE ENCOUNTER
Diabetes Educator      in regards of: brayan lancets (FINGERSTIX LANCETS)    [facility] states that there is an insurance problem. please call them back to assist what is going on.      Patient has been waiting 2 weeks for this o get straighten out but still nothing.    Please call her back @ [phone]

## 2021-06-15 NOTE — TELEPHONE ENCOUNTER
Patient called. She was informed that her insurance will only cover for her to be checking her blood sugar 1x a day only. Please call [facility] and update the Rx.    Anny @ [phone]

## 2021-06-15 NOTE — TELEPHONE ENCOUNTER
Called [facility] who claims they do not have an rx for lancets. However they received the rx for contour test strips and those are ready for  for $16. rx for lancets resent.     Called pt, no answer. LM informing she should be able to  lancets today and strips are already ready. Asked pt to call back if any issues.

## 2021-06-15 NOTE — TELEPHONE ENCOUNTER
Controlled Substance Refill Request  Medication Name:   Requested Prescriptions     Pending Prescriptions Disp Refills     oxyCODONE (ROXICODONE) 5 MG immediate release tablet 20 tablet 0     Sig: Take 1-3 tabs PO every 6 hours as needed for pain     Date Last Fill: 5/11/2020  [facility]ed [facility]  Submit electronically to pharmacy  Controlled Substance Agreement on file:   Encounter-Level CSA Scan Date:    There are no encounter-level csa scan date.        Last office visit:  1/20/21         Twila Mendes RN, BSN Nurse Triage Advisor 4:43 PM 2/25/2021

## 2021-06-15 NOTE — PROGRESS NOTES
OFFICE VISIT NOTE      Assessment & Plan   Alexus Garcia is a 62 y.o. female who's recovering from multiple surgeries related to breast cancer, has diabetes and anxiety, scalp itching and flaking and chronic pain    Prediabetes - seems good, check A1-c today; order glucometer so if she has dizzy or faint spells, she can check her gluose    Itchy scalp - keep it moisturized; if this does not help, I'll refer her to derm (she'll call)    Cancer - she's improving - fewer hospital visits in the past month than ever in the past year!    Flu - had it just after new years, but she got through it and is well now    Pain - she's managing without pain meds    Diagnoses and all orders for this visit:    Anxiety    Prediabetes  -     HM1(CBC and Differential)  -     Glycosylated Hemoglobin A1c  -     HM1 (CBC with Diff)    Malignant neoplasm of female breast, unspecified estrogen receptor status, unspecified laterality, unspecified site of breast    Diabetes mellitus    Malignant neoplasm of upper-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, unspecified estrogen receptor status    Posttraumatic hematoma of right breast, sequela    Scalp irritation        Daxa Morales MD            Subjective:   Chief Complaint:  Follow-up (pain and diabetes) and Review results (colon kit mailed 1/1/18)    62 y.o. female.     1) insurance change -   Pharmacy might change - been at [facility] 20 years so she might stay    2) DM- she wants to know what A1-C should be - in the 5's? She would like a glucometer    3) breast - healing; last surgery was in Oct    Still taped up, did botox on right, soon they'll do it on her left    4) 1/2/18 had the flu for a week - terrible x 10 days then gone  5) scalp - has tried OTC products  Last night did coconut oil  Refer prn    Goes to the library for a quiet time    Current Outpatient Prescriptions   Medication Sig     acetaminophen (TYLENOL) 500 MG tablet Take 1,000 mg by mouth 3 (three) times a day as needed  "for pain.     aspirin-acetaminophen-caffeine (EXCEDRIN MIGRAINE) 250-250-65 mg per tablet Take 2 tablets by mouth every 8 (eight) hours as needed for pain (headaches).     betamethasone, augmented, (DIPROLENE) 0.05 % lotion Apply 1 application topically 2 (two) times a day as needed.     cholecalciferol, vitamin D3, 1,000 unit tablet Take 2,000 Units by mouth 2 (two) times a day.     diazepam (VALIUM) 5 MG tablet Take 5 mg by mouth every 6 (six) hours as needed for muscle spasms.      LORazepam (ATIVAN) 1 MG tablet Take 1 tablet (1 mg total) by mouth every 8 (eight) hours as needed for anxiety.     melatonin 10 mg Tab Take 10 mg by mouth bedtime as needed. [facility] 10 mg     metFORMIN (GLUCOPHAGE-XR) 500 MG 24 hr tablet TAKE 1 TABLET BY MOUTH DAILY WITH SUPPER     multivitamin therapeutic w/minerals (THERAPEUTIC-M) 27-0.4 mg Tab tablet Take by mouth.     naproxen sodium (ALEVE) 220 MG tablet Take 440 mg by mouth every 12 (twelve) hours as needed for headaches.     oxyCODONE (ROXICODONE) 15 MG immediate release tablet Take 1 tablet (15 mg total) by mouth every 8 (eight) hours as needed for pain.     zolpidem (AMBIEN) 10 mg tablet Take 1 tablet (10 mg total) by mouth at bedtime.       PSFHx: appropriate sections of PMH completed/filled in   Tobacco Status:  She  reports that she quit smoking about 6 years ago. Her smoking use included Cigarettes. She smoked 1.00 pack per day. She has never used smokeless tobacco.    Review of Systems:  No fever.  No rash.    Objective:    /64 (Patient Site: Left Arm, Cuff Size: Child) Comment (Patient Site): cuff on wrist due to lymph node removal  Pulse 95  Temp 98.7  F (37.1  C) (Oral)   Ht 5' 3\" (1.6 m)  Wt 149 lb (67.6 kg)  LMP 09/16/2006  SpO2 97%  BMI 26.39 kg/m2  GENERAL: No acute distress.  Scalp - no erythema, no obvious flaking    No other exam today    cologuard pending    Greater than 25 minutes spent with patient, with greater than 50% of time spent " in counseling, consultation and care coordination regarding problems detailed above.

## 2021-06-15 NOTE — TELEPHONE ENCOUNTER
Last office visit: 08/07/2019  Last ordered: 05/11/2020 #20, R-0  Last lab check: 12/2020 [facility]   Next appointment: On license of [facility] 03/24/2021    Chart reviewed. Please review findings below.   2. Breast mass  Biopsy needed. I have prescribed extra pain medication for post-procedure  - LORazepam (ATIVAN) 1 MG tablet; Take 1 tablet (1 mg total) by mouth every 8 (eight) hours as needed for anxiety.  Dispense: 90 tablet; Refill: 0  - oxyCODONE (ROXICODONE) 5 MG immediate release tablet; Take 1-3 tabs PO every 6 hours as needed for pain  Dispense: 20 tablet; Refill: 0  - HM1(CBC and Differential)  - Basic Metabolic Panel  - HM1 (CBC with Diff)  - Urinalysis-UC if Indicated  - Culture, Urine

## 2021-06-15 NOTE — TELEPHONE ENCOUNTER
RN cannot approve Refill Request    RN can NOT refill this medication med is not covered by policy/route to provider. Last office visit: 12/16/2020 Daxa Morales MD Last Physical: 8/7/2019 Last MTM visit: Visit date not found Last visit same specialty: 12/16/2020 Daxa Morales MD.  Next visit within 3 mo: Visit date not found  Next physical within 3 mo: Visit date not found      Tony Irene, [facility] Triage/Med Refill 3/12/2021    Requested Prescriptions   Pending Prescriptions Disp Refills     QUEtiapine (SEROQUEL) 25 MG tablet [Pharmacy Med Name: QUETIAPINE FUMARATE 25 MG TAB] 135 tablet 4     Sig: TAKE 1 AND ONE-HALF TABLETS BY MOUTH AT BEDTIME (NEW DIRECTIONS IN MD NOTES)       There is no refill protocol information for this order

## 2021-06-15 NOTE — TELEPHONE ENCOUNTER
Spoke with pharmacy again. They did in fact receive the Rx sent yesterday at testing 1x/day and received the Rx today as well. Pharmacy states they will fill for pt to .   Called pt to inform. No answer. LM informing her and to call back if any further issues.

## 2021-06-16 PROBLEM — Z71.89 ADVANCED DIRECTIVES, COUNSELING/DISCUSSION: Status: ACTIVE | Noted: 2019-12-07

## 2021-06-16 PROBLEM — R23.8 SCALP IRRITATION: Status: ACTIVE | Noted: 2018-01-18

## 2021-06-16 PROBLEM — N18.30 CKD (CHRONIC KIDNEY DISEASE) STAGE 3, GFR 30-59 ML/MIN (H): Status: ACTIVE | Noted: 2020-12-17

## 2021-06-16 PROBLEM — E13.9 DIABETES 1.5, MANAGED AS TYPE 2 (H): Status: ACTIVE | Noted: 2017-07-07

## 2021-06-16 PROBLEM — E11.9 TYPE 2 DIABETES MELLITUS WITHOUT COMPLICATION, WITHOUT LONG-TERM CURRENT USE OF INSULIN (H): Status: ACTIVE | Noted: 2017-10-11

## 2021-06-16 PROBLEM — E55.9 VITAMIN D DEFICIENCY DISEASE: Status: ACTIVE | Noted: 2019-12-09

## 2021-06-16 PROBLEM — S06.0X9A CONCUSSION WITH LOSS OF CONSCIOUSNESS: Status: ACTIVE | Noted: 2020-12-16

## 2021-06-16 PROBLEM — F32.1 MODERATE MAJOR DEPRESSION (H): Status: ACTIVE | Noted: 2020-12-16

## 2021-06-16 PROBLEM — F41.9 ANXIETY: Status: ACTIVE | Noted: 2017-11-28

## 2021-06-16 PROBLEM — M75.100 ROTATOR CUFF TEAR: Status: ACTIVE | Noted: 2017-10-20

## 2021-06-16 PROBLEM — S06.0X0A CONCUSSION WITHOUT LOSS OF CONSCIOUSNESS: Status: ACTIVE | Noted: 2020-12-29

## 2021-06-16 PROBLEM — Z98.890 H/O BREAST SURGERY: Status: ACTIVE | Noted: 2017-06-08

## 2021-06-16 PROBLEM — R93.89 ABNORMAL MRI: Status: ACTIVE | Noted: 2019-08-01

## 2021-06-16 NOTE — PROGRESS NOTES
This video/telephone visit will be conducted via a call between you and your physician/provider. We have found that certain health care needs can be provided without the need for an in-person physical exam. This service lets us provide the care you need with a video /telephone conversation. If a prescription is necessary we can send it directly to your pharmacy. If lab work is needed we can place an order for that and you can then stop by our lab to have the test done at a later time.    Just as we bill insurance for in-person visits, we also bill insurance for video/telephone visits. If you have questions about your insurance coverage, we recommend that you speak with your insurance company.    Patient has given verbal consent for video/Telephone visit? Yes   Patient would like telephone visit please call: [phone]  ELISEO/ABAD TORRES-Nabeel     Patient verified allergies, medications and pharmacy via phone. Patient states she is ready for visit.

## 2021-06-16 NOTE — TELEPHONE ENCOUNTER
Reached out to patient for Quality Measures, Pt is over due for an AWV with PCP. LVM indicating to call clinic back and schedule.

## 2021-06-16 NOTE — PROGRESS NOTES
"Alexus Garcia is a 65 y.o. female who is being evaluated via a billable telephone visit.      The patient has been notified of following:     \"This telephone visit will be conducted via a call between you and your physician/provider. We have found that certain health care needs can be provided without the need for a physical exam.  This service lets us provide the care you need with a short phone conversation.  If a prescription is necessary we can send it directly to your pharmacy.  If lab work is needed we can place an order for that and you can then stop by our lab to have the test done at a later time.    Telephone visits are billed at different rates depending on your insurance coverage. During this emergency period, for some insurers they may be billed the same as an in-person visit.  Please reach out to your insurance provider with any questions.    If during the course of the call the physician/provider feels a telephone visit is not appropriate, you will not be charged for this service.\"    Patient has given verbal consent to a Telephone visit? Yes    Patient would like to receive their AVS by AVS Preference: Mail a copy.    Psychiatric  Out patient Follow Up Progress Note  Date of visit:3/22/2021         Discussion of Care and Treatment Recommendations:   This is a 65 y.o. female with a with history of depression and PTSD presenting to our virtual clinic for a follow up appointment.         PCP managing sleep :  Prescribing  Lorazepam ,Seroquel and Ambien   PCP managing Gabapentin for neurological       Last visit  01/25/2021.  Recommendation at last visit .  1 .Continue with  Psychotherapy   2.Highly recommend : Community Support groups   3. Continue   Cymbalta at  30 mg daily    4. RTC- 8  weeks, call in between visit with any question   Patient and I reviewed diagnosis and treatment plan and patient agrees with following recommendations:  Ongoing education given regarding diagnostic and treatment " "options with adequate verbalization of understanding.  Plan   1 .Continue with  Psychotherapy   2.Highly recommend : Community Support groups   3. Continue   Cymbalta at  30 mg daily    4. 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)     Malignant neoplasm of breast (H)     Posttraumatic hematoma of right breast     Postoperative pain     Pain in right axilla     Constipation due to outlet dysfunction     Logorrhea     Type 2 diabetes mellitus without complication, without long-term current use of insulin (H)     Rotator cuff tear     Anxiety     Scalp irritation     Abnormal MRI     Diabetes 1.5, managed as type 2 (H)     H/O breast surgery     Advanced directives, counseling/discussion     Vitamin D deficiency disease     Moderate major depression (H)     Concussion with loss of consciousness     CKD (chronic kidney disease) stage 3, GFR 30-59 ml/min     Concussion without loss of consciousness             Chief Complaint / Subjective:    Chief complaint: \" I am okay\"     History of Present Illness:   Per patient's statement : She is doing well has been compliant with current medication denies side effects.  Is anxiously waiting in line to get the COVID-19 vaccination.  She has also been having issues her sons but she is trying to mend their relationship.  Her dog was recently diagnosed with diabetes and vaginal surfaces are very expensive so she is trying to manage that alone at home for the most part.  Other than that she is feeling stable on current medication is denying all other psychiatric issues offers no other concerns would like to continue the same medication.  Mental Status Examination:     Appearance: unable to assess  Orientation: Patient alert and oriented to person, place, time, and situation  Reliability:  Patient " "appears to be an adequate historian.    Behavior: unable to assess  Speech: Speech is spontaneous and coherent, with a normal rate, rhythm and tone.    Language:There are no difficulties with expressive or receptive language as observed throughout the interview.    Mood: Described as \"ok\".    Affect: unable to assess  Judgement: Able to make basic decision regarding safety.  Insight: Good awareness of physical and mental health conditions and aware of needs around care for these.  Gait and station: unable to assess  Thought process: Logical   Hallucinations : No evidence of any hallucination  Thought content: No evidence of delusions or paranoia.   Suicidal /Homical Ideations:  No thoughts of self harm or suicide. No thoughts of harming others.  Associations: Connected  Fund of knowledge: Average  Attention / Concentration: Able to remain focused during the interview with minimal distractibility or need for redirection.  Short Term Memory: Grossly intact as evidence by client recalling themes and ideas discussed.  Long Term Memory: Intact  Motor Status: unable to asse      Drug/treatment history and current pattern of use:   Denies     Medication changes: See Above   Medication adherence: compliant  Medication side effects: absent  Information about medications: Side effects, benefits and alternative treatments discussed and patient agrees .    Psychotherapy: Supportive therapy day-to-day living    Education: Diet, exercise, abstinence from drugs and alcohol, patient will not drive if sedated and medications or  under influence of any substance    Lab Results:   Personally reviewed and discussed with the patient    Lab Results   Component Value Date    WBC 8.4 12/16/2020    HGB 15.9 12/16/2020    HCT 48.8 (H) 12/16/2020     12/16/2020    CHOL 302 (H) 11/10/2020    TRIG 156 (H) 11/10/2020    HDL 70 11/10/2020     (H) 11/10/2020     (H) 11/10/2020     (L) 12/16/2020    K 4.7 12/16/2020    CL " 98 12/16/2020    CREATININE 1.16 (H) 12/16/2020    BUN 22 12/16/2020    CO2 20 (L) 12/16/2020    TSH 0.84 12/06/2019    INR 0.96 09/20/2016    HGBA1C 8.2 (H) 11/10/2020    MICROALBUR 4.15 (H) 11/10/2020       Vital signs:  LMP 09/16/2006   Unable to assess telephone visit  Allergies: Aztreonam, Castor oil, Cefaclor, Cephalexin, Cephalexin monohydrate, Chlorhexidine, Ciprofloxacin, Clarithromycin, Clindamycin, Penicillins, Propofol, Scopolamine, Sulfa (sulfonamide antibiotics), Sulfasalazine, Tetracyclines, Vancomycin, Adhesive, Cytoxan [cyclophosphamide], Erythromycin base, Hydrocodone, Neupogen [filgrastim], Paper tape, Tapentadol, Taxol [paclitaxel], and Taxotere [docetaxel]           Review of Systems:      ROS:  Subjective data only - Tele-Health  Visit   10 point ROS was negative except for the items listed in HPI-              Medications:     Current Outpatient Medications on File Prior to Visit   Medication Sig Dispense Refill     acetaminophen (TYLENOL) 500 MG tablet Take 1,000 mg by mouth 3 (three) times a day as needed for pain.       betamethasone, augmented, (DIPROLENE) 0.05 % lotion Apply 1 application topically 2 (two) times a day as needed. Apply to the back of the neck and upper back 60 mL 3     blood glucose test (CONTOUR NEXT TEST STRIPS) strips Use 1 each As Directed daily. 100 strip 3     blood glucose test strips Use 1 each As Directed as needed. Dispense brand per patient's insurance at pharmacy discretion. 100 strip 2     blood-glucose meter (CONTOUR NEXT METER) Misc Check BG twice daily. 1 each 0     calcium citrate-vitamin D (CITRACAL+D) 315-200 mg-unit per tablet Take 1 tablet by mouth.       cholecalciferol, vitamin D3, 1,000 unit (25 mcg) tablet Take 2 tablets (2,000 Units total) by mouth 2 (two) times a day. 90 tablet 4     DULoxetine (CYMBALTA) 30 MG capsule TAKE 1 CAPSULE BY MOUTH EVERY DAY 90 capsule 1     generic lancets (MICROLET LANCET) Use 1 each As Directed daily. Dispense brand  per patient's insurance at pharmacy discretion. 100 each 11     lisinopriL (PRINIVIL,ZESTRIL) 2.5 MG tablet TAKE 1 TABLET BY MOUTH EVERY DAY 90 tablet 3     LORazepam (ATIVAN) 1 MG tablet Take 1 tablet (1 mg total) by mouth every 8 (eight) hours as needed for anxiety. 30 tablet 0     melatonin 10 mg Tab Take 10 mg by mouth at bedtime.       metFORMIN (GLUCOPHAGE-XR) 500 MG 24 hr tablet Take 4 tablets (2,000 mg total) by mouth daily with supper. 360 tablet 4     naproxen sodium (ALEVE) 220 MG tablet Take 440 mg by mouth as needed.       ONETOUCH ULTRA2 USE DAILY AS DIRECTED  0     oxyCODONE (ROXICODONE) 5 MG immediate release tablet Take 1-3 tabs PO every 6 hours as needed for pain; do not take lorazepam when taking this medication 20 tablet 0     polyethylene glycol (MIRALAX) 17 gram packet Take 17 g by mouth.       QUEtiapine (SEROQUEL) 25 MG tablet TAKE 1 AND ONE-HALF TABLETS BY MOUTH AT BEDTIME (NEW DIRECTIONS IN MD NOTES) 135 tablet 4     simvastatin (ZOCOR) 5 MG tablet Take 1 tablet (5 mg total) by mouth every evening. 90 tablet 4     No current facility-administered medications on file prior to visit.                Coordination of Care:   More than 30 minutes spent on this visit  with more than 50% of time spent on coordination of care including: Educating patient about diagnosis, prognosis, side effects and benefits of medications, diet, exercise.  Time also spent providing supportive therapy regarding above issues.    This note was created using a dictation system. All typing errors or contextual distortion is unintentional and software inherent.    Phone call duration: 25 minutes    Lucy Carey NP

## 2021-06-16 NOTE — TELEPHONE ENCOUNTER
Telephone Encounter by Milla Perez RN at 4/9/2019  2:59 PM     Author: Milla Perez RN Service: Behavioral Author Type: Registered Nurse    Filed: 4/9/2019  3:03 PM Encounter Date: 4/9/2019 Status: Signed    : Milla Perez RN (Registered Nurse)       *90 day supply request due to insurance/copay reasons*    Date of Last Office Visit: 4/5/19  Date of Next Office Visit: 5/9/19  No shows since last visit: 0  Cancellations since last visit: 0  ED visits since last visit:  0    Medication duloxetine 30 mg date last ordered: 4/5/19  Qty: 30  Refills: 1    Lapse in therapy greater than 7 days: No, request is for 90 day supply.  Medication refill request verified as identical to current order: Yes  Result of Last DAM, VPA, Li+ Level, CBC, or Carbamazepine Level (at or since last visit): N/A     [] Medication refilled per AC M-1.   [x] Medication unable to be refilled by RN due to criteria not met as indicated below:     []Eligibility - not seen in last year    []Supervision - no future appointment    []Compliance     []Verification - order discrepancy    []Controlled Medication    []Medication not included in RN Protocol    [x]90 - day supply request    []Other     Current Medication list:    Medication Plan of Care at last office visit with MD/CNP:

## 2021-06-16 NOTE — TELEPHONE ENCOUNTER
Pt returned call. States she getting her covid shot on Monday. She will call us back to schedule. Otherwise, please call her in a week if she forgets to call back.

## 2021-06-16 NOTE — PATIENT INSTRUCTIONS - HE
Continue medications as prescribed  Have your pharmacy contact us for a refill if you are running low on medications (We may ask you to come into clinic to get a refill from the nurse  No Alcohol or drug use  No driving if sedated  Call the clinic with any questions or concerns   Reach out for help if you feel like hurting yourself or others ([facility] [phone]: [address], [address] or [facility] [place] Suicide Hotline [phone] , call 911 or go to nearest Emergency room    Follow up as directed, for your appointments, per your After Visit Summary Form.

## 2021-06-17 NOTE — PROGRESS NOTES
OFFICE VISIT NOTE      Assessment & Plan   Alexus Garcia is a 62 y.o. female with a very complicated medical history, here about her diabetes. She thinks she's getting depression after all she's been through. Insomnia, too, as she does not fall asleep without zolpidem. She agrees to try seroquel, knowing she's not supposed to take zolpidem continuously.  If getting better sleep does not help her memory and mood, then she'll start an antidepressant.    Diagnoses and all orders for this visit:    Anxiety  -     Referral for Psychotherapy Evaluation- Urgent, Brief DA (within 1 week), Therapist will coordinate referral to Psychiatry    Diabetes  -     Microalbumin, Random Urine  -     Glycosylated Hemoglobin A1c  -     HM1(CBC and Differential)  -     Basic Metabolic Panel  -     HM1 (CBC with Diff)  -     Referral for Psychotherapy Evaluation- Urgent, Brief DA (within 1 week), Therapist will coordinate referral to Psychiatry    Malignant neoplasm of female breast, unspecified estrogen receptor status, unspecified laterality, unspecified site of breast  -     Referral for Psychotherapy Evaluation- Urgent, Brief DA (within 1 week), Therapist will coordinate referral to Psychiatry    Diabetes mellitus  -     Referral for Psychotherapy Evaluation- Urgent, Brief DA (within 1 week), Therapist will coordinate referral to Psychiatry    Posttraumatic hematoma of right breast, sequela  -     Referral for Psychotherapy Evaluation- Urgent, Brief DA (within 1 week), Therapist will coordinate referral to Psychiatry    Malignant neoplasm of upper-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, unspecified estrogen receptor status  -     Referral for Psychotherapy Evaluation- Urgent, Brief DA (within 1 week), Therapist will coordinate referral to Psychiatry    Severe major depressive disorder  -     Referral for Psychotherapy Evaluation- Urgent, Brief DA (within 1 week), Therapist will coordinate referral to Psychiatry    Depression  -      Thyroid Stimulating Hormone (TSH)  -     Lipid Cascade  -     Vitamin D, Total (25-Hydroxy)    Other orders  -     QUEtiapine (SEROQUEL) 25 MG tablet; Take 1 tablet (25 mg total) by mouth at bedtime.  Dispense: 30 tablet; Refill: 0        Daxa Morales MD    The following high BMI interventions were performed this visit: encouragement to exercise          Subjective:   Chief Complaint:  Diabetes    62 y.o. female.     1) stress - crying at everything  Wants referral for counseling  Sleep is an issue, needs zolipdem nightly, and only gets 3-4 hrs of sleep. It is not good quality.  Train wakes her during the night.    2) she's keeping up with her appointments    Current Outpatient Prescriptions   Medication Sig     acetaminophen (TYLENOL) 500 MG tablet Take 1,000 mg by mouth 3 (three) times a day as needed for pain.     aspirin-acetaminophen-caffeine (EXCEDRIN MIGRAINE) 250-250-65 mg per tablet Take 2 tablets by mouth every 8 (eight) hours as needed for pain (headaches).     betamethasone, augmented, (DIPROLENE) 0.05 % lotion Apply 1 application topically 2 (two) times a day as needed.     blood glucose test strips Use 1 each As Directed as needed. Dispense brand per patient's insurance at pharmacy discretion.     blood-glucose meter Misc Use daily as directed     cholecalciferol, vitamin D3, 1,000 unit tablet Take 2,000 Units by mouth 2 (two) times a day.     diazepam (VALIUM) 5 MG tablet Take 5 mg by mouth every 6 (six) hours as needed for muscle spasms.      gabapentin (NEURONTIN) 400 MG capsule TAKE 1 CAPSULE (400 MG TOTAL) BY MOUTH 3 (THREE) TIMES A DAY.     generic lancets Use 1 each As Directed as needed. Dispense brand per patient's insurance at pharmacy discretion.     LORazepam (ATIVAN) 1 MG tablet Take 1 tablet (1 mg total) by mouth every 8 (eight) hours as needed for anxiety.     melatonin 10 mg Tab Take 10 mg by mouth bedtime as needed. Uses [facility] Melatonin 10 mg     metFORMIN (GLUCOPHAGE-XR) 500 MG 24  "hr tablet TAKE 1 TABLET BY MOUTH DAILY WITH SUPPER     multivitamin therapeutic w/minerals (THERAPEUTIC-M) 27-0.4 mg Tab tablet Take by mouth.     naproxen sodium (ALEVE) 220 MG tablet Take 440 mg by mouth every 12 (twelve) hours as needed for headaches.     oxyCODONE (ROXICODONE) 15 MG immediate release tablet Take 1 tablet (15 mg total) by mouth every 8 (eight) hours as needed for pain.     zolpidem (AMBIEN) 10 mg tablet Take 1 tablet (10 mg total) by mouth at bedtime as needed for sleep.     QUEtiapine (SEROQUEL) 25 MG tablet Take 1 tablet (25 mg total) by mouth at bedtime.       PSFHx: appropriate sections of PMH completed/filled in   Tobacco Status:  She  reports that she quit smoking about 6 years ago. Her smoking use included Cigarettes. She smoked 1.00 pack per day. She has never used smokeless tobacco.    Review of Systems:  No fever.  No rash.    Objective:    /74 (Patient Site: Left Arm, Patient Position: Sitting, Cuff Size: Child) Comment (Patient Site): Left forearm  Pulse 83  Temp 98.1  F (36.7  C) (Oral)   Resp 18  Ht 5' 3\" (1.6 m)  Wt 152 lb 4 oz (69.1 kg)  LMP 09/16/2006  SpO2 96% Comment: RA  Breastfeeding? No  BMI 26.97 kg/m2  GENERAL: No acute distress.  Mood: fair to low  Judgment: good  Insight: fair  Affect: normal    Greater than 25 minutes spent with patient, with greater than 50% of time spent in counseling, consultation and care coordination regarding problems detailed above.          "

## 2021-06-17 NOTE — TELEPHONE ENCOUNTER
Telephone Encounter by Jerardo Ellison CMA at 12/29/2020  9:44 AM     Author: Jerardo Ellison CMA Service: -- Author Type: Certified Medical Assistant    Filed: 12/29/2020  9:44 AM Encounter Date: 12/28/2020 Status: Signed    : Jerardo Ellison CMA (Certified Medical Assistant)       Me         8:55 AM  Note     Writer attempted to reach Alexus but the phone number is not working. If the patient returns call please advise her per MD note below. Thanks.                6:38 AM  Daxa Morales MD routed this conversation to Daxa Morales Care Team Pool    Daxa Morales MD         6:38 AM  Note     My schedule is full today, however with the weather warnings, we well might have some patients not show up.  If Alexus can remain available for a phone visit at any time today, I will call her if I have a no-show. If there are no no-shows, I will call her at the end of the day. If our phone call determines that she must be seen today - will send her to the ER. If she can wait until tomorrow, I could fit her in then.

## 2021-06-17 NOTE — PATIENT INSTRUCTIONS - HE
Continue medications as prescribed  Have your pharmacy contact us for a refill if you are running low on medications (We may ask you to come into clinic to get a refill from the nurse  No Alcohol or drug use  No driving if sedated  Call the clinic with any questions or concerns   Reach out for help if you feel like hurting yourself or others ([facility] [phone]: [address], [address] or [facility] Suicide Hotline [phone] , call 911 or go to nearest Emergency room    Follow up as directed, for your appointments, per your After Visit Summary Form.

## 2021-06-17 NOTE — PROGRESS NOTES
"Alexus Garcia is a 65 y.o. female who is being evaluated via a billable telephone visit.      The patient has been notified of following:     \"This telephone visit will be conducted via a call between you and your physician/provider. We have found that certain health care needs can be provided without the need for a physical exam.  This service lets us provide the care you need with a short phone conversation.  If a prescription is necessary we can send it directly to your pharmacy.  If lab work is needed we can place an order for that and you can then stop by our lab to have the test done at a later time.    Telephone visits are billed at different rates depending on your insurance coverage. During this emergency period, for some insurers they may be billed the same as an in-person visit.  Please reach out to your insurance provider with any questions.    If during the course of the call the physician/provider feels a telephone visit is not appropriate, you will not be charged for this service.\"    Patient has given verbal consent to a Telephone visit? Yes        Patient would like to receive their AVS by AVS Preference: Mail a copy.    Psychiatric  Out patient Follow Up Progress Note  Date of visit:5/17/2021         Discussion of Care and Treatment Recommendations:   This is a 65 y.o. female with  a with history of depression and PTSD presenting to our virtual clinic for a follow up appointment.         PCP managing sleep :  Prescribing  Lorazepam ,Seroquel and Ambien   PCP managing Gabapentin for neurological issues          Last visit 03/22/2021 Recommendation at last visit   1 .Continue with  Psychotherapy   2.Highly recommend : Community Support groups   3. Continue   Cymbalta at  30 mg daily    4. RTC- 8  weeks, call in between visit with any question   Patient and I reviewed diagnosis and treatment plan and patient agrees with following recommendations:  Ongoing education given regarding diagnostic and " "treatment options with adequate verbalization of understanding.  Plan   1 .Continue with  Psychotherapy   2.Highly recommend : Community Support groups   3. Continue   Cymbalta at  30 mg daily    4. 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)     Malignant neoplasm of breast (H)     Posttraumatic hematoma of right breast     Postoperative pain     Pain in right axilla     Constipation due to outlet dysfunction     Logorrhea     Type 2 diabetes mellitus without complication, without long-term current use of insulin (H)     Rotator cuff tear     Anxiety     Scalp irritation     Abnormal MRI     Diabetes 1.5, managed as type 2 (H)     H/O breast surgery     Advanced directives, counseling/discussion     Vitamin D deficiency disease     Moderate major depression (H)     Concussion with loss of consciousness     CKD (chronic kidney disease) stage 3, GFR 30-59 ml/min     Concussion without loss of consciousness             Chief Complaint / Subjective:    Chief complaint: \" I have not been feeling like doing anything lately  \"     History of Present Illness:  Per patient's statement : Endorses feeling isolated and lonely and has been sleeping a lot during the day now and has had her sleep schedule distorted as she is awake during the night.  She is now fully vaccinated for COVID-19 but worries about her son and fiancé who have not been vaccinated and like to be around her and has been feeling anxious when they are around therefore tries to avoid them.  She is still nervous about going places due to her pre-existing conditions therefore she is taking her full Covid precautions regardless of the CDC recommendations to stay maskless.  Today we spent a lot of time in counseling.  She reports that she is not really depressed but has just had " "this feeling of not doing anything but we agreed that she needs to start staying up during the day and going outside to enjoy the good weather so that her sleep cycle can be restored back to sleeping at night and being awake during the day to avoid going into a deep depression as during the night there is nothing much to do.  She is agreeable with this recommendations.  She denies suicidal homicidal ideations.  Denies all other psychiatric issues.  Plans on starting to work on her sleep schedule and cycle tomorrow.  Mental Status Examination:     Appearance: unable to assess  Orientation: Patient alert and oriented to person, place, time, and situation  Reliability:  Patient appears to be an adequate historian.    Behavior: unable to assess  Speech: Speech is spontaneous and coherent, with a normal rate, rhythm and tone.    Language:There are no difficulties with expressive or receptive language as observed throughout the interview.    Mood: Described as \"ok\".    Affect: unable to assess  Judgement: Able to make basic decision regarding safety.  Insight: Good awareness of physical and mental health conditions and aware of needs around care for these.  Gait and station: unable to assess  Thought process: Logical   Hallucinations : No evidence of any hallucination  Thought content: No evidence of delusions or paranoia.   Suicidal /Homical Ideations:  No thoughts of self harm or suicide. No thoughts of harming others.  Associations: Connected  Fund of knowledge: Average  Attention / Concentration: Able to remain focused during the interview with minimal distractibility or need for redirection.  Short Term Memory: Grossly intact as evidence by client recalling themes and ideas discussed.  Long Term Memory: Intact  Motor Status: unable to asse      Drug/treatment history and current pattern of use:   Denies     Medication changes: See Above   Medication adherence: compliant  Medication side effects: absent  Information " about medications: Side effects, benefits and alternative treatments discussed and patient agrees .    Psychotherapy: Supportive therapy day-to-day living    Education: Diet, exercise, abstinence from drugs and alcohol, patient will not drive if sedated and medications or  under influence of any substance    Lab Results:   Personally reviewed and discussed with the patient    Lab Results   Component Value Date    WBC 8.4 12/16/2020    HGB 15.9 12/16/2020    HCT 48.8 (H) 12/16/2020     12/16/2020    CHOL 302 (H) 11/10/2020    TRIG 156 (H) 11/10/2020    HDL 70 11/10/2020     (H) 11/10/2020     (H) 11/10/2020     (L) 12/16/2020    K 4.7 12/16/2020    CL 98 12/16/2020    CREATININE 1.16 (H) 12/16/2020    BUN 22 12/16/2020    CO2 20 (L) 12/16/2020    TSH 0.84 12/06/2019    INR 0.96 09/20/2016    HGBA1C 6.6 (H) 03/26/2021    MICROALBUR 4.15 (H) 11/10/2020       Vital signs:  LMP 09/16/2006   Unable to assess telephone visit  Allergies: Aztreonam, Castor oil, Cefaclor, Cephalexin, Cephalexin monohydrate, Chlorhexidine, Ciprofloxacin, Clarithromycin, Clindamycin, Penicillins, Propofol, Scopolamine, Sulfa (sulfonamide antibiotics), Sulfasalazine, Tetracyclines, Vancomycin, Adhesive, Cytoxan [cyclophosphamide], Erythromycin base, Hydrocodone, Neupogen [filgrastim], Paper tape, Tapentadol, Taxol [paclitaxel], and Taxotere [docetaxel]           Review of Systems:      ROS:  Subjective data only - Tele-Health  Visit   10 point ROS was negative except for the items listed in HPI-              Medications:         Current Outpatient Medications on File Prior to Visit   Medication Sig Dispense Refill     acetaminophen (TYLENOL) 500 MG tablet Take 1,000 mg by mouth 3 (three) times a day as needed for pain.       betamethasone, augmented, (DIPROLENE) 0.05 % lotion Apply 1 application topically 2 (two) times a day as needed. Apply to the back of the neck and upper back 60 mL 3     blood glucose test (CONTOUR  NEXT TEST STRIPS) strips Use 1 each As Directed daily. 100 strip 3     blood glucose test strips Use 1 each As Directed as needed. Dispense brand per patient's insurance at pharmacy discretion. 100 strip 2     blood-glucose meter (CONTOUR NEXT METER) Misc Check BG twice daily. 1 each 0     calcium citrate-vitamin D (CITRACAL+D) 315-200 mg-unit per tablet Take 1 tablet by mouth.       cholecalciferol, vitamin D3, 1,000 unit (25 mcg) tablet Take 2 tablets (2,000 Units total) by mouth 2 (two) times a day. 90 tablet 4     DULoxetine (CYMBALTA) 30 MG capsule TAKE 1 CAPSULE BY MOUTH EVERY DAY 90 capsule 1     generic lancets (MICROLET LANCET) Use 1 each As Directed daily. Dispense brand per patient's insurance at pharmacy discretion. 100 each 11     lisinopriL (PRINIVIL,ZESTRIL) 2.5 MG tablet TAKE 1 TABLET BY MOUTH EVERY DAY 90 tablet 3     LORazepam (ATIVAN) 1 MG tablet Take 1 tablet (1 mg total) by mouth every 8 (eight) hours as needed for anxiety. 30 tablet 0     melatonin 10 mg Tab Take 10 mg by mouth at bedtime.       metFORMIN (GLUCOPHAGE-XR) 500 MG 24 hr tablet Take 4 tablets (2,000 mg total) by mouth daily with supper. 360 tablet 4     naproxen sodium (ALEVE) 220 MG tablet Take 440 mg by mouth as needed.       ONETOUCH ULTRA2 USE DAILY AS DIRECTED  0     oxyCODONE (ROXICODONE) 5 MG immediate release tablet Take 1-3 tabs PO every 6 hours as needed for pain; do not take lorazepam when taking this medication 20 tablet 0     polyethylene glycol (MIRALAX) 17 gram packet Take 17 g by mouth.       QUEtiapine (SEROQUEL) 25 MG tablet TAKE 1 AND ONE-HALF TABLETS BY MOUTH AT BEDTIME (NEW DIRECTIONS IN MD NOTES) 135 tablet 4     simvastatin (ZOCOR) 5 MG tablet Take 1 tablet (5 mg total) by mouth every evening. 90 tablet 4     No current facility-administered medications on file prior to visit.            Coordination of Care:   More than 30 minutes spent on this visit  with more than 50% of time spent on coordination of care  including: Educating patient about diagnosis, prognosis, side effects and benefits of medications, diet, exercise.  Time also spent providing supportive therapy regarding above issues.    This note was created using a dictation system. All typing errors or contextual distortion is unintentional and software inherent.  Start Time : 2: 30 pm   End time : 3: 03 PM            Lucy Carey NP
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
## 2021-06-18 NOTE — PROGRESS NOTES
OFFICE VISIT NOTE      Assessment & Plan   Alexus Garcia is a 62 y.o. female with significant insomnia - she'll try 1/2 tab seroquel and continue with occasional zolpidem + work on sleep hygiene.  Diabetes - continue same meds, increase exercise daily, check A1-C in July. Refer for diabetic eye exam. She declines a statin, even knowing it is recommended for diabetics. She does not like the side effects.  Skin care - discussed/reivewed this.      Diagnoses and all orders for this visit:    H/O breast surgery  -     Ambulatory referral to General Surgery    Diabetes  -     metFORMIN (GLUCOPHAGE-XR) 500 MG 24 hr tablet; Take 1 tablet (500 mg total) by mouth daily with supper.  Dispense: 90 tablet; Refill: 4  -     Ambulatory referral to Ophthalmology    Posttraumatic hematoma of right breast, sequela    Rotator cuff tear    Malignant neoplasm of female breast, unspecified estrogen receptor status, unspecified laterality, unspecified site of breast    Diabetes mellitus    Other orders  -     betamethasone, augmented, (DIPROLENE) 0.05 % lotion; Apply 1 application topically 2 (two) times a day as needed.  Dispense: 60 mL; Refill: 3  -     QUEtiapine (SEROQUEL) 25 MG tablet; TAKE 1/2 TABLET (25 MG TOTAL) BY MOUTH AT BEDTIME.  Dispense: 30 tablet; Refill: 6      Daxa Morales MD    The following are part of a depression follow up plan for the patient:  coping support assessment  The following high BMI interventions were performed this visit: encouragement to exercise          Subjective:   Chief Complaint:  med check    62 y.o. female.     1) doing OK overall    2) sleep - seroquel knocked her out but then she was groggy the next day  Without the med, she can get to sleep, but she wakes up after an hour; she's not getting much total sleep either (4-6 hrs per night). She occasionally takes zolpidem, but she's careful not to use it too much. She does not get as much sleep with it.    3)  at [facility] helped  "her get a referral to psychiatry but the psychiatrist - can't see her till July  Sharon called [facility] to find out if there is anyone else \"in network\" who can see her sooner    4) she's trying to eat well and watch her diet. No foot problems. Her skin problems are on her chest - she really hopes the surgical scar does not come apart again.    Current Outpatient Prescriptions   Medication Sig     acetaminophen (TYLENOL) 500 MG tablet Take 1,000 mg by mouth 3 (three) times a day as needed for pain.     aspirin-acetaminophen-caffeine (EXCEDRIN MIGRAINE) 250-250-65 mg per tablet Take 2 tablets by mouth every 8 (eight) hours as needed for pain (headaches).     betamethasone, augmented, (DIPROLENE) 0.05 % lotion Apply 1 application topically 2 (two) times a day as needed.     blood glucose test strips Use 1 each As Directed as needed. Dispense brand per patient's insurance at pharmacy discretion.     blood-glucose meter Misc Use daily as directed     cholecalciferol, vitamin D3, 1,000 unit tablet Take 2,000 Units by mouth 2 (two) times a day.     diazepam (VALIUM) 5 MG tablet Take 5 mg by mouth every 6 (six) hours as needed for muscle spasms.      gabapentin (NEURONTIN) 400 MG capsule TAKE 1 CAPSULE (400 MG TOTAL) BY MOUTH 3 (THREE) TIMES A DAY.     generic lancets Use 1 each As Directed as needed. Dispense brand per patient's insurance at pharmacy discretion.     LORazepam (ATIVAN) 1 MG tablet Take 1 tablet (1 mg total) by mouth every 8 (eight) hours as needed for anxiety.     melatonin 10 mg Tab Take 10 mg by mouth bedtime as needed. Uses [facility] Melatonin 10 mg     metFORMIN (GLUCOPHAGE-XR) 500 MG 24 hr tablet Take 1 tablet (500 mg total) by mouth daily with supper.     multivitamin therapeutic w/minerals (THERAPEUTIC-M) 27-0.4 mg Tab tablet Take by mouth.     naproxen sodium (ALEVE) 220 MG tablet Take 440 mg by mouth every 12 (twelve) hours as needed for headaches.     oxyCODONE (ROXICODONE) 15 MG immediate release " "tablet Take 1 tablet (15 mg total) by mouth every 8 (eight) hours as needed for pain.     QUEtiapine (SEROQUEL) 25 MG tablet TAKE 1/2 TABLET (25 MG TOTAL) BY MOUTH AT BEDTIME.     zolpidem (AMBIEN) 10 mg tablet Take 1 tablet (10 mg total) by mouth at bedtime as needed for sleep.       PSFHx: appropriate sections of PMH completed/filled in   Tobacco Status:  She  reports that she quit smoking about 7 years ago. Her smoking use included Cigarettes. She smoked 1.00 pack per day. She has never used smokeless tobacco.    Review of Systems:  No fever.  No rash. No headaches - these have dissipated.    Objective:    /76 (Patient Site: Left Arm)  Pulse (!) 102  Temp 98.3  F (36.8  C) (Oral)   Resp 14  Ht 5' 3\" (1.6 m)  Wt 151 lb 12.8 oz (68.9 kg)  LMP 09/16/2006  SpO2 94%  BMI 26.89 kg/m2  GENERAL: No acute distress.  Ht: reg s1s2  Lungs: clear with good aeration    Spent 25 min face to face with patient with more the 50% spent in counseling, reviewing chart, and coordination of care and discussing insomnia - and meds to help her, diabetes, general pain management    "

## 2021-06-18 NOTE — PROGRESS NOTES
"Mental Health Visit Note    6/11/2018    Start time: 4:00pm    Stop Time: 4:52pm   Session # 1    Alexus Garcia is a 62 y.o. female is being seen today for    Chief Complaint   Patient presents with      Follow Up     Depression     PTSD     Anxiety   .     New symptoms or complaints: Stress related to cancer diagnosis, family issues, and increased mental health symptoms.    Functional Impairment:   Personal: 4  Family: 4  Work: 0  Social:4    Clinical assessment of mental status:   Grooming: Well groomed  Attire: Appropriate  Age: Appears Stated  Behavior Towards Examiner: Cooperative  Motor Activity: Within normal   Eye Contact: Appropriate  Mood: Anxious and depressed  Affect: Congruent w/content of speech  Speech/Language: Within normal  Attention: Within normal  Concentration: Within normal  Thought Process: Within normal  Thought Content: Hallucinations: Within noraml  Delusions: Within normal  Orientation: X 3  Memory: No Evidence of Impairment  Judgement: No Evidence of Impairment  Estimated Intelligence: Average  Demonstrated Insight: Adequate  Fund of Knowledge: adequate      Suicidal/Homicidal Ideation present: None Reported This Session    Patient's impression of their current status: Patient presented on time for scheduled appointment. Patient reports being interested in starting psychotherapy and had questions regarding the differences between a LICSW and psychologist. Therapist explained that an LICSW is equivalent to a master's level psychologist, trained in mental health, but uses a social work framework/lens, informing patient that most of [facility]'s therapists are LICSWs. Patient conveyed understanding. She reports she has been though a lot, having had breast cancer 2x and some ongoing family issues, she stated, \"I don't know what it's like to wake up happy\" and would like some support to get there. Patient reports she was originally diagnosed with (triple negative) breast cancer in 2011 and " "it has been a continuous ballard since then, with a lot of life changes and family dynamics and losses in her life. Patient endorsed having anxiety, being unable to sleep, difficulty being around others, and having no interest in doing anything. Patient reports she has been trying to deal with things on her own, suppressing her feelings and problems, but now she finds herself crying all the time, not wanting to leave her apartment, and being uncomfortable in social situations. Patient reports she's been in therapy before, she's tried yoga, meditation, mindful thinking, biofeedback, journaling, getting out to do things with people, but none of those things are helping her, they are only reminding herself of her limitations. Patient also reports she does not want pity from her friends, so she often avoids people and isolates, which she understands is not good, but she is not able to find a balance yet, and that is why she would like additional support through psychotherapy. Patient also expressed frustration dealing with her physical health problems, going from doctor to doctor who doesn't know how to help her with reconstructive surgery, as the location of her tumor is in the center of her chest, and she is still dealing with disfigurement and scarring and not feeling feminine anymore and losing her sense of self. Patient is also stressed from dealing with \"dysfunctional family,\" and her adult son, who has Aspergers and other mental health issues, whom relies on patient heavily. Patient reports she is also living in a common bond disability senior facility, and it is not her ideal living situation - she is not comfortable there and does not feel like it is a good fit for her, but she is grateful for a safe roof over her head.    Therapist impression of patients current state: Patient presented with depressed mood but was engaged, cooperative, and expressive. Patient openly shared thoughts and feelings regarding life " stressors and what she needs. Processed those thoughts and feelings with patient and provided validation. Patient was insightful and able to articulate very well what she struggles with. Patient also displays a sense of hope and the desire to improve her situation and is seeking support in doing so. Patient identified that she is a fighter, and has always been a fighter, as she shared that she was given up and found in the trash and survived. Discussed therapy options with patient, as therapist informed patient that therapist will be taking a maternity leave in August. Patient is not interested in starting psychotherapy with therapist at this time, as patient would rather not start therapy and then have to restart with someone else. Patient will consider returning to see [facility] [place], or patient reports she received a provider's directory from her insurance company and will take a look at that to find another provider. Therapist informed patient that she could also contact the [place] speciality schedulers for a community referral for mental health services as well.    Type of psychotherapeutic technique provided: Insight oriented and Client centered    Progress toward short term goals:Progress as expected, patient attended appointment as scheduled and seeking support.    Review of long term goals: Not done at today's visit    Diagnosis:   1. Severe episode of recurrent major depressive disorder, without psychotic features (H)    2. Chronic post-traumatic stress disorder (PTSD)    3. Generalized anxiety disorder        Plan and Follow up: Patient is not interested in starting psychotherapy with therapist at this time, as therapist has informed patient that therapist will be taking a maternity leave in August, and patient would rather not start therapy and then have to restart with someone else. Patient will consider returning to see [facility] [place], or patient reports she received a provider's  directory from her insurance company and will take a look at that to find another provider. Therapist informed patient that she could also contact the [place] speciality schedulers for a community referral for mental health services as well.      Discharge Criteria/Planning: Patient will continue with follow-up until therapy can be discontinued without return of signs and symptoms.    Samantha Peter, TREVINSW 6/11/2018

## 2021-06-18 NOTE — PROGRESS NOTES
"This is a 62 y.o. woman who comes in for  continued follow-up of her bilateral breast cancer.  She is now 2 years  status post right total mastectomy with reconstruction.  She had previously had a left lumpectomy that required reconstruction also.  She had radiation on that side.  She is feeling well.  She does not feel that she can tolerate a mammogram on the left.  Because of the location of her tumor on the left side she has very thin skin over the top of her implant and her plastic surgeon is worried about it rupturing with the mammogram.  She is wondering if she should just be getting MRIs instead.      Please see the chart review for PMH, Meds, allergies, FH and SH.    ROS:  A 12 point comprehensive review of systems was negative except as noted.      Physical Exam:  /86 (Patient Site: Left Arm, Patient Position: Sitting, Cuff Size: Adult Regular)  Pulse 92  Ht 5' 3\" (1.6 m)  Wt 152 lb (68.9 kg)  LMP 09/16/2006  SpO2 96%  BMI 26.93 kg/m2  General appearance: alert, appears stated age and cooperative  Breasts: There are no palpable masses.  There are scars consistent with a mastectomy on the right with reconstruction and then with a lumpectomy with reconstruction on the left.  The implant on the left is somewhat mobile underneath the tissue.  There is no evidence of recurrent disease.  Lymph nodes: Cervical, supraclavicular, and axillary nodes normal.  Neurologic: Grossly normal          Impression: Personal History of breast cancer. NOD.  I think it is pretty unlikely for her skin to rupture with the mammogram but the plastic surgeon is worried about it.  She indeed does have very thin skin over the top of the implant.  Getting an MRI I do not think is not unreasonable.    Plan: Breast MRI.  I do not think insurance should give us any issues with ordering this.  If they do we will do an appeal.  If they do not then going forward she should have no issues with this being ordered by her primary " physician.  Follow-up with me can be as needed.

## 2021-06-19 NOTE — LETTER
Letter by Daxa Morales MD at      Author: Daxa Morales MD Service: -- Author Type: --    Filed:  Encounter Date: 6/17/2019 Status: (Other)        [facility] PATIENT ACCESS  [address]  [facility] [id number] [phone]         Alexus Garcia  [address] [place] [id number]        06/17/19    Dear Alexus Garcia,     At [facility] we care about your health and well-being. Your primary care provider is committed to ensuring you receive high quality care and has chosen a network of specialists to assist in providing that care. Recently Dr. Morales referred you to [facility] for specialty care.       It is important to your overall health to follow through with the recommendation from your provider. Please call [phone] at your earliest convenience for assistance in scheduling an appointment.  If you have already scheduled this appointment, please disregard this notice.  Thank you for choosing [facility] for your healthcare needs.       Sincerely,       [facility] Specialty Scheduling

## 2021-06-19 NOTE — PROGRESS NOTES
"  Mental Health Visit Note    8/15/2018    Start time: 11:02  Stop Time: 12:00   Session # 1    Session Type: Patient is presenting for an Individual session.    Alexus Garcia is a 62 y.o. female is being seen today for    Chief Complaint   Patient presents with     Depression     Anxiety     PTSD     Follow up in regards to ongoing symptom management of anxiety and depression.     New symptoms or complaints: \" I am recommended to start therapy for many issues since my childhood\"     Functional Impairment:   Personal: 4  Family: 4  Social: 3  Work: 0    Clinical assessment of mental status:   Alexus Garcia presented on time.   She was oriented x3, open and cooperative, and dressed appropriately for this session and weather. Her memory was Normal cognitive functioning .  Her speech was  Within normal.  Language was appropriate.  Concentration and focus is Within normal. Psychosis is not noted or reported. She reports her mood is Anxious and Depressed.  Affect is congruent with speech and is Anxious and Depressed.  Fund of knowledge is adequate. Insight is adequate for therapy.    Suicidal/Homicidal Ideation present: Patient denies suicidal and homicidal ideations/means or plans.     Patient's impression of their current status: Patient is back to this writer for therapy. She last saw this writer in April for a DA to see a psychiatrist. At that time, patient realized she also would benefit from therapy. However, she realized HE did not have a psychologist to work with her. This writer connected with the Dr. Hernandes ant her students. However, patient did not make a decision. Greta's team. It is noted that she rather went to a different clinic and saw another therapist  Samantha ARCHULETA with [facility], in June. However, today she returned to this writer stating she was referred by her psychiatrist to learn how to cope with some long standing trauma since her childhood. Patient did not reveal that she has seen " another therapist. Though it was noted that she did not return since Hlee is taking a maternity leave. Patient is not sure if she can handle sharing her long time suffering in the session. She reports this might become a barrier to keep up with therapy. Patient continues to suffer from multiple ailments both medical and emotional. She is diagnosed with a Major depressive disorder and PTSD. Her living arrangements do not align up with her medical and mental health needs. She can't sleep enough due to living by a train that runs twice each night. She isolates as she does not feel comfortable engaging in total conversation with residents in her building. Patient has a son in autism spectrum. He is in his early 30's. They get along pretty well but she notes too much demands from her. This son of hers has his CADI services and patient agreed to continue working with him coaching him how to address his issues to his providers. Patient is working with Lucy Carey NP. She notes she needed some medication change and feels confident that current medications will help with her sleep, mood and anxiety. Patient did not bring up the request for a psychologist. For this reason, writer herself did not go back past conversation as the goal was to make patient comfortable and build a rapport for therapy.  Patient wants to be seen weekly for now. She will return to develop her treatment plan that will be focusing on acceptance of her illness and coping with her past trauma.    Therapist impression of patients current state: This 62 y.o. White or  female presented on time. She has positive affect and was engaged in the communication. Writer acknowledge the patient as it was the second time to see each other. Writer processed with the patient her presenting issues. Validated her feelings where appropriate. Invited her to problems solving and provided education around who to expect in sessions. Patient has been in therapy in  the past. However, it is appears that she is concerned by the process. She had already stated that sharing her past trauma might make her feel worse. However, at this time, writer assured the patient that therapy is a process and she will share what she feels comfortable until she feels strong enough to process tough experiences. Patient will do well with open ended questions, some simple reflection, CBT skills, person centered. Patient might be referred for PE once she feels comfortable for this.     Assessment tools used today include:  ROBERT-7:13, PHQ-9:3;  CAGE-AID:0, C-SSRS:0 ; WHODAS 2: 27 %    Type of psychotherapeutic technique provided: Client centered and rapport building     Progress toward short term goals:unable to assess. jamie has not seen this patient since her intake on 4/25/2018.    Review of long term goals: Not done at today's visit.    Diagnosis:   1. Moderate single current episode of major depressive disorder (H)    2. Chronic post-traumatic stress disorder (PTSD)    3. Generalized anxiety disorder    No change    Plan and Follow-up:   1.  Patient will continue taking her medications as prescribed.  2.  Patient will practice positive self talk as needed.  3.  Patient will continue building rapport with this therapist.  4.  Patient will develop a treatment plan at the next visit.  5. Patient will return in a week.    Discharge Criteria/Planning: Patient will continue with follow-up until therapy can be discontinued without return of signs and symptoms.    Performed and documented by OG Freeman; [facility] 8/15/2018

## 2021-06-19 NOTE — PROGRESS NOTES
Patient is here to establish care with BROOKS Carey.  She had depression and anxiety daily.  Her last panic attack was a couple weeks ago, but unaware of what the triggers are.  She said her PMD is managing her sleep and anxiety.  Her sleep is poor using Ambien and melatonin, but said her PMD is managing this too.  She denies SI or HI.  She does not see a therapist.  She said this is her 3 rd round with cancer.  Patient stated she stopped taking the Seroquel as she didn't like how it made her feel.      Correct pharmacy verified with patient and confirmed in snapshot? [x] yes []no    Charge captured ? [x] yes  [] no    Medications Phoned  to Pharmacy [] yes [x]no  Name of Pharmacist:  List Medications, including dose, quantity and instructions      Medication Prescriptions given to patient   [] yes  [x] no   List the name of the drug the prescription was written for.       Medications ordered this visit were e-scribed.  Verified by order class [x] yes  [] no  Cymbalta started    Medication changes or discontinuations were communicated to patient's pharmacy: [] yes  [x] NA    UA collected [] yes  [x] no    [place] Prescription Monitoring Program Reviewed? [x] yes  [] no    Referrals were made to: Therapy     Future appointment was made: [x] yes  [] no    Dictation completed at time of chart check: [x] yes  [] no    I have checked the documentation for today s encounters and the above information has been reviewed and completed.
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
## 2021-06-20 NOTE — PROGRESS NOTES
"Patient is here for follow up appointment.  She states there has been no change in her depression on the Cymbalta.  She denies SI.  Anxiety is daily with last panic attack 3 days ago due to autistic son having a  \"spell\".  Sleep is not good.  Her PCP manages her sleep and gabapentin.  She sees speciose for therapy.     Correct pharmacy verified with patient and confirmed in snapshot? [x] yes []no    Charge captured ? [x] yes  [] no    Medications Phoned  to Pharmacy [] yes [x]no  Name of Pharmacist:  List Medications, including dose, quantity and instructions      Medication Prescriptions given to patient   [] yes  [x] no   List the name of the drug the prescription was written for.       Medications ordered this visit were e-scribed.  Verified by order class [x] yes  [] no    Medication changes or discontinuations were communicated to patient's pharmacy: [] yes  [x] NA    UA collected [] yes  [x] no    [place] Pres[place] Monitoring [place] Reviewed? [] yes  [x] no    Referrals were made to:  [facility]    Future appointment was made: [x] yes  [] no    Dictation completed at time of chart check: [x] yes  [] no    I have checked the documentation for today s encounters and the above information has been reviewed and completed.    "

## 2021-06-20 NOTE — PROGRESS NOTES
Mental Health Visit Note    2018    Start time: 12:05   Stop Time: 13:00  Session # 2    Session Type: Patient is presenting for an Individual session.    Alexus Garcia is a 62 y.o. female is being seen today for    Chief Complaint   Patient presents with      Follow Up     Anxiety     Depression     psycho social      Follow up in regards to ongoing symptom management of anxiety and depression in the context of trauma     New symptoms or complaints: sleep,     Functional Impairment:   Personal: 4  Family: 3  Social: 3  Work: 0    Clinical assessment of mental status:   Alexus Garcia presented on time.   She was oriented x3, open and cooperative, and dressed appropriately for this session and weather. Her memory was Normal cognitive functioning .  Her speech was  Within normal.  Language was appropriate.  Concentration and focus is Within normal. Psychosis is not noted or reported. She reports her mood is Anxious and Depressed.  Affect is congruent with speech and is Anxious and Depressed.  Fund of knowledge is adequate. Insight is adequate for therapy.    Suicidal/Homicidal Ideation present: Patient denies suicidal and homicidal ideations/means or plans.     Patient's impression of their current status: Patient returned to the clinic for her session. She reported her concerns and feelings around housing, son who has autism and other issues that make her depression worse and bring her anxiety higher. Patient wants changes in her life so she can handle her health issues better and be there for her one son who seems to need higher level of care. Son has CADI waiver and request to be with patient more frequently than she can afford due to her own physical limitations.  Patient wants to know any resources she might use to help with some issues related to her SSDI that is to  soon. She reports a struggle to keep her SSDI which she received after being diagnosed with a rare and recurring breast cancer.  Patient was told that she no longer was qualified and had to fight with the SSA review team to keep her benefits. Patient notes she might lose her housing subsidy if she loses her SS. Patient also is worried about he sleep due [place] train. She still has not been able to move to a different building under [employer].  Patient notes she will not be able to process her trauma at this point but wished to continue with therapy. She will return in 2 weeks for more support.      Therapist impression of patients current state: This 62 y.o. White or  female presented on time. She was rather jovial today. She was expressive and had insight about her needs and their complexity. Writer processed her concerns today. Offered empathy and validated her feelings where possible. Writer provided some resources to the patient to learn more about her needs and how they can be met through [facility]/hub. Writer explained to the patient about the needs to complete her treatment plan and areas to focus on. Patient has PTSD. However, she has expressed she won't process her trauma for now as she believes things will be worse should she start talking about it now. She wants to have some time to feel comfortable.     Assessment tools used today include: How do you feel today?    Type of psychotherapeutic technique provided: Client centered, CBT and rapport building    Progress toward short term goals:Patient reports no change from last session: feeling depressed, overwhelmed  by health and family issues.     Review of long term goals:  Patient was explained. Patient wishes to complete her treatment plan at the next session.    Diagnosis:   1. Moderate single current episode of major depressive disorder (H)    2. Chronic post-traumatic stress disorder (PTSD)    3. Generalized anxiety disorder    No change    Plan and Follow-up:  Goals reviewed and  Updated with the patient:  1.  Patient will continue taking her  medications as prescribed.  2.  Patient will practice positive self talk as needed.  3.  Patient will continue building rapport with this therapist.  4.  Patient will develop a treatment plan at the next visit.  5. Patient will return in a week.    Discharge Criteria/Planning: Patient will continue with follow-up until therapy can be discontinued without return of signs and symptoms.    Performed and documented by OG Freeman; [facility] 8/24/2018

## 2021-06-20 NOTE — PROGRESS NOTES
"Psychiatric  Progress Note  Date of visit:9/27/2018           Discussion of Care and Treatment Recommendations:   This is a 62 y.o. female with a history of depression and     PCP managing sleep :  Prescribing  Lorazepam , Seroquel and Ambien     PCP managing Gabapentin for neurological pain            Last visit 7/31/18.  Recommendation at last visit .  1. Highly recommend: Psychotherapy   2.Highly recommend : Community Support groups   3. Start Cymbalta 30 mg daily for 2 weeks, then increase dose to 60 mg daily    4. RTC- 8 weeks, call in between visit with nay question   Patient and I reviewed diagnosis and treatment plan and patient agrees with following recommendations:  Ongoing education given regarding diagnostic and treatment options with adequate verbalization of understanding.  Plan   1 . Highly recommend: Psychotherapy   2.Highly recommend : Community Support groups   3. Continue  Cymbalta 60 mg daily    4. 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  She mentioned to both myself 2018 that she was having blurry vision.  And her primary is today and her primary doctor during the last visit on September 18 providers notes that he is also mention \" had eye exam - has early cataracts; no glasses needed except \"cheaters\"    She also has complains of Seroquel making her too groggy and controlled with aches also causing her blurry " vision.  Her depression and anxiety has heightened her primary care provider's note from 9/18/2018 due to a recent breast lump that was recently found.  Patient has a history of breast cancer and has had several surgeries.  Patient also reported her 35-year-old son who resides in a group home and struggles with autism has not been doing well and has been very demanding of her attention.  She did see a therapist specialist today and scored high on both ROBERT and PHQ 9.  I am proposing that she sees a therapist on a weekly versus a biweekly basis.  She will be calling her therapist to see if this arrangement is possible.  Today she reports that she has found Cymbalta benefit issues both in relieving some pain and also managing depression.  She reports that since starting Cymbalta she stopped crying of television commercials and she is not as labile in her mood.  She obviously believes it has helped with her depression.  She mentioned concerned with the blurry vision and suspicious of Cymbalta causing it.  At this time is difficult to rule out what is causing the blurry vision given that she was recently discovered to have mild cataracts and is also on Ambien Seroquel and as needed lorazepam per her report which are managed by PCP.  We discussed either discontinuing the Seroquel to see whether the blurry vision will continue her to stay on the Seroquel for another few weeks and see if this gets worse.  She does not want to stop the Seroquel because she stated that she has 1 benefits as far as her mood is concerned and would like to continue on it.    She is denying rikki hypomania symptoms.  Denied delusions hallucinations or paranoia.  Denies suicidal homicidal ideations.  She does not appear to be in any imminent distress or any imminent danger to self or others and verbally contracts for safety.  She will return to the clinic in 6 weeks for follow-up appointment    Mental Status Examination:   General: Adequate  hygiene, cooperative  Speech: Normal in rate and tone  Language: Intact  Thought process: Coherent  Thought content:                           Auditory hallucinations- absent                           Visual Hallucinations - Absent                           Delusions Absent                           Loose Associations:  No                          Suicidal thoughts: Absent                          Homicidal thoughts: Absent                       Affect: Neutral  Mood: Neutral   Intellectual functioning: Within normal limits  Memory: Within normal limits  Fund of knowledge: Average  Attention and concentration: Within normal limits  Gait: Steady  Psychomotor activity: Calm, no agitation  Muscles: No atrophy, no abnormal movements  InSight and judgment: Fair    Medication changes: See above   Medication adherence: compliant  Medication side effects: absent  Information about medications: Side effects, benefits and alternative treatments discussed and patient agrees with capacity to do so.    Psychotherapy: Supportive therapy day-to-day living    Education: Diet, exercise, abstinence from drugs and alcohol, patient will not drive if sedated and medications or  under influence of any substance    Lab Results:   Personally reviewed and discussed with the patient    Lab Results   Component Value Date    WBC 4.0 04/20/2018    HGB 14.2 04/20/2018    HCT 43.0 04/20/2018     04/20/2018    CHOL 300 (H) 04/20/2018    TRIG 136 04/20/2018    HDL 68 04/20/2018    ALT 27 10/11/2017    AST 21 10/11/2017     04/20/2018    K 3.8 04/20/2018     04/20/2018    CREATININE 0.70 04/20/2018    BUN 9 04/20/2018    CO2 21 (L) 04/20/2018    TSH 0.29 (L) 04/20/2018    INR 0.96 09/20/2016    HGBA1C 6.4 (H) 04/20/2018    MICROALBUR 9.41 (H) 04/20/2018       Vital signs:    Vitals:    09/27/18 1412   BP: 138/81   Pulse: 97   Temp: 98.4  F (36.9  C)   TempSrc: Oral   Weight: 148 lb (67.1 kg)     Allergies:   Allergies   Allergen  Reactions     Cefaclor Anaphylaxis     Cephalexin Anaphylaxis     Ciprofloxacin Anaphylaxis     Clarithromycin Anaphylaxis, Anxiety, Dizziness, Hives, Itching, Nausea And Vomiting, Rash and Shortness Of Breath     Penicillins Anaphylaxis     Propofol Nausea Only and Headache     Scopolamine Anaphylaxis, Hives and Swelling     Swollen tongue, eye swelled closed     Sulfa (Sulfonamide Antibiotics) Anaphylaxis     Tetracyclines Anaphylaxis     Cytoxan [Cyclophosphamide] Hives     severe     Erythromycin Base Hives     Hydrocodone Nausea And Vomiting     Neupogen [Filgrastim] Hives     severe     Paper Tape Other (See Comments)     Surgical paper tape - redness. States if left on too long leaves little cuts on her skin     Taxol [Paclitaxel] Hives     severe     Taxotere [Docetaxel] Hives          Medications:       Current Outpatient Prescriptions on File Prior to Visit   Medication Sig Dispense Refill     acetaminophen (TYLENOL) 500 MG tablet Take 1,000 mg by mouth 3 (three) times a day as needed for pain.       aspirin-acetaminophen-caffeine (EXCEDRIN MIGRAINE) 250-250-65 mg per tablet Take 2 tablets by mouth every 8 (eight) hours as needed for pain (headaches).       betamethasone, augmented, (DIPROLENE) 0.05 % lotion Apply 1 application topically 2 (two) times a day as needed. 60 mL 3     blood glucose test strips Use 1 each As Directed as needed. Dispense brand per patient's insurance at pharmacy discretion. 100 strip 2     blood-glucose meter Misc Use daily as directed 100 each 4     cholecalciferol, vitamin D3, 1,000 unit tablet Take 2,000 Units by mouth 2 (two) times a day.       DULoxetine (CYMBALTA) 30 MG capsule Take 1 capsule (30 mg total) by mouth daily. 60 capsule 1     gabapentin (NEURONTIN) 400 MG capsule TAKE 1 CAPSULE (400 MG TOTAL) BY MOUTH 3 (THREE) TIMES A DAY. 270 capsule 4     generic lancets Use 1 each As Directed as needed. Dispense brand per patient's insurance at pharmacy discretion. 100 each  4     LORazepam (ATIVAN) 1 MG tablet Take 1 tablet (1 mg total) by mouth every 8 (eight) hours as needed for anxiety. 90 tablet 0     melatonin 10 mg Tab Take 10 mg by mouth at bedtime.       metFORMIN (GLUCOPHAGE-XR) 500 MG 24 hr tablet TAKE 1 TABLET BY MOUTH DAILY WITH SUPPER 90 tablet 4     naproxen sodium (ALEVE) 220 MG tablet Take 440 mg by mouth as needed.       zolpidem (AMBIEN) 10 mg tablet Take 1 tablet (10 mg total) by mouth at bedtime as needed for sleep. 90 tablet 0     No current facility-administered medications on file prior to visit.                 Review of Systems:      ROS:       10 point ROS was negative except for the items listed in HPI.        Coordination of Care:   More than 25 minutes spent on this visit  with more than 50% of time spent on coordination of care including: Educating patient about diagnosis, prognosis, side effects and benefits of medications, diet, exercise.  Time also spent on entering orders and preparing documentation for the visit.Time also spent providing supportive therapy regarding above issues.    This note was created using a dictation system. All typing errors or contextual distortion is unintentional and software inherent.

## 2021-06-20 NOTE — PROGRESS NOTES
"OFFICE VISIT NOTE      Assessment & Plan   Alexus Garcia is a 62 y.o. female with h/o breast cancer and now a possible recurrence-- breast cancer doc will have it checked by MRI since the FNA did not work (insufficient material, even though it was said at the procedure there was enough). Alexus has had trouble healing due to repeated surgeries, so I agree with this approach. Strongly encouraged her to work at maintaining a good attitude, resting well and eating well. Exercising whenever she can for blood sugar control. She is doing a lot of this already.    Diagnoses and all orders for this visit:    Malignant neoplasm of female breast, unspecified estrogen receptor status, unspecified laterality, unspecified site of breast  Possible set back with lump...    Need for immunization against influenza  -     Cancel: Influenza, Seasonal,Quad Inj, 36+ MOS  -     Influenza High Dose, Seasonal 65+ yrs    Diabetes mellitus (H)  Pushed exercise - A1-C next time    Anxiety  Heightened by lump presence    Neuralgia  Trying to get it under control    Postoperative pain    Malignant neoplasm of upper-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, unspecified estrogen receptor status (H)        Daxa Morales MD              Subjective:   Chief Complaint:  Follow-up    62 y.o. female.     1) Cymbalta - up to 2 per day to help her with the chronic pain  Decreasing appetite, some nausea, dizzy (super)  F/u next week  ?blurry vision (but this might be from something else)    2) seroquel - cannot tolerate, she cut into 1/4ths   She thinks the potency is too much, even on 1/4 pill  ?blurry vision  Takes zolpidem a couple nights every \"so often\" - out of a month, she's on it half the time    3) had eye exam - has early cataracts; no glasses needed except \"cheaters\"    4) breast lump found - will do MRI before the end of the year; there is a lump and FNA was \"inadequante\"    Has [facility] [employer] [facility] since 2 months ago    Current Outpatient " Prescriptions   Medication Sig Note     acetaminophen (TYLENOL) 500 MG tablet Take 1,000 mg by mouth 3 (three) times a day as needed for pain.      aspirin-acetaminophen-caffeine (EXCEDRIN MIGRAINE) 250-250-65 mg per tablet Take 2 tablets by mouth every 8 (eight) hours as needed for pain (headaches).      betamethasone, augmented, (DIPROLENE) 0.05 % lotion Apply 1 application topically 2 (two) times a day as needed.      blood glucose test strips Use 1 each As Directed as needed. Dispense brand per patient's insurance at pharmacy discretion.      blood-glucose meter Misc Use daily as directed      cholecalciferol, vitamin D3, 1,000 unit tablet Take 2,000 Units by mouth 2 (two) times a day.      DULoxetine (CYMBALTA) 30 MG capsule Take 1 capsule (30 mg total) by mouth daily.      gabapentin (NEURONTIN) 400 MG capsule TAKE 1 CAPSULE (400 MG TOTAL) BY MOUTH 3 (THREE) TIMES A DAY.      generic lancets Use 1 each As Directed as needed. Dispense brand per patient's insurance at pharmacy discretion.      LORazepam (ATIVAN) 1 MG tablet Take 1 tablet (1 mg total) by mouth every 8 (eight) hours as needed for anxiety.      melatonin 10 mg Tab Take 10 mg by mouth at bedtime. 5/31/2018: Received from: [facility] Received Sig: Take 10 mg by mouth.     metFORMIN (GLUCOPHAGE-XR) 500 MG 24 hr tablet TAKE 1 TABLET BY MOUTH DAILY WITH SUPPER      naproxen sodium (ALEVE) 220 MG tablet Take 440 mg by mouth as needed. 5/31/2018: Received from: [facility] Received Sig: Take 440 mg by mouth.     zolpidem (AMBIEN) 10 mg tablet Take 1 tablet (10 mg total) by mouth at bedtime as needed for sleep. 9/18/2018: Need refill       PSFHx: appropriate sections of PMH completed/filled in   Tobacco Status:  She  reports that she quit smoking about 7 years ago. Her smoking use included Cigarettes. She smoked 1.00 pack per day. She has never used smokeless tobacco.    Review of Systems:  No fever.  No constipation now. All other systems negative  "except as noted above.    Objective:    /82  Pulse 90  Temp 98.3  F (36.8  C) (Oral)   Resp 12  Ht 5' 3\" (1.6 m)  Wt 148 lb 12.8 oz (67.5 kg)  LMP 09/16/2006  SpO2 95%  BMI 26.36 kg/m2  GENERAL: No acute distress.  Mood: good  Judgment: fair  Insight: poor    No exam done  Reviewed notes from procedures and meds  Spent 40 min face to face with patient with more the 50% spent in counseling, reviewing chart, and coordination of care and discussing medications, sleep hygiene, pain management, and mood.      "

## 2021-06-20 NOTE — PROGRESS NOTES
"  Mental Health Visit Note    9/27/2018    Start time:  13:05   Stop Time:14:00   Session # 3    Session Type: Patient is presenting for an Individual session.    Alexus Garcia is a 62 y.o. female is being seen today for    Chief Complaint   Patient presents with      Follow Up     Anxiety     \" medications won't work\"     Depression     \" difficulties dealing with her disabled son's behaviors\"      PTSD     Follow up in regards to ongoing symptom management of anxiety and depression.     New symptoms or complaints: \" my disabled son causes me more depression. My meds won't work\"    Functional Impairment:   Personal: 3  Family: 3  Social: 3  Work: 0    Clinical assessment of mental status:   Alexus Garcia presented on time.   She was oriented x3, open and cooperative, and dressed appropriately for this session and weather. Her memory was Normal cognitive functioning .  Her speech was  Within normal.  Language was appropriate.  Concentration and focus is Within normal. Psychosis is not noted or reported. She reports her mood is Congruent w/content of speech, Anxious and Depressed.  Affect is congruent with speech and is Congruent w/content of speech.  Fund of knowledge is adequate. Insight is adequate for therapy.    Suicidal/Homicidal Ideation present: Patient denies suicidal and homicidal ideations/means or plans.     Patient's impression of their current status: Patient presented today to continue with her therapy sessions.  She notes no much has changed since last session which was on 8/24/2018. She notes her depression and anxiety are increasing as she still has not found the medication that works for her. She is seeing Lucy today for her medication review. Patient has her disabled son over this time. She notes he causes more stress due to him being disabled. He has been too demanding and stubborn. Threatens to heart himself when patient redirects him. Patient completed her initial treatment plan " "targeting anxiety and depression.  She wants to be seen biweekly to be able to process her feelings in this area.     Therapist impression of patients current state: This 62 y.o. White or  female presented on time with anxious and depressed affect.  Writer processed with the patient presented problems and feelings.  Writer assisted the patient safety using C-SSRS tool. Writer also encouraged the patient to address her medications issue to Lucy today. CBT skills, MI, patient centered and other modalities as applicable will be using in therapy sessions. Patient has long standing history of PTSD. She would rather come in to express her needs but she does not wish to process her trauma at this time.    Assessment tools used today include:ROBERT-7:15; PHQ-9:22; C-SSRS: 0;  CAGE-AID: 0/4; Whodas 2.0: 68.75 %     Type of psychotherapeutic technique provided: Client centered, Solution-focused, CBT and Initial Treatment Plan    Progress toward short term goals:slow progress with depression and anxiety     Review of long term goals: \" To find peace in my life\"     initial treatment plan: 9/27/2018 next review: 12/28/2018 .     Diagnosis:   1. Moderate single current episode of major depressive disorder (H)    2. Chronic post-traumatic stress disorder (PTSD)    3. Generalized anxiety disorder    No change    Plan and Follow-up:   1.  Patient will continue taking her medications as prescribed by her prescriber.  2.  Patient will continue to increase positive self talk as needed.  3.  [facility] or as she is able  4.  Patient will return for therapy in 2 weeks    Discharge Criteria/Planning: Client has chronic symptoms and ongoing therapy for maintenance stability recommended.    Performed and documented by OG Freeman; [facility] 9/27/2018  "

## 2021-06-20 NOTE — LETTER
Letter by Lucy Carey NP at      Author: Lucy Carey NP Service: -- Author Type: --    Filed:  Encounter Date: 2/14/2020 Status: (Other)         Patient Name: Alexus Garcia  YOB: 1955  Jury Number: [id number]             February 14, 2020    Re: Excuse from Jury Duty     The person named above is under my care for the treatment of Major depressive Disorder, Generalized Anxiety Disorder and Post Traumatic Stress Disorder.   Alexus is currently on psychiatric medication and also sees a therapist for the same. Alexus also recently underwent surgery for Breast Cancer in the past recent months but has also undergone multiple treatment for breast cancer in the past.   She is currently experiencing a high level of anxiety and struggling with both depression and anxiety. She currently feels overwhelmed and unstable mentally to go through jury duty at this time.   Given her current struggles, I highly recommend that Alexus is excused from Jury duty at this time  If you have any questions or need any other information, please feel free to contact me at   [facility]   Telephone: [phone]  Fax: [phone]  Address:  [address] [place] [address], [id number]    Thank you     Lucy Carey- MERCEDES- CNP - BC   Psychiatric Mental Health Nurse Practioner

## 2021-06-20 NOTE — PROGRESS NOTES
Outpatient Mental Health Treatment Plan    Name:  Alexus Garcia  :  1955  MRN:  [id number]    Treatment Plan:  Initial Treatment Plan  Intake/initial treatment plan date:  2018  Benefit and risks and alternatives have been discussed: Yes  Is this treatment appropriate with minimal intrusion/restrictions: Yes  Estimated duration of treatment:  10 +  Anticipated frequency of services:  Every 2 weeks  Necessity for frequency: This frequency is needed to establish therapeutic goals and for continuity of care in order to monitor progress.  Necessity for treatment: To address cognitive, behavioral, and/or emotional barriers in order to work toward goals and to improve quality of life.    Plan:      Depression    Goal:  Decrease average depression level from 4 to 3.   Strategies:    ?[x] Decrease social isolation     [x] Increase involvement in meaningful activities     ?[x] Discuss sleep hygiene     ?[x] Explore thoughts and expectations about self and others     ?[x] Process grief (loss of significant person, independence, role,  etc.)     ?[x] Assess for suicide risk     ?[x] Implement physical activity routine (with physician approval)     [] Consider introduction of bibliotherapy and/or videos     [x] Continue compliance with medical treatment plan (or explore barriers)       Degree to which this is a problem (1-4): 4     Degree to which goal is met (1-4)1    Date of Review:2018        ?   ? Anxiety    Goal:  Decrease average anxiety level from 4 to 3.   Strategies: ? [x]Learn and practice relaxation techniques and other coping strategies (e.g., thought stopping, reframing, meditation)     ? [x] Increase involvement in meaningful activities     ? [x] Discuss sleep hygiene     ? [x] Explore thoughts and expectations about self and others     ? [x] Identify and monitor triggers for panic/anxiety symptoms     ? [x] Implement physical activity routine (with physician approval)     ? [] Consider  introduction of bibliotherapy and/or videos     ? [x] Continue compliance with medical treatment plan (or explore barriers)      Degree to which this is a problem (1-4): 4   Degree to which goal is met (1-4)1  Date of Review:12/27/2018    Functional Impairment: 1=Not at all/Rarely  2=Some days  3=Most Days  4=Every Day   Personal: 4  Family: 4  Social: 3  Work: 0    Diagnosis:  1.Severe episode of recurrent major depressive disorder, without psychotic features  2. Chronic post-traumatic stress disorder (PTSD)     WHODAS 2.0 12-item version= 33  H1= 30  H2= 5-7  H3= 20    Clinical assessments completed: ROBERT-7:15; PHQ9:22; C-SSRS: 0;  CAGE-AID: 0/4; Whodas 2.0: 68.75 %     Strengths: Self awareness, resilient    Limitations: Mental health and medical health issues.     Cultural Considerations:  . Navigates the system on her own. Uses [employer]. Has medical coverage.     Persons responsible for this plan:  ? [x] Patient ? [x] Provider ? [] Other: __________________    Provider:Performed and documented by OG Freeman; [facility]  Date:  9/27/2018  Time:  1:24 PM        Patient Signature:____________________________________ Date: ______________     Guardian Signature: __________________________________ Date: ______________     Therapist Signature: __________________________________ Date: ______________

## 2021-06-21 NOTE — LETTER
Letter by Daxa Morales MD at      Author: Daxa Morales MD Service: -- Author Type: --    Filed:  Encounter Date: 12/17/2020 Status: (Other)         Alexus NGUYEN Jose  [address] [place]  [facility] [address]             December 17, 2020         Dear Ms. Garcia,    Below are the results from your recent visit:    Resulted Orders   Urinalysis-UC if Indicated   Result Value Ref Range    Color, UA Yellow Colorless, Yellow, Straw, Light Yellow    Clarity, UA Clear Clear    Glucose, UA Negative Negative    Bilirubin, UA Small (!) Negative    Ketones, UA Trace (!) Negative    Specific Gravity, UA 1.025 1.005 - 1.030    Blood, UA Negative Negative    pH, UA 5.5 5.0 - 8.0    Protein, UA 30 mg/dL (!) Negative mg/dL    Urobilinogen, UA 0.2 E.U./dL 0.2 E.U./dL, 1.0 E.U./dL    Nitrite, UA Negative Negative    Leukocytes, UA Trace (!) Negative    Bacteria, UA Few (!) None Seen hpf    RBC, UA 3-5 (!) None Seen, 0-2 hpf    WBC, UA 5-10 (!) None Seen, 0-5 hpf    Squam Epithel, UA 25-50 (!) None Seen, 0-5 lpf    Narrative    Urine Culture ordered based on [facility]' criteria   HM2(CBC w/o Differential)   Result Value Ref Range    WBC 8.4 4.0 - 11.0 thou/uL    RBC 5.34 3.80 - 5.40 mill/uL    Hemoglobin 15.9 12.0 - 16.0 g/dL    Hematocrit 48.8 (H) 35.0 - 47.0 %    MCV 91 80 - 100 fL    MCH 29.7 27.0 - 34.0 pg    MCHC 32.5 32.0 - 36.0 g/dL    RDW 11.3 11.0 - 14.5 %    Platelets 287 140 - 440 thou/uL    MPV 6.8 (L) 7.0 - 10.0 fL   Basic Metabolic Panel   Result Value Ref Range    Sodium 135 (L) 136 - 145 mmol/L    Potassium 4.7 3.5 - 5.0 mmol/L    Chloride 98 98 - 107 mmol/L    CO2 20 (L) 22 - 31 mmol/L    Anion Gap, Calculation 17 5 - 18 mmol/L    Glucose 113 70 - 125 mg/dL    Calcium 9.9 8.5 - 10.5 mg/dL    BUN 22 8 - 22 mg/dL    Creatinine 1.16 (H) 0.60 - 1.10 mg/dL    GFR MDRD Af Amer 57 (L) >60 mL/min/1.73m2    GFR MDRD Non Af Amer 47 (L) >60 mL/min/1.73m2    Narrative    Fasting  Glucose reference range is 70-99 mg/dL per  American Diabetes Association (ADA) guidelines.       Please note that your kidney function has decreased. It is not at a critical level, but all the things we talked about at your appointment - staying hydrated, eating small meals through the day, would also be better for your kidneys (and good for the diabetes, etc). I hope your upcoming diabetes educator appointments will help you better understand all the choices that can help you feel better and be healthier even with diabetes.    Berta Inman!  Please call with questions or contact us using [employer].    Sincerely,        Electronically signed by Daxa Morales MD

## 2021-06-21 NOTE — LETTER
Letter by Daxa Morales MD at      Author: Daxa Morales MD Service: -- Author Type: --    Filed:  Encounter Date: 11/10/2020 Status: (Other)         11/16/2020        Dear Anny -    Your A1-C (long term blood sugar) test came back high at 8.2. Therefore you should increase your metformin dose from 500mg daily to 2000mg daily (4 tabs). Please do this starting today.    We tried to reach you by phone from the clinic. Apparently none of your numbers work. Please call the clinic and update our records so we can reach you easily.      See you in December.      Daxa Morales MD

## 2021-06-21 NOTE — PROGRESS NOTES
Patient is here for follow up appointment.  She states her depression is managed.  Her PMD manages anxiety and sleep.  She would like to go down on her Cymbalta to 30 mg xie due to dizziness on the 60 mg.  She sees speciose for therapy.    Correct pharmacy verified with patient and confirmed in snapshot? [x] yes []no    Charge captured ? [x] yes  [] no    Medications Phoned  to Pharmacy [] yes [x]no  Name of Pharmacist:  List Medications, including dose, quantity and instructions      Medication Prescriptions given to patient   [] yes  [x] no   List the name of the drug the prescription was written for.       Medications ordered this visit were e-scribed.  Verified by order class [x] yes  [] no  Decreased cymbalta    Medication changes or discontinuations were communicated to patient's pharmacy: [x] yes  [] no    UA collected [] yes  [x] no    [place] Prescription Monitoring Program Reviewed? [] yes  [x] no    Referrals were made to:  NA    Future appointment was made: [x] yes  [] no    Dictation completed at time of chart check: [x] yes  [] no    I have checked the documentation for today s encounters and the above information has been reviewed and completed.

## 2021-06-21 NOTE — PROGRESS NOTES
"Psychiatric  Progress Note  Date of visit:11/8/2018           Discussion of Care and Treatment Recommendations:   This is a 63 y.o. female with a history of depression and PTSD who presents to tte clinic for a follow up appointment.      PCP managing sleep :  Prescribing  Lorazepam ,Seroquel and Ambien   PCP managing Gabapentin for neurological pain      Last visit 9/27/18   Recommendation at last visit   1 . Highly recommend: Psychotherapy   2.Highly recommend : Community Support groups   3. Continue  Cymbalta 60 mg daily    4. RTC- weeks, call in between visit with nay question       Patient and I reviewed diagnosis and treatment plan and patient agrees with following recommendations:  Ongoing education given regarding diagnostic and treatment options with adequate verbalization of understanding.  Plan   1 .Highly recommend: Psychotherapy   2.Highly recommend : Community Support groups   3. Continue  Cymbalta 60 mg daily    4. 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  During our last visit she was complaining of blurry vision after taking Seroquel.  We had discussed about benzodiazepines and neuroleptic medications that are being prescribed by her primary for sleep and the side effects profile which may conclude dizziness.  She is however convinced that Cymbalta is the " one causing dizziness .  She is also on blood pressure medications which could also be associate the dizziness.  She does not want to wean off.  Ambien or Seroquel because she is convinced that Cymbalta is causing dizziness I am willing to cut down to 30 mg today she can try taking the 30 mg for a few days and if the dizziness does not resolve then she will can go back and take the 60 mg because 30 mg may not provide efficacy that is needed to manage depression.  She endorsed understanding of the above.  I did provide for her at 30 mg prescription for 90-day supply.  She will call the clinic to let us know if she decides to bump up the Cymbalta 60 mg.  We also discussed need for her to be seen by sleep specialist for sleep.  His primary is prescribing her Seroquel and Ambien I will defer any questions about those 2 medications to her primary care provider.  Patient also reports that she does sometimes get nightmares and flashback memories.  We did discuss processing the drug of choice for addressing PTSD symptoms.  At this time she is not interested in starting the medication but may consider that in the future.  She denies other psychiatric symptoms and offers no further complaints.  She will return to the clinic in 12 weeks for follow-up appointment.  She will continue psychotherapy.  Encouraged to call in between visits any questions or concerns    Mental Status Examination:   General: Adequate hygiene, cooperative  Speech: Normal in rate and tone  Language: Intact  Thought process: Coherent  Thought content:                           Auditory hallucinations- absent                           Visual Hallucinations - Absent                           Delusions Absent                           Loose Associations:  No                          Suicidal thoughts: Absent                          Homicidal thoughts: Absent                       Affect: Neutral  Mood: Neutral   Intellectual functioning: Within normal  limits  Memory: Within normal limits  Fund of knowledge: Average  Attention and concentration: Within normal limits  Gait: Steady  Psychomotor activity: Calm, no agitation  Muscles: No atrophy, no abnormal movements  InSight and judgment: Fair     Decision Making Capacity   Patient has the capacity to make independent decisions regarding medical and psychiatric care.      Medication changes: See above   Medication adherence: compliant  Medication side effects: absent  Information about medications: Side effects, benefits and alternative treatments discussed and patient agrees with capacity to do so.    Psychotherapy: Supportive therapy day-to-day living    Education: Diet, exercise, abstinence from drugs and alcohol, patient will not drive if sedated and medications or  under influence of any substance    Lab Results:   Personally reviewed and discussed with the patient    Lab Results   Component Value Date    WBC 4.0 04/20/2018    HGB 14.2 04/20/2018    HCT 43.0 04/20/2018     04/20/2018    CHOL 300 (H) 04/20/2018    TRIG 136 04/20/2018    HDL 68 04/20/2018    ALT 27 10/11/2017    AST 21 10/11/2017     04/20/2018    K 3.8 04/20/2018     04/20/2018    CREATININE 0.70 04/20/2018    BUN 9 04/20/2018    CO2 21 (L) 04/20/2018    TSH 0.29 (L) 04/20/2018    INR 0.96 09/20/2016    HGBA1C 6.4 (H) 04/20/2018    MICROALBUR 9.41 (H) 04/20/2018       Vital signs:    Vitals:    11/08/18 1420   BP: 120/66   Pulse: (!) 121   Temp: 97.8  F (36.6  C)   TempSrc: Oral   Weight: 145 lb (65.8 kg)     Allergies:   Allergies   Allergen Reactions     Cefaclor Anaphylaxis     Cephalexin Anaphylaxis     Ciprofloxacin Anaphylaxis     Clarithromycin Anaphylaxis, Anxiety, Dizziness, Hives, Itching, Nausea And Vomiting, Rash and Shortness Of Breath     Penicillins Anaphylaxis     Propofol Nausea Only and Headache     Scopolamine Anaphylaxis, Hives and Swelling     Swollen tongue, eye swelled closed     Sulfa (Sulfonamide  Antibiotics) Anaphylaxis     Tetracyclines Anaphylaxis     Cytoxan [Cyclophosphamide] Hives     severe     Erythromycin Base Hives     Hydrocodone Nausea And Vomiting     Neupogen [Filgrastim] Hives     severe     Paper Tape Other (See Comments)     Surgical paper tape - redness. States if left on too long leaves little cuts on her skin     Taxol [Paclitaxel] Hives     severe     Taxotere [Docetaxel] Hives          Medications:     Current Outpatient Prescriptions on File Prior to Visit   Medication Sig Dispense Refill     acetaminophen (TYLENOL) 500 MG tablet Take 1,000 mg by mouth 3 (three) times a day as needed for pain.       aspirin-acetaminophen-caffeine (EXCEDRIN MIGRAINE) 250-250-65 mg per tablet Take 2 tablets by mouth every 8 (eight) hours as needed for pain (headaches).       betamethasone, augmented, (DIPROLENE) 0.05 % lotion Apply 1 application topically 2 (two) times a day as needed. 60 mL 3     blood-glucose meter Misc Use daily as directed 100 each 4     cholecalciferol, vitamin D3, 1,000 unit tablet Take 2,000 Units by mouth 2 (two) times a day.       DULoxetine (CYMBALTA) 30 MG capsule Take 2 capsules (60 mg total) by mouth daily. 60 capsule 1     DULoxetine (CYMBALTA) 60 MG capsule Take 1 capsule (60 mg total) by mouth daily. 30 capsule 1     gabapentin (NEURONTIN) 400 MG capsule TAKE 1 CAPSULE (400 MG TOTAL) BY MOUTH 3 (THREE) TIMES A DAY. 270 capsule 4     generic lancets Use 1 each As Directed as needed. Dispense brand per patient's insurance at pharmacy discretion. 100 each 4     LORazepam (ATIVAN) 1 MG tablet Take 1 tablet (1 mg total) by mouth every 8 (eight) hours as needed for anxiety. 90 tablet 0     melatonin 10 mg Tab Take 10 mg by mouth at bedtime.       metFORMIN (GLUCOPHAGE-XR) 500 MG 24 hr tablet TAKE 1 TABLET BY MOUTH DAILY WITH SUPPER 90 tablet 4     naproxen sodium (ALEVE) 220 MG tablet Take 440 mg by mouth as needed.       zolpidem (AMBIEN) 10 mg tablet Take 1 tablet (10 mg  total) by mouth at bedtime as needed for sleep. 90 tablet 0     blood glucose test strips Use 1 each As Directed as needed. Dispense brand per patient's insurance at pharmacy discretion. 100 strip 2     No current facility-administered medications on file prior to visit.                   Review of Systems:      ROS:       10 point ROS was negative except for the items listed in HPI.      Coordination of Care:   More than 25 minutes spent on this visit  with more than 50% of time spent on coordination of care including: Educating patient about diagnosis, prognosis, side effects and benefits of medications, diet, exercise.  Time also spent on entering orders and preparing documentation for the visit.Time also spent providing supportive therapy regarding above issues.    This note was created using a dictation system. All typing errors or contextual distortion is unintentional and software inherent.

## 2021-06-22 NOTE — PROGRESS NOTES
Chief Complaint:  Left Breast Mass or Lesion    HPI:  This is a 63 y.o. woman who comes into once again to discuss surgery on her left breast.  Since I saw her last we got another MRI which shows once again a suspicious lesion in the nipple.  They had attempted a fine-needle aspirate of this last year but had to do it blindly because of its location and the fact he did not shop and ultrasound.  That was negative but explained to her there is no way to know for sure that they got the right area.  She had a lot of questions and concerns about doing this biopsy..      Past Medical History:  Past Medical History:   Diagnosis Date     Breast cancer (H) 2011    left     Breast cancer (H) 2016    right     Colon cancer screening 2018    FIT test for blood; negative     Diabetes mellitus (H)      History of transfusion      Hx antineoplastic chemotherapy 2011    left     Hx of radiation therapy 2011    left     Lymphedema     left chest wall and left arm     Neuropathy (H)     left upper torso and chest     PONV (postoperative nausea and vomiting)      Prediabetes      Rectocele      Urination disorder     has to manually push to get urine out     Past Surgical History:   Procedure Laterality Date     AUGMENTATION MAMMAPLASTY Left 2013    TRAM flap with implant     AUGMENTATION MAMMAPLASTY Right 2013    implant placed     BREAST BIOPSY Left 2011     BREAST BIOPSY Right 2016     BREAST IMPLANT REMOVAL Right 9/20/2016    Procedure: EVACUATION RIGHT BREAST HEMATOMA;  Surgeon: Josr Alcantara MD;  Location: [facility];  Service:      MASTECTOMY Right 2016    reconstructive expanders bilaterally     DE ARTHRODESIS ANT INTERBODY MIN DISCECTOMY,LUMBAR      Description: Lumbar Vertebral Fusion;  Recorded: 09/25/2013;     DE BREAST RECONSTRUC W LAT DORSI FLAP      Description: Breast Surgery Reconstruction With Latissimus Dorsi Flap;  Recorded: 09/25/2013;     DE BREAST RECONSTRUC W TISS EXPANDR Bilateral 8/18/2016     Procedure: BILATERAL BREAST RECONSTRUCTION, REMOVAL OF LESION RIGHT AXILLA, REMOVAL OF LESION RIGHT NECK;  Surgeon: Josr Alcantara MD;  [place] [facility] [place]  Service: [place]astics     MD EXCISE BREAST CYST Left 2011    Description: Breast Surgery Lumpectomy;  Recorded: 09/25/2013;     MD EXCISE EXCESS SKIN TISSUE,ABDOMEN N/A 3/3/2017    Procedure: BILATERAL FAT GRAFTING FROM ABDOMEN TO BREASTS;  Surgeon: Josr Alcantara MD;  [place] [facility] [place];  Service: Plastics     MD LAP,DIAGNOSTIC ABDOMEN      Description: Laparoscopy (Diagnostic);  Recorded: 09/25/2013;     MD MASTECTOMY, SIMPLE, COMPLETE Right 8/18/2016    Procedure: RIGHT MASTECTOMY;  Surgeon: Veronique Danielle MD;  [place] [facility] [place];  Service: General     MD REPLACE TISSUE EXPANDER Bilateral 3/3/2017    Procedure: BILATERAL TISSUE EXPANDER REMOVAL FOR PERMANENT IMPLANTS;  Surgeon: Josr Alcantara MD;  [place] [facility] [place];  Service: Plastics       Meds:    Current Outpatient Medications:      acetaminophen (TYLENOL) 500 MG tablet, Take 1,000 mg by mouth 3 (three) times a day as needed for pain., Disp: , Rfl:      aspirin-acetaminophen-caffeine (EXCEDRIN MIGRAINE) 250-250-65 mg per tablet, Take 2 tablets by mouth every 8 (eight) hours as needed for pain (headaches)., Disp: , Rfl:      betamethasone, augmented, (DIPROLENE) 0.05 % lotion, Apply 1 application topically 2 (two) times a day as needed., Disp: 60 mL, Rfl: 3     blood glucose test strips, Use 1 each As Directed as needed. Dispense brand per patient's insurance at pharmacy discretion., Disp: 100 strip, Rfl: 2     blood-glucose meter Misc, Use daily as directed, Disp: 100 each, Rfl: 4     calcium citrate-vitamin D (CITRACAL+D) 315-200 mg-unit per tablet, Take 1 tablet by mouth., Disp: , Rfl:      cholecalciferol, vitamin D3, 1,000 unit tablet, Take 2,000 Units by mouth 2 (two) times a day., Disp: , Rfl:      DULoxetine (CYMBALTA) 60 MG capsule, Take 1 capsule (60 mg total)  by mouth daily., Disp: 30 capsule, Rfl: 0     gabapentin (NEURONTIN) 400 MG capsule, TAKE 1 CAPSULE (400 MG TOTAL) BY MOUTH 3 (THREE) TIMES A DAY., Disp: 270 capsule, Rfl: 4     generic lancets, Use 1 each As Directed as needed. Dispense brand per patient's insurance at pharmacy discretion., Disp: 100 each, Rfl: 4     hydrOXYzine HCl (ATARAX) 25 MG tablet, Take 25 mg by mouth., Disp: , Rfl:      LORazepam (ATIVAN) 1 MG tablet, Take 1 tablet (1 mg total) by mouth every 8 (eight) hours as needed for anxiety., Disp: 90 tablet, Rfl: 0     melatonin 10 mg Tab, Take 10 mg by mouth at bedtime., Disp: , Rfl:      melatonin 3 mg Tab tablet, Take 10 mg by mouth., Disp: , Rfl:      metFORMIN (GLUCOPHAGE-XR) 500 MG 24 hr tablet, Take 500 mg by mouth., Disp: , Rfl:      multivitamin therapeutic w/minerals (THERAPEUTIC-M) 27-0.4 mg Tab tablet, Take 100 capsules by mouth., Disp: , Rfl:      naproxen sodium (ALEVE) 220 MG tablet, Take 440 mg by mouth as needed., Disp: , Rfl:      ONETOUCH DELICA LANCETS 30 gauge Misc, Use 1 each As Directed as needed. DISPENSE BRAND PER PATIENT'S INSURANCE AT PHARMACY DISCRETION., Disp: , Rfl: 4     ONETOUCH ULTRA2, USE DAILY AS DIRECTED, Disp: , Rfl: 0     oxyCODONE (ROXICODONE) 5 MG immediate release tablet, Take 15 mg by mouth., Disp: , Rfl:      polyethylene glycol (MIRALAX) 17 gram packet, Take 17 g by mouth., Disp: , Rfl:      QUEtiapine (SEROQUEL) 25 MG tablet, , Disp: , Rfl:      zolpidem (AMBIEN) 10 mg tablet, Take 1 tablet (10 mg total) by mouth at bedtime as needed for sleep., Disp: 90 tablet, Rfl: 0    Allergies:  Allergies   Allergen Reactions     Aztreonam Hives     Castor Oil Hives     Cefaclor Anaphylaxis     Cephalexin Anaphylaxis     Cephalexin Monohydrate Hives     Chlorhexidine Hives     White clear stuff that you lather during surgery that dries unto the skin and stays on the skin that's almost like a superglue. It was very itchy and skin gets very red.     Ciprofloxacin  "Anaphylaxis     Clarithromycin Anaphylaxis, Anxiety, Dizziness, Hives, Itching, Nausea And Vomiting, Rash and Shortness Of Breath     Clindamycin Hives     Penicillins Anaphylaxis     Propofol Nausea Only and Headache     Scopolamine Anaphylaxis, Hives and Swelling     Swollen tongue, eye swelled closed     Sulfa (Sulfonamide Antibiotics) Anaphylaxis     Sulfasalazine Hives     Tetracyclines Anaphylaxis     Vancomycin Hives     Adhesive Itching     Irritation where the tape had contact     Cytoxan [Cyclophosphamide] Hives     severe     Erythromycin Base Hives     Hydrocodone Nausea And Vomiting     Neupogen [Filgrastim] Hives     severe     Paper Tape Other (See Comments)     Surgical paper tape - redness. States if left on too long leaves little cuts on her skin     Taxol [Paclitaxel] Hives     severe     Taxotere [Docetaxel] Hives       Social History:   reports that she quit smoking about 7 years ago. Her smoking use included cigarettes. She smoked 1.00 pack per day. she has never used smokeless tobacco. She reports that she does not drink alcohol or use drugs.    ROS:  A 12 point comprehensive review of systems was negative except as noted.    Physical exam:  /60 (Patient Site: Left Arm, Patient Position: Sitting, Cuff Size: Adult Regular)   Pulse (!) 120   Ht 5' 3\" (1.6 m)   Wt 148 lb (67.1 kg)   LMP 09/16/2006   SpO2 96%   Breastfeeding? No   BMI 26.22 kg/m    General: alert, appears stated age and cooperative  Lungs: clear to auscultation bilaterally  CV: regular rate and rhythm, S1, S2 normal, no murmur, click, rub or gallop  Breast: Did not examine her today but on previous exams there is no palpable mass.  Neuro: Grossly normal        Studies: Reviewed her MRI with Dr. Yañez.    Impression: Left breast Lesion seen on MRI. Discussed option of excision.  I think excising this is going to be the best option.  I was able to review it with the radiologist.  The lesion is right underneath the " skin of the areola between the 12 and 3 o'clock position.  Risks and benefits of surgery explained and they wish to proceed. All questions answered.    Plan: Left breast biopsy.  Typically an outpatient procedure under local MAC anesthetic.  Reassured her that this will not affect the implant that is in place and this can be the safest thing to make sure she does not have a recurrence of her breast cancer or new breast cancer.

## 2021-06-22 NOTE — PROGRESS NOTES
"  Mental Health Visit Note    12/12/2018    Start time: 12:06   Stop Time: 13:00   Session # 6    Session Type: Patient is presenting for an Individual session.    Alexus Garcia is a 63 y.o. female is being seen today for    Chief Complaint   Patient presents with      Follow Up     Anxiety     Depression     \"cancer has returned\"     Follow up in regards to ongoing symptom management of anxiety and depression.     New symptoms or complaints: \" the cancer is back. I am not sure if I have to go for a surgery or not\"    Functional Impairment:   Personal: 4  Family: 4  Social: 4  Work: 0    Clinical assessment of mental status:   Alexus Gracia presented on time.   She was oriented x3, open and cooperative, and dressed appropriately for this session and weather. Her memory was Normal cognitive functioning .  Her speech was  Within normal.  Language was appropriate.  Concentration and focus is Within normal. Psychosis is not noted or reported. She reports her mood is Angry, Irritable, Anxious and Depressed.  Affect is congruent with speech and is Congruent w/content of speech.  Fund of knowledge is adequate. Insight is adequate for therapy.    Suicidal/Homicidal Ideation present: Patient denies suicidal and homicidal ideations/means or plans.     Patient's impression of their current status: Patient has not talked with Dr. Danielle regarding her surgery. She is not sure whether she should event try a surgery this time. She reports she has had at least 17 surgeries and still the cancer is back. She does not think this time she can survive.  After some time sharing her options, she agreed to that it would be best to give Dr. Danielle a call to share her feelings and frustration and share her options for some input. Patient agreed to return in session and to continue with her support group her in the hospital. She will return in 2 weeks.     Therapist impression of patients current state: This 63 y.o. White or  " "female presented on time. She was able to expressed her frustration about health. While she was not suicidal, it was clear that patient has a well founded frustrations with current health issues. Writer actively listened to the patient, offered empathy, and validated her feelings around her recent report on the cancer returning in her breast area. Writer explored with the patient what would be best support to the patient in the process of figuring out with her providers the best treatment for her cancer.     Assessment tools used today include: How do you feel today?    Type of psychotherapeutic technique provided: Client centered and Solution-focused    Progress toward short term goals:\" feeling confused and desapointed\"     Review of long term goals: initial treatment plan:  9/27/2018 next review: 12/27/2018    Diagnosis:   1. Moderate single current episode of major depressive disorder (H)    2. Chronic post-traumatic stress disorder (PTSD)    3. Generalized anxiety disorder    No change    Plan and Follow-up:  Goals reviewed and updated today:   1. Patient plans to continue taking the medication as prescribed by her NP/MD  2. Patient plans to give a call to her Dr. Danielle about surgery   3.Patient will continue to listen to [facility] when she feels alone  4.Patient will continue to attend her support group as she is able  5.Patient plans to follow up with therapy in 2 weeks    Discharge Criteria/Planning: Patient will continue with follow-up until therapy can be discontinued without return of signs and symptoms.    Performed and documented by OG Freeman; [facility] 12/12/2018  "

## 2021-06-22 NOTE — PROGRESS NOTES
"OFFICE VISIT NOTE      Assessment & Plan   Alexus Garcia is a 63 y.o. female she is distraught about having ANOTHER lump that should be removed. She feel like perhaps she will delay any interventions and focus on the holidays --she's frustrated she's not had a holiday time free of medical focus for 3 years. She is also frustrated that her counselor does not help her more - just says \"I don't know what to tell you\".  At the end of the visit, she had an appointment to talk to her general surgeon in early Jan.    Diagnoses and all orders for this visit:    Scalp irritation    Anxiety    Logorrhea    Constipation by outlet dysfunction    Malignant neoplasm of female breast, unspecified estrogen receptor status, unspecified laterality, unspecified site of breast (H)      Daxa Morales MD    The following are part of a depression follow up plan for the patient:  under care of mental health counselor          Subjective:   Chief Complaint:  Follow-up    63 y.o. female.     1) needs lumpectomy - for suspicious lump - not yet scheduled. Dr. Danielle.  This has happened every holiday for 3 years now.  She wants a consultation - might delay until spring, also can they do a revision of the implant which slides - at the same time (this is with Dr. Alcantara)  She has no garage so she has to scrape her car off - that is difficult with healing from surgery.    2) sleep - impacted by #1  With seroquel, 4 hrs then up to the toilet; then wakes with heavy head and goes back to sleep for 1-2 hrs.  Seeing a therapist - she's at a loss, \"I don't know what to tell you\"  She watches movies, listens to music, reads books - these help sometimes. At a loss for what else to do (did not take suggestion).  Ary thinks the cancer cells move with surgery.  Her friends have treatment and are cancer-free.    3) insurance change was challenging    4) she knows her situation is unique; she's not the normal patient and she does not want to be treated " "as such  Is there something else that can be done? Consult others - is there something new?  She does not want to be \"mainstreamed\"; she wants her docs to go \"the extra mile\"    Polamarcelo says \"just have a double- mastectomy\" but Anny says it's different for her because she's single and would possibly want to date...    5) a cousin just  over giving - due to neglect from a doctor    6) taking pain meds 48 hrs re-addicts her and there are months of hot flashes, etc.    Current Outpatient Medications   Medication Sig Note     acetaminophen (TYLENOL) 500 MG tablet Take 1,000 mg by mouth 3 (three) times a day as needed for pain.      aspirin-acetaminophen-caffeine (EXCEDRIN MIGRAINE) 250-250-65 mg per tablet Take 2 tablets by mouth every 8 (eight) hours as needed for pain (headaches).      betamethasone, augmented, (DIPROLENE) 0.05 % lotion Apply 1 application topically 2 (two) times a day as needed.      blood glucose test strips Use 1 each As Directed as needed. Dispense brand per patient's insurance at pharmacy discretion.      blood-glucose meter Misc Use daily as directed      cholecalciferol, vitamin D3, 1,000 unit tablet Take 2,000 Units by mouth 2 (two) times a day.      DULoxetine (CYMBALTA) 60 MG capsule Take 1 capsule (60 mg total) by mouth daily.      gabapentin (NEURONTIN) 400 MG capsule TAKE 1 CAPSULE (400 MG TOTAL) BY MOUTH 3 (THREE) TIMES A DAY.      generic lancets Use 1 each As Directed as needed. Dispense brand per patient's insurance at pharmacy discretion.      LORazepam (ATIVAN) 1 MG tablet Take 1 tablet (1 mg total) by mouth every 8 (eight) hours as needed for anxiety.      melatonin 10 mg Tab Take 10 mg by mouth at bedtime. 2018: Received from: [facility] Received Sig: Take 10 mg by mouth.     metFORMIN (GLUCOPHAGE-XR) 500 MG 24 hr tablet TAKE 1 TABLET BY MOUTH DAILY WITH SUPPER      naproxen sodium (ALEVE) 220 MG tablet Take 440 mg by mouth as needed. 2018: Received from: " "[facility] Received Sig: Take 440 mg by mouth.     zolpidem (AMBIEN) 10 mg tablet Take 1 tablet (10 mg total) by mouth at bedtime as needed for sleep. 9/18/2018: Need refill       PSFHx: appropriate sections of PMH completed/filled in   Tobacco Status:  She  reports that she quit smoking about 7 years ago. Her smoking use included cigarettes. She smoked 1.00 pack per day. she has never used smokeless tobacco.    Review of Systems:  No fever.  No rash. All other systems negative except as noted above.    Objective:    /80   Pulse 100   Temp 98.3  F (36.8  C) (Oral)   Resp 12   Ht 5' 3\" (1.6 m)   Wt 148 lb (67.1 kg)   LMP 09/16/2006   SpO2 94%   BMI 26.22 kg/m    GENERAL: No acute distress  Mood: low  Insight: low  Judgment: fair  Affect: normal    Spent 40 min face to face with patient with more the 50% spent in counseling, reviewing chart, and coordination of care and discussing stressors, tension, possibly stopping \"more\" interventions for cancer, depression.        "

## 2021-06-22 NOTE — PROGRESS NOTES
Chief Complaint   Patient presents with     Consult     patient states another lump was found from a biopsy that was done . patient wants to ask questions about  left breast

## 2021-06-22 NOTE — PROGRESS NOTES
"  Mental Health Visit Note    11/30/2018    Start time: 12:03   Stop Time: 13:00   Session # 5    Session Type: Patient is presenting for an Individual session.    Alexus Garcia is a 63 y.o. female is being seen today for    Chief Complaint   Patient presents with      Follow Up     Anxiety     Depression     PTSD     Medical results, fears, discouragement.      Follow up in regards to ongoing symptom management of anxiety and depression.     New symptoms or complaints: \" I don't know what to do, it does not look good. My skin won't hold another surgery \"    Functional Impairment:   Personal: 4  Family: 4   Social: 4  Work: 0    Clinical assessment of mental status:   Alexus Garcia presented on time.   She was oriented x3, open and cooperative, and dressed appropriately for this session and weather. Her memory was Normal cognitive functioning .  Her speech was  Within normal.  Language was appropriate.  Concentration and focus is Brief. Psychosis is not noted or reported. She reports her mood is Irritable and Anxious.  Affect is congruent with speech and is Tearful, Irritable, Anxious and Depressed.  Fund of knowledge is adequate. Insight is adequate for therapy.    Suicidal/Homicidal Ideation present: Patient denies suicidal and homicidal ideations/means or plans.     Patient's impression of their current status: patient reported she was grieving her cousin who recently passed away due to a live cancer. She also has been stressed by her son's living situation. In the middle of her visit with this writer, she received a call from Dr. Danielle about the abnormal MRI of her left breast. She spent some time over the phone discussing the next step with Dr. Danielle. Patient then was able to share with this writer her fear, frustration and anger about her own health. She notes she never feel better for awhile. She notes , \" I don't want to be strong anymore. My son needs me, I should be able to handle my life and be " "available for my son. I have to\" Patient will be waiting from Dr. Danielle's team about the date for surgery. Patient stated she has been in the same situation and is worried about the surgery. though she wants to have it done soon.  Patient will see this writer in 2 weeks for support.     Therapist impression of patients current state: This 63 y.o. White or  female presented on time today. As indicated her excitement at the beginning  changed into tears and sadness after receiving the news related to her MRI. Patient Processed feelings with the patient around the loss of her cousin, the news about abnormal MRI and her concerns around son's new place to [place]. Writer offered empathy , validated her feelings as response to the news.  Patient will need support to go through this new situation. She does well with active listening, empathy and encouragement.     Assessment tools used today include: How do you feel today?    Type of psychotherapeutic technique provided: Client centered and Solution-focused    Progress toward short term goals:\"high anxiety and less hope\"    Review of long term goals: Initial treatment plan: 9/27/2018 next review: 12/27/2018     Diagnosis:   1. Moderate single current episode of major depressive disorder (H)    2. Chronic post-traumatic stress disorder (PTSD)    3. Generalized anxiety disorder     No change    Plan and Follow-up:     Goals reviewed and updated today:   1. Patient plans to continue taking the medication as prescribed by her NP/MD  2. Patient will follow up with the recommendations received today by Dr. Andrade  3.Patient will listen to [employer] to keep her company  when she feels alone  4.Patient plans to follow up with therapy in 2 weeks    Discharge Criteria/Planning: Patient will continue with follow-up until therapy can be discontinued without return of signs and symptoms.    Performed and documented by CHAD Freeman- KATHE; [facility] 11/30/2018  "

## 2021-06-23 NOTE — PROGRESS NOTES
"  Mental Health Visit Note    1/17/2019    Start time: 14:05   Stop Time: 15:00  Session # 1 this year    Session Type: Patient is presenting for an Individual session.    Alexus Garcia is a 63 y.o. female is being seen today for    Chief Complaint   Patient presents with      Follow Up     Anxiety     Depression     Psychosocial stress     Follow up in regards to ongoing symptom management of anxiety and depression.     New symptoms or complaints:  \" I am not sure how to prepare for my surgery, I am not sure if this is an option for me\"    Functional Impairment:   Personal: 4  Family: 4  Social: 4  Work: 4    Clinical assessment of mental status:   Alexus Garcia presented on time.   She was oriented x3, open and cooperative, and dressed appropriately for this session and weather. Her memory was Normal cognitive functioning .  Her speech was  Within normal.  Language was appropriate.  Concentration and focus is Within normal. Psychosis is not noted or reported. She reports her mood is Anxious and Depressed.  Affect is congruent with speech and is Congruent w/content of speech.  Fund of knowledge is adequate. Insight is adequate for therapy.    Suicidal/Homicidal Ideation present: Patient denies suicidal and homicidal ideations/means or plans.     Patient's impression of their current status: Patient reports she has been experiencing anxiety related to her left breast surgery. She has been thinking about scheduling for surgery but she continues to have some questions about the process and possible outcomes. She wants to know all this before she can schedule. This is due to past surgeries. She has been ruminating around the fact none tells her what to expect from the surgery. She agreed to check with her surgeon about the these questions.    Therapist impression of patients current state: This 63 y.o. White or  female presented on time for session. Writer and patient spent most of the time processing " "feelings around her experience with past surgeries and her anticipation about future surgery. Even though she does not know the results, she has already started to feel nothing will be done right because she does not have all information or all possible outcomes.  Patient will continue to be encouraged and prompted to address her concern about her surgery to proper provider.     Assessment tools used today include: How do you feel today?    Type of psychotherapeutic technique provided: Client centered, Solution-focused and CBT    Progress toward short term goals:\" anxious\"    Review of long term goals: Treatment Plan updated: 1/17/2019 next review: 4/17/2019  Diagnosis:   1. Moderate single current episode of major depressive disorder (H)    2. Chronic post-traumatic stress disorder (PTSD)    3. Generalized anxiety disorder     Improving, worsening, no change    Plan and Follow-up:     Goals reviewed and updated today: 1/17/2019  1. Patient plans to continue taking the medications as prescribed by her NP/MD  2. Patient plans to give a call to her Dr. Danielle about all the questions she has regarding her surgery   3.Patient will continue to listen to KTIS when she feels alone  4.Patient will contact [facility] with questions related to their support group.    5.Patient plans to follow up with therapy in 2 weeks    Discharge Criteria/Planning: Patient will continue with follow-up until therapy can be discontinued without return of signs and symptoms.    Performed and documented by OG Freeman; [facility] 1/17/2019  "

## 2021-06-23 NOTE — PATIENT INSTRUCTIONS - HE
Goals reviewed and updated today: 1/17/2019  1. Patient plans to continue taking the medications as prescribed by her NP/MD  2. Patient plans to give a call to her Dr. Danielle about all the questions she has regarding her surgery   3.Patient will continue to listen to KTIS when she feels alone  4.Patient will contact [facility] with questions related to their support group.    5.Patient plans to follow up with therapy in 2 weeks

## 2021-06-23 NOTE — TELEPHONE ENCOUNTER
FYI  Sharon called to cancel her appointment with gamal and Lucy due to tomorrows weather.  Her next with you is on 2-28-19 (this was already on the books)    She stated she will make more after that visit

## 2021-06-24 NOTE — PROGRESS NOTES
"Psychiatric  Progress Note  Date of visit:3/7/2019         Discussion of Care and Treatment Recommendations:   This is a 63 y.o. female with a history of depression and PTSD who presents to tte clinic for a follow up appointment.      PCP managing sleep :  Prescribing  Lorazepam ,Seroquel and Ambien   PCP managing Gabapentin for neurological pain        Last visit  11/8/18.  Recommendation at last visit   1 .Highly recommend: Psychotherapy   2.Highly recommend : Community Support groups   3. Continue  Cymbalta 60 mg daily    4. RTC- 12 weeks, call in between visit with any question     Patient and I reviewed diagnosis and treatment plan and patient agrees with following recommendations:  Ongoing education given regarding diagnostic and treatment options with adequate verbalization of understanding.  Plan   1 .Continue with  Psychotherapy   2.Highly recommend : Community Support groups   3. Restart  Cymbalta at  30 mg daily    4. 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  Is working with her daughter and her to pursue the coming up that she keeps postponing.  She will not be pursuing chemotherapy at this time but may consider other alternatives.   She stopped taking her Cymbalta because she got 60 mg pills and 30 mg capsules.  I reminded her to call any questions about her " medications.  She reported 60 mg made her BMP.  Today she would like to start Cymbalta at 30 mg.  We discussed medication benefits and side effects profile and we started her given that he milligrams today.  She is endorsing depression and some anxiety mainly regarding her cancer diagnosis.  She is also stressed because she has an adult son who struggles with autism and lives in a group home whom she has been told about her cancer diagnosis.  Denies homicidal ideations.  Tells me she feels safe and verbally contracts for safety.  She is working with her therapist on a weekly basis.  She reports when she is feeling better and limits more slowly she does engage in community support groups.   She offers no other concerns.  She will restart Cymbalta 30 mg.  Return to the clinic in 4 weeks for follow-up appointment.  Encouraged her to call in between visits with any questions or concerns.  Crisis numbers have been provided and occurred from the clinic with phone numbers have been provided for her.  Mental Status Examination:   General: Adequate hygiene, cooperative  Speech: Normal in rate and tone  Language: Intact  Thought process: Coherent  Thought content:                           Auditory hallucinations-Denies                          Visual Hallucinations - Denies                         Delusions Absent                           Loose Associations:  No                          Suicidal thoughts: Denies                          Homicidal thoughts:Denies                        Affect: Neutral  Mood: Neutral   Intellectual functioning: Within normal limits  Memory: Within normal limits  Fund of knowledge: Average  Attention and concentration: Within normal limits  Gait: Steady  Psychomotor activity: Calm, no agitation  Muscles: No atrophy, no abnormal movements  InSight and judgment: Fair      Medication changes: See Above   Medication adherence: compliant  Medication side effects: absent  Information about  medications: Side effects, benefits and alternative treatments discussed and patient agrees with capacity to do so.    Psychotherapy: Supportive therapy day-to-day living    Education: Diet, exercise, abstinence from drugs and alcohol, patient will not drive if sedated and medications or  under influence of any substance    Lab Results:  Personally reviewed and discussed with the patient    Lab Results   Component Value Date    WBC 4.0 04/20/2018    HGB 14.2 04/20/2018    HCT 43.0 04/20/2018     04/20/2018    CHOL 300 (H) 04/20/2018    TRIG 136 04/20/2018    HDL 68 04/20/2018    ALT 27 10/11/2017    AST 21 10/11/2017     04/20/2018    K 3.8 04/20/2018     04/20/2018    CREATININE 0.70 04/20/2018    BUN 9 04/20/2018    CO2 21 (L) 04/20/2018    TSH 0.29 (L) 04/20/2018    INR 0.96 09/20/2016    HGBA1C 6.4 (H) 04/20/2018    MICROALBUR 9.41 (H) 04/20/2018       Vital signs:    Vitals:    03/07/19 1255   BP: 111/76   Pulse: (!) 111   Temp: 97.6  F (36.4  C)   TempSrc: Oral   Weight: 152 lb (68.9 kg)     Allergies:   Allergies   Allergen Reactions     Aztreonam Hives     Castor Oil Hives     Cefaclor Anaphylaxis     Cephalexin Anaphylaxis     Cephalexin Monohydrate Hives     Chlorhexidine Hives     White clear stuff that you lather during surgery that dries unto the skin and stays on the skin that's almost like a superglue. It was very itchy and skin gets very red.     Ciprofloxacin Anaphylaxis     Clarithromycin Anaphylaxis, Anxiety, Dizziness, Hives, Itching, Nausea And Vomiting, Rash and Shortness Of Breath     Clindamycin Hives     Penicillins Anaphylaxis     Propofol Nausea Only and Headache     Scopolamine Anaphylaxis, Hives and Swelling     Swollen tongue, eye swelled closed     Sulfa (Sulfonamide Antibiotics) Anaphylaxis     Sulfasalazine Hives     Tetracyclines Anaphylaxis     Vancomycin Hives     Adhesive Itching     Irritation where the tape had contact     Cytoxan [Cyclophosphamide] Hives      severe     Erythromycin Base Hives     Hydrocodone Nausea And Vomiting     Neupogen [Filgrastim] Hives     severe     Paper Tape Other (See Comments)     Surgical paper tape - redness. States if left on too long leaves little cuts on her skin     Taxol [Paclitaxel] Hives     severe     Taxotere [Docetaxel] Hives          Medications:     Current Outpatient Medications on File Prior to Visit   Medication Sig Dispense Refill     acetaminophen (TYLENOL) 500 MG tablet Take 1,000 mg by mouth 3 (three) times a day as needed for pain.       aspirin-acetaminophen-caffeine (EXCEDRIN MIGRAINE) 250-250-65 mg per tablet Take 2 tablets by mouth every 8 (eight) hours as needed for pain (headaches).       betamethasone, augmented, (DIPROLENE) 0.05 % lotion Apply 1 application topically 2 (two) times a day as needed. 60 mL 3     blood glucose test strips Use 1 each As Directed as needed. Dispense brand per patient's insurance at pharmacy discretion. 100 strip 2     blood-glucose meter Misc Use daily as directed 100 each 4     calcium citrate-vitamin D (CITRACAL+D) 315-200 mg-unit per tablet Take 1 tablet by mouth.       cholecalciferol, vitamin D3, 1,000 unit tablet Take 2,000 Units by mouth 2 (two) times a day.       DULoxetine (CYMBALTA) 60 MG capsule Take 1 capsule (60 mg total) by mouth daily. 30 capsule 0     gabapentin (NEURONTIN) 400 MG capsule TAKE 1 CAPSULE (400 MG TOTAL) BY MOUTH 3 (THREE) TIMES A DAY. 270 capsule 4     generic lancets Use 1 each As Directed as needed. Dispense brand per patient's insurance at pharmacy discretion. 100 each 4     hydrOXYzine HCl (ATARAX) 25 MG tablet Take 25 mg by mouth.       LORazepam (ATIVAN) 1 MG tablet Take 1 tablet (1 mg total) by mouth every 8 (eight) hours as needed for anxiety. 90 tablet 0     melatonin 10 mg Tab Take 10 mg by mouth at bedtime.       melatonin 3 mg Tab tablet Take 10 mg by mouth.       metFORMIN (GLUCOPHAGE-XR) 500 MG 24 hr tablet Take 500 mg by mouth.        multivitamin therapeutic w/minerals (THERAPEUTIC-M) 27-0.4 mg Tab tablet Take 100 capsules by mouth.       naproxen sodium (ALEVE) 220 MG tablet Take 440 mg by mouth as needed.       ONETOUCH DELICA LANCETS 30 gauge Misc Use 1 each As Directed as needed. DISPENSE BRAND PER PATIENT'S INSURANCE AT PHARMACY DISCRETION.  4     ONETOUCH ULTRA2 USE DAILY AS DIRECTED  0     oxyCODONE (ROXICODONE) 5 MG immediate release tablet Take 15 mg by mouth.       polyethylene glycol (MIRALAX) 17 gram packet Take 17 g by mouth.       QUEtiapine (SEROQUEL) 25 MG tablet        zolpidem (AMBIEN) 10 mg tablet Take 1 tablet (10 mg total) by mouth at bedtime as needed for sleep. 90 tablet 0     No current facility-administered medications on file prior to visit.               Review of Systems:      ROS:       10 point ROS was negative except for the items listed in HPI.        Coordination of Care:   More than 25 minutes spent on this visit  with more than 50% of time spent on coordination of care including: Educating patient about diagnosis, prognosis, side effects and benefits of medications, diet, exercise.  Time also spent on entering orders and preparing documentation for the visit.Time also spent providing supportive therapy regarding above issues.    This note was created using a dictation system. All typing errors or contextual distortion is unintentional and software inherent.

## 2021-06-24 NOTE — PATIENT INSTRUCTIONS - HE
Continue medications as prescribed  Have your pharmacy contact us for a refill if you are running low on medications (We may ask you to come into clinic to get a refill from the nurse  No Alcohol or drug use  No driving if sedated  Call the clinic with any questions or concerns   Reach out for help if you feel like hurting yourself or others ([facility] [phone]: [address], [address] or [facility] Suicide Hotline [phone] or go to nearest Emergency room    Follow up as directed, for your appointments, per your After Visit Summary Form.

## 2021-06-24 NOTE — PROGRESS NOTES
Patient is here for follow up appointment.  She said her depression is bad as she has been off her Cymbalta for one month.  She wants to take 30 mg instead of 60 mg so she stopped taking them.  She was instructed to call triage next time she has a concern or questions so we can help her problem solve it.  She said she feels SI at times, but would never act on it nor has a plan.  Her anxiety is daily without panic attacks.  Sleep is poor.  Her PMD manages her anxiety and sleep.  She will see [facility] next Friday for therapy.      Correct pharmacy verified with patient and confirmed in snapshot? [x] yes []no    Charge captured ? [x] yes  [] no    Medications Phoned  to Pharmacy [] yes [x]no  Name of Pharmacist:  List Medications, including dose, quantity and instructions      Medication Prescriptions given to patient   [] yes  [x] no   List the name of the drug the prescription was written for.       Medications ordered this visit were e-scribed.  Verified by order class [x] yes  [] no  Cymbalta decreased    Medication changes or discontinuations were communicated to patient's pharmacy: [x] yes  [] no    UA collected [] yes  [x] no    [place] Prescription Monitoring Program Reviewed? [] yes  [x] no    Referrals were made to:  NA    Future appointment was made: [x] yes  [] no    Dictation completed at time of chart check: [x] yes  [] no    I have checked the documentation for today s encounters and the above information has been reviewed and completed.

## 2021-06-25 NOTE — PATIENT INSTRUCTIONS - HE
Goals reviewed and updated today: 3/15/2019  1. Patient plans to continue taking the medications as prescribed by her NP/MD  2. Patient plans to contact her surgeons about her readiness for surgery.   3. Patient will contact [facility] with questions related to their support group.    5.Patient plans to follow up with therapy in 2 weeks

## 2021-06-25 NOTE — PROGRESS NOTES
"Mental Health Visit Note    3/15/2019    Start time: 14:05    Stop Time: 14:35 Session # 2 this year    Session Type: Patient is presenting for an Individual session.    Alexus Garcia is a 63 y.o. female is being seen today for    Chief Complaint   Patient presents with     Anxiety     Depression     Interpersonal Stress     Follow up in regards to ongoing symptom management of anxiety/ PTSD , and depression.      New symptoms or complaints: \" I have not been functionning due to bad weather, I am in pain too\"    Functional Impairment:   Personal: 4  Family: 4  Social: 4  Work: 0    Clinical assessment of mental status:   Alexus Garcia presented on time.   She was oriented x3, open and cooperative, and dressed appropriately for this session and weather. Her memory was Normal cognitive functioning .  Her speech was  Within normal.  Language was appropriate.  Concentration and focus is Within normal. Psychosis is not noted or reported. She reports her mood is tired and sleepy.  Affect is congruent with speech and is Congruent w/content of speech.  Fund of knowledge is adequate. Insight is adequate for therapy.    Suicidal/Homicidal Ideation present: Patient denies suicidal and homicidal ideations/means or plans.     Patient's impression of their current status: Patient came in for her session even though it was marked that she had cancelled her appt for today. The team had to put her back on the schedule. Patient reported a fatigue, feeling sleep and in pain. She was not able to sleep last night. She requested to make the session shorter due to not feeling well today.  Has not done much since the last visit due to the rough winter. Still has not scheduled for her surgery but indicated she is about to call to schedule. Patient's cousin celebrated her BD without her twin sister for the frist time. Sister passed away last year. Patient had to be there for her cousin as they get along well. Patient felt great that she " can help others in tough times. Patient will continue her session in 2 weeks. She has not been since January so this time she plan to be consistent in sessions. .     Therapist impression of patients current state: This 63 y.o. White or  female presented to the clinic on time but appeared sleepy which was congruent with her own report. Writer assessed her safety did a follow up on her plan back in January to contact her surgeon regarding her surgery. Patient was encouraged to do as the weather has gotten better. Writer offered empathy to the patient, validated her feelings related to her medical issue that increased her depression and anxiety. Patient will benefit from therapy should she attend her sessions consistently.     Assessment tools used today include: How do you feel today?    Type of psychotherapeutic technique provided: Client centered and Solution-focused    Progress toward short term goals:was last seen in January. reports having been sick and having had difficulties with Winter.     Review of long term goals: Treatment Plan updated: 1/17/2019 next review: 4/17/2019    Diagnosis:   1. Moderate single current episode of major depressive disorder (H)    2. Chronic post-traumatic stress disorder (PTSD)    3. Generalized anxiety disorder    No change    Plan and Follow-up:   Goals reviewed and updated today: 3/15/2019  1. Patient plans to continue taking the medications as prescribed by her NP/MD  2. Patient plans to contact her surgeons about her readiness for surgery.   3. Patient will contact [facility] with questions related to their support group.    5.Patient plans to follow up with therapy in 2 weeks    Discharge Criteria/Planning: Patient will continue with follow-up until therapy can be discontinued without return of signs and symptoms.    Performed and documented by CHAD Freeman- KATHE; [facility] 3/15/2019

## 2021-06-25 NOTE — TELEPHONE ENCOUNTER
Health Maintenance was reviewed and patient is due for AWV with Dr. Morales. CA attempted contact but there was no answer. LVM indicating to return call and schedule apt with PCP.   If pt returns call please assist in scheduling a AWV with Dr. Morales.

## 2021-06-27 NOTE — PROGRESS NOTES
Progress Notes by Hai Parra at 2/6/2019 11:43 AM     Author: Hai Parra Service: -- Author Type: Licensed Social Worker    Filed: 2/6/2019 11:46 AM Encounter Date: 2/6/2019 Status: Signed    : Hai Parra (Licensed Social Worker)       Writer received the following message via Epic:       Abigail Sheffield 25 minutes ago (11:17 AM)        FYI  Sharon called to cancel her appointment with gamal and Lucy due to tomorrows weather.  Her next with you is on 2-28-19 (this was already on the books)    She stated she will make more after that visit         Documentation       Alexus Garcia L [phone]  Abigail Sheffield

## 2021-06-28 NOTE — PROGRESS NOTES
Progress Notes by Sulema Morris LPN at 11/7/2019  1:30 PM     Author: Sulema Morris LPN Service: -- Author Type: Licensed Nurse    Filed: 11/8/2019 12:21 PM Encounter Date: 11/7/2019 Status: Signed    : Sulema Morris LPN (Licensed Nurse)       Pt is here for psychiatric med management follow up.  PMD manages her anxiety and sleep ( Ambien, Lorazepam, Seroquel)  Has [facility] for therapy but hasn't seen her for a couple month due to another tumor removal surgery. Pt  keep fighting CA, is complaining of feeling tired lately. Reports using Ambien x 2/week and Seroquel x 5 days/week for sleep.

## 2021-06-28 NOTE — PROGRESS NOTES
Progress Notes by Katie Velasco CMA at 3/26/2020  1:00 PM     Author: Katie Velasco CMA Service: -- Author Type: Certified Medical Assistant    Filed: 3/26/2020 12:59 PM Encounter Date: 3/26/2020 Status: Signed    : Katie Velasco CMA (Certified Medical Assistant)       [place] :

## 2021-06-28 NOTE — PROGRESS NOTES
Progress Notes by Emilie Garcia LPN at 10/29/2019  1:15 PM     Author: Emilie Garcia LPN Service: -- Author Type: Licensed Nurse    Filed: 10/29/2019  2:19 PM Encounter Date: 10/29/2019 Status: Attested    : Emilie Garcia LPN (Licensed Nurse) Cosigner: Daxa Morales MD at 10/29/2019  6:26 PM    Attestation signed by Daxa Morales MD at 10/29/2019  6:26 PM    agree                Patient reports she receives high dose due to cancer.  Confirmed this via immunization report which shows she received it in 2018.

## 2021-06-28 NOTE — PROGRESS NOTES
"Progress Notes by Sulema Morris LPN at 1/30/2020  1:00 PM     Author: Sulema Morris LPN Service: -- Author Type: Licensed Nurse    Filed: 1/30/2020  2:27 PM Encounter Date: 1/30/2020 Status: Addendum    : Sulema Morris LPN (Licensed Nurse)    Related Notes: Original Note by Sulema Morris LPN (Licensed Nurse) filed at 1/30/2020  1:45 PM       Pt is here for psychiatric med management follow up. Pt says she is \" withdrawing \" because she has been out of Cymbalta for sometime. According to the medicine bottle she had with her: last fill was on 10/8/2019 for 90 days but she was told by pharmacy staff that she is not due until 1/31/2020. Pt said she was trying to use Cymbalta 60 mg tabs she had at home but it made her sick to stomach.  She reports having a lot of anxiety, also got a letter for a jury duty yesterday in the mail.        Correct pharmacy verified with patient and confirmed in snapshot? [x] yes []no    Charge captured ? [x] yes  [] no    Medications Phoned  to Pharmacy [] yes [x]no  Name of Pharmacist:  List Medications, including dose, quantity and instructions      Medication Prescriptions given to patient   [] yes  [x] no   List the name of the drug the prescription was written for.       Medications ordered this visit were e-scribed.  Verified by order class [] yes  [x] no    Medication changes or discontinuations were communicated to patient's pharmacy: [] yes  [x] no    UA collected [] yes  [x] no    [place] Prescription Monitoring Program Reviewed? [x] yes  [] no    Referrals were made to:  none    Future appointment was made: [x] yes  [] no    Dictation completed at time of chart check: [] yes  [x] no    I have checked the documentation for todays encounters and the above information has been reviewed and completed.                 "

## 2021-06-29 NOTE — PROGRESS NOTES
Progress Notes by Sulema Morris LPN at 9/24/2020  2:00 PM     Author: Sulema Morris LPN Service: -- Author Type: Licensed Nurse    Filed: 9/24/2020  3:12 PM Encounter Date: 9/24/2020 Status: Addendum    : Sulema Morris LPN (Licensed Nurse)    Related Notes: Original Note by Sulema Morris LPN (Licensed Nurse) filed at 9/24/2020  2:37 PM       This video/telephone visit will be conducted via a call between you and your physician/provider. We have found that certain health care needs can be provided without the need for an in-person physical exam. This service lets us provide the care you need with a video /telephone conversation. If a prescription is necessary we can send it directly to your pharmacy. If lab work is needed we can place an order for that and you can then stop by our lab to have the test done at a later time.   Just as we bill insurance for in-person visits, we also bill insurance for video/telephone visits. If you have questions about your insurance coverage, we recommend that you speak with your insurance company.   Patient has given verbal consent for video/Telephone visit? yes  Patient would like the video visit invitation sent by: Text to cell phone: na or Send to email: NA  Please call: [phone]  ELISEO/ABAD: ABAD ALVARADO  She reports having more anxiety due to COVID  Patient verified allergies, medications and pharmacy via phone.  Patient states she is ready for visit.

## 2021-06-30 NOTE — PROGRESS NOTES
"Progress Notes by Toya Jovel RD at 2/24/2021 11:00 AM     Author: Toya Jovel RD Service: -- Author Type: Registered Dietitian    Filed: 2/24/2021  3:43 PM Encounter Date: 2/24/2021 Status: Attested    : Toya Jovel RD (Registered Dietitian) Cosigner: Daxa Morales MD at 2/24/2021  5:18 PM    Attestation signed by Daxa Morales MD at 2/24/2021  5:18 PM    Thanks for helping her. I am surprised about the diarrhea she reports, but perhaps she'll adjust and be OK. Seems like she's getting \"into gear\" to do better which is great.  JUAN Morales                Type of Service: Telephone Visit- 60 minutes        Assessment: Anny has had type 2 diabetes for a couple of years.   This is diabetes education follow up visit.  She is currently taking 1000 mg XR metformin twice daily but reporting having diarrhea on daily basis.     She has not started testing BG as she just picked up her meter.   She reports a 7 lb weight loss over the past few weeks.   She is eating 3 times daily.   She is trying to be more active each day, walk/marching in place.  Stretching on pilates ball.  Trying to eat more fruit instead of candy bars.        Bedtime snack will have nuts.    She is a new grandmother.  She has complicated medical hx , including breast cancer, stage 3 CKD.       Plan:  If she is unable to tolerate 2000 mg metformin, recommended she go back to 1500 mg metformin once daily.  Congratulated her on making the changes in her diet.   During phone visit, instructions provided for checking BG.  Her BG was 130 mg/dl - FBS.   Recommend that she start checking BG on daily basis,  Recommnend checking FBS and one other 2 hour post meal reading at varying times.   Reviewed BG goals.   F/up CDE 1 month.    Due for Alc, she agreed to go in for lab only appt. prior to next CDE visit.  To call with questions.      These goals were set again today:     1)  check BG as noted above -- did not " do - agreed to start  2) increase activity as able:  accomplished  3) Follow carbohydrate recommendations, decrease sweets- will not bring home,  sometimes     Subjective and Objective:       Alexus Garcia is referred by Dr. Morales for Diabetes Education.                Lab Results   Component Value Date     HGBA1C 8.2 (H) 11/10/2020          Education:      Monitoring   Meter (per above goals): assessment,-  able to do BG test while coached over phone  Monitoring: assessment, discussed, literature provided-  had materials in front of her, they had been mailed previously   BG goals: assessment, discussed,  literature provided        Nutrition Management:  assessment, discussed,  literature provided   Weight:assessment, discussed,  literature provided   Portions/Balance: assessment, discussed,  literature provided   Carb ID/Count: assessment, discussed,,  literature provided   Label Reading: assessment, discussed, ,  literature provided   Heart Healthy Fats:assessment, discussed,  literature provided   Menu Planning: assessment, discussed,,  literature provided   Dining Out: assessment, discussed,  literature provided - she goes out 1-2 times weekly with son  Physical Activity: assessment, discussed,  literature provided - trying to increase  Medications: assessment, discussed  Orals: assessment, discussed  Diabetes Disease Process: Discussed     Acute Complications: Prevent, Detect, Treat:  Hypoglycemia: Discussed  Hyperglycemia: Discussed  Sick Days: Literature provided     Chronic Complications  Foot Care: literature provided  Skin Care: Literature provided and Needs instruction/review at follow-up  Eye: Literature provided  ABC: Needs instruction/review at follow-up  Teeth:Literature provided  Goal Setting and Problem Solving: Needs instruction/review at follow-up  Barriers: Discussed  Psychosocial Adjustments: Discussed        Time spent with the patient: 60 minutes for diabetes education and  counseling.   Previous Education: yes  Visit Type:DSMT

## 2021-06-30 NOTE — PROGRESS NOTES
Progress Notes by Katie Velasco CMA at 1/25/2021  2:30 PM     Author: Katie Velasco CMA Service: -- Author Type: Certified Medical Assistant    Filed: 1/25/2021  3:01 PM Encounter Date: 1/25/2021 Status: Signed    : Katie Velasco CMA (Certified Medical Assistant)       This video/telephone visit will be conducted via a call between you and your physician/provider. We have found that certain health care needs can be provided without the need for an in-person physical exam. This service lets us provide the care you need with a video /telephone conversation. If a prescription is necessary we can send it directly to your pharmacy. If lab work is needed we can place an order for that and you can then stop by our lab to have the test done at a later time.   Just as we bill insurance for in-person visits, we also bill insurance for video/telephone visits. If you have questions about your insurance coverage, we recommend that you speak with your insurance company.   Patient has given verbal consent for video/Telephone visit? Yes   Patient would like telephone visit please call: [phone]  ELISEO/ABAD FUNES CMA   AVS-MyChart  No new concerns at this time. Depression 4/5 last two weeks.  Denies SI/HI thoughts at this time.     Patient verified allergies, medications and pharmacy via phone. Patient states she is ready for visit.  [place]  was reviewed prior to seeing patient today. See below for embedded report.

## 2021-06-30 NOTE — PROGRESS NOTES
Progress Notes by Toya Jovel RD at 3/24/2021  1:00 PM     Author: Toya Jovel RD Service: -- Author Type: Registered Dietitian    Filed: 3/24/2021  1:51 PM Encounter Date: 3/24/2021 Status: Attested    : Toya Jovel RD (Registered Dietitian) Cosigner: Daxa Morales MD at 3/24/2021  2:03 PM    Attestation signed by Daxa Morales MD at 3/24/2021  2:03 PM    Thanks for your good care for Anny. She is so good at pursuing good health!  JUAN Morales                Type of Service: Telephone Visit- 45 minutes    Patient would like to receive their AVS by AVS Preference: Nabeel.     Assessment: Anny has had type 2 diabetes for a couple of years.   This is diabetes education follow up visit.  She is currently taking 1000 mg XR metformin twice daily, she is having some side effects but tolerating ok.   She started testing FBS about 1 month ago.  Reports weight 141 lbs.  She has avoided ice cream and sweets the past month.  She typically eats  3 times daily, looking at food labels, smaller food portions, more fruit/vegetables. She is looking forward to warmer weather and increasing activity.      FBS:  average over past  8 days   122 mg/dl    She is a new grandmother, baby doing well, but she did have falling out with her son (baby's dad).  She has complicated medical hx , including breast cancer, stage 3 CKD.   She is scheduled for lab only appt for Alc this Friday.    BF:  oatmeal or PB toast  or egg         Lunch:  sandwich and/or soup         Dinner:  meat/veg/  fruit       Plan:  Congratulated her on making the changes in her diet. Reviewed BG targets.   Recommnend continue to check FBS and occ check 2 hours after eating.  Discussed A, B, C's diabetes.  Reviewed guidelines for eye, foot, dental, and skin care.  F up with CDE in 3 months. To call with questions.      These goals were set again today:     1)  check BG as noted above --  doing FBS  2) increase activity  as able:  accomplished  3) Follow carbohydrate recommendations, decrease sweets- accomplished     Subjective and Objective:       Alexus Garcia is referred by Dr. Morales for Diabetes Education.                Lab Results   Component Value Date     HGBA1C 8.2 (H) 11/10/2020          Education:      Monitoring   Meter (per above goals): assessment,-  competent  Monitoring: assessment, discussed, literature provided-  had materials in front of her, they had been mailed previously   BG goals: assessment, discussed,  literature provided        Nutrition Management:  assessment, discussed,  literature provided   Weight:assessment, discussed,  literature provided   Portions/Balance: assessment, discussed,  literature provided   Carb ID/Count: assessment, discussed,,  literature provided   Label Reading: assessment, discussed, ,  literature provided   Heart Healthy Fats:assessment, discussed,  literature provided   Menu Planning: assessment, discussed,,  literature provided   Dining Out: assessment, discussed,  literature provided - she goes out 1-2 times weekly with son  Physical Activity: assessment, discussed,  literature provided - trying to increase  Medications: assessment, discussed  Orals: assessment, discussed  Diabetes Disease Process: Discussed     Acute Complications: Prevent, Detect, Treat:  Hypoglycemia: Discussed  Hyperglycemia: Discussed  Sick Days: Literature provided     Chronic Complications  Foot Care: literature provided, discussed  Skin Care: Literature provided, discussed  Eye: Literature provided discussed,  did have eye exam scheduled but cancelled due to Covid  ABC: assessed, discussed  Teeth:Literature provided, discussed  Goal Setting and Problem Solving: Needs instruction/review at follow-up  Barriers: Discussed  Psychosocial Adjustments: Discussed        Time spent with the patient: 45 minutes for diabetes education and counseling.   Previous Education: yes  Visit Type:DSMT

## 2021-06-30 NOTE — PROGRESS NOTES
Progress Notes by Toya Jovel RD at 1/20/2021 10:00 AM     Author: Toya Jovel RD Service: -- Author Type: Registered Dietitian    Filed: 1/20/2021 11:58 AM Encounter Date: 1/20/2021 Status: Attested    : Toya Jovel RD (Registered Dietitian) Cosigner: Daxa Morales MD at 1/20/2021  2:48 PM    Attestation signed by Daxa Morales MD at 1/20/2021  2:48 PM    Thanks for encouraging her to follow through and DO things --she can be passive. When I first met her I thought she did not do things because of all her surgeries she was going through. Now the surgeries are finished and she still does not follow through well. Keep encouraging the right things!                Type of Service: Telephone Visit      Assessment: Anny has had type 2 diabetes for a couple of years.   This is diabetes education follow up visit.  She is currently taking 1000 mg XR metformin once daily. She is scheduled to titrate to 1500mg once daily next week.   She has not started testing BG as she's had problems getting her prescription.  She was expecting Medicare and supplement to cover BG testing supplies @ 100%-  she reports having financial challenges  as her vet bill for cat has been $1200 this past week.    She has been making effort to eat healthier:  she has been eating more often:     food recall:   11 am sourav mccauley  ([employer])              Lunch:  fruit, milk  or sandwich         Dinner:  riblet, green beans, fries  Trying to eat more fruit instead of candy bars.        Bedtime snack will have nuts.         She did receive diabetes education materials.  She has not been walking halls in senior living facility due to Covid.  She has complicated medical hx , including breast cancer, stage 3 CKD.       Plan:  Congratulated her on making the changes in her diet.   Recommend that she start checking BG levels.   Will mail out sample meter but she will need to  testing strips from  pharmacy.   Reviewed BG goals.  Encouraged more activity as able.    F/up CDE 1 month.  To call with questions.      These goals were set again today:     1)  check BG as noted above -- did not do  2) increase activity as able:  will try [employer] activity -  did not do   3) Follow carbohydrate recommendations, decrease sweets- will not bring home,  sometimes     Subjective and Objective:       Alexus Garcia is referred by Dr. Morales for Diabetes Education.            Lab Results   Component Value Date     HGBA1C 8.2 (H) 11/10/2020          Education:      Monitoring   Meter (per above goals): assessment,-  rec to view training video from [employer]  Monitoring: assessment, discussed, literature provided   BG goals: assessment, discussed,  literature provided        Nutrition Management:  assessment, discussed,  literature provided   Weight:assessment, discussed,  literature provided   Portions/Balance: assessment, discussed,  literature provided   Carb ID/Count: assessment, discussed,,  literature provided   Label Reading: assessment, discussed, ,  literature provided   Heart Healthy Fats:assessment, discussed,  literature provided   Menu Planning: assessment, discussed,,  literature provided   Dining Out: assessment, discussed,  literature provided   Physical Activity: assessment, discussed,  literature provided   Medications: assessment, discussed  Orals: assessment, discussed  Diabetes Disease Process: Discussed     Acute Complications: Prevent, Detect, Treat:  Hypoglycemia: Discussed  Hyperglycemia: Discussed  Sick Days: Literature provided     Chronic Complications  Foot Care: literature provided  Skin Care: Literature provided and Needs instruction/review at follow-up  Eye: Literature provided  ABC: Needs instruction/review at follow-up  Teeth:Literature provided  Goal Setting and Problem Solving: Needs instruction/review at follow-up  Barriers: Discussed  Psychosocial Adjustments: Discussed        Time spent with  the patient: 30 minutes for diabetes education and counseling.   Previous Education: yes  Visit Type:DSMT

## 2021-06-30 NOTE — PROGRESS NOTES
Progress Notes by Toya Jovel RD at 1/11/2021  2:00 PM     Author: Toya Jovel RD Service: -- Author Type: Registered Dietitian    Filed: 1/11/2021  3:23 PM Encounter Date: 1/11/2021 Status: Attested    : Toya Jovel RD (Registered Dietitian) Cosigner: Daxa Morales MD at 1/11/2021  7:09 PM    Attestation signed by Daxa Morales MD at 1/11/2021  7:09 PM    Thanks for seeing Anny. I hope she listened to you and tries the slow increase in metformin and better eating practices.                Assessment: Anny has had type 2 diabetes for a couple of years.   She is currently taking 2000 mg XR metformin once daily but is complaining of stomach problems since increased dose, gassy and nauseous.   She has been on 500 mg metformin for a couple of years.  She typically eats once daily;  she receives meals from [facility] (protein/vegetable/starch).  She does snack on nuts, fruits, crackers.   She loves the sweets and is challenged to avoid them.  She lives in senior living facility and will walk halls [facility].   She does not check BG.  She has complicated medical hx , including breast cancer, stage 3 CKD.      Plan: Suggested titirating dose of metforming to start with 1,000 mg metformin for once week, 2nd week to 1500 mg,  3rd week 2,000 mg metformin. Reviewed diabetes diagnosis, BG goals, nutrition, activity, and medication guidelines.   Will mail diabetes education materials.   Recommnend checking FBS and one other 2 hour post meal reading at varying times.    Reviewed simple ideas for breakfast and lunch.   F/up CDE 2-3 weeks.     1)  check BG as noted above  2) increase activity as able:  will try [employer] activity  3) Follow carbohydrate recommendations, decrease sweets- will not bring home    Subjective and Objective:      Alexus Garcia is referred by Dr. Morales for Diabetes Education.     Lab Results   Component Value Date    HGBA1C 8.2 (H) 11/10/2020         Goals      ? Diabetes      Goal 05.31.16:  Nutrition-  Watch carb intake at breakfast  Ensure all meals and snacks have a carb and a healthy fat and/or protein  Other-  Continue to be as active as tolerated      ? I wan tto have my breast surgery and be safe. (pt-stated)      Action steps to achieve this goal  1.  I will go to my medical appointments and bring health questions I have to discuss with my providers.  2.  I will continue going to therapy.  3.  I will stay in contact with my Care Guide to help me understand CCC.    Date goal set:  2/6/17            Follow up:         Education:     Monitoring   Meter (per above goals): assessment,-  rec to view training video from [employer]  Monitoring: assessment, discussed, literature provided   BG goals: assessment, discussed,  literature provided      Nutrition Management:  assessment, discussed,  literature provided   Weight:assessment, discussed,  literature provided   Portions/Balance: assessment, discussed,  literature provided   Carb ID/Count: assessment, discussed,,  literature provided   Label Reading: assessment, discussed, ,  literature provided   Heart Healthy Fats:assessment, discussed,  literature provided   Menu Planning: assessment, discussed,,  literature provided   Dining Out: assessment, discussed,  literature provided   Physical Activity: assessment, discussed,  literature provided   Medications: assessment, discussed  Orals: assessment, discussed  Diabetes Disease Process: Discussed    Acute Complications: Prevent, Detect, Treat:  Hypoglycemia: Discussed  Hyperglycemia: Discussed  Sick Days: Literature provided    Chronic Complications  Foot Care: literature provided  Skin Care: Literature provided and Needs instruction/review at follow-up  Eye: Literature provided  ABC: Needs instruction/review at follow-up  Teeth:Literature provided  Goal Setting and Problem Solving: Needs instruction/review at follow-up  Barriers: Discussed  Psychosocial Adjustments:  Discussed      Time spent with the patient: 60 minutes for diabetes education and counseling.   Previous Education: no  Visit Type:LINDA Jovel  1/11/2021

## 2021-07-03 NOTE — ADDENDUM NOTE
Addendum Note by Diaz Morales MD at 3/14/2018 12:07 PM     Author: Diaz Morales MD Service: -- Author Type: Physician    Filed: 3/14/2018 12:07 PM Encounter Date: 3/14/2018 Status: Signed    : Diaz Morales MD (Physician)    Addended by: DIAZ MORALES on: 3/14/2018 12:07 PM        Modules accepted: Orders

## 2021-07-03 NOTE — ADDENDUM NOTE
Addendum Note by Diaz Morales MD at 3/2/2017 11:25 AM     Author: Diaz Morales MD Service: -- Author Type: Physician    Filed: 3/2/2017 11:25 AM Encounter Date: 2/22/2017 Status: Signed    : Diaz Morales MD (Physician)    Addended by: DIAZ MORALES on: 3/2/2017 11:25 AM        Modules accepted: Orders

## 2021-07-04 NOTE — ADDENDUM NOTE
Addendum Note by Sangita Barnes RN at 2/16/2021 12:08 PM     Author: Sangita Barnes RN Service: -- Author Type: Registered Nurse    Filed: 2/16/2021 12:08 PM Encounter Date: 2/12/2021 Status: Signed    : Sangita Barnes RN (Registered Nurse)    Addended by: SANGITA BARNES on: 2/16/2021 12:08 PM        Modules accepted: Orders

## 2021-07-04 NOTE — ADDENDUM NOTE
Addendum Note by Sangita Barnes RN at 2/15/2021  4:11 PM     Author: Sangita Barnes RN Service: -- Author Type: Registered Nurse    Filed: 2/15/2021  4:11 PM Encounter Date: 2/12/2021 Status: Signed    : Sangita Barnes RN (Registered Nurse)    Addended by: SANGITA BARNES on: 2/15/2021 04:11 PM        Modules accepted: Orders

## 2021-07-14 ENCOUNTER — VIRTUAL VISIT (OUTPATIENT)
Dept: BEHAVIORAL HEALTH | Facility: CLINIC | Age: 66
End: 2021-07-14
Payer: MEDICARE

## 2021-07-14 DIAGNOSIS — F32.89 OTHER DEPRESSION: ICD-10-CM

## 2021-07-14 PROCEDURE — 99443 PR PHYSICIAN TELEPHONE EVALUATION 21-30 MIN: CPT | Mod: 95 | Performed by: NURSE PRACTITIONER

## 2021-07-14 RX ORDER — DULOXETIN HYDROCHLORIDE 30 MG/1
60 CAPSULE, DELAYED RELEASE ORAL DAILY
Qty: 60 CAPSULE | Refills: 1 | Status: SHIPPED | OUTPATIENT
Start: 2021-07-14 | End: 2021-09-02

## 2021-07-14 NOTE — PROGRESS NOTES
This video/telephone visit will be conducted via a call between you and your physician/provider. We have found that certain health care needs can be provided without the need for an in-person physical exam. This service lets us provide the care you need with a video /telephone conversation. If a prescription is necessary we can send it directly to your pharmacy. If lab work is needed we can place an order for that and you can then stop by our lab to have the test done at a later time.    Just as we bill insurance for in-person visits, we also bill insurance for video/telephone visits. If you have questions about your insurance coverage, we recommend that you speak with your insurance company.    Patient has given verbal consent for Telephone visit? Yes   Patient would like telephone visit please call: [phone]  ELISEO/ABAD TORRES-Nabeel   No refills are needed at this time. Pt has 90 days plus refill at Mail order.   Patient verified allergies, medications and pharmacy via phone.  Patient states she is ready for visit.  [place]  to be reviewed by provider.

## 2021-07-14 NOTE — PATIENT INSTRUCTIONS
Continue medications as prescribed  Have your pharmacy contact us for a refill if you are running low on medications (We may ask you to come into clinic to get a refill from the nurse  No Alcohol or drug use  No driving if sedated  Call the clinic with any questions or concerns   Reach out for help if you feel like hurting yourself or others ([facility] [phone]: [address] [facility], [address] or [facility] Suicide Hotline [phone] , call 911 or go to nearest Emergency room    Follow up as directed, for your appointments, per your After Visit Summary Form.

## 2021-07-14 NOTE — PROGRESS NOTES
"  The patient has been notified of following:      \"This telephone visit will be conducted via a call between you and your physician/provider. We have found that certain health care needs can be provided without the need for a physical exam.  This service lets us provide the care you need with a short phone conversation.  If a prescription is necessary we can send it directly to your pharmacy.  If lab work is needed we can place an order for that and you can then stop by our lab to have the test done at a later time.     Telephone visits are billed at different rates depending on your insurance coverage. During this emergency period, for some insurers they may be billed the same as an in-person visit.  Please reach out to your insurance provider with any questions.     If during the course of the call the physician/provider feels a telephone visit is not appropriate, you will not be charged for this service.\"     Patient has given verbal consent to a Telephone visit? Yes        Patient would like to receive their AVS by AVS P/reference: Mail a copy        Psychiatric  Out patient Follow Up Progress Note  Date of visit:7/14/2021         Discussion of Care and Treatment Recommendations:   This is a 65 year old female with history of depression and PTSD presenting to our virtual clinic for a follow up appointment.         PCP managing sleep :  Prescribing  Lorazepam ,Seroquel and Ambien   PCP managing Gabapentin for neurological issues          Last visit 05/17/2021  Recommendation at last visit .  .Continue with  Psychotherapy   2.Highly recommend : Community Support groups   3. Continue   Cymbalta at  30 mg daily    4. RTC- 8  weeks, call in between visit with any question  Patient and I reviewed diagnosis and treatment plan and patient agrees with following recommendations:  Ongoing education given regarding diagnostic and treatment options with adequate verbalization of understanding.  Plan   1.MDD/Anxiety/PTSD : " Had stopped seeing a therapist but agreeable to restart ambulatory referral to psychotherapy  2.MDD/ANxiety /PTSDHighly recommend : Community Support groups   3.MDD/Anxiety : Titrate   Cymbalta to  60 mg daily    4. 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)     Malignant neoplasm of breast (H)     Posttraumatic hematoma of right breast     Postoperative pain     Pain in right axilla     Constipation due to outlet dysfunction     Logorrhea     Type 2 diabetes mellitus without complication, without long-term current use of insulin (H)     Rotator cuff tear     Anxiety     Scalp irritation     Abnormal MRI     Diabetes 1.5, managed as type 2 (H)     H/O breast surgery     Advanced directives, counseling/discussion     Vitamin D deficiency disease     Moderate major depression (H)     Concussion with loss of consciousness     CKD (chronic kidney disease) stage 3, GFR 30-59 ml/min     Concussion without loss of consciousness             Chief Complaint / Subjective:    Chief complaint: Depression     History of Present Illness:   Per patient's statement : Patient has been compliant with current medications denies side effects.  She has been feeling depressed and sad lately and has poor motivation to do anything from anyone.  She finds herself worrying around people as mostly for more stress and she does not know who is vaccinated and not significant.   She is currently on a dose of Cymbalta 30 mg daily.  We will we discussed titrating the medication to 60 mg daily and also discussed going back to therapy.  She is agreeable to both recommendations.  She is denying suicidal homicidal ideations.  She states she feels safe she verbally contracts for safety.  She denies all other psychiatric issues and offers no other complaints  I will  "titrate her Cymbalta to 60 mg daily, ambulatory referral to psychotherapy made.  I will have her return to the clinic in approximately 4 weeks for follow-up appointment.  She will call in between visits with any questions or concerns.    Mental Status Examination:     Appearance: unable to assess  Orientation: Patient alert and oriented to person, place, time, and situation  Reliability:  Patient appears to be an adequate historian.    Behavior: unable to assess  Speech: Speech is spontaneous and coherent, with a normal rate, rhythm and tone.    Language:There are no difficulties with expressive or receptive language as observed throughout the interview.    Mood: Described as \"ok\".    Affect: unable to assess  Judgement: Able to make basic decision regarding safety.  Insight: Good awareness of physical and mental health conditions and aware of needs around care for these.  Gait and station: unable to assess  Thought process: Logical   Hallucinations : No evidence of any hallucination  Thought content: No evidence of delusions or paranoia.   Suicidal /Homical Ideations:  No thoughts of self harm or suicide. No thoughts of harming others.  Associations: Connected  Fund of knowledge: Average  Attention / Concentration: Able to remain focused during the interview with minimal distractibility or need for redirection.  Short Term Memory: Grossly intact as evidence by client recalling themes and ideas discussed.  Long Term Memory: Intact  Motor Status: unable to asse      Drug/treatment history and current pattern of use:   Denies    Medication changes: See Above   Medication adherence: compliant  Medication side effects: absent  Information about medications: Side effects, benefits and alternative treatments discussed and patient agrees .    Psychotherapy: Supportive therapy day-to-day living    Education: Diet, exercise, abstinence from drugs and alcohol, patient will not drive if sedated and medications or  under " influence of any substance    Lab Results:   Personally reviewed and discussed with the patient    Lab Results   Component Value Date    WBC 8.4 12/16/2020    HGB 15.9 12/16/2020    HCT 48.8 (H) 12/16/2020     12/16/2020    CHOL 302 (H) 11/10/2020    TRIG 156 (H) 11/10/2020    HDL 70 11/10/2020     (H) 11/10/2020     (H) 11/10/2020     (L) 12/16/2020    BUN 22 12/16/2020    CO2 20 (L) 12/16/2020    TSH 0.84 12/06/2019    MICROALBUR 4.15 (H) 11/10/2020       Vital signs:  There were no vitals taken for this visit.  Unable to assess telephone visit  Allergies: Aztreonam, Castor oil, Cefaclor, Cephalexin, Cephalexin monohydrate [cephalexin], Chlorhexidine, Ciprofloxacin, Clarithromycin, Clindamycin, Penicillins, Propofol, Scopolamine, Sulfa (sulfonamide antibiotics) [sulfa drugs], Sulfasalazine, Tetracyclines, Vancomycin, Adhesive [mecrylate], Cytoxan [cyclophosphamide], Erythromycin base [erythromycin], Hydrocodone, Neupogen [filgrastim], Paper tape [adhesive tape], Tapentadol, Taxol [paclitaxel], and Taxotere [docetaxel]           Review of Systems:      ROS:  Subjective data only - Tele-Health  Visit   10 point ROS was negative except for the items listed in HPI-              Medications:     Current Outpatient Medications   Medication     acetaminophen (TYLENOL) 500 MG tablet     betamethasone, augmented, (DIPROLENE) 0.05 % lotion     blood glucose test (CONTOUR NEXT TEST STRIPS) strips     blood glucose test strips     blood-glucose meter (CONTOUR NEXT METER) Misc     calcium citrate-vitamin D (CITRACAL+D) 315-200 mg-unit per tablet     cholecalciferol, vitamin D3, 1,000 unit (25 mcg) tablet     DULoxetine (CYMBALTA) 30 MG capsule     generic lancets (MICROLET LANCET)     lisinopriL (PRINIVIL,ZESTRIL) 2.5 MG tablet     LORazepam (ATIVAN) 1 MG tablet     melatonin 10 mg Tab     metFORMIN (GLUCOPHAGE-XR) 500 MG 24 hr tablet     naproxen sodium (ALEVE) 220 MG tablet     ONETOUCH ULTRA2      oxyCODONE (ROXICODONE) 5 MG immediate release tablet     polyethylene glycol (MIRALAX) 17 gram packet     QUEtiapine (SEROQUEL) 25 MG tablet     simvastatin (ZOCOR) 5 MG tablet     No current facility-administered medications for this visit.                 Coordination of Care:   More than 30 minutes spent on this visit  with more than 50% of time spent on coordination of care including: Educating patient about diagnosis, prognosis, side effects and benefits of medications, diet, exercise.  Time also spent providing supportive therapy regarding above issues.    This note was created using a dictation system. All typing errors or contextual distortion is unintentional and software inherent.  Start Time : 3: 00  End time : 3: 30 pM

## 2021-07-15 NOTE — PROGRESS NOTES
________________________________________  Medications Phoned  to Pharmacy [] yes [x]no  Name of Pharmacist:  List Medications, including dose, quantity and instructions    Medications ordered this visit were e-scribed.  Verified by order class [x] yes  [] no  Cymbalta 30 mg   Medication changes or discontinuations were communicated to patient's pharmacy: [] yes  [x] no    Dictation completed at time of chart check: [] yes  [] no    I have checked the documentation for today s encounters and the above information has been reviewed and completed.

## 2021-07-22 ENCOUNTER — RECORDS - HEALTHEAST (OUTPATIENT)
Dept: SCHEDULING | Facility: CLINIC | Age: 66
End: 2021-07-22

## 2021-07-22 DIAGNOSIS — Z12.31 OTHER SCREENING MAMMOGRAM: ICD-10-CM

## 2021-08-22 ENCOUNTER — HEALTH MAINTENANCE LETTER (OUTPATIENT)
Age: 66
End: 2021-08-22

## 2021-09-02 ENCOUNTER — VIRTUAL VISIT (OUTPATIENT)
Dept: BEHAVIORAL HEALTH | Facility: CLINIC | Age: 66
End: 2021-09-02
Payer: MEDICARE

## 2021-09-02 DIAGNOSIS — G47.00 PERSISTENT INSOMNIA: ICD-10-CM

## 2021-09-02 DIAGNOSIS — F32.89 OTHER DEPRESSION: ICD-10-CM

## 2021-09-02 RX ORDER — DULOXETIN HYDROCHLORIDE 30 MG/1
30 CAPSULE, DELAYED RELEASE ORAL DAILY
Qty: 90 CAPSULE | Refills: 0 | Status: SHIPPED | OUTPATIENT
Start: 2021-09-02 | End: 2021-11-01

## 2021-09-02 NOTE — PATIENT INSTRUCTIONS
Continue medications as prescribed  Have your pharmacy contact us for a refill if you are running low on medications (We may ask you to come into clinic to get a refill from the nurse  No Alcohol or drug use  No driving if sedated  Call the clinic with any questions or concerns   Reach out for help if you feel like hurting yourself or others ([facility] [phone]: [address], [address] or [facility] Suicide Hotline [phone] , call 911 or go to nearest Emergency room    Follow up as directed, for your appointments, per your After Visit Summary Form.

## 2021-09-02 NOTE — PROGRESS NOTES
"  The patient has been notified of following:      \"This telephone visit will be conducted via a call between you and your physician/provider. We have found that certain health care needs can be provided without the need for a physical exam.  This service lets us provide the care you need with a short phone conversation.  If a prescription is necessary we can send it directly to your pharmacy.  If lab work is needed we can place an order for that and you can then stop by our lab to have the test done at a later time.     Telephone visits are billed at different rates depending on your insurance coverage. During this emergency period, for some insurers they may be billed the same as an in-person visit.  Please reach out to your insurance provider with any questions.     If during the course of the call the physician/provider feels a telephone visit is not appropriate, you will not be charged for this service.\"     Patient has given verbal consent to a Telephone visit? Yes        Patient would like to receive their AVS by AVS P/reference: Mail a copy          Psychiatric  Out patient Follow Up Progress Note  Date of visit:9/2/2021         Discussion of Care and Treatment Recommendations:   This is a 65 year old female with history of depression and PTSD presenting to our virtual clinic for a follow up appointment.         PCP managing sleep :  Prescribing  Lorazepam ,Seroquel and Ambien   PCP managing Gabapentin for neurological issues       Last visit 07/14/2021 Recommendation at last visit   1.MDD/Anxiety/PTSD : Had stopped seeing a therapist but agreeable to restart ambulatory referral to psychotherapy  2.MDD/ANxiety /PTSDHighly recommend : Community Support groups   3.MDD/Anxiety : Titrate   Cymbalta to  60 mg daily    4. RTC- 4  weeks, call in between visit with any question  Patient and I reviewed diagnosis and treatment plan and patient agrees with following recommendations:  Ongoing education given regarding " diagnostic and treatment options with adequate verbalization of understanding.  Plan   1.MDD/Anxiety/PTSD : Had stopped seeing a therapist but agreeable to restart ambulatory referral to psychotherapy made.  2.MDD/ANxiety /PTSDHighly recommend : Community Support groups -  3.MDD/Anxiety : Titrate   Cymbalta  down to  30 mg daily    4. 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)     Malignant neoplasm of breast (H)     Posttraumatic hematoma of right breast     Postoperative pain     Pain in right axilla     Constipation due to outlet dysfunction     Logorrhea     Type 2 diabetes mellitus without complication, without long-term current use of insulin (H)     Rotator cuff tear     Anxiety     Scalp irritation     Abnormal MRI     Diabetes 1.5, managed as type 2 (H)     H/O breast surgery     Advanced directives, counseling/discussion     Vitamin D deficiency disease     Moderate major depression (H)     Concussion with loss of consciousness     CKD (chronic kidney disease) stage 3, GFR 30-59 ml/min     Concussion without loss of consciousness             Chief Complaint / Subjective:    Chief complaint: Depression     History of Present Illness:  Per patient's statement :.  She is still dealing with a lot of family dynamics.  Her sister is in the hospital right now and her sons girlfriend is pregnant again and has about a 6-month-old baby and she is stressed about this as she supports him financially.  However she reports doing a lot better than she was during her last visit.  She tried being on Cymbalta 60 mg and states she would like to go back to 30 mg today .  She did not note any difference and believes her depression at last time was triggered mainly by family dynamics.  She has now learned to set boundaries and practice some  "self-care strategies.  This is working a lot better for her.  She is also employing meditation and prayer.  We had discussed referral to psychotherapy and she continues to be interested therefore I did make an of ambulatory referral to psychology today for individual psychotherapy.  She denies suicidal homicidal ideations.  She states she feels safe she verbally contracts for safety.  She will continue with Cymbalta 30 mg daily.  Hoping she can get in with psychotherapy soon.  I will have her return to the clinic in approximately 4 weeks for follow-up appointment.  She will call in between visits any questions or concerns.  Mental Status Examination:     Appearance: unable to assess  Orientation: Patient alert and oriented to person, place, time, and situation  Reliability:  Patient appears to be an adequate historian.    Behavior: calm and cooperative    Speech: Speech is spontaneous and coherent, with a normal rate, rhythm and tone.    Language:There are no difficulties with expressive or receptive language as observed throughout the interview.    Mood: Described as \"ok\".    Affect: unable to assess  Judgement: Able to make basic decision regarding safety.  Insight: Good awareness of physical and mental health conditions and aware of needs around care for these.  Gait and station: unable to assess  Thought process: Logical   Hallucinations : No evidence of any hallucination  Thought content: No evidence of delusions or paranoia.   Suicidal /Homical Ideations:  No thoughts of self harm or suicide. No thoughts of harming others.  Associations: Connected  Fund of knowledge: Average  Attention / Concentration: Able to remain focused during the interview with minimal distractibility or need for redirection.  Short Term Memory: Grossly intact as evidence by client recalling themes and ideas discussed.  Long Term Memory: Intact  Motor Status: unable to asses      Drug/treatment history and current pattern of use: "   Denies    Medication changes: See Above   Medication adherence: compliant  Medication side effects: absent  Information about medications: Side effects, benefits and alternative treatments discussed and patient agrees .    Psychotherapy: Supportive therapy day-to-day living    Education: Diet, exercise, abstinence from drugs and alcohol, patient will not drive if sedated and medications or  under influence of any substance    Lab Results:   Personally reviewed and discussed with the patient    Lab Results   Component Value Date    WBC 8.4 12/16/2020    HGB 15.9 12/16/2020    HCT 48.8 (H) 12/16/2020     12/16/2020    CHOL 302 (H) 11/10/2020    TRIG 156 (H) 11/10/2020    HDL 70 11/10/2020     (H) 11/10/2020     (H) 11/10/2020     (L) 12/16/2020    BUN 22 12/16/2020    CO2 20 (L) 12/16/2020    TSH 0.84 12/06/2019    MICROALBUR 4.15 (H) 11/10/2020       Vital signs:  There were no vitals taken for this visit.  Unable to assess telephone visit  Allergies: Aztreonam, Castor oil, Cefaclor, Cephalexin, Cephalexin monohydrate [cephalexin], Chlorhexidine, Ciprofloxacin, Clarithromycin, Clindamycin, Penicillins, Propofol, Scopolamine, Sulfa (sulfonamide antibiotics) [sulfa drugs], Sulfasalazine, Tetracyclines, Vancomycin, Adhesive [mecrylate], Cytoxan [cyclophosphamide], Erythromycin base [erythromycin], Hydrocodone, Neupogen [filgrastim], Paper tape [adhesive tape], Tapentadol, Taxol [paclitaxel], and Taxotere [docetaxel]           Review of Systems:      ROS:  Subjective data only - Tele-Health  Visit   10 point ROS was negative except for the items listed in HPI-              Medications:     Current Outpatient Medications   Medication     acetaminophen (TYLENOL) 500 MG tablet     betamethasone, augmented, (DIPROLENE) 0.05 % lotion     blood glucose test (CONTOUR NEXT TEST STRIPS) strips     blood glucose test strips     blood-glucose meter (CONTOUR NEXT METER) Misc     calcium citrate-vitamin D  (CITRACAL+D) 315-200 mg-unit per tablet     cholecalciferol, vitamin D3, 1,000 unit (25 mcg) tablet     DULoxetine (CYMBALTA) 30 MG capsule     generic lancets (MICROLET LANCET)     lisinopriL (PRINIVIL,ZESTRIL) 2.5 MG tablet     LORazepam (ATIVAN) 1 MG tablet     melatonin 10 mg Tab     metFORMIN (GLUCOPHAGE-XR) 500 MG 24 hr tablet     naproxen sodium (ALEVE) 220 MG tablet     ONETOUCH ULTRA2     oxyCODONE (ROXICODONE) 5 MG immediate release tablet     polyethylene glycol (MIRALAX) 17 gram packet     QUEtiapine (SEROQUEL) 25 MG tablet     simvastatin (ZOCOR) 5 MG tablet     No current facility-administered medications for this visit.         Medication adherence: Reviewed risk/benefits of medication , Patient able to verbalize understanding of side effects and Patient verbally consents to taking medications    Coordination of Care:   More than 30 minutes spent on this visit  with more than 50% of time spent on coordination of care including: Educating patient about diagnosis, prognosis, side effects and benefits of medications, diet, exercise.  Time also spent providing supportive therapy regarding above issues.    This note was created using a dictation system. All typing errors or contextual distortion is unintentional and software inherent.  Start Time : 1500  End time : 1530

## 2021-09-02 NOTE — NURSING NOTE
This video/telephone visit will be conducted via a call between you and your physician/provider. We have found that certain health care needs can be provided without the need for an in-person physical exam. This service lets us provide the care you need with a video /telephone conversation. If a prescription is necessary we can send it directly to your pharmacy. If lab work is needed we can place an order for that and you can then stop by our lab to have the test done at a later time.    Just as we bill insurance for in-person visits, we also bill insurance for video/telephone visits. If you have questions about your insurance coverage, we recommend that you speak with your insurance company.    Patient has given verbal consent for video/Telephone visit? yes  Patient would like telephone visit, please call: [phone]  ELISEO/ABAD : Demarco ALLEN LPN  Pt says she was going thru the rough time and haven't been noticing any improvement on a new Cymbalta dose  Patient verified allergies, medications and pharmacy via phone.  Patient states she  is ready for visit.    ________________________________________  Medications Phoned  to Pharmacy [] yes [x]no  Name of Pharmacist:  List Medications, including dose, quantity and instructions    Medications ordered this visit were e-scribed.  Verified by order class [x] yes  [] no  Cymbalta 30 mg  Medication changes or discontinuations were communicated to patient's pharmacy: [x] yes  [] no  Called [facility] and d/c'd Cymbalta 60 mg - dose decrease  Dictation completed at time of chart check: [x] yes  [] no    I have checked the documentation for today s encounters and the above information has been reviewed and completed.

## 2021-09-03 RX ORDER — QUETIAPINE FUMARATE 25 MG/1
TABLET, FILM COATED ORAL
Qty: 90 TABLET | Refills: 4 | Status: SHIPPED | OUTPATIENT
Start: 2021-09-03 | End: 2022-03-21

## 2021-09-03 NOTE — TELEPHONE ENCOUNTER
"Routing refill request to provider for review/approval because:  Drug not on the [facility] [place]     Last Written Prescription Date:  3/12/21  Last Fill Quantity: 135,  # refills: 4   Last office visit provider:  12/29/20     Requested Prescriptions   Pending Prescriptions Disp Refills     QUEtiapine (SEROQUEL) 25 MG tablet [Pharmacy Med Name: QUETIAPINE FUMARATE 25 MG TAB] 90 tablet 4     Sig: TAKE 1 TABLET BY MOUTH AT BEDTIME       Antipsychotic Medications Failed - 9/2/2021  9:34 AM        Failed - Recent (6 mo) or future (30 days) visit within the authorizing provider's specialty     Patient had office visit in the last 6 months or has a visit in the next 30 days with authorizing provider or within the authorizing provider's specialty.  See \"Patient Info\" tab in inbasket, or \"Choose Columns\" in Meds & Orders section of the refill encounter.            Passed - Blood pressure under 140/90 in past 12 months     BP Readings from Last 3 Encounters:   12/16/20 (!) 84/58   11/10/20 98/68   03/04/20 118/82                 Passed - Patient is 12 years of age or older        Passed - Lipid panel on file within the past 12 months     Recent Labs   Lab Test 11/10/20  1545   CHOL 302*   TRIG 156*   HDL 70   *               Passed - CBC on file in past 12 months     Recent Labs   Lab Test 12/16/20  1530   WBC 8.4   RBC 5.34   HGB 15.9   HCT 48.8*                    Passed - Heart Rate on file within past 12 months     Pulse Readings from Last 3 Encounters:   12/16/20 121   11/10/20 114   03/04/20 104               Passed - A1c or Glucose on file in past 12 months     Recent Labs   Lab Test 03/26/21  1351 12/16/20  1530   GLC  --  113   A1C 6.6*  --        Please review patients last 3 weights. If a weight gain of >10 lbs exists, you may refill the prescription once after instructing the patient to schedule an appointment within the next 30 days.    Wt Readings from Last 3 Encounters:   12/16/20 66.2 kg (146 " lb)   11/10/20 68.5 kg (151 lb)   03/04/20 74.2 kg (163 lb 8 oz)             Passed - Medication is active on med list        Passed - Patient is not pregnant        Passed - No positve pregnancy test on file in past 12 months             velia rodriguez RN 09/02/21 8:04 PM

## 2021-09-27 DIAGNOSIS — E11.9 TYPE 2 DIABETES MELLITUS WITHOUT COMPLICATION, WITHOUT LONG-TERM CURRENT USE OF INSULIN (H): ICD-10-CM

## 2021-09-27 RX ORDER — LISINOPRIL 2.5 MG/1
TABLET ORAL
Qty: 90 TABLET | Refills: 3 | Status: SHIPPED | OUTPATIENT
Start: 2021-09-27 | End: 2022-08-09

## 2021-10-17 ENCOUNTER — HEALTH MAINTENANCE LETTER (OUTPATIENT)
Age: 66
End: 2021-10-17

## 2021-10-19 PROBLEM — F32.9 MAJOR DEPRESSION: Status: ACTIVE | Noted: 2020-12-16

## 2021-11-01 ENCOUNTER — VIRTUAL VISIT (OUTPATIENT)
Dept: BEHAVIORAL HEALTH | Facility: CLINIC | Age: 66
End: 2021-11-01
Attending: NURSE PRACTITIONER
Payer: MEDICARE

## 2021-11-01 DIAGNOSIS — F32.89 OTHER DEPRESSION: ICD-10-CM

## 2021-11-01 PROCEDURE — 99443 PR PHYSICIAN TELEPHONE EVALUATION 21-30 MIN: CPT | Mod: 95 | Performed by: NURSE PRACTITIONER

## 2021-11-01 RX ORDER — ZOLPIDEM TARTRATE 10 MG/1
1 TABLET ORAL
COMMUNITY
End: 2021-11-24

## 2021-11-01 RX ORDER — DULOXETIN HYDROCHLORIDE 30 MG/1
30 CAPSULE, DELAYED RELEASE ORAL DAILY
Qty: 90 CAPSULE | Refills: 0 | Status: SHIPPED | OUTPATIENT
Start: 2021-11-01 | End: 2022-01-05

## 2021-11-01 NOTE — PATIENT INSTRUCTIONS
Continue medications as prescribed  Have your pharmacy contact us for a refill if you are running low on medications (We may ask you to come into clinic to get a refill from the nurse  No Alcohol or drug use  No driving if sedated  Call the clinic with any questions or concerns   Reach out for help if you feel like hurting yourself or others ([facility] [phone]: [address], [address] or [facility] Suicide Hotline [phone] , call 911 or go to nearest Emergency room    Follow up as directed, for your appointments, per your After Visit Summary Form.

## 2021-11-01 NOTE — PROGRESS NOTES
This video/telephone visit will be conducted via a call between you and your physician/provider. We have found that certain health care needs can be provided without the need for an in-person physical exam. This service lets us provide the care you need with a video /telephone conversation. If a prescription is necessary we can send it directly to your pharmacy. If lab work is needed we can place an order for that and you can then stop by our lab to have the test done at a later time.    Just as we bill insurance for in-person visits, we also bill insurance for video/telephone visits. If you have questions about your insurance coverage, we recommend that you speak with your insurance company.    Patient has given verbal consent for Telephone visit? Yes  Patient would like telephone visit please call: [phone]  ELISEO/ABAD FUNES CMA   [facility]   Medication refill is pended for review and approval by provider.  Pt does report auditory sounds when taking Seroquel.   Patient verified allergies, medications and pharmacy via phone.   Patient states she is ready for visit.  [place]  to be reviewed by provider.

## 2021-11-01 NOTE — PROGRESS NOTES
"  The patient has been notified of following:      \"This telephone visit will be conducted via a call between you and your physician/provider. We have found that certain health care needs can be provided without the need for a physical exam.  This service lets us provide the care you need with a short phone conversation.  If a prescription is necessary we can send it directly to your pharmacy.  If lab work is needed we can place an order for that and you can then stop by our lab to have the test done at a later time.     Telephone visits are billed at different rates depending on your insurance coverage. During this emergency period, for some insurers they may be billed the same as an in-person visit.  Please reach out to your insurance provider with any questions.     If during the course of the call the physician/provider feels a telephone visit is not appropriate, you will not be charged for this service.\"     Patient has given verbal consent to a Telephone visit? Yes        Patient would like to receive their AVS by AVS P/reference: Mail a copy          Psychiatric  Out patient Follow Up Progress Note  Date of visit:11/1/2021         Discussion of Care and Treatment Recommendations:   This is a 65 year old female with history of depression and PTSD presenting to our virtual clinic for a follow up appointment.         PCP managing sleep :  Prescribing  Lorazepam ,Seroquel and Ambien   PCP managing Gabapentin for neurological issues       Last visit 09/02/2021.  Recommendation at last visit .  .MDD/Anxiety/PTSD : Had stopped seeing a therapist but agreeable to restart ambulatory referral to psychotherapy made.  2.MDD/ANxiety /PTSDHighly recommend : Community Support groups -  3.MDD/Anxiety : Titrate   Cymbalta  down to  30 mg daily    4. RTC- 4  weeks, call in between visit with any question  Patient and I reviewed diagnosis and treatment plan and patient agrees with following recommendations:  Ongoing education " "given regarding diagnostic and treatment options with adequate verbalization of understanding.  Plan   .MDD/Anxiety/PTSD : Had stopped seeing a therapist but agreeable to restart ambulatory referral to psychotherapy made.  2.MDD/ANxiety /PTSDHighly recommend : Community Support groups -  3.MDD/Anxiety : Continue Cymbalta  down to  30 mg daily    4. 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)     Malignant neoplasm of breast (H)     Posttraumatic hematoma of right breast     Postoperative pain     Pain in right axilla     Constipation due to outlet dysfunction     Logorrhea     Type 2 diabetes mellitus without complication, without long-term current use of insulin (H)     Rotator cuff tear     Anxiety     Scalp irritation     Abnormal MRI     Diabetes 1.5, managed as type 2 (H)     H/O breast surgery     Advanced directives, counseling/discussion     Vitamin D deficiency disease     Moderate major depression (H)     Concussion with loss of consciousness     CKD (chronic kidney disease) stage 3, GFR 30-59 ml/min (H)     Concussion without loss of consciousness             Chief Complaint / Subjective:    Chief complaint: \" I am dong a lot better \"     History of Present Illness:   Per patient's statement : reports compliance with duloxetine.  Feels depression is currently well managed.  Does get sleep medications from his primary doctor including Seroquel.  He had mentioned to the medical assistant that he does experience auditory noises on Seroquel but when I inquired she mentioned that she was not sure whether that was the case.  She has not been seen by a sleep specialist which is what I recommend and she will think about it.  She denies all other psychiatric issues offers no other concerns.  She is yet to hear from psychology about " "getting a new therapist.  I have made a referral for her and I will make another one today.  I did remind her to call the clinic if she does not hear from anyone in 2 weeks.  Patient will return to clinic in approximately 2 months for follow-up appointment she will call in between visits with any questions or concerns.      Mental Status Examination:     Appearance: unable to assess  Orientation: Patient alert and oriented to person, place, time, and situation  Reliability:  Patient appears to be an adequate historian.    Behavior: calm and coperative   Speech: Speech is spontaneous and coherent, with a normal rate, rhythm and tone.    Language:There are no difficulties with expressive or receptive language as observed throughout the interview.    Mood: Described as \"ok\".    Affect: unable to assess  Judgement: Able to make basic decision regarding safety.  Insight: Good awareness of physical and mental health conditions and aware of needs around care for these.  Gait and station: unable to assess  Thought process: Logical   Hallucinations : No evidence of any hallucination  Thought content: No evidence of delusions or paranoia.   Suicidal /Homical Ideations:  No thoughts of self harm or suicide. No thoughts of harming others.  Associations: Connected  Fund of knowledge: Average  Attention / Concentration: Able to remain focused during the interview with minimal distractibility or need for redirection.  Short Term Memory: Grossly intact as evidence by client recalling themes and ideas discussed.  Long Term Memory: Intact  Motor Status: unable to asses      Drug/treatment history and current pattern of use:   Denies     Medication changes: See Above   Medication adherence: compliant  Medication side effects: absent  Information about medications: Side effects, benefits and alternative treatments discussed and patient agrees .    Psychotherapy: Supportive therapy day-to-day living    Education: Diet, exercise, " abstinence from drugs and alcohol, patient will not drive if sedated and medications or  under influence of any substance    Lab Results:   Personally reviewed and discussed with the patient    Lab Results   Component Value Date    WBC 8.4 12/16/2020    HGB 15.9 12/16/2020    HCT 48.8 (H) 12/16/2020     12/16/2020    CHOL 302 (H) 11/10/2020    TRIG 156 (H) 11/10/2020    HDL 70 11/10/2020     (H) 11/10/2020     (H) 11/10/2020     (L) 12/16/2020    BUN 22 12/16/2020    CO2 20 (L) 12/16/2020    TSH 0.84 12/06/2019    MICROALBUR 4.15 (H) 11/10/2020       Vital signs:  There were no vitals taken for this visit.  Unable to assess telephone visit  Allergies: Aztreonam, Castor oil, Cefaclor, Cephalexin, Cephalexin monohydrate [cephalexin], Chlorhexidine, Ciprofloxacin, Clarithromycin, Clindamycin, Penicillins, Propofol, Scopolamine, Sulfa (sulfonamide antibiotics) [sulfa drugs], Sulfasalazine, Tetracyclines, Vancomycin, Adhesive [mecrylate], Cytoxan [cyclophosphamide], Erythromycin base [erythromycin], Hydrocodone, Neupogen [filgrastim], Paper tape [adhesive tape], Tapentadol, Taxol [paclitaxel], and Taxotere [docetaxel]           Review of Systems:      ROS:  Subjective data only - Tele-Health  Visit   10 point ROS was negative except for the items listed in HPI-            Medications:     Current Outpatient Medications   Medication     acetaminophen (TYLENOL) 500 MG tablet     betamethasone, augmented, (DIPROLENE) 0.05 % lotion     blood glucose test (CONTOUR NEXT TEST STRIPS) strips     blood glucose test strips     blood-glucose meter (CONTOUR NEXT METER) Misc     calcium citrate-vitamin D (CITRACAL+D) 315-200 mg-unit per tablet     cholecalciferol, vitamin D3, 1,000 unit (25 mcg) tablet     DULoxetine (CYMBALTA) 30 MG capsule     generic lancets (MICROLET LANCET)     lisinopril (ZESTRIL) 2.5 MG tablet     LORazepam (ATIVAN) 1 MG tablet     metFORMIN (GLUCOPHAGE-XR) 500 MG 24 hr tablet      naproxen sodium (ALEVE) 220 MG tablet     ONETOUCH ULTRA2     oxyCODONE (ROXICODONE) 5 MG immediate release tablet     polyethylene glycol (MIRALAX) 17 gram packet     QUEtiapine (SEROQUEL) 25 MG tablet     simvastatin (ZOCOR) 5 MG tablet     zolpidem (AMBIEN) 10 MG tablet     melatonin 10 mg Tab     No current facility-administered medications for this visit.         Medication adherence: Reviewed risk/benefits of medication , Patient able to verbalize understanding of side effects and Patient verbally consents to taking medications    Coordination of Care:   More than 30 minutes spent on this visit  with more than 50% of time spent on coordination of care including: Educating patient about diagnosis, prognosis, side effects and benefits of medications, diet, exercise.  Time also spent providing supportive therapy regarding above issues.    This note was created using a dictation system. All typing errors or contextual distortion is unintentional and software inherent.  Start Time : 1530  End time : 1600

## 2021-11-02 NOTE — PROGRESS NOTES
________________________________  Medications Phoned  to Pharmacy [] yes [x]no  Name of Pharmacist:  List Medications, including dose, quantity and instructions     Medications ordered this visit were e-scribed.  Verified by order class [x] yes  [] no  Cymbalta 30 mg   Medication changes or discontinuations were communicated to patient's pharmacy: [] yes  [x] no     Dictation completed at time of chart check: [x] yes  [] no     I have checked the documentation for today s encounters and the above information has been reviewed and completed.      Katie Velasco on November 2, 2021 at 8:37 AM

## 2021-11-13 NOTE — PROGRESS NOTES
"  Mental Health Visit Note    11/1/2018    Start time: 14:03   Stop Time: 15:00  Session # 4    Session Type: Patient is presenting for an Individual session.    Alexus Garcia is a 62 y.o. female is being seen today for    Chief Complaint   Patient presents with      Follow Up     Depression     PTSD     psychosocial issues     Follow up in regards to ongoing symptom management of anxiety and depression.     New symptoms or complaints: \"I am frustrated by the [facility] people, son's problems are greater now\"    Functional Impairment:   Personal: 4  Family: 4  Social: 4  Work: 0    Clinical assessment of mental status:   Alexus Garcia presented on time.   She was oriented x3, open and cooperative, and dressed appropriately for this session and weather. Her memory was Normal cognitive functioning .  Her speech was  Excessive.  Language was appropriate.  Concentration and focus is Within normal. Psychosis is not noted or reported. She reports her mood is Anxious, Depressed and frustrated.  Affect is congruent with speech and is Congruent w/content of speech.  Fund of knowledge is adequate. Insight is adequate for therapy.    Suicidal/Homicidal Ideation present: Patient denies suicidal and homicidal ideations/means or plans.     Patient's impression of their current status: Patient reported that she was not happy about how the [facility] is handling her renewal. She is being asked too many things that they already have. She notes none notes that she is has disability and they expect her to keep providing the same information that never changes. She also notes her benefit like food stamps are being cut greatly even though her SS never changed. Patient also brought up to issue around communication over the phone with those whom English is not their first language. She reports this becomes a problem because it adds to her hearing issues. She expressed frustration around working with some foreign people. Though she " acknowledged that she would need to express her needs around hearing issues versus which will help to accommodate her. Patient notes son continue to experience difficulties with his complete autism. This also has brought too many negative feelings in her life. Patient expressed satisfaction of being heard today. she will return on 11/30/2018 for more support.    Therapist impression of patients current state: This 62 y.o. White or  female presented on time. She appeared unhappy as she also confirmed, frustrated by the Health Care system.  Writer acknowledged patient motivation to come to therapy today and  processed the presented concerns and feelings.  Writer actively listened to the patient concerns,  offered empathy and  invited the patient for problem solving. Writer encouraged the patient to identify the most bothersome issues today as she appeared to be dealing with many things at once. Writer assessed her safety and assisted her to schedule for future sessions.  She was very receptive to the active listening and some thought challenging.    Assessment tools used today include:How do you feel today?     Type of psychotherapeutic technique provided: Client centered, Solution-focused and CBT    Progress toward short term goals:patient reports increased anxiety and depression due to county paper work  and son's conditions.     Review of long term goals: Treatment Plan updated: 9/27/2019 next review: 12/27/2018    Diagnosis:   1. Moderate single current episode of major depressive disorder (H)    2. Chronic post-traumatic stress disorder (PTSD)    3. Generalized anxiety disorder    No change    Plan and Follow-up:  1. Patient plans to continue taking the medication as prescribed by her NP/MD  2. Patient will continue working with her SW in the building regarding her insurance and benefit issues  3.Patient will continue to practice positive self talk as needed.   4.Patient plans to follow up with therapy  on 11/30/2018     Discharge Criteria/Planning: Client has chronic symptoms and ongoing therapy for maintenance stability recommended.    Performed and documented by CHAD Freeman- KATHE; [facility] [place] 11/1/2018   Greater than 75%

## 2021-11-15 ENCOUNTER — TELEPHONE (OUTPATIENT)
Dept: FAMILY MEDICINE | Facility: CLINIC | Age: 66
End: 2021-11-15
Payer: MEDICARE

## 2021-11-15 DIAGNOSIS — Z76.0 ENCOUNTER FOR MEDICATION REFILL: Primary | ICD-10-CM

## 2021-11-15 NOTE — TELEPHONE ENCOUNTER
Dr. Morales I called patient to reschedule one of her appointments and she stated she will be out of her Zolpidem in about 5 days. Wondering if you can refill the medication. Please send refill to [facility] on [address]

## 2021-11-24 RX ORDER — ZOLPIDEM TARTRATE 10 MG/1
10 TABLET ORAL
Qty: 30 TABLET | Refills: 0 | Status: SHIPPED | OUTPATIENT
Start: 2021-11-24 | End: 2022-02-11

## 2022-01-02 ASSESSMENT — ANXIETY QUESTIONNAIRES
4. TROUBLE RELAXING: SEVERAL DAYS
7. FEELING AFRAID AS IF SOMETHING AWFUL MIGHT HAPPEN: SEVERAL DAYS
1. FEELING NERVOUS, ANXIOUS, OR ON EDGE: MORE THAN HALF THE DAYS
2. NOT BEING ABLE TO STOP OR CONTROL WORRYING: SEVERAL DAYS
GAD7 TOTAL SCORE: 9
5. BEING SO RESTLESS THAT IT IS HARD TO SIT STILL: SEVERAL DAYS
GAD7 TOTAL SCORE: 9
3. WORRYING TOO MUCH ABOUT DIFFERENT THINGS: SEVERAL DAYS
7. FEELING AFRAID AS IF SOMETHING AWFUL MIGHT HAPPEN: SEVERAL DAYS
6. BECOMING EASILY ANNOYED OR IRRITABLE: MORE THAN HALF THE DAYS
GAD7 TOTAL SCORE: 9

## 2022-01-03 ASSESSMENT — ANXIETY QUESTIONNAIRES: GAD7 TOTAL SCORE: 9

## 2022-01-05 ENCOUNTER — VIRTUAL VISIT (OUTPATIENT)
Dept: BEHAVIORAL HEALTH | Facility: CLINIC | Age: 67
End: 2022-01-05
Attending: NURSE PRACTITIONER
Payer: MEDICARE

## 2022-01-05 DIAGNOSIS — F32.89 OTHER DEPRESSION: ICD-10-CM

## 2022-01-05 PROCEDURE — 99443 PR PHYSICIAN TELEPHONE EVALUATION 21-30 MIN: CPT | Mod: 95 | Performed by: NURSE PRACTITIONER

## 2022-01-05 RX ORDER — DULOXETIN HYDROCHLORIDE 30 MG/1
30 CAPSULE, DELAYED RELEASE ORAL DAILY
Qty: 90 CAPSULE | Refills: 0 | Status: SHIPPED | OUTPATIENT
Start: 2022-01-05 | End: 2022-03-02

## 2022-01-05 NOTE — PROGRESS NOTES
"      The patient has been notified of following:      \"This telephone visit will be conducted via a call between you and your physician/provider. We have found that certain health care needs can be provided without the need for a physical exam.  This service lets us provide the care you need with a short phone conversation.  If a prescription is necessary we can send it directly to your pharmacy.  If lab work is needed we can place an order for that and you can then stop by our lab to have the test done at a later time.     Telephone visits are billed at different rates depending on your insurance coverage. During this emergency period, for some insurers they may be billed the same as an in-person visit.  Please reach out to your insurance provider with any questions.     If during the course of the call the physician/provider feels a telephone visit is not appropriate, you will not be charged for this service.\"     Patient has given verbal consent to a Telephone visit? Yes        Patient would like to receive their AVS by AVS P/reference: Mail a copy          Psychiatric  Out patient Follow Up Progress Note  Date of visit:1/5/2022         Discussion of Care and Treatment Recommendations:   This is a 66 year old female with history of depression and PTSD presenting to our virtual clinic for a follow up appointment.         PCP managing sleep :  Prescribing  Lorazepam ,Seroquel and Ambien   PCP managing Gabapentin for neurological issues       Last visit 11/10/21  Recommendation at last visit .  1.MDD/Anxiety/PTSD : Had stopped seeing a therapist but agreeable to restart ambulatory referral to psychotherapy made.  2.MDD/ANxiety /PTSDHighly recommend : Community Support groups -  3.MDD/Anxiety : Continue Cymbalta  down to  30 mg daily    4. RTC- 8  weeks, call in between visit with any question  Patient and I reviewed diagnosis and treatment plan and patient agrees with following recommendations:  Ongoing education " "given regarding diagnostic and treatment options with adequate verbalization of understanding.  Plan   1.MDD/Anxiety/PTSD : Had stopped seeing a therapist but agreeable to restart ambulatory referral to psychotherapy made.  2.MDD/ANxiety /PTSDHighly recommend : Community Support groups -  3.MDD/Anxiety : Continue Cymbalta  down to  30 mg daily    4. 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)     Malignant neoplasm of breast (H)     Posttraumatic hematoma of right breast     Postoperative pain     Pain in right axilla     Constipation due to outlet dysfunction     Logorrhea     Type 2 diabetes mellitus without complication, without long-term current use of insulin (H)     Rotator cuff tear     Anxiety     Scalp irritation     Abnormal MRI     Diabetes 1.5, managed as type 2 (H)     H/O breast surgery     Advanced directives, counseling/discussion     Vitamin D deficiency disease     Moderate major depression (H)     Concussion with loss of consciousness     CKD (chronic kidney disease) stage 3, GFR 30-59 ml/min (H)     Concussion without loss of consciousness             Chief Complaint / Subjective:    Chief complaint: \" I have terrible flu/covid like symptoms\"     History of Present Illness:   Per patient's statement : She was around her family this [place] and also went to different stores for shopping.  She is fully vaccinated for Covid 19.  She is feeling very sick with no Covid-like symptoms.  She is coughing and is nauseated and has a headache.  She is here to go get tested for Covid but she was very sick to drive herself.  I did highly recommend that she call the primary care provider and also goes get tested as soon as possible.   She has been compliant with current medications and denies side effects.  She is feeling down " "due to her illness that is related to buildup of life and what anxiety takes to get herself back together.  She offers no other concerns.  We will have her continue with current medications and return to the clinic in approximately 8 weeks for follow-up appointment.  She will call in between visits with any questions or concerns.  Mental Status Examination:     Appearance: unable to assess  Orientation: Patient alert and oriented to person, place, time, and situation  Reliability:  Patient appears to be an adequate historian.    Behavior: calm and coperative   Speech: Speech is spontaneous and coherent, with a normal rate, rhythm and tone.    Language:There are no difficulties with expressive or receptive language as observed throughout the interview.    Mood: Described as \"ok\".    Affect: unable to assess  Judgement: Able to make basic decision regarding safety.  Insight: Good awareness of physical and mental health conditions and aware of needs around care for these.  Gait and station: unable to assess  Thought process: Logical   Hallucinations : No evidence of any hallucination  Thought content: No evidence of delusions or paranoia.   Suicidal /Homical Ideations:  No thoughts of self harm or suicide. No thoughts of harming others.  Associations: Connected  Fund of knowledge: Average  Attention / Concentration: Able to remain focused during the interview with minimal distractibility or need for redirection.  Short Term Memory: Grossly intact as evidence by client recalling themes and ideas discussed.  Long Term Memory: Intact  Motor Status: unable to asses      Answers for HPI/ROS submitted by the patient on 1/2/2022  ROBERT 7 TOTAL SCORE: 9      Drug/treatment history and current pattern of use:   Denies     Medication changes: See Above   Medication adherence: compliant  Medication side effects: absent  Information about medications: Side effects, benefits and alternative treatments discussed and patient agrees " .    Psychotherapy: Supportive therapy day-to-day living    Education: Diet, exercise, abstinence from drugs and alcohol, patient will not drive if sedated and medications or  under influence of any substance    Lab Results:  Personally reviewed and discussed with the patient    Lab Results   Component Value Date    WBC 8.4 12/16/2020    HGB 15.9 12/16/2020    HCT 48.8 (H) 12/16/2020     12/16/2020    CHOL 302 (H) 11/10/2020    TRIG 156 (H) 11/10/2020    HDL 70 11/10/2020     (H) 11/10/2020     (H) 11/10/2020     (L) 12/16/2020    BUN 22 12/16/2020    CO2 20 (L) 12/16/2020    TSH 0.84 12/06/2019    MICROALBUR 4.15 (H) 11/10/2020       Vital signs:  There were no vitals taken for this visit.  Unable to assess telephone visit  Allergies: Aztreonam, Castor oil, Cefaclor, Cephalexin, Cephalexin monohydrate [cephalexin], Chlorhexidine, Ciprofloxacin, Clarithromycin, Clindamycin, Penicillins, Propofol, Scopolamine, Sulfa (sulfonamide antibiotics) [sulfa drugs], Sulfasalazine, Tetracyclines, Vancomycin, Adhesive [mecrylate], Cytoxan [cyclophosphamide], Erythromycin base [erythromycin], Hydrocodone, Neupogen [filgrastim], Paper tape [adhesive tape], Tapentadol, Taxol [paclitaxel], and Taxotere [docetaxel]           Review of Systems:      ROS:  Subjective data only - Tele-Health  Visit   10 point ROS was negative except for the items listed in HPI-              Medications:       Current Outpatient Medications   Medication     acetaminophen (TYLENOL) 500 MG tablet     betamethasone, augmented, (DIPROLENE) 0.05 % lotion     blood glucose test (CONTOUR NEXT TEST STRIPS) strips     blood glucose test strips     blood-glucose meter (CONTOUR NEXT METER) Misc     calcium citrate-vitamin D (CITRACAL+D) 315-200 mg-unit per tablet     cholecalciferol, vitamin D3, 1,000 unit (25 mcg) tablet     DULoxetine (CYMBALTA) 30 MG capsule     generic lancets (MICROLET LANCET)     lisinopril (ZESTRIL) 2.5 MG tablet      LORazepam (ATIVAN) 1 MG tablet     melatonin 10 mg Tab     metFORMIN (GLUCOPHAGE-XR) 500 MG 24 hr tablet     naproxen sodium (ALEVE) 220 MG tablet     ONETOUCH ULTRA2     oxyCODONE (ROXICODONE) 5 MG immediate release tablet     polyethylene glycol (MIRALAX) 17 gram packet     QUEtiapine (SEROQUEL) 25 MG tablet     simvastatin (ZOCOR) 5 MG tablet     zolpidem (AMBIEN) 10 MG tablet     No current facility-administered medications for this visit.         Medication adherence: Reviewed risk/benefits of medication , Patient able to verbalize understanding of side effects and Patient verbally consents to taking medications    Coordination of Care:   More than 30 minutes spent on this visit  with more than 50% of time spent on coordination of care including: Educating patient about diagnosis, prognosis, side effects and benefits of medications, diet, exercise.  Time also spent providing supportive therapy regarding above issues.    This note was created using a dictation system. All typing errors or contextual distortion is unintentional and software inherent.  Start Time : 1500  End time : 1530

## 2022-01-05 NOTE — PROGRESS NOTES
This video/telephone visit will be conducted via a call between you and your physician/provider. We have found that certain health care needs can be provided without the need for an in-person physical exam. This service lets us provide the care you need with a video /telephone conversation. If a prescription is necessary we can send it directly to your pharmacy. If lab work is needed we can place an order for that and you can then stop by our lab to have the test done at a later time.    Just as we bill insurance for in-person visits, we also bill insurance for video/telephone visits. If you have questions about your insurance coverage, we recommend that you speak with your insurance company.    Patient has given verbal consent for video/Telephone visit? Yes   Patient would like telephone visit, please connect: [phone]  Reason for visit: medication follow up  AVS-Mail  Pt is not feeling well complains of dry cough.   Patient verified allergies, medications and pharmacy via telephone verbally with writer.   Medication refill is pended for review and approval by provider.  Patient states she is ready for visit.    MN  to be reviewed by provider.   Katie Velasco January 5, 2022 2:28 PM

## 2022-01-05 NOTE — PROGRESS NOTES
Medications Phoned  to Pharmacy [] yes [x]no  Name of Pharmacist:  List Medications, including dose, quantity and instructions     Medications ordered this visit were e-scribed.  Verified by order class [x] yes  [] no  Cymbalta 30 mg   Medication changes or discontinuations were communicated to patient's pharmacy: [] yes  [x] no     Dictation completed at time of chart check: [x] yes  [] no     I have checked the documentation for today s encounters and the above information has been reviewed and completed.        Katie Velasco January 5, 2022 3:33 PM

## 2022-01-05 NOTE — PATIENT INSTRUCTIONS
Continue medications as prescribed  Have your pharmacy contact us for a refill if you are running low on medications (We may ask you to come into clinic to get a refill from the nurse  No Alcohol or drug use  No driving if sedated  Call the clinic with any questions or concerns   Reach out for help if you feel like hurting yourself or others ([facility] [phone]: [address] [facility], [address] or [facility] Suicide Hotline [phone] , call 911 or go to nearest Emergency room    Follow up as directed, for your appointments, per your After Visit Summary Form.

## 2022-01-13 DIAGNOSIS — E11.9 DIABETES (H): ICD-10-CM

## 2022-01-13 DIAGNOSIS — Z76.0 ENCOUNTER FOR MEDICATION REFILL: Primary | ICD-10-CM

## 2022-01-13 RX ORDER — SIMVASTATIN 5 MG
TABLET ORAL
Qty: 90 TABLET | Refills: 4 | Status: SHIPPED | OUTPATIENT
Start: 2022-01-13 | End: 2022-08-10

## 2022-01-13 RX ORDER — METFORMIN HCL 500 MG
TABLET, EXTENDED RELEASE 24 HR ORAL
Qty: 360 TABLET | Refills: 4 | Status: SHIPPED | OUTPATIENT
Start: 2022-01-13 | End: 2022-06-08

## 2022-02-10 DIAGNOSIS — Z76.0 ENCOUNTER FOR MEDICATION REFILL: Primary | ICD-10-CM

## 2022-02-10 DIAGNOSIS — E11.9 TYPE 2 DIABETES MELLITUS WITHOUT COMPLICATION, WITHOUT LONG-TERM CURRENT USE OF INSULIN (H): ICD-10-CM

## 2022-02-10 DIAGNOSIS — E55.9 VITAMIN D DEFICIENCY DISEASE: ICD-10-CM

## 2022-02-11 RX ORDER — CHOLECALCIFEROL (VITAMIN D3) 25 MCG
TABLET ORAL
Qty: 360 TABLET | Refills: 3 | Status: SHIPPED | OUTPATIENT
Start: 2022-02-11 | End: 2022-04-08

## 2022-02-11 RX ORDER — ZOLPIDEM TARTRATE 10 MG/1
10 TABLET ORAL
Qty: 30 TABLET | Refills: 0 | Status: SHIPPED | OUTPATIENT
Start: 2022-02-11 | End: 2022-08-08

## 2022-02-16 DIAGNOSIS — Z76.0 ENCOUNTER FOR MEDICATION REFILL: Primary | ICD-10-CM

## 2022-02-16 DIAGNOSIS — N63.0 BREAST MASS: ICD-10-CM

## 2022-02-16 RX ORDER — LORAZEPAM 1 MG/1
1 TABLET ORAL EVERY 8 HOURS PRN
Qty: 5 TABLET | Refills: 0 | Status: SHIPPED | OUTPATIENT
Start: 2022-02-16 | End: 2022-06-08

## 2022-02-16 NOTE — TELEPHONE ENCOUNTER
Medication Question or Refill    Who is calling: patient    What medication are you calling about (include dose and sig)?: Lorazepam 1 mg take one tab every 8 hrs as needed for anxiety     Who prescribed the medication?: PCP    Do you need a refill? Yes: script     When did you use the medication last? unknown    Patient offered an appointment? No - pt has appt on 3/2/22 with mental health and addiction clinic and then 22 with PCP for AWV.      Do you have any questions or concerns?  No    Okay to leave a detailed message?: No     Call taken on 22 at 4 pm by Effie Reynoso CMA, CMT

## 2022-02-27 ASSESSMENT — ANXIETY QUESTIONNAIRES
5. BEING SO RESTLESS THAT IT IS HARD TO SIT STILL: NOT AT ALL
GAD7 TOTAL SCORE: 7
1. FEELING NERVOUS, ANXIOUS, OR ON EDGE: MORE THAN HALF THE DAYS
3. WORRYING TOO MUCH ABOUT DIFFERENT THINGS: SEVERAL DAYS
2. NOT BEING ABLE TO STOP OR CONTROL WORRYING: SEVERAL DAYS
7. FEELING AFRAID AS IF SOMETHING AWFUL MIGHT HAPPEN: SEVERAL DAYS
GAD7 TOTAL SCORE: 7
4. TROUBLE RELAXING: SEVERAL DAYS
6. BECOMING EASILY ANNOYED OR IRRITABLE: SEVERAL DAYS
7. FEELING AFRAID AS IF SOMETHING AWFUL MIGHT HAPPEN: SEVERAL DAYS
GAD7 TOTAL SCORE: 7

## 2022-02-28 ASSESSMENT — ANXIETY QUESTIONNAIRES: GAD7 TOTAL SCORE: 7

## 2022-03-01 ASSESSMENT — PATIENT HEALTH QUESTIONNAIRE - PHQ9
10. IF YOU CHECKED OFF ANY PROBLEMS, HOW DIFFICULT HAVE THESE PROBLEMS MADE IT FOR YOU TO DO YOUR WORK, TAKE CARE OF THINGS AT HOME, OR GET ALONG WITH OTHER PEOPLE: SOMEWHAT DIFFICULT
SUM OF ALL RESPONSES TO PHQ QUESTIONS 1-9: 12
SUM OF ALL RESPONSES TO PHQ QUESTIONS 1-9: 12

## 2022-03-02 ENCOUNTER — VIRTUAL VISIT (OUTPATIENT)
Dept: BEHAVIORAL HEALTH | Facility: CLINIC | Age: 67
End: 2022-03-02
Payer: MEDICARE

## 2022-03-02 DIAGNOSIS — F32.89 OTHER DEPRESSION: ICD-10-CM

## 2022-03-02 PROCEDURE — 99214 OFFICE O/P EST MOD 30 MIN: CPT | Mod: 95 | Performed by: NURSE PRACTITIONER

## 2022-03-02 RX ORDER — DULOXETIN HYDROCHLORIDE 30 MG/1
30 CAPSULE, DELAYED RELEASE ORAL DAILY
Qty: 90 CAPSULE | Refills: 0 | Status: SHIPPED | OUTPATIENT
Start: 2022-03-02 | End: 2022-05-02

## 2022-03-02 ASSESSMENT — PATIENT HEALTH QUESTIONNAIRE - PHQ9: SUM OF ALL RESPONSES TO PHQ QUESTIONS 1-9: 12

## 2022-03-02 NOTE — PROGRESS NOTES
Medications Phoned  to Pharmacy [] yes [x]no     Medications ordered this visit were e-scribed.  Verified by order class [x] yes  [] no  List medications sent to pharmacy: Duloxetine 30mg  Medication changes or discontinuations were communicated to patient's pharmacy: [] yes  [x] no     Dictation completed at time of chart check: [x] yes  [] no     I have checked the documentation for today s encounters and the above information has been reviewed and completed.        Ro Flores CMA March 2, 2022 3:57 PM

## 2022-03-02 NOTE — PROGRESS NOTES
This video/telephone visit will be conducted via a call between you and your physician/provider. We have found that certain health care needs can be provided without the need for an in-person physical exam. This service lets us provide the care you need with a video /telephone conversation. If a prescription is necessary we can send it directly to your pharmacy. If lab work is needed we can place an order for that and you can then stop by our lab to have the test done at a later time.    Just as we bill insurance for in-person visits, we also bill insurance for video/telephone visits. If you have questions about your insurance coverage, we recommend that you speak with your insurance company.    Patient has given verbal consent for video/Telephone visit? Yrs    Patient would like telephone visit, please connect : [phone]  Reason for visit: Medication visit-refill duloxetine 30mg  Patient verified allergies, medications and pharmacy via telephone.     ROBERT-7 scores:    ROBERT-7 SCORE 1/2/2022 2/27/2022   Total Score 9 (mild anxiety) 7 (mild anxiety)   Total Score 9 7     PHQ-9 scores:   PHQ-9 SCORE 11/10/2020 3/1/2022   PHQ-9 Total Score MyChart - 12 (Moderate depression)   PHQ-9 Total Score 21 12     Medication refill is pended for review and approval by provider.  MN  to be reviewed by provider.   Patient states she  is ready for visit.    Ro Flores CMA March 2, 2022 2:46 PM

## 2022-03-02 NOTE — PROGRESS NOTES
"  The patient has been notified of following:      \"This telephone visit will be conducted via a call between you and your physician/provider. We have found that certain health care needs can be provided without the need for a physical exam.  This service lets us provide the care you need with a short phone conversation.  If a prescription is necessary we can send it directly to your pharmacy.  If lab work is needed we can place an order for that and you can then stop by our lab to have the test done at a later time.     Telephone visits are billed at different rates depending on your insurance coverage. During this emergency period, for some insurers they may be billed the same as an in-person visit.  Please reach out to your insurance provider with any questions.     If during the course of the call the physician/provider feels a telephone visit is not appropriate, you will not be charged for this service.\"     Patient has given verbal consent to a Telephone visit? Yes        Patient would like to receive their AVS by AVS P/reference: Mail a copy          Psychiatric  Out patient Follow Up Progress Note  Date of visit:3/2/2022         Discussion of Care and Treatment Recommendations:   This is a 66 year old female with  history of depression and PTSD presenting to our virtual clinic for a follow up appointment.         PCP managing sleep :  Prescribing  Lorazepam ,Seroquel and Ambien   PCP managing Gabapentin for neurological issues       Last visit  01/05/2022.  Recommendation at last visit .  1.MDD/Anxiety/PTSD : Had stopped seeing a therapist but agreeable to restart ambulatory referral to psychotherapy made.  2.MDD/ANxiety /PTSDHighly recommend : Community Support groups -  3.MDD/Anxiety : Continue Cymbalta  down to  30 mg daily    4. RTC- 8  weeks, call in between visit with any question  Patient and I reviewed diagnosis and treatment plan and patient agrees with following recommendations:  Ongoing education " given regarding diagnostic and treatment options with adequate verbalization of understanding.  Plan   1.MDD/Anxiety/PTSD : Had stopped seeing a therapist but agreeable to restart ambulatory referral to psychotherapy made.  2.MDD/ANxiety /PTSDHighly recommend : Community Support groups -  3.MDD/Anxiety : Continue Cymbalta  down to  30 mg daily    4. 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)     Malignant neoplasm of breast (H)     Posttraumatic hematoma of right breast     Postoperative pain     Pain in right axilla     Constipation due to outlet dysfunction     Logorrhea     Type 2 diabetes mellitus without complication, without long-term current use of insulin (H)     Rotator cuff tear     Anxiety     Scalp irritation     Abnormal MRI     Diabetes 1.5, managed as type 2 (H)     H/O breast surgery     Advanced directives, counseling/discussion     Vitamin D deficiency disease     Moderate major depression (H)     Concussion with loss of consciousness     CKD (chronic kidney disease) stage 3, GFR 30-59 ml/min (H)     Concussion without loss of consciousness             Chief Complaint / Subjective:    Chief complaint: Depression     History of Present Illness:  Per patient's statement : She is having a good day today.  She reports that some days are harder than other.  This next few weeks will be anxiety provoking for her as she prepares to go for her annual physical.  She is hoping that things will be fine and she will continue to have her constant in remission.  She is taking 1 day at a time.  During her last visit in need of referral to psychotherapy but she was never followed through.  She would like to think about it first.  She is happy with current medication is looking for medication changes.  Patient will therefore  "continue on current dosage of duloxetine.  She return to the clinic in about 2 months for follow-up appointment.  She will call in between visits with any questions or concerns.  Mental Status Examination:     Appearance: unable to assess  Orientation: Patient alert and oriented to person, place, time, and situation  Reliability:  Patient appears to be an adequate historian.    Behavior: calm and coperative   Speech: Speech is spontaneous and coherent, with a normal rate, rhythm and tone.    Language:There are no difficulties with expressive or receptive language as observed throughout the interview.    Mood: Described as \"ok\".    Affect: unable to assess  Judgement: Able to make basic decision regarding safety.  Insight: Good awareness of physical and mental health conditions and aware of needs around care for these.  Gait and station: unable to assess  Thought process: Logical   Hallucinations : No evidence of any hallucination  Thought content: No evidence of delusions or paranoia.   Suicidal /Homical Ideations:  No thoughts of self harm or suicide. No thoughts of harming others.  Associations: Connected  Fund of knowledge: Average  Attention / Concentration: Able to remain focused during the interview with minimal distractibility or need for redirection.  Short Term Memory: Grossly intact as evidence by client recalling themes and ideas discussed.  Long Term Memory: Intact  Motor Status: unable to asses      Drug/treatment history and current pattern of use:   Denies    Medication changes: See Above   Medication adherence: compliant  Medication side effects: absent  Information about medications: Side effects, benefits and alternative treatments discussed and patient agrees .    Psychotherapy: Supportive therapy day-to-day living    Education: Diet, exercise, abstinence from drugs and alcohol, patient will not drive if sedated and medications or  under influence of any substance    Lab Results:   Personally " reviewed and discussed with the patient    Lab Results   Component Value Date    WBC 8.4 12/16/2020    HGB 15.9 12/16/2020    HCT 48.8 (H) 12/16/2020     12/16/2020    CHOL 302 (H) 11/10/2020    TRIG 156 (H) 11/10/2020    HDL 70 11/10/2020     (H) 11/10/2020     (H) 11/10/2020     (L) 12/16/2020    BUN 22 12/16/2020    CO2 20 (L) 12/16/2020    TSH 0.84 12/06/2019    MICROALBUR 4.15 (H) 11/10/2020       Vital signs:  There were no vitals taken for this visit.  Unable to assess telephone visit  Allergies: Aztreonam, Castor oil, Cefaclor, Cephalexin, Cephalexin monohydrate [cephalexin], Chlorhexidine, Ciprofloxacin, Clarithromycin, Clindamycin, Penicillins, Propofol, Scopolamine, Sulfa (sulfonamide antibiotics) [sulfa drugs], Sulfasalazine, Tetracyclines, Vancomycin, Adhesive [mecrylate], Cytoxan [cyclophosphamide], Erythromycin base [erythromycin], Hydrocodone, Neupogen [filgrastim], Paper tape [adhesive tape], Tapentadol, Taxol [paclitaxel], and Taxotere [docetaxel]           Review of Systems:      ROS:  Subjective data only - Tele-Health  Visit   10 point ROS was negative except for the items listed in HPI-              Medications:     Current Outpatient Medications   Medication     acetaminophen (TYLENOL) 500 MG tablet     betamethasone, augmented, (DIPROLENE) 0.05 % lotion     blood glucose test (CONTOUR NEXT TEST STRIPS) strips     blood glucose test strips     blood-glucose meter (CONTOUR NEXT METER) Misc     calcium citrate-vitamin D (CITRACAL+D) 315-200 mg-unit per tablet     Cholecalciferol (VITAMIN D3) 25 MCG TABS     DULoxetine (CYMBALTA) 30 MG capsule     generic lancets (MICROLET LANCET)     lisinopril (ZESTRIL) 2.5 MG tablet     LORazepam (ATIVAN) 1 MG tablet     melatonin 10 mg Tab     metFORMIN (GLUCOPHAGE-XR) 500 MG 24 hr tablet     naproxen sodium (ALEVE) 220 MG tablet     ONETOUCH ULTRA2     oxyCODONE (ROXICODONE) 5 MG immediate release tablet     polyethylene glycol  (MIRALAX) 17 gram packet     QUEtiapine (SEROQUEL) 25 MG tablet     simvastatin (ZOCOR) 5 MG tablet     zolpidem (AMBIEN) 10 MG tablet     No current facility-administered medications for this visit.           Medication adherence: Reviewed risk/benefits of medication , Patient able to verbalize understanding of side effects and Patient verbally consents to taking medications    Coordination of Care:   More than 30 minutes spent on this visit  with more than 50% of time spent on coordination of care including: Educating patient about diagnosis, prognosis, side effects and benefits of medications, diet, exercise.  Time also spent providing supportive therapy regarding above issues.    This note was created using a dictation system. All typing errors or contextual distortion is unintentional and software inherent.  Start Time : 1500  End time : 1530

## 2022-03-02 NOTE — PATIENT INSTRUCTIONS
Continue medications as prescribed  Have your pharmacy contact us for a refill if you are running low on medications (We may ask you to come into clinic to get a refill from the nurse  No Alcohol or drug use  No driving if sedated  Call the clinic with any questions or concerns   Reach out for help if you feel like hurting yourself or others ([facility] [phone]: [address] [facility], [address] or [facility] Suicide Hotline [phone] , call 911 or go to nearest Emergency room    Follow up as directed, for your appointments, per your After Visit Summary Form.

## 2022-03-21 DIAGNOSIS — Z76.0 ENCOUNTER FOR MEDICATION REFILL: Primary | ICD-10-CM

## 2022-03-21 DIAGNOSIS — G47.00 PERSISTENT INSOMNIA: ICD-10-CM

## 2022-03-21 RX ORDER — QUETIAPINE FUMARATE 25 MG/1
TABLET, FILM COATED ORAL
Qty: 135 TABLET | Refills: 3 | Status: SHIPPED | OUTPATIENT
Start: 2022-03-21 | End: 2023-01-09

## 2022-04-08 DIAGNOSIS — Z76.0 ENCOUNTER FOR MEDICATION REFILL: Primary | ICD-10-CM

## 2022-04-08 DIAGNOSIS — E11.9 TYPE 2 DIABETES MELLITUS WITHOUT COMPLICATION, WITHOUT LONG-TERM CURRENT USE OF INSULIN (H): ICD-10-CM

## 2022-04-08 DIAGNOSIS — E55.9 VITAMIN D DEFICIENCY DISEASE: ICD-10-CM

## 2022-04-08 RX ORDER — CHOLECALCIFEROL (VITAMIN D3) 25 MCG
TABLET ORAL
Qty: 360 TABLET | Refills: 3 | Status: SHIPPED | OUTPATIENT
Start: 2022-04-08

## 2022-04-22 ENCOUNTER — TELEPHONE (OUTPATIENT)
Dept: FAMILY MEDICINE | Facility: CLINIC | Age: 67
End: 2022-04-22
Payer: MEDICARE

## 2022-04-22 PROCEDURE — 99207 PR NO CHARGE LOS: CPT | Performed by: FAMILY MEDICINE

## 2022-04-22 NOTE — TELEPHONE ENCOUNTER
Patient return calls. Writer relayed provider message to patient. Patient states the 2 bedrooms is for exercising, pilate machine and therapy equipment. Please call patient if there should be anymore questions.

## 2022-05-02 ENCOUNTER — VIRTUAL VISIT (OUTPATIENT)
Dept: BEHAVIORAL HEALTH | Facility: CLINIC | Age: 67
End: 2022-05-02
Attending: PSYCHIATRY & NEUROLOGY
Payer: MEDICARE

## 2022-05-02 VITALS — HEIGHT: 63 IN | BODY MASS INDEX: 24.8 KG/M2 | WEIGHT: 140 LBS

## 2022-05-02 DIAGNOSIS — F32.89 OTHER DEPRESSION: ICD-10-CM

## 2022-05-02 RX ORDER — DULOXETIN HYDROCHLORIDE 30 MG/1
30 CAPSULE, DELAYED RELEASE ORAL DAILY
Qty: 90 CAPSULE | Refills: 0 | Status: SHIPPED | OUTPATIENT
Start: 2022-05-02 | End: 2022-08-03

## 2022-05-02 NOTE — PROGRESS NOTES
This video/telephone visit will be conducted via a call between you and your physician/provider. We have found that certain health care needs can be provided without the need for an in-person physical exam. This service lets us provide the care you need with a video /telephone conversation. If a prescription is necessary we can send it directly to your pharmacy. If lab work is needed we can place an order for that and you can then stop by our lab to have the test done at a later time.    Just as we bill insurance for in-person visits, we also bill insurance for video/telephone visits. If you have questions about your insurance coverage, we recommend that you speak with your insurance company.    Patient has given verbal consent for video/Telephone visit? Yes   Patient would like telephone visit, please connect: [phone]  Reason for visit: Follow up  Rahul   Patient verified allergies, medications and pharmacy via telephone verbally with writer.   ROBETR-7 scores:    ROBERT-7 SCORE 1/2/2022 2/27/2022   Total Score 9 (mild anxiety) 7 (mild anxiety)   Total Score 9 7   PHQ-9 scores:   PHQ-9 SCORE 11/10/2020 3/1/2022   PHQ-9 Total Score MyChart - 12 (Moderate depression)   PHQ-9 Total Score 21 12   Patient states she is ready for visit.  [place]  to be reviewed by provider.   Katie Velasco May 2, 2022 2:53 PM

## 2022-05-02 NOTE — PROGRESS NOTES
"  The patient has been notified of following:      \"This telephone visit will be conducted via a call between you and your physician/provider. We have found that certain health care needs can be provided without the need for a physical exam.  This service lets us provide the care you need with a short phone conversation.  If a prescription is necessary we can send it directly to your pharmacy.  If lab work is needed we can place an order for that and you can then stop by our lab to have the test done at a later time.     Telephone visits are billed at different rates depending on your insurance coverage. During this emergency period, for some insurers they may be billed the same as an in-person visit.  Please reach out to your insurance provider with any questions.     If during the course of the call the physician/provider feels a telephone visit is not appropriate, you will not be charged for this service.\"     Patient has given verbal consent to a Telephone visit? Yes        Patient would like to receive their AVS by AVS P/reference: Mail a copy          Psychiatric  Out patient Follow Up Progress Note  Date of visit:         Discussion of Care and Treatment Recommendations:   This is a 66 year old female with a  history of depression and PTSD presenting to our virtual clinic for a follow up appointment.         PCP managing sleep :  Prescribing  Lorazepam ,Seroquel and Ambien   PCP managing Gabapentin for neurological issues       Last visit 03/02/2022  Recommendation at last visit .  1.MDD/Anxiety/PTSD : Had stopped seeing a therapist but agreeable to restart ambulatory referral to psychotherapy made.  2.MDD/ANxiety /PTSDHighly recommend : Community Support groups -  3.MDD/Anxiety : Continue Cymbalta  down to  30 mg daily    4. RTC- 8  weeks, call in between visit with any question  Patient and I reviewed diagnosis and treatment plan and patient agrees with following recommendations:  Ongoing education given " regarding diagnostic and treatment options with adequate verbalization of understanding.  Plan   1.MDD/Anxiety/PTSD : Had stopped seeing a therapist but agreeable to restart ambulatory referral to psychotherapy made.  2.MDD/ANxiety /PTSDHighly recommend : Community Support groups -  3.MDD/Anxiety : Continue Cymbalta  down to  30 mg daily    4. 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)     Malignant neoplasm of breast (H)     Posttraumatic hematoma of right breast     Postoperative pain     Pain in right axilla     Constipation due to outlet dysfunction     Logorrhea     Type 2 diabetes mellitus without complication, without long-term current use of insulin (H)     Rotator cuff tear     Anxiety     Scalp irritation     Abnormal MRI     Diabetes 1.5, managed as type 2 (H)     H/O breast surgery     Advanced directives, counseling/discussion     Vitamin D deficiency disease     Moderate major depression (H)     Concussion with loss of consciousness     CKD (chronic kidney disease) stage 3, GFR 30-59 ml/min (H)     Concussion without loss of consciousness             Chief Complaint / Subjective:    Chief complaint: Depression    History of Present Illness:   Per patient's statement : Patient reports compliance with current medications denies side effects.  Feeling depressed this new month of May as it is the anniversary of her late mother's death.  She is also worried because it is time for her to be checked again to see if her cancer is still in remission.  In the past her cancer has returned in May.  She did reschedule her appointment as she did not want to think about it.  She is bring in hoping for the best.  The weather has also contributed to some of her depression.  However she denies suicidal homicidal ideations.  She was to  "start psychotherapy but has not initiated care yet.  She still plans on initiating care.  I am highly recommending psychotherapy for her.  She is not interested in medication changes therefore will continue on current medications and will return to the clinic in approximately a month for follow-up appointment.  She will call in between visits with any questions or concerns.  Mental Status Examination:     Appearance: unable to assess  Orientation: Patient alert and oriented to person, place, time, and situation  Reliability:  Patient appears to be an adequate historian.    Behavior: calm and coperative   Speech: Speech is spontaneous and coherent, with a normal rate, rhythm and tone.    Language:There are no difficulties with expressive or receptive language as observed throughout the interview.    Mood: Described as \"ok\".    Affect: unable to assess  Judgement: Able to make basic decision regarding safety.  Insight: Good awareness of physical and mental health conditions and aware of needs around care for these.  Gait and station: unable to assess  Thought process: Logical   Hallucinations : No evidence of any hallucination  Thought content: No evidence of delusions or paranoia.   Suicidal /Homical Ideations:  No thoughts of self harm or suicide. No thoughts of harming others.  Associations: Connected  Fund of knowledge: Average  Attention / Concentration: Able to remain focused during the interview with minimal distractibility or need for redirection.  Short Term Memory: Grossly intact as evidence by client recalling themes and ideas discussed.  Long Term Memory: Intact  Motor Status: unable to asses      Drug/treatment history and current pattern of use:   Denies     Medication changes: See Above   Medication adherence: compliant  Medication side effects: absent  Information about medications: Side effects, benefits and alternative treatments discussed and patient agrees .    Psychotherapy: Supportive therapy " "day-to-day living    Education: Diet, exercise, abstinence from drugs and alcohol, patient will not drive if sedated and medications or  under influence of any substance    Lab Results:   Personally reviewed and discussed with the patient    Lab Results   Component Value Date    WBC 8.4 12/16/2020    HGB 15.9 12/16/2020    HCT 48.8 (H) 12/16/2020     12/16/2020    CHOL 302 (H) 11/10/2020    TRIG 156 (H) 11/10/2020    HDL 70 11/10/2020     (H) 11/10/2020     (H) 11/10/2020     (L) 12/16/2020    BUN 22 12/16/2020    CO2 20 (L) 12/16/2020    TSH 0.84 12/06/2019    MICROALBUR 4.15 (H) 11/10/2020       Vital signs:  Ht 1.6 m (5' 3\")   Wt 63.5 kg (140 lb)   BMI 24.80 kg/m    Unable to assess telephone visit  Allergies: Aztreonam, Castor oil, Cefaclor, Cephalexin, Cephalexin monohydrate [cephalexin], Chlorhexidine, Ciprofloxacin, Clarithromycin, Clindamycin, Penicillins, Propofol, Scopolamine, Sulfa (sulfonamide antibiotics) [sulfa drugs], Sulfasalazine, Tetracyclines, Vancomycin, Adhesive [mecrylate], Cytoxan [cyclophosphamide], Erythromycin base [erythromycin], Hydrocodone, Neupogen [filgrastim], Paper tape [adhesive tape], Tapentadol, Taxol [paclitaxel], and Taxotere [docetaxel]           Review of Systems:      ROS:  Subjective data only - Tele-Health  Visit   10 point ROS was negative except for the items listed in HPI-              Medications:     Current Outpatient Medications   Medication     acetaminophen (TYLENOL) 500 MG tablet     betamethasone, augmented, (DIPROLENE) 0.05 % lotion     blood glucose test (CONTOUR NEXT TEST STRIPS) strips     blood glucose test strips     blood-glucose meter (CONTOUR NEXT METER) Misc     calcium citrate-vitamin D (CITRACAL+D) 315-200 mg-unit per tablet     Cholecalciferol (VITAMIN D3) 25 MCG TABS     DULoxetine (CYMBALTA) 30 MG capsule     generic lancets (MICROLET LANCET)     lisinopril (ZESTRIL) 2.5 MG tablet     LORazepam (ATIVAN) 1 MG tablet     " melatonin 10 mg Tab     metFORMIN (GLUCOPHAGE-XR) 500 MG 24 hr tablet     naproxen sodium (ALEVE) 220 MG tablet     ONETOUCH ULTRA2     oxyCODONE (ROXICODONE) 5 MG immediate release tablet     polyethylene glycol (MIRALAX) 17 gram packet     QUEtiapine (SEROQUEL) 25 MG tablet     simvastatin (ZOCOR) 5 MG tablet     zolpidem (AMBIEN) 10 MG tablet     No current facility-administered medications for this visit.                 Medication adherence: Reviewed risk/benefits of medication , Patient able to verbalize understanding of side effects and Patient verbally consents to taking medications        Crisis Resources:    1. Present to the Emergency Department as needed or call after hours crisis line at [phone] or [phone].   2. [place] Crisis Text Line: Text [place] to 256037.  3. Suicide LifeLine Chat: suicidepreventionSpoofem.comline.org/chat/.  4. National Suicide Prevention Lifeline: 344.534.2357 (TTY: 843.175.7973). Call anytime for help.  (www.suicidepreventionlifeline.org)  5. National [place] on Mental Illness (www.norma.org): 220-367-4534 or 319-977-7327.  6. Mental Health Association (www.mentalhealth.org): 541.930.7880 or 233-313-8130.      Coordination of Care:   More than 30 minutes spent on this visit  with more than 50% of time spent on coordination of care including: Educating patient about diagnosis, prognosis, side effects and benefits of medications, diet, exercise.  Time also spent providing supportive therapy regarding above issues.    This note was created using a dictation system. All typing errors or contextual distortion is unintentional and software inherent.  Start Time : 1500  End time : 1530

## 2022-05-02 NOTE — PATIENT INSTRUCTIONS
Continue medications as prescribed  Have your pharmacy contact us for a refill if you are running low on medications (We may ask you to come into clinic to get a refill from the nurse  No Alcohol or drug use  No driving if sedated  Call the clinic with any questions or concerns   Reach out for help if you feel like hurting yourself or others ([facility] [phone]: [address] [facility], [address] or [facility] Suicide Hotline   [phone] , call 911 or go to nearest Emergency room     Crisis Resources:    Present to the Emergency Department as needed or call after hours crisis line at [phone] or [phone].   [place] Crisis Text Line: Text [place] to 490027.  Suicide LifeLine Chat: suicidepreventionlifeline.org/chat/.  National Suicide Prevention Lifeline: 690.733.1403 (TTY: 214.183.3331). Call anytime for help.  (www.suicidepreventionlifeline.org)  National [place] on Mental Illness (www.norma.org): 826.425.6785 or 915-142-8361.  Mental Health Association (www.mentalhealth.org): 803.878.8646 or 378-396-7951.       Follow up as directed, for your appointments, per your After Visit Summary Form.

## 2022-05-03 NOTE — PROGRESS NOTES
[facility] Post-Appointment Cart -check      Correct pharmacy verified with patient and updated in chart? [x] yes []no    Charge captured ? [] yes  [] no [x] n/a-virtual     Medications ordered this visit were e-scribed.  Verified by order class [x] yes  [] no    List Medications: DULoxetine (CYMBALTA) 30 MG capsule    Medication changes or discontinuations were communicated to patient's pharmacy: [] yes  [x] no    UA collected [] yes  [] no  [x] n/a-virtual     Outside referrals / labs, etc support staff to follow up: [] yes  [x] no    Future appointment was made: [x] yes  [] no  [] n/a   06/01/2022  Dictation completed at time of chart check: [x] yes  [] no    I have checked the documentation for today s encounters and the above information has been reviewed and completed.      Katie Velasco on May 3, 2022 at 9:01 AM

## 2022-05-28 ENCOUNTER — HEALTH MAINTENANCE LETTER (OUTPATIENT)
Age: 67
End: 2022-05-28

## 2022-05-29 ASSESSMENT — ANXIETY QUESTIONNAIRES
3. WORRYING TOO MUCH ABOUT DIFFERENT THINGS: MORE THAN HALF THE DAYS
4. TROUBLE RELAXING: MORE THAN HALF THE DAYS
GAD7 TOTAL SCORE: 10
1. FEELING NERVOUS, ANXIOUS, OR ON EDGE: MORE THAN HALF THE DAYS
7. FEELING AFRAID AS IF SOMETHING AWFUL MIGHT HAPPEN: SEVERAL DAYS
GAD7 TOTAL SCORE: 10
6. BECOMING EASILY ANNOYED OR IRRITABLE: SEVERAL DAYS
7. FEELING AFRAID AS IF SOMETHING AWFUL MIGHT HAPPEN: SEVERAL DAYS
GAD7 TOTAL SCORE: 10
5. BEING SO RESTLESS THAT IT IS HARD TO SIT STILL: NOT AT ALL
8. IF YOU CHECKED OFF ANY PROBLEMS, HOW DIFFICULT HAVE THESE MADE IT FOR YOU TO DO YOUR WORK, TAKE CARE OF THINGS AT HOME, OR GET ALONG WITH OTHER PEOPLE?: SOMEWHAT DIFFICULT
2. NOT BEING ABLE TO STOP OR CONTROL WORRYING: MORE THAN HALF THE DAYS

## 2022-06-01 ENCOUNTER — TELEPHONE (OUTPATIENT)
Dept: BEHAVIORAL HEALTH | Facility: CLINIC | Age: 67
End: 2022-06-01
Payer: MEDICARE

## 2022-06-01 ENCOUNTER — VIRTUAL VISIT (OUTPATIENT)
Dept: BEHAVIORAL HEALTH | Facility: CLINIC | Age: 67
End: 2022-06-01
Attending: PSYCHIATRY & NEUROLOGY
Payer: MEDICARE

## 2022-06-01 VITALS — WEIGHT: 140 LBS | BODY MASS INDEX: 24.8 KG/M2 | HEIGHT: 63 IN

## 2022-06-01 DIAGNOSIS — F32.1 CURRENT MODERATE EPISODE OF MAJOR DEPRESSIVE DISORDER, UNSPECIFIED WHETHER RECURRENT (H): Primary | ICD-10-CM

## 2022-06-01 NOTE — PROGRESS NOTES
"  The patient has been notified of following:      \"This telephone visit will be conducted via a call between you and your physician/provider. We have found that certain health care needs can be provided without the need for a physical exam.  This service lets us provide the care you need with a short phone conversation.  If a prescription is necessary we can send it directly to your pharmacy.  If lab work is needed we can place an order for that and you can then stop by our lab to have the test done at a later time.     Telephone visits are billed at different rates depending on your insurance coverage. During this emergency period, for some insurers they may be billed the same as an in-person visit.  Please reach out to your insurance provider with any questions.     If during the course of the call the physician/provider feels a telephone visit is not appropriate, you will not be charged for this service.\"     Patient has given verbal consent to a Telephone visit? Yes        Patient would like to receive their AVS by AVS P/reference: Mail a copy          Psychiatric  Out patient Follow Up Progress Note  Date of visit:6/1/2022           Discussion of Care and Treatment Recommendations:   This is a 66 year old female with history of depression and PTSD presenting to our virtual clinic for a follow up appointment.         PCP managing sleep :  Prescribing  Lorazepam ,Seroquel and Ambien   PCP managing Gabapentin for neurological issues       Last visit  03/02/2022  Recommendation at last visit   1.MDD/Anxiety/PTSD : Highly recommend psychotherapy .  2.MDD/ANxiety /PTSDHighly recommend : Community Support groups -  3.MDD/Anxiety : Continue Cymbalta  down to  30 mg daily    4. RTC- 8  weeks, call in between visit with any question     Patient and I reviewed diagnosis and treatment plan and patient agrees with following recommendations:  Ongoing education given regarding diagnostic and treatment options with adequate " verbalization of understanding.  Plan   1.MDD/Anxiety/PTSD : Had stopped seeing a therapist but agreeable to restart ambulatory referral to psychotherapy made.  2.MDD/ANxiety /PTSDHighly recommend : Community Support groups -  3.MDD/Anxiety : Continue Cymbalta  down to  30 mg daily    4. 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)     Malignant neoplasm of breast (H)     Posttraumatic hematoma of right breast     Postoperative pain     Pain in right axilla     Constipation due to outlet dysfunction     Logorrhea     Type 2 diabetes mellitus without complication, without long-term current use of insulin (H)     Rotator cuff tear     Anxiety     Scalp irritation     Abnormal MRI     Diabetes 1.5, managed as type 2 (H)     H/O breast surgery     Advanced directives, counseling/discussion     Vitamin D deficiency disease     Moderate major depression (H)     Concussion with loss of consciousness     CKD (chronic kidney disease) stage 3, GFR 30-59 ml/min (H)     Concussion without loss of consciousness             Chief Complaint / Subjective:    Chief complaint:Depression     History of Present Illness:   Per patient's statement : Has been having balance  issues and she has a fall but did not hit her head. Has an up coming appointment   to see her doctor.She has been anxious to get a mammogram . She has a history of breast cancer and has been trough chemotherapy and is hoping for the best . She is suspecting her cancer is back .She has been depressed and sleeping more than usual. She is also planning to move from her current apartment to a town home. She is feeling a bit overwhelmed .  She denies suicidal homicidal ideations.  I had put in a referral for psychotherapy for-she suspect that we might have to call her when she missed the  "call.  I did give her the number to call scheduling to initiate the call to get scheduled with a therapist.  She is currently only taking 30 mg of Cymbalta but she is not interested in increasing the dose.  She is praying and meditating and states that that is keeping her strong.  She will be seeing her primary doctor next Wednesday and also plans on reaching out to [facility] to make an appointment for therapy.  I also sent a message to the [facility] to see if they can provide immediate psychotherapy for patient.  Mental Status Examination:     Appearance: unable to assess  Orientation: Patient alert and oriented to person, place, time, and situation  Reliability:  Patient appears to be an adequate historian.    Behavior: calm and coperative   Speech: Speech is spontaneous and coherent, with a normal rate, rhythm and tone.    Language:There are no difficulties with expressive or receptive language as observed throughout the interview.    Mood: Described as \"ok\".    Affect: unable to assess  Judgement: Able to make basic decision regarding safety.  Insight: Good awareness of physical and mental health conditions and aware of needs around care for these.  Gait and station: unable to assess  Thought process: Logical   Hallucinations : No evidence of any hallucination  Thought content: No evidence of delusions or paranoia.   Suicidal /Homical Ideations:  No thoughts of self harm or suicide. No thoughts of harming others.  Associations: Connected  Fund of knowledge: Average  Attention / Concentration: Able to remain focused during the interview with minimal distractibility or need for redirection.  Short Term Memory: Grossly intact as evidence by client recalling themes and ideas discussed.  Long Term Memory: Intact  Motor Status: unable to asses      Answers for HPI/ROS submitted by the patient on 5/29/2022  ROBERT 7 TOTAL SCORE: 10      Drug/treatment history and current pattern of use:   Denies "     Medication changes: See Above   Medication adherence: compliant  Medication side effects: absent  Information about medications: Side effects, benefits and alternative treatments discussed and patient agrees .    Psychotherapy: Supportive therapy day-to-day living    Education: Diet, exercise, abstinence from drugs and alcohol, patient will not drive if sedated and medications or  under influence of any substance    Lab Results:   Personally reviewed and discussed with the patient    Lab Results   Component Value Date    WBC 8.4 12/16/2020    HGB 15.9 12/16/2020    HCT 48.8 (H) 12/16/2020     12/16/2020    CHOL 302 (H) 11/10/2020    TRIG 156 (H) 11/10/2020    HDL 70 11/10/2020     (H) 11/10/2020     (H) 11/10/2020     (L) 12/16/2020    BUN 22 12/16/2020    CO2 20 (L) 12/16/2020    TSH 0.84 12/06/2019    MICROALBUR 4.15 (H) 11/10/2020       Vital signs:  There were no vitals taken for this visit.  Unable to assess telephone visit  Allergies: Aztreonam, Castor oil, Cefaclor, Cephalexin, Cephalexin monohydrate [cephalexin], Chlorhexidine, Ciprofloxacin, Clarithromycin, Clindamycin, Penicillins, Propofol, Scopolamine, Sulfa (sulfonamide antibiotics) [sulfa drugs], Sulfasalazine, Tetracyclines, Vancomycin, Adhesive [mecrylate], Cytoxan [cyclophosphamide], Erythromycin base [erythromycin], Hydrocodone, Neupogen [filgrastim], Paper tape [adhesive tape], Tapentadol, Taxol [paclitaxel], and Taxotere [docetaxel]         Review of Systems:      ROS:  Subjective data only - Tele-Health  Visit   10 point ROS was negative except for the items listed in HPI-              Medications:     Current Outpatient Medications   Medication     acetaminophen (TYLENOL) 500 MG tablet     betamethasone, augmented, (DIPROLENE) 0.05 % lotion     blood glucose test (CONTOUR NEXT TEST STRIPS) strips     blood glucose test strips     blood-glucose meter (CONTOUR NEXT METER) Misc     calcium citrate-vitamin D  (CITRACAL+D) 315-200 mg-unit per tablet     Cholecalciferol (VITAMIN D3) 25 MCG TABS     DULoxetine (CYMBALTA) 30 MG capsule     generic lancets (MICROLET LANCET)     lisinopril (ZESTRIL) 2.5 MG tablet     LORazepam (ATIVAN) 1 MG tablet     melatonin 10 mg Tab     metFORMIN (GLUCOPHAGE-XR) 500 MG 24 hr tablet     naproxen sodium (ALEVE) 220 MG tablet     ONETOUCH ULTRA2     oxyCODONE (ROXICODONE) 5 MG immediate release tablet     polyethylene glycol (MIRALAX) 17 gram packet     QUEtiapine (SEROQUEL) 25 MG tablet     simvastatin (ZOCOR) 5 MG tablet     zolpidem (AMBIEN) 10 MG tablet     No current facility-administered medications for this visit.                 Medication adherence: Reviewed risk/benefits of medication , Patient able to verbalize understanding of side effects and Patient verbally consents to taking medications        Crisis Resources:    1. Present to the Emergency Department as needed or call after hours crisis line at [phone] or [phone].   2. [place] Crisis Text Line: Text [place] to 614309.  3. Suicide LifeLine Chat: suicidepreCrittercismline.org/chat/.  4. National Suicide Prevention Lifeline: 228.503.3972 (TTY: 411.144.7511). Call anytime for help.  (www.suicidepreventionlifeline.org)  5. National [place] on Mental Illness (www.norma.org): 679.911.8579 or 731-097-2857.  6. Mental Health Association (www.mentalhealth.org): 145.824.6725 or 019-216-9403.      Coordination of Care:   More than 30 minutes spent on this visit  with more than 50% of time spent on coordination of care including: Educating patient about diagnosis, prognosis, side effects and benefits of medications, diet, exercise.  Time also spent providing supportive therapy regarding above issues.    This note was created using a dictation system. All typing errors or contextual distortion is unintentional and software inherent.  Start Time : 1500  End time : 1530

## 2022-06-01 NOTE — PROGRESS NOTES
This video/telephone visit will be conducted via a call between you and your physician/provider. We have found that certain health care needs can be provided without the need for an in-person physical exam. This service lets us provide the care you need with a video /telephone conversation. If a prescription is necessary we can send it directly to your pharmacy. If lab work is needed we can place an order for that and you can then stop by our lab to have the test done at a later time.    Just as we bill insurance for in-person visits, we also bill insurance for video/telephone visits. If you have questions about your insurance coverage, we recommend that you speak with your insurance company.    Patient has given verbal consent for video/Telephone visit? Yes     Patient would like telephone visit, please connect: [phone]  Reason for visit: Follow up  Randi  Patient verified allergies, medications and pharmacy via telephone verbally with writer.   Answers for HPI/ROS submitted by the patient on 5/29/2022  ROBERT 7 TOTAL SCORE: 10    ROBERT-7 scores:    ROBERT-7 SCORE 2/27/2022 5/29/2022   Total Score 7 (mild anxiety) 10 (moderate anxiety)   Total Score 7 10     PHQ-9 scores:   PHQ-9 SCORE 11/10/2020 3/1/2022   PHQ-9 Total Score MyChart - 12 (Moderate depression)   PHQ-9 Total Score 21 12   Patient states she is ready for visit.  [place]  to be reviewed by provider.   Katie Velasco June 1, 2022 2:50 PM

## 2022-06-02 ASSESSMENT — ENCOUNTER SYMPTOMS
SORE THROAT: 0
ABDOMINAL PAIN: 1
HEARTBURN: 1
FEVER: 0
NAUSEA: 1
ARTHRALGIAS: 1
DIARRHEA: 1
EYE PAIN: 1
NERVOUS/ANXIOUS: 1
HEMATOCHEZIA: 1
COUGH: 0
FREQUENCY: 1
BREAST MASS: 0
PARESTHESIAS: 1
HEMATURIA: 1
DIZZINESS: 1
CONSTIPATION: 1
CHILLS: 0
HEADACHES: 1
PALPITATIONS: 1
SHORTNESS OF BREATH: 1
JOINT SWELLING: 0
WEAKNESS: 1
DYSURIA: 1
MYALGIAS: 1

## 2022-06-02 ASSESSMENT — PATIENT HEALTH QUESTIONNAIRE - PHQ9
10. IF YOU CHECKED OFF ANY PROBLEMS, HOW DIFFICULT HAVE THESE PROBLEMS MADE IT FOR YOU TO DO YOUR WORK, TAKE CARE OF THINGS AT HOME, OR GET ALONG WITH OTHER PEOPLE: VERY DIFFICULT
SUM OF ALL RESPONSES TO PHQ QUESTIONS 1-9: 18
SUM OF ALL RESPONSES TO PHQ QUESTIONS 1-9: 18

## 2022-06-02 ASSESSMENT — ACTIVITIES OF DAILY LIVING (ADL): CURRENT_FUNCTION: NO ASSISTANCE NEEDED

## 2022-06-03 NOTE — PROGRESS NOTES
[facility] Post-Appointment Chart -check      Correct pharmacy verified with patient and updated in chart? [x] yes []no    Charge captured ? [] yes  [] no [x] n/a-virtual     Medications ordered this visit were e-scribed.  Verified by order class [] yes  [x] no    List Medications:    Medication changes or discontinuations were communicated to patient's pharmacy: [] yes  [x] no    UA collected [] yes  [] no  [x] n/a-virtual     Outside referrals / labs, etc support staff to follow up: [] yes  [x] no    Future appointment was made: [x] yes  [] no  [] n/a  08/03/2022  Dictation completed at time of chart check: [x] yes  [] no    I have checked the documentation for today s encounters and the above information has been reviewed and completed.      Katie Velasco on Holly 3, 2022 at 9:44 AM

## 2022-06-08 ENCOUNTER — OFFICE VISIT (OUTPATIENT)
Dept: FAMILY MEDICINE | Facility: CLINIC | Age: 67
End: 2022-06-08
Payer: MEDICARE

## 2022-06-08 VITALS
RESPIRATION RATE: 16 BRPM | HEART RATE: 82 BPM | WEIGHT: 136 LBS | BODY MASS INDEX: 24.09 KG/M2 | SYSTOLIC BLOOD PRESSURE: 98 MMHG | TEMPERATURE: 98.8 F | OXYGEN SATURATION: 97 % | DIASTOLIC BLOOD PRESSURE: 58 MMHG

## 2022-06-08 DIAGNOSIS — Z00.00 PREVENTATIVE HEALTH CARE: Primary | ICD-10-CM

## 2022-06-08 DIAGNOSIS — C50.211 MALIGNANT NEOPLASM OF UPPER-INNER QUADRANT OF RIGHT FEMALE BREAST, UNSPECIFIED ESTROGEN RECEPTOR STATUS (H): ICD-10-CM

## 2022-06-08 DIAGNOSIS — E11.9 TYPE 2 DIABETES MELLITUS WITHOUT COMPLICATION, WITHOUT LONG-TERM CURRENT USE OF INSULIN (H): ICD-10-CM

## 2022-06-08 DIAGNOSIS — N18.32 STAGE 3B CHRONIC KIDNEY DISEASE (H): ICD-10-CM

## 2022-06-08 DIAGNOSIS — Z23 NEED FOR COVID-19 VACCINE: ICD-10-CM

## 2022-06-08 DIAGNOSIS — Z12.11 SCREEN FOR COLON CANCER: ICD-10-CM

## 2022-06-08 DIAGNOSIS — F32.1 MODERATE MAJOR DEPRESSION (H): ICD-10-CM

## 2022-06-08 DIAGNOSIS — Z76.0 ENCOUNTER FOR MEDICATION REFILL: ICD-10-CM

## 2022-06-08 DIAGNOSIS — E55.9 VITAMIN D DEFICIENCY DISEASE: ICD-10-CM

## 2022-06-08 LAB
ALBUMIN SERPL-MCNC: 4 G/DL (ref 3.5–5)
ALBUMIN UR-MCNC: NEGATIVE MG/DL
ALP SERPL-CCNC: 72 U/L (ref 45–120)
ALT SERPL W P-5'-P-CCNC: 28 U/L (ref 0–45)
ANION GAP SERPL CALCULATED.3IONS-SCNC: 14 MMOL/L (ref 5–18)
APPEARANCE UR: CLEAR
AST SERPL W P-5'-P-CCNC: 20 U/L (ref 0–40)
BILIRUB SERPL-MCNC: 0.3 MG/DL (ref 0–1)
BILIRUB UR QL STRIP: NEGATIVE
BUN SERPL-MCNC: 14 MG/DL (ref 8–22)
CALCIUM SERPL-MCNC: 10.3 MG/DL (ref 8.5–10.5)
CHLORIDE BLD-SCNC: 102 MMOL/L (ref 98–107)
CHOLEST SERPL-MCNC: 213 MG/DL
CO2 SERPL-SCNC: 21 MMOL/L (ref 22–31)
COLOR UR AUTO: YELLOW
CREAT SERPL-MCNC: 0.8 MG/DL (ref 0.6–1.1)
CREAT UR-MCNC: 82 MG/DL
ERYTHROCYTE [DISTWIDTH] IN BLOOD BY AUTOMATED COUNT: 13.5 % (ref 10–15)
FASTING STATUS PATIENT QL REPORTED: YES
GFR SERPL CREATININE-BSD FRML MDRD: 81 ML/MIN/1.73M2
GLUCOSE BLD-MCNC: 114 MG/DL (ref 70–125)
GLUCOSE UR STRIP-MCNC: NEGATIVE MG/DL
HBA1C MFR BLD: 6 % (ref 0–5.6)
HCT VFR BLD AUTO: 40.1 % (ref 35–47)
HDLC SERPL-MCNC: 66 MG/DL
HGB BLD-MCNC: 12.9 G/DL (ref 11.7–15.7)
HGB UR QL STRIP: NEGATIVE
KETONES UR STRIP-MCNC: NEGATIVE MG/DL
LDLC SERPL CALC-MCNC: 127 MG/DL
LEUKOCYTE ESTERASE UR QL STRIP: NEGATIVE
MCH RBC QN AUTO: 27.9 PG (ref 26.5–33)
MCHC RBC AUTO-ENTMCNC: 32.2 G/DL (ref 31.5–36.5)
MCV RBC AUTO: 87 FL (ref 78–100)
MICROALBUMIN UR-MCNC: 0.79 MG/DL (ref 0–1.99)
MICROALBUMIN/CREAT UR: 9.6 MG/G CR
NITRATE UR QL: NEGATIVE
PH UR STRIP: 5.5 [PH] (ref 5–7)
PLATELET # BLD AUTO: 313 10E3/UL (ref 150–450)
POTASSIUM BLD-SCNC: 4.6 MMOL/L (ref 3.5–5)
PROT SERPL-MCNC: 7 G/DL (ref 6–8)
RBC # BLD AUTO: 4.62 10E6/UL (ref 3.8–5.2)
SODIUM SERPL-SCNC: 137 MMOL/L (ref 136–145)
SP GR UR STRIP: 1.01 (ref 1–1.03)
TRIGL SERPL-MCNC: 102 MG/DL
UROBILINOGEN UR STRIP-ACNC: 0.2 E.U./DL
WBC # BLD AUTO: 7.8 10E3/UL (ref 4–11)

## 2022-06-08 PROCEDURE — 0054A COVID-19,PF,PFIZER (12+ YRS): CPT | Performed by: FAMILY MEDICINE

## 2022-06-08 PROCEDURE — 80061 LIPID PANEL: CPT | Performed by: FAMILY MEDICINE

## 2022-06-08 PROCEDURE — 85027 COMPLETE CBC AUTOMATED: CPT | Performed by: FAMILY MEDICINE

## 2022-06-08 PROCEDURE — 80053 COMPREHEN METABOLIC PANEL: CPT | Performed by: FAMILY MEDICINE

## 2022-06-08 PROCEDURE — 82306 VITAMIN D 25 HYDROXY: CPT | Performed by: FAMILY MEDICINE

## 2022-06-08 PROCEDURE — 82043 UR ALBUMIN QUANTITATIVE: CPT | Performed by: FAMILY MEDICINE

## 2022-06-08 PROCEDURE — 99214 OFFICE O/P EST MOD 30 MIN: CPT | Mod: 25 | Performed by: FAMILY MEDICINE

## 2022-06-08 PROCEDURE — 36415 COLL VENOUS BLD VENIPUNCTURE: CPT | Performed by: FAMILY MEDICINE

## 2022-06-08 PROCEDURE — 83036 HEMOGLOBIN GLYCOSYLATED A1C: CPT | Performed by: FAMILY MEDICINE

## 2022-06-08 PROCEDURE — 81003 URINALYSIS AUTO W/O SCOPE: CPT | Performed by: FAMILY MEDICINE

## 2022-06-08 PROCEDURE — G0439 PPPS, SUBSEQ VISIT: HCPCS | Performed by: FAMILY MEDICINE

## 2022-06-08 PROCEDURE — 91305 COVID-19,PF,PFIZER (12+ YRS): CPT | Performed by: FAMILY MEDICINE

## 2022-06-08 RX ORDER — METFORMIN HCL 500 MG
500 TABLET, EXTENDED RELEASE 24 HR ORAL
Qty: 90 TABLET | Refills: 4 | Status: SHIPPED | OUTPATIENT
Start: 2022-06-08 | End: 2023-01-22

## 2022-06-08 RX ORDER — LORAZEPAM 1 MG/1
1 TABLET ORAL EVERY 8 HOURS PRN
Qty: 5 TABLET | Refills: 0 | Status: SHIPPED | OUTPATIENT
Start: 2022-06-08 | End: 2023-09-28

## 2022-06-08 ASSESSMENT — ENCOUNTER SYMPTOMS
NERVOUS/ANXIOUS: 1
SHORTNESS OF BREATH: 1
PALPITATIONS: 1
FEVER: 0
EYE PAIN: 1
ABDOMINAL PAIN: 1
DYSURIA: 1
DIZZINESS: 1
DIARRHEA: 1
FREQUENCY: 1
HEADACHES: 1
JOINT SWELLING: 0
NAUSEA: 1
COUGH: 0
CHILLS: 0
CONSTIPATION: 1
WEAKNESS: 1
BREAST MASS: 0
ARTHRALGIAS: 1
HEMATOCHEZIA: 1
HEARTBURN: 1
PARESTHESIAS: 1
MYALGIAS: 1
SORE THROAT: 0
HEMATURIA: 1

## 2022-06-08 ASSESSMENT — ACTIVITIES OF DAILY LIVING (ADL): CURRENT_FUNCTION: NO ASSISTANCE NEEDED

## 2022-06-08 NOTE — PROGRESS NOTES
"SUBJECTIVE:   Alexus Garcia is a 66 year old female who presents for Preventive Visit.    Patient has been advised of split billing requirements and indicates understanding: Yes  Are you in the first 12 months of your Medicare coverage?  No    Healthy Habits:     Duration of exercise:  15-30 minutes    Do you usually eat at least 4 servings of fruit and vegetables a day, include whole grains    & fiber and avoid regularly eating high fat or \"junk\" foods?  No    Barriers to taking medications:  Side effects    Ability to successfully perform activities of daily living:  No assistance needed    Home Safety:  No safety concerns identified    In general, how would you rate your overall mental or emotional health?  Fair      PHQ-2 Total Score: 6    Additional concerns today:  Yes    Do you feel safe in your environment? Yes    Have you ever done Advance Care Planning? (For example, a Health Directive, POLST, or a discussion with a medical provider or your loved ones about your wishes): No, advance care planning information given to patient to review.  Patient plans to discuss their wishes with loved ones or provider.       Moving 7/1/22. From [place] to [place]. On wait list for 2 bedroom (Then she will have room for PT/exercise equipment). There is a [place] and a [place]. She walks out into a hallway. She also hopes to get on a wait list for a garage. There is no garage where she is now and there is no hope where she is now for a 2 bedroom apartment - also where she is now she walks directly outside.    More tired lately. Lots of dizziness.  Falling recently.    horendous headaches that last days. No meds have helped. Acetaminophen TID does nothing. She wonders if a med is causing this    Taking 4 metformin causes diarrhea, even if dividing them. If she does two, then the diarrhea isn't bad. Anny is currently taking 4 per day.    With having bone pain, she has bad dreams about her cancer coming back.    Wants " her covid booster and her shingles shot     Fall risk  Fallen 2 or more times in the past year?: Yes  Any fall with injury in the past year?: Yes    Cognitive Screening   1) Repeat 3 items (Leader, Season, Table)    2) Clock draw: NORMAL  3) 3 item recall: Recalls 3 objects  Results: NORMAL clock, 1-2 items recalled: COGNITIVE IMPAIRMENT LESS LIKELY    Mini-CogTM Copyright JANE Graham. Licensed by the author for use in [facility]; reprinted with permission (viv@[facility]). All rights reserved.      Do you have sleep apnea, excessive snoring or daytime drowsiness?: unknown    Reviewed and updated as needed this visit by clinical staff    Allergies  Meds                Reviewed and updated as needed this visit by Provider                   Social History     Tobacco Use     Smoking status: Former Smoker     Packs/day: 1.00     Types: Cigarettes     Quit date: 5/3/2011     Years since quittin.1     Smokeless tobacco: Never Used     Tobacco comment: No passive exposure   Substance Use Topics     Alcohol use: No       Alcohol Use 2022   Prescreen: >3 drinks/day or >7 drinks/week? Not Applicable       Current providers sharing in care for this patient include:   Patient Care Team:  Daxa Morales MD as PCP - General  Daxa Morales MD as Assigned PCP  Lucy Carey NP as Assigned Behavioral Health Provider    The following health maintenance items are reviewed in Epic and correct as of today:  Health Maintenance Due   Topic Date Due     DEXA  Never done     DIABETIC FOOT EXAM  Never done     ANNUAL REVIEW OF  ORDERS  Never done     DEPRESSION ACTION PLAN  Never done     LUNG CANCER SCREENING  2017     EYE EXAM  2019     MEDICARE ANNUAL WELLNESS VISIT  2020     COLORECTAL CANCER SCREENING  2020     A1C  2021     COVID-19 Vaccine (3 - Booster for Pfizer series) 10/18/2021     LIPID  11/10/2021     MICROALBUMIN  11/10/2021     BMP  2021     ZOSTER  IMMUNIZATION (2 of 2) 12/10/2021     HEMOGLOBIN  12/16/2021     Lab work is in process  Labs reviewed in EPIC  BP Readings from Last 3 Encounters:   06/08/22 98/58   12/16/20 (!) 84/58   11/10/20 98/68    Wt Readings from Last 3 Encounters:   06/08/22 61.7 kg (136 lb)   06/01/22 63.5 kg (140 lb)   05/02/22 63.5 kg (140 lb)                  Patient Active Problem List   Diagnosis     Neuralgia     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)     Malignant neoplasm of breast (H)     Posttraumatic hematoma of right breast     Postoperative pain     Pain in right axilla     Constipation due to outlet dysfunction     Logorrhea     Type 2 diabetes mellitus without complication, without long-term current use of insulin (H)     Rotator cuff tear     Anxiety     Scalp irritation     Abnormal MRI     Diabetes 1.5, managed as type 2 (H)     H/O breast surgery     Advanced directives, counseling/discussion     Vitamin D deficiency disease     Moderate major depression (H)     Concussion with loss of consciousness     CKD (chronic kidney disease) stage 3, GFR 30-59 ml/min (H)     Concussion without loss of consciousness     Past Surgical History:   Procedure Laterality Date     BIOPSY BREAST Left 2011     BIOPSY BREAST Right 2016     BIOPSY OF BREAST, INCISIONAL Left 8/9/2019    Procedure: Left Breast Biopsy;  Surgeon: Veronique Danielle MD;  Location: [facility];  Service: General     EXCISE BREAST CYST/FIBROADENOMA/TUMOR/DUCT LESION/NIPPLE LESION/AREOLAR LESION Left 2011    Description: Breast Surgery Lumpectomy;  Recorded: 09/25/2013;     HC BREAST RECONSTRUC W TISS EXPANDR Bilateral 8/18/2016    Procedure: BILATERAL BREAST RECONSTRUCTION, REMOVAL OF LESION RIGHT AXILLA, REMOVAL OF LESION RIGHT NECK;  Surgeon: Josr Alcantara MD;  Location: [facility];  Service: Plastics     MAMMOPLASTY AUGMENTATION Left 2013    TRAM flap with implant     MAMMOPLASTY AUGMENTATION Right 2013    implant  placed     MASTECTOMY Right 2016    reconstructive expanders bilaterally     VT ARTHRODESIS ANT INTERBODY MIN DISCECTOMY,LUMBAR      Description: Lumbar Vertebral Fusion;  Recorded: 2013;     VT EXCISE EXCESS SKIN TISSUE,ABDOMEN N/A 3/3/2017    Procedure: BILATERAL FAT GRAFTING FROM ABDOMEN TO BREASTS;  Surgeon: Josr Alcantara MD;  Location: [facility];  Service: Plastics     VT LAP,DIAGNOSTIC ABDOMEN      Description: Laparoscopy (Diagnostic);  Recorded: 2013;     VT MASTECTOMY, SIMPLE, COMPLETE Right 2016    Procedure: RIGHT MASTECTOMY;  Surgeon: Veronique Danielle MD;  Location: [facility];  Service: General     VT REPLACEMENT TISSUE EXPANDER W/PERMANENT IMPLANT Bilateral 3/3/2017    Procedure: BILATERAL TISSUE EXPANDER REMOVAL FOR PERMANENT IMPLANTS;  Surgeon: Josr Alcantara MD;  Location: [facility];  Service: Plastics     REMOVE IMPLANT BREAST Right 2016    Procedure: EVACUATION RIGHT BREAST HEMATOMA;  Surgeon: Josr Alcantara MD;  Location: [facility];  Service:      ZZPR BREAST RECONSTRUCTION W/LATISSIMUS DORSI FLAP      Description: Breast Surgery Reconstruction With Latissimus Dorsi Flap;  Recorded: 2013;       Social History     Tobacco Use     Smoking status: Former Smoker     Packs/day: 1.00     Types: Cigarettes     Quit date: 5/3/2011     Years since quittin.1     Smokeless tobacco: Never Used     Tobacco comment: No passive exposure   Substance Use Topics     Alcohol use: No     Family History   Adopted: Yes   Problem Relation Age of Onset     No Known Problems Mother      No Known Problems Father      No Known Problems Sister      No Known Problems Daughter      No Known Problems Maternal Grandmother      No Known Problems Maternal Grandfather      No Known Problems Paternal Grandmother      No Known Problems Paternal Grandfather      No Known Problems Maternal Aunt      No Known Problems Paternal Aunt      Hereditary Breast and Ovarian  Cancer Syndrome No family hx of      Breast Cancer No family hx of      Cancer No family hx of      Colon Cancer No family hx of      Endometrial Cancer No family hx of      Ovarian Cancer No family hx of          Current Outpatient Medications   Medication Sig Dispense Refill     acetaminophen (TYLENOL) 500 MG tablet [ACETAMINOPHEN (TYLENOL) 500 MG TABLET] Take 1,000 mg by mouth 3 (three) times a day as needed for pain.       betamethasone, augmented, (DIPROLENE) 0.05 % lotion [BETAMETHASONE, AUGMENTED, (DIPROLENE) 0.05 % LOTION] Apply 1 application topically 2 (two) times a day as needed. Apply to the back of the neck and upper back 60 mL 3     blood glucose test (CONTOUR NEXT TEST STRIPS) strips [BLOOD GLUCOSE TEST (CONTOUR NEXT TEST STRIPS) STRIPS] Use 1 each As Directed daily. 100 strip 3     blood glucose test strips [BLOOD GLUCOSE TEST STRIPS] Use 1 each As Directed as needed. Dispense brand per patient's insurance at pharmacy discretion. 100 strip 2     blood-glucose meter (CONTOUR NEXT METER) Misc [BLOOD-GLUCOSE METER (CONTOUR NEXT METER) MISC] Check BG twice daily. 1 each 0     calcium citrate-vitamin D (CITRACAL+D) 315-200 mg-unit per tablet [CALCIUM CITRATE-VITAMIN D (CITRACAL+D) 315-200 MG-UNIT PER TABLET] Take 1 tablet by mouth.       Cholecalciferol (VITAMIN D3) 25 MCG TABS TAKE 2 TABLETS (2,000 UNITS TOTAL) BY MOUTH 2 (TWO) TIMES A DAY. *NOT COVERED* 360 tablet 3     DULoxetine (CYMBALTA) 30 MG capsule Take 1 capsule (30 mg) by mouth daily 90 capsule 0     generic lancets (MICROLET LANCET) [GENERIC LANCETS (MICROLET LANCET)] Use 1 each As Directed daily. Dispense brand per patient's insurance at pharmacy discretion. 100 each 11     lisinopril (ZESTRIL) 2.5 MG tablet TAKE 1 TABLET BY MOUTH EVERY DAY 90 tablet 3     LORazepam (ATIVAN) 1 MG tablet Take 1 tablet (1 mg) by mouth every 8 hours as needed for anxiety 5 tablet 0     metFORMIN (GLUCOPHAGE XR) 500 MG 24 hr tablet Take 1 tablet (500 mg) by mouth  daily (with breakfast) 90 tablet 4     naproxen sodium (ALEVE) 220 MG tablet [NAPROXEN SODIUM (ALEVE) 220 MG TABLET] Take 440 mg by mouth as needed.       ONETOUCH ULTRA2 [ONETOUCH ULTRA2] USE DAILY AS DIRECTED  0     oxyCODONE (ROXICODONE) 5 MG immediate release tablet [OXYCODONE (ROXICODONE) 5 MG IMMEDIATE RELEASE TABLET] Take 1-3 tabs PO every 6 hours as needed for pain; do not take lorazepam when taking this medication 20 tablet 0     polyethylene glycol (MIRALAX) 17 gram packet [POLYETHYLENE GLYCOL (MIRALAX) 17 GRAM PACKET] Take 17 g by mouth.       QUEtiapine (SEROQUEL) 25 MG tablet TAKE 1 AND ONE-HALF TABLETS BY MOUTH AT BEDTIME (NEW DIRECTIONS IN MD NOTES) 135 tablet 3     simvastatin (ZOCOR) 5 MG tablet TAKE 1 TABLET BY MOUTH EVERY DAY IN THE EVENING 90 tablet 4     zolpidem (AMBIEN) 10 MG tablet TAKE 1 TABLET (10 MG) BY MOUTH NIGHTLY AS NEEDED 30 tablet 0     Allergies   Allergen Reactions     Aztreonam Hives     Castor Oil Hives     Cefaclor Anaphylaxis     Cephalexin Anaphylaxis     Cephalexin Monohydrate [Cephalexin] Hives     Chlorhexidine Hives     White clear stuff that you lather during surgery that dries unto the skin and stays on the skin that's almost like a superglue. It was very itchy and skin gets very red.     Ciprofloxacin Anaphylaxis     Clarithromycin Anaphylaxis, Anxiety, Dizziness, Hives, Itching, Nausea and Vomiting, Rash and Shortness Of Breath     Clindamycin Hives     Penicillins Anaphylaxis     Propofol Nausea and Headache     Scopolamine Anaphylaxis, Hives and Swelling     Swollen tongue, eye swelled closed, Eye and tongue swelling      Sulfa (Sulfonamide Antibiotics) [Sulfa Drugs] Anaphylaxis and Hives     Sulfasalazine Hives     Tetracyclines Anaphylaxis     Vancomycin Hives     Adhesive [Mecrylate] Itching     Irritation where the tape had contact     Cytoxan [Cyclophosphamide] Hives     severe     Erythromycin Base [Erythromycin] Hives     Hydrocodone Nausea and Vomiting      "Neupogen [Filgrastim] Hives     severe     Paper Tape [Adhesive Tape] Other (See Comments)     Surgical paper tape - redness. States if left on too long leaves little cuts on her skin     Tapentadol Other (See Comments)     bleeding     Taxol [Paclitaxel] Hives     severe     Taxotere [Docetaxel] Hives     FHS-7: No flowsheet data found.    Pertinent mammograms are reviewed under the imaging tab.    Review of Systems   Constitutional: Negative for chills and fever.   HENT: Positive for hearing loss. Negative for congestion, ear pain and sore throat.    Eyes: Positive for pain and visual disturbance.   Respiratory: Positive for shortness of breath. Negative for cough.    Cardiovascular: Positive for chest pain and palpitations. Negative for peripheral edema.   Gastrointestinal: Positive for abdominal pain, constipation, diarrhea, heartburn, hematochezia and nausea.   Breasts:  Positive for tenderness. Negative for breast mass and discharge.   Genitourinary: Positive for dysuria, frequency, hematuria and urgency. Negative for genital sores, pelvic pain, vaginal bleeding and vaginal discharge.   Musculoskeletal: Positive for arthralgias and myalgias. Negative for joint swelling.   Skin: Negative for rash.   Neurological: Positive for dizziness, weakness, headaches and paresthesias.   Psychiatric/Behavioral: Positive for mood changes. The patient is nervous/anxious.      Hip pain (on side of upper thighs) - this is especially bad because she's a side sleeper    Getting auditory hallucinations** - wonders if it is from her sleep med. Also hears \"swoosh-swoosh\" from time to time. Thinks she knows which med causes it - her psychotropic med (quetiapine). Zolpidem does not cause it.    Breast MRI - it's been over a year **    OBJECTIVE:   There were no vitals taken for this visit. Estimated body mass index is 24.8 kg/m  as calculated from the following:    Height as of 6/1/22: 1.6 m (5' 3\").    Weight as of 6/1/22: 63.5 kg " (140 lb). Answers for HPI/ROS submitted by the patient on 6/2/2022  If you checked off any problems, how difficult have these problems made it for you to do your work, take care of things at home, or get along with other people?: Very difficult  PHQ9 TOTAL SCORE: 18    Conflicting answers have been found for some questions. Please document the patient's answers manually.     Physical Exam  GENERAL: alert, no distress and talkative  EYES: Eyes grossly normal to inspection, PERRL and conjunctivae and sclerae normal  HENT: ear canals and TM's normal, nose and mouth without ulcers or lesions  NECK: no adenopathy, no asymmetry, masses, or scars and thyroid normal to palpation  RESP: lungs clear to auscultation - no rales, rhonchi or wheezes  CV: regular rate and rhythm, normal S1 S2, no S3 or S4, no murmur, click or rub, no peripheral edema and peripheral pulses strong  ABDOMEN: soft, nontender, without hepatosplenomegaly or masses and bowel sounds normal  MS: no gross musculoskeletal defects noted, no edema  SKIN: no suspicious lesions or rashes  NEURO: Normal strength and tone, sensory exam grossly normal and mentation intact  PSYCH: mentation appears normal and affect normal/bright  Diabetic foot exam: normal DP and PT pulses, no trophic changes or ulcerative lesions and normal sensory exam    Diagnostic Test Results:  Labs reviewed in Epic  Results for orders placed or performed in visit on 06/08/22   CBC with platelets     Status: Normal   Result Value Ref Range    WBC Count 7.8 4.0 - 11.0 10e3/uL    RBC Count 4.62 3.80 - 5.20 10e6/uL    Hemoglobin 12.9 11.7 - 15.7 g/dL    Hematocrit 40.1 35.0 - 47.0 %    MCV 87 78 - 100 fL    MCH 27.9 26.5 - 33.0 pg    MCHC 32.2 31.5 - 36.5 g/dL    RDW 13.5 10.0 - 15.0 %    Platelet Count 313 150 - 450 10e3/uL   Comprehensive metabolic panel (BMP + Alb, Alk Phos, ALT, AST, Total. Bili, TP)     Status: Abnormal   Result Value Ref Range    Sodium 137 136 - 145 mmol/L    Potassium 4.6  3.5 - 5.0 mmol/L    Chloride 102 98 - 107 mmol/L    Carbon Dioxide (CO2) 21 (L) 22 - 31 mmol/L    Anion Gap 14 5 - 18 mmol/L    Urea Nitrogen 14 8 - 22 mg/dL    Creatinine 0.80 0.60 - 1.10 mg/dL    Calcium 10.3 8.5 - 10.5 mg/dL    Glucose 114 70 - 125 mg/dL    Alkaline Phosphatase 72 45 - 120 U/L    AST 20 0 - 40 U/L    ALT 28 0 - 45 U/L    Protein Total 7.0 6.0 - 8.0 g/dL    Albumin 4.0 3.5 - 5.0 g/dL    Bilirubin Total 0.3 0.0 - 1.0 mg/dL    GFR Estimate 81 >60 mL/min/1.73m2   Hemoglobin A1c     Status: Abnormal   Result Value Ref Range    Hemoglobin A1C 6.0 (H) 0.0 - 5.6 %   Lipid panel reflex to direct LDL Non-fasting     Status: Abnormal   Result Value Ref Range    Cholesterol 213 (H) <=199 mg/dL    Triglycerides 102 <=149 mg/dL    Direct Measure HDL 66 >=50 mg/dL    LDL Cholesterol Calculated 127 <=129 mg/dL    Patient Fasting > 8hrs? Yes    Vitamin D Deficiency     Status: Normal   Result Value Ref Range    Vitamin D, Total (25-Hydroxy) 48 20 - 75 ug/L    Narrative    Season, race, dietary intake, and treatment affect the concentration of 25-hydroxy-Vitamin D. Values may decrease during winter months and increase during summer months. Values 20-29 ug/L may indicate Vitamin D insufficiency and values <20 ug/L may indicate Vitamin D deficiency.    Vitamin D determination is routinely performed by an immunoassay specific for 25 hydroxyvitamin D3.  If an individual is on vitamin D2(ergocalciferol) supplementation, please specify 25 OH vitamin D2 and D3 level determination by LCMSMS test [id number].     UA Macro with Reflex to Micro and Culture - lab collect     Status: Normal    Specimen: Urine, Midstream   Result Value Ref Range    Color Urine Yellow Colorless, Straw, Light Yellow, Yellow    Appearance Urine Clear Clear    Glucose Urine Negative Negative mg/dL    Bilirubin Urine Negative Negative    Ketones Urine Negative Negative mg/dL    Specific Gravity Urine 1.010 1.005 - 1.030    Blood Urine Negative Negative     pH Urine 5.5 5.0 - 7.0    Protein Albumin Urine Negative Negative mg/dL    Urobilinogen Urine 0.2 0.2, 1.0 E.U./dL    Nitrite Urine Negative Negative    Leukocyte Esterase Urine Negative Negative    Narrative    Microscopic not indicated   Albumin Random Urine Quantitative with Creat Ratio     Status: None   Result Value Ref Range    Microalbumin Urine mg/dL 0.79 0.00 - 1.99 mg/dL    Creatinine Urine mg/dL 82 mg/dL    Microalbumin Urine mg/g Cr 9.6 <=19.9 mg/g Cr    Narrative    Microalbumin, Random Urine   <2.0 mg/dL . . . . . . . . Normal   3.0-30.0 mg/dL . . . . . . Microalbuminuria   >30.0 mg/dL . . . . . .  . Clinical Proteinuria     Microalbumin/Creatinine Ratio, Random Urine   <20 mg/g . . . . .. . . . Normal    mg/g . . . . . . . Microalbuminuria   >300 mg/g . . . . . . . . Clinical Proteinuria       ASSESSMENT / PLAN:   (Z00.00) Preventative health care  (primary encounter diagnosis)  Comment: went through preventative measures today. She wants them and is working to stabilize her life    (E11.9) Type 2 diabetes mellitus without complication, without long-term current use of insulin (H)  Comment: I think her headaches have actually been from low sugars - that she is taking 4 metformin which is too much. She agrees to back off to one daily, and we'll see if her headaches improve and her diarrhea resolves.  Plan: CBC with platelets, Comprehensive metabolic         panel (BMP + Alb, Alk Phos, ALT, AST, Total.         Bili, TP), Hemoglobin A1c, UA Macro with Reflex        to Micro and Culture - lab collect, Albumin         Random Urine Quantitative with Creat Ratio,         Lipid panel reflex to direct LDL Non-fasting,         Adult Eye Referral, metFORMIN (GLUCOPHAGE XR)         500 MG 24 hr tablet    (E55.9) Vitamin D deficiency disease  Comment: check vit d level to determine what supplement, if any, she needs  Plan: Vitamin D Deficiency    (F32.1) Moderate major depression (H)  Comment: this  "continues - made worse by the stress of her upcoming move and just not feeling well recently  Plan: Vitamin D Deficiency    (Z23) Need for COVID-19 vaccine  Comment: gave booster  Plan: COVID-19,PF,PFIZER (12+ Yrs GRAY LABEL)    (C50.211) Malignant neoplasm of upper-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, unspecified estrogen receptor status (H)  Comment: this is what is causing most of her stress - continued cancer care    (N18.32) Stage 3b chronic kidney disease (H)  Comment: follow closely    (Z12.11) Screen for colon cancer  Comment: she agrees to do the Cologuard test  Plan: COLOGUARD([employer])    (Z76.0) Encounter for medication refill  Comment: refills given  Plan: metFORMIN (GLUCOPHAGE XR) 500 MG 24 hr tablet,         LORazepam (ATIVAN) 1 MG tablet    COUNSELING:  Reviewed preventive health counseling, as reflected in patient instructions       Regular exercise       Healthy diet/nutrition       Dental care       Bladder control       Fall risk prevention       Immunizations    Vaccinated for: Zoster  + covid           Osteoporosis prevention/bone health       Colon cancer screening       Advanced Planning     Estimated body mass index is 24.8 kg/m  as calculated from the following:    Height as of 6/1/22: 1.6 m (5' 3\").    Weight as of 6/1/22: 63.5 kg (140 lb).        She reports that she quit smoking about 11 years ago. Her smoking use included cigarettes. She smoked 1.00 pack per day. She has never used smokeless tobacco.      Appropriate preventive services were discussed with this patient, including applicable screening as appropriate for cardiovascular disease, diabetes, osteopenia/osteoporosis, and glaucoma.  As appropriate for age/gender, discussed screening for colorectal cancer, prostate cancer, breast cancer, and cervical cancer. Checklist reviewing preventive services available has been given to the patient.    Reviewed patients plan of care and provided an AVS. The Complex Care Plan (for " patients with higher acuity and needing more deliberate coordination of services) for Alexus meets the Care Plan requirement. This Care Plan has been established and reviewed with the Patient.    Counseling Resources:  ATP IV Guidelines  Pooled Cohorts Equation Calculator  Breast Cancer Risk Calculator  Breast Cancer: Medication to Reduce Risk  FRAX Risk Assessment  ICSI Preventive Guidelines  Dietary Guidelines for Americans, 2010  USDA's MyPlate  ASA Prophylaxis  Lung CA Screening    Daxa Morales MD  [facility]    Identified Health Risks:

## 2022-06-08 NOTE — PATIENT INSTRUCTIONS

## 2022-06-08 NOTE — LETTER
"Holly 10, 2022      Anny NGUYEN Jose  [address] [place] [address] [place] [id number]        Dear ,    We are writing to inform you of your test results.    Your lab results are good. Your kidneys are no longer \"leaking\" protein in her urine and your other labs are all fine. Even your Vitamin D is very good at 48. Please keep taking your Vitamin D supplement, but just once per week.       Resulted Orders   CBC with platelets   Result Value Ref Range    WBC Count 7.8 4.0 - 11.0 10e3/uL    RBC Count 4.62 3.80 - 5.20 10e6/uL    Hemoglobin 12.9 11.7 - 15.7 g/dL    Hematocrit 40.1 35.0 - 47.0 %    MCV 87 78 - 100 fL    MCH 27.9 26.5 - 33.0 pg    MCHC 32.2 31.5 - 36.5 g/dL    RDW 13.5 10.0 - 15.0 %    Platelet Count 313 150 - 450 10e3/uL   Comprehensive metabolic panel (BMP + Alb, Alk Phos, ALT, AST, Total. Bili, TP)   Result Value Ref Range    Sodium 137 136 - 145 mmol/L    Potassium 4.6 3.5 - 5.0 mmol/L    Chloride 102 98 - 107 mmol/L    Carbon Dioxide (CO2) 21 (L) 22 - 31 mmol/L    Anion Gap 14 5 - 18 mmol/L    Urea Nitrogen 14 8 - 22 mg/dL    Creatinine 0.80 0.60 - 1.10 mg/dL    Calcium 10.3 8.5 - 10.5 mg/dL    Glucose 114 70 - 125 mg/dL    Alkaline Phosphatase 72 45 - 120 U/L    AST 20 0 - 40 U/L    ALT 28 0 - 45 U/L    Protein Total 7.0 6.0 - 8.0 g/dL    Albumin 4.0 3.5 - 5.0 g/dL    Bilirubin Total 0.3 0.0 - 1.0 mg/dL    GFR Estimate 81 >60 mL/min/1.73m2      Comment:      Effective December 21, 2021 eGFRcr in adults is calculated using the 2021 CKD-EPI creatinine equation which includes age and gender (Stefania leal al., NEJM, DOI: 10.1056/RSFYeh5729515)   Hemoglobin A1c   Result Value Ref Range    Hemoglobin A1C 6.0 (H) 0.0 - 5.6 %      Comment:      Normal <5.7%   Prediabetes 5.7-6.4%    Diabetes 6.5% or higher     Note: Adopted from ADA consensus guidelines.   Lipid panel reflex to direct LDL Non-fasting   Result Value Ref Range    Cholesterol 213 (H) <=199 mg/dL    Triglycerides 102 <=149 mg/dL    Direct " Measure HDL 66 >=50 mg/dL      Comment:      HDL Cholesterol Reference Range:     0-2 years:   No reference ranges established for patients under 2 years old  at [facility] for lipid analytes.    2-8 years:  Greater than 45 mg/dL     18 years and older:   Female: Greater than or equal to 50 mg/dL   Male:   Greater than or equal to 40 mg/dL    LDL Cholesterol Calculated 127 <=129 mg/dL    Patient Fasting > 8hrs? Yes    Vitamin D Deficiency   Result Value Ref Range    Vitamin D, Total (25-Hydroxy) 48 20 - 75 ug/L    Narrative    Season, race, dietary intake, and treatment affect the concentration of 25-hydroxy-Vitamin D. Values may decrease during winter months and increase during summer months. Values 20-29 ug/L may indicate Vitamin D insufficiency and values <20 ug/L may indicate Vitamin D deficiency.    Vitamin D determination is routinely performed by an immunoassay specific for 25 hydroxyvitamin D3.  If an individual is on vitamin D2(ergocalciferol) supplementation, please specify 25 OH vitamin D2 and D3 level determination by LCMSMS test VITD23.     UA Macro with Reflex to Micro and Culture - lab collect   Result Value Ref Range    Color Urine Yellow Colorless, Straw, Light Yellow, Yellow    Appearance Urine Clear Clear    Glucose Urine Negative Negative mg/dL    Bilirubin Urine Negative Negative    Ketones Urine Negative Negative mg/dL    Specific Gravity Urine 1.010 1.005 - 1.030    Blood Urine Negative Negative    pH Urine 5.5 5.0 - 7.0    Protein Albumin Urine Negative Negative mg/dL    Urobilinogen Urine 0.2 0.2, 1.0 E.U./dL    Nitrite Urine Negative Negative    Leukocyte Esterase Urine Negative Negative    Narrative    Microscopic not indicated   Albumin Random Urine Quantitative with Creat Ratio   Result Value Ref Range    Microalbumin Urine mg/dL 0.79 0.00 - 1.99 mg/dL    Creatinine Urine mg/dL 82 mg/dL    Microalbumin Urine mg/g Cr 9.6 <=19.9 mg/g Cr    Narrative    Microalbumin, Random  Urine   <2.0 mg/dL . . . . . . . . Normal   3.0-30.0 mg/dL . . . . . . Microalbuminuria   >30.0 mg/dL . . . . . .  . Clinical Proteinuria     Microalbumin/Creatinine Ratio, Random Urine   <20 mg/g . . . . .. . . . Normal    mg/g . . . . . . . Microalbuminuria   >300 mg/g . . . . . . . . Clinical Proteinuria       If you have any questions or concerns, please call the clinic at the number listed above.       Sincerely,      Daxa Morales MD

## 2022-06-08 NOTE — LETTER
My Depression Action Plan  Name: Alexus Garcia   Date of Birth 1955  Date: 6/8/2022    My doctor: Daxa Morales   My clinic: [facility] [address] [place] [id number] [phone]          GREEN    ZONE   Good Control    What it looks like:     Things are going generally well. You have normal ups and downs. You may even feel depressed from time to time, but bad moods usually last less than a day.   What you need to do:  1. Continue to care for yourself (see self care plan)  2. Check your depression survival kit and update it as needed  3. Follow your physician s recommendations including any medication.  4. Do not stop taking medication unless you consult with your physician first.           YELLOW         ZONE Getting Worse    What it looks like:     Depression is starting to interfere with your life.     It may be hard to get out of bed; you may be starting to isolate yourself from others.    Symptoms of depression are starting to last most all day and this has happened for several days.     You may have suicidal thoughts but they are not constant.   What you need to do:     1. Call your care team. Your response to treatment will improve if you keep your care team informed of your progress. Yellow periods are signs an adjustment may need to be made.     2. Continue your self-care.  Just get dressed and ready for the day.  Don't give yourself time to talk yourself out of it.    3. Talk to someone in your support network.    4. Open up your Depression Self-Care Plan/Wellness Kit.           RED    ZONE Medical Alert - Get Help    What it looks like:     Depression is seriously interfering with your life.     You may experience these or other symptoms: You can t get out of bed most days, can t work or engage in other necessary activities, you have trouble taking care of basic hygiene, or basic responsibilities, thoughts of suicide or death that will  not go away, self-injurious behavior.     What you need to do:  1. Call your care team and request a same-day appointment. If they are not available (weekends or after hours) call your local crisis line, emergency room or 911.          Depression Self-Care Plan / Wellness Kit    Many people find that medication and therapy are helpful treatments for managing depression. In addition, making small changes to your everyday life can help to boost your mood and improve your wellbeing. Below are some tips for you to consider. Be sure to talk with your medical provider and/or behavioral health consultant if your symptoms are worsening or not improving.     Sleep   Sleep hygiene  means all of the habits that support good, restful sleep. It includes maintaining a consistent bedtime and wake time, using your bedroom only for sleeping or sex, and keeping the bedroom dark and free of distractions like a computer, smartphone, or television.     Develop a Healthy Routine  Maintain good hygiene. Get out of bed in the morning, make your bed, brush your teeth, take a shower, and get dressed. Don t spend too much time viewing media that makes you feel stressed. Find time to relax each day.    Exercise  Get some form of exercise every day. This will help reduce pain and release endorphins, the  feel good  chemicals in your brain. It can be as simple as just going for a walk or doing some gardening, anything that will get you moving.      Diet  Strive to eat healthy foods, including fruits and vegetables. Drink plenty of water. Avoid excessive sugar, caffeine, alcohol, and other mood-altering substances.     Stay Connected with Others  Stay in touch with friends and family members.    Manage Your Mood  Try deep breathing, massage therapy, biofeedback, or meditation. Take part in fun activities when you can. Try to find something to smile about each day.     Psychotherapy  Be open to working with a therapist if your provider recommends  it.     Medication  Be sure to take your medication as prescribed. Most anti-depressants need to be taken every day. It usually takes several weeks for medications to work. Not all medicines work for all people. It is important to follow-up with your provider to make sure you have a treatment plan that is working for you. Do not stop your medication abruptly without first discussing it with your provider.    Crisis Resources   These hotlines are for both adults and children. They and are open 24 hours a day, 7 days a week unless noted otherwise.      National Suicide Prevention Lifeline   6-121-149-TALK (0159)      Crisis Text Line    www.crisistextline.org  Text HOME to 191745 from anywhere in the [place], anytime, about any type of crisis. A live, trained crisis counselor will receive the text and respond quickly.      [place] Lifeline for LGBTQ Youth  A national crisis intervention and suicide lifeline for LGBTQ youth under 25. Provides a safe place to talk without judgement. Call 1-538.600.7747; text START to 138193 or visit www.thetrevorproject.org to talk to a trained counselor.      For [facility] crisis numbers, visit the [facility] website at:  https://[place].gov/dhs/people-we-serve/adults/health-care/mental-health/resources/crisis-contacts.jsp

## 2022-06-09 LAB — DEPRECATED CALCIDIOL+CALCIFEROL SERPL-MC: 48 UG/L (ref 20–75)

## 2022-06-12 DIAGNOSIS — G47.00 PERSISTENT INSOMNIA: ICD-10-CM

## 2022-06-12 DIAGNOSIS — Z76.0 ENCOUNTER FOR MEDICATION REFILL: ICD-10-CM

## 2022-06-13 RX ORDER — QUETIAPINE FUMARATE 25 MG/1
TABLET, FILM COATED ORAL
Qty: 90 TABLET | Refills: 4 | OUTPATIENT
Start: 2022-06-13

## 2022-08-03 ENCOUNTER — VIRTUAL VISIT (OUTPATIENT)
Dept: PSYCHIATRY | Facility: CLINIC | Age: 67
End: 2022-08-03
Payer: MEDICARE

## 2022-08-03 DIAGNOSIS — F32.89 OTHER DEPRESSION: ICD-10-CM

## 2022-08-03 PROCEDURE — 99443 PR PHYSICIAN TELEPHONE EVALUATION 21-30 MIN: CPT | Mod: 95 | Performed by: NURSE PRACTITIONER

## 2022-08-03 RX ORDER — DULOXETIN HYDROCHLORIDE 30 MG/1
30 CAPSULE, DELAYED RELEASE ORAL DAILY
Qty: 90 CAPSULE | Refills: 0 | Status: SHIPPED | OUTPATIENT
Start: 2022-08-03 | End: 2022-09-28

## 2022-08-03 NOTE — PATIENT INSTRUCTIONS
Continue medications as prescribed  Have your pharmacy contact us for a refill if you are running low on medications (We may ask you to come into clinic to get a refill from the nurse  No Alcohol or drug use  No driving if sedated  Call the clinic with any questions or concerns   Reach out for help if you feel like hurting yourself or others ([facility] [phone]: [address] [facility], [address] or [facility] Suicide Hotline   602.507.7228 , call 911 or go to nearest Emergency room     Crisis Resources:    Present to the Emergency Department as needed or call after hours crisis line at [phone] or [phone].   [place] Crisis Text Line: Text [place] to 122164.  Suicide LifeLine Chat: suicidepreventionlifeline.org/chat/.  National Suicide Prevention Lifeline: 132.353.9432 (TTY: 897.447.2025). Call anytime for help.  (www.suicidepreventionlifeline.org)  National [place] on Mental Illness (www.norma.org): 114.655.5401 or 021-833-2645.  Mental Health Association (www.mentalhealth.org): 275.620.7348 or 305-346-0445.       Follow up as directed, for your appointments, per your After Visit Summary Form.            Occupational Therapy Cancel Note       01/22/21 1013   Note Type   Note type Evaluation   Cancel Reasons Medical status  (Patient being transferred to [facility] for cardiac cath)   Licensure   [place] License Number  Fiorella Garsia OTR/L [id number]

## 2022-08-03 NOTE — PROGRESS NOTES
Medications Phoned  to Pharmacy [] yes [x]no     Medications ordered this visit were e-scribed.  Verified by order class [x] yes  [] no  List medications sent to pharmacy: Duloxetine 30mg  Medication changes or discontinuations were communicated to patient's pharmacy: [] yes  [x] no     Dictation completed at time of chart check: [] yes  [x] no     I have checked the documentation for today s encounters and the above information has been reviewed and completed.        Ro Flores CMA August 4, 2022 10:05 AM         This video/telephone visit will be conducted via a call between you and your physician/provider. We have found that certain health care needs can be provided without the need for an in-person physical exam. This service lets us provide the care you need with a video /telephone conversation. If a prescription is necessary we can send it directly to your pharmacy. If lab work is needed we can place an order for that and you can then stop by our lab to have the test done at a later time.    Just as we bill insurance for in-person visits, we also bill insurance for video/telephone visits. If you have questions about your insurance coverage, we recommend that you speak with your insurance company.    Patient has given verbal consent for video/Telephone visit? Yes    Patient would like telephone visit, please connect : Call [phone]  Reason for visit: Medication follow up-duloxetine 30mg  Patient verified allergies, medications and pharmacy via telephone.     ROBERT-7 scores:    ROBERT-7 SCORE 2/27/2022 5/29/2022   Total Score 7 (mild anxiety) 10 (moderate anxiety)   Total Score 7 10     PHQ-9 scores:   PHQ-9 SCORE 3/1/2022 6/2/2022   PHQ-9 Total Score MyChart 12 (Moderate depression) 18 (Moderately severe depression)   PHQ-9 Total Score 12 18   [place]  to be reviewed by provider. Medication refill is pended for review and approval by provider.  Patient states she  is ready for visit.  Ro Flores CMA  August 3, 2022 2:01 PM

## 2022-08-04 NOTE — PROGRESS NOTES
"  The patient has been notified of following:      \"This telephone visit will be conducted via a call between you and your physician/provider. We have found that certain health care needs can be provided without the need for a physical exam.  This service lets us provide the care you need with a short phone conversation.  If a prescription is necessary we can send it directly to your pharmacy.  If lab work is needed we can place an order for that and you can then stop by our lab to have the test done at a later time.     Telephone visits are billed at different rates depending on your insurance coverage. During this emergency period, for some insurers they may be billed the same as an in-person visit.  Please reach out to your insurance provider with any questions.     If during the course of the call the physician/provider feels a telephone visit is not appropriate, you will not be charged for this service.\"     Patient has given verbal consent to a Telephone visit? Yes        Patient would like to receive their AVS by AVS P/reference: Mail a copy          Psychiatric  Out patient Follow Up Progress Note  Date of visit:8/4/2022           Discussion of Care and Treatment Recommendations:   This is a 66 year old female with a  history of depression and PTSD presenting to our virtual clinic for a follow up appointment      Last visit  06/01/2022 Recommendation at last visit .  1.MDD/Anxiety/PTSD : Had stopped seeing a therapist but agreeable to restart ambulatory referral to psychotherapy made.  2.MDD/ANxiety /PTSDHighly recommend : Community Support groups -  3.MDD/Anxiety : Continue Cymbalta  down to  30 mg daily    4. RTC- 8  weeks, call in between visit with any question  Patient and I reviewed diagnosis and treatment plan and patient agrees with following recommendations:  Ongoing education given regarding diagnostic and treatment options with adequate verbalization of understanding.  Plan   1.MDD/Anxiety/PTSD " : Had stopped seeing a therapist but agreeable to restart ambulatory referral to psychotherapy made.  2.MDD/ANxiety /PTSDHighly recommend : Community Support groups -  3.MDD/Anxiety : Continue Cymbalta  down to  30 mg daily    4. 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)     Malignant neoplasm of breast (H)     Posttraumatic hematoma of right breast     Postoperative pain     Pain in right axilla     Constipation due to outlet dysfunction     Logorrhea     Type 2 diabetes mellitus without complication, without long-term current use of insulin (H)     Rotator cuff tear     Anxiety     Scalp irritation     Abnormal MRI     Diabetes 1.5, managed as type 2 (H)     H/O breast surgery     Advanced directives, counseling/discussion     Vitamin D deficiency disease     Moderate major depression (H)     Concussion with loss of consciousness     CKD (chronic kidney disease) stage 3, GFR 30-59 ml/min (H)     Concussion without loss of consciousness             Chief Complaint / Subjective:    Chief complaint: Depression    History of Present Illness:   Per patient's statement : She is settling into her new subsidized townhouse that she waited for 4 years and she is very excited.  She is feeling very fatigued from and boxing and unpacking but is looking having things and creating her own personal space and despite new place.  Depression has much improved compared to her last visit.  She denies suicidal and homicidal ideations.  States she feels safe.  She will now start working on being intentional about socializing more and getting together with friends as her new place has potential to be more isolative as it is away from her previous friends and she is in her own unit now.  She would like to continue on current medications.  Mental Status  "Examination:     Appearance: unable to assess  Orientation: Patient alert and oriented to person, place, time, and situation  Reliability:  Patient appears to be an adequate historian.    Behavior: calm and coperative   Speech: Speech is spontaneous and coherent, with a normal rate, rhythm and tone.    Language:There are no difficulties with expressive or receptive language as observed throughout the interview.    Mood: Described as \"ok\".    Affect: unable to assess  Judgement: Able to make basic decision regarding safety.  Insight: Good awareness of physical and mental health conditions and aware of needs around care for these.  Gait and station: unable to assess  Thought process: Logical   Hallucinations : No evidence of any hallucination  Thought content: No evidence of delusions or paranoia.   Suicidal /Homical Ideations:  No thoughts of self harm or suicide. No thoughts of harming others.  Associations: Connected  Fund of knowledge: Average  Attention / Concentration: Able to remain focused during the interview with minimal distractibility or need for redirection.  Short Term Memory: Grossly intact as evidence by client recalling themes and ideas discussed.  Long Term Memory: Intact  Motor Status: unable to asses      Drug/treatment history and current pattern of use:   Denies   Medication changes: See Above   Medication adherence: compliant  Medication side effects: absent  Information about medications: Side effects, benefits and alternative treatments discussed and patient agrees .    Psychotherapy: Supportive therapy day-to-day living    Education: Diet, exercise, abstinence from drugs and alcohol, patient will not drive if sedated and medications or  under influence of any substance    Lab Results:   Personally reviewed and discussed with the patient    Lab Results   Component Value Date    WBC 7.8 06/08/2022    HGB 12.9 06/08/2022    HCT 40.1 06/08/2022     06/08/2022    CHOL 213 (H) 06/08/2022    " TRIG 102 06/08/2022    HDL 66 06/08/2022    ALT 28 06/08/2022    AST 20 06/08/2022     06/08/2022    BUN 14 06/08/2022    CO2 21 (L) 06/08/2022    TSH 0.84 12/06/2019    MICROALBUR 0.79 06/08/2022       Vital signs:  There were no vitals taken for this visit.  Unable to assess telephone visit  Allergies: Aztreonam, Castor oil, Cefaclor, Cephalexin, Cephalexin monohydrate [cephalexin], Chlorhexidine, Ciprofloxacin, Clarithromycin, Clindamycin, Penicillins, Propofol, Scopolamine, Sulfa (sulfonamide antibiotics) [sulfa drugs], Sulfasalazine, Tetracyclines, Vancomycin, Adhesive [mecrylate], Cytoxan [cyclophosphamide], Erythromycin base [erythromycin], Hydrocodone, Neupogen [filgrastim], Paper tape [adhesive tape], Tapentadol, Taxol [paclitaxel], and Taxotere [docetaxel]           Review of Systems:      ROS:  Subjective data only - Tele-Health  Visit   10 point ROS was negative except for the items listed in HPI-              Medications:     Current Outpatient Medications   Medication     DULoxetine (CYMBALTA) 30 MG capsule     acetaminophen (TYLENOL) 500 MG tablet     betamethasone, augmented, (DIPROLENE) 0.05 % lotion     blood glucose test (CONTOUR NEXT TEST STRIPS) strips     blood glucose test strips     blood-glucose meter (CONTOUR NEXT METER) Misc     calcium citrate-vitamin D (CITRACAL+D) 315-200 mg-unit per tablet     Cholecalciferol (VITAMIN D3) 25 MCG TABS     generic lancets (MICROLET LANCET)     lisinopril (ZESTRIL) 2.5 MG tablet     LORazepam (ATIVAN) 1 MG tablet     metFORMIN (GLUCOPHAGE XR) 500 MG 24 hr tablet     naproxen sodium (ALEVE) 220 MG tablet     ONETOUCH ULTRA2     oxyCODONE (ROXICODONE) 5 MG immediate release tablet     polyethylene glycol (MIRALAX) 17 gram packet     QUEtiapine (SEROQUEL) 25 MG tablet     simvastatin (ZOCOR) 5 MG tablet     zolpidem (AMBIEN) 10 MG tablet     No current facility-administered medications for this visit.                 Medication adherence: Reviewed  risk/benefits of medication , Patient able to verbalize understanding of side effects and Patient verbally consents to taking medications        Crisis Resources:    1. Present to the Emergency Department as needed or call after hours crisis line at [phone] or [phone].   2. [place] Crisis Text Line: Text [place] to 978908.  3. Suicide LifeLine Chat: suicideEinspect.org/chat/.  4. National Suicide Prevention Lifeline: 154.697.8538 (TTY: 356.286.4059). Call anytime for help.  (www.suicidepreventionlifeline.org)  5. National [place] on Mental Illness (www.norma.org): 628.147.5168 or 637-879-2773.  6. Mental Health Association (www.mentalhealth.org): 421.323.7047 or 844-136-3612.      Coordination of Care:   More than 30 minutes spent on this visit  with more than 50% of time spent on coordination of care including: Educating patient about diagnosis, prognosis, side effects and benefits of medications, diet, exercise.  Time also spent providing supportive therapy regarding above issues.    This note was created using a dictation system. All typing errors or contextual distortion is unintentional and software inherent.  Start Time :1430   End time : 1500

## 2022-08-08 DIAGNOSIS — Z76.0 ENCOUNTER FOR MEDICATION REFILL: ICD-10-CM

## 2022-08-08 DIAGNOSIS — Z76.0 ENCOUNTER FOR MEDICATION REFILL: Primary | ICD-10-CM

## 2022-08-08 DIAGNOSIS — E11.9 TYPE 2 DIABETES MELLITUS WITHOUT COMPLICATION, WITHOUT LONG-TERM CURRENT USE OF INSULIN (H): ICD-10-CM

## 2022-08-08 DIAGNOSIS — E11.9 DIABETES (H): ICD-10-CM

## 2022-08-08 RX ORDER — ZOLPIDEM TARTRATE 10 MG/1
10 TABLET ORAL
Qty: 30 TABLET | Refills: 0 | Status: SHIPPED | OUTPATIENT
Start: 2022-08-08 | End: 2023-06-22

## 2022-08-09 RX ORDER — LISINOPRIL 2.5 MG/1
2.5 TABLET ORAL DAILY
Qty: 90 TABLET | Refills: 3 | Status: SHIPPED | OUTPATIENT
Start: 2022-08-09 | End: 2023-10-16

## 2022-08-09 RX ORDER — SIMVASTATIN 5 MG
TABLET ORAL
Qty: 90 TABLET | Refills: 4 | OUTPATIENT
Start: 2022-08-09

## 2022-08-09 NOTE — TELEPHONE ENCOUNTER
Assessment/Plan: 


Assessment/Plan:


* MSSA bacteremia with multifocal metastatic foci including epidural abscess, 

ileo psoas abscess, piriformis abscess, and left elbow septic olecranon 

bursitis all status post either open or percutaneous drainage:  Blood cultures 

have been repeated today with most recent set still showing growth of Staph 

aureus.  IJ catheter removed yesterday with placement of PICC line in event IJ 

serving as persistent nidus of disease.  SALAS without evidence of endocarditis.  

Overall clinically improved post drainage and with IV nafcillin.  Continue 

nafcillin 12 g IV daily by continuous infusion.  Follow up repeat blood 

cultures to assess for clearing of bacteremia.  Side effects of nafcillin 

including potential for allergic reactions, interstitial nephritis, and 

hepatitis reviewed with patient today.





Time spent, greater than 35 min, which greater than half was spent in education/

counseling/coordination of care related to MSSA bacteremia, clinical findings, 

and ongoing plan of care.  Care coordinated with patient and Venecia Villanueva NP.





02/12/19 14:02





Subjective: 





Patient feels significantly improved.  Remains with poor appetite.  Elbow 

significantly less tender.  Some pruritis over back but no rash.


Objective: 


 Vital Signs











Temp Pulse Resp BP Pulse Ox


 


 36.8 C   94   20   141/74 H  98 


 


 02/12/19 11:32  02/12/19 11:32  02/12/19 11:32  02/12/19 11:32  02/12/19 11:32








 Microbiology











 02/10/19 14:35 Gram Stain - Final





 Elbow - Eswab 


 


 02/07/19 13:05 Gram Stain - Final





 Back - Aspirate 


 


 02/07/19 12:52 Gram Stain - Final





 Pelvis - Aspirate 


 


 02/06/19 14:46 Gram Stain - Final





 Back - Eswab 


 


 02/06/19 14:46 Gram Stain - Final





 Chest - Eswab 


 


 02/04/19 18:23 Gram Stain - Final





 Knee - Aspirate Body Fluid Culture - Final


 


 02/07/19 13:05 Mycobacterial Smear (SHAAN) - Final





 Back - Aspirate 


 


 02/07/19 12:52 Mycobacterial Smear (SHAAN) - Final





 Pelvis - Aspirate 








 Laboratory Results





 02/10/19 06:38 





 02/12/19 06:30 





 











 02/11/19 02/12/19 02/13/19





 05:59 05:59 05:59


 


Intake Total 2938 500 120


 


Output Total 1460 1060 500


 


Balance 1478 -560 -380








 











ESR  72 MM/HR (0-30)  H  02/05/19  04:58    


 


C-Reactive Protein  204.9 mg/L (<10.0)  H  02/05/19  04:58    











Nafcillin 12 g IV Q 24 hr by continuous infusion # 7


Blood cultures 2/10/19 Staph aureus


Elbow culture 2/10/2019 MSSA





- Physical Exam


General Appearance: alert, no apparent distress


EENT: No scleral icterus, No thrush, No conjunctival petechiae


Respiratory: lungs clear, No respiratory distress


Cardiac/Chest: regular rate, rhythm, systolic murmur (2/6 throughout)


Extremities: inflammation (Left elbow incision intact with Penrose drain; no 

erythema, palpable bursal fluid or thickening present; no pain with range of 

motion of elbow)


Abdomen: non-tender, No distended


Back: other (Surgical incisions intact without erythema or drainage; right-

sided PASQUALE drain with scant old blood; left PASQUALE drain with dark brown purulent 

material)


Skin: No embolic lesions





ICD10 Worksheet


Patient Problems: 


 Problems











Problem Status Onset


 


Effusion, left knee Acute  


 


Hypokalemia Acute  


 


Hyponatremia Acute  


 


Leukocytosis Acute  


 


UTI (urinary tract infection) Acute  


 


Weakness Acute Refilled 1/13/22 with 4 refills.

## 2022-08-09 NOTE — TELEPHONE ENCOUNTER
"Routing refill request to provider for review/approval because:  Early refill requested.    Last Written Prescription Date:  9/27/21  Last Fill Quantity: 90,  # refills: 3   Last office visit provider:  6/8/22     Requested Prescriptions   Pending Prescriptions Disp Refills     lisinopril (ZESTRIL) 2.5 MG tablet [Pharmacy Med Name: LISINOPRIL 2.5 MG TABLET] 90 tablet 3     Sig: TAKE 1 TABLET BY MOUTH EVERY DAY       ACE Inhibitors (Including Combos) Protocol Passed - 8/8/2022  4:46 PM        Passed - Blood pressure under 140/90 in past 12 months     BP Readings from Last 3 Encounters:   06/08/22 98/58   12/16/20 (!) 84/58   11/10/20 98/68                 Passed - Recent (12 mo) or future (30 days) visit within the authorizing provider's specialty     Patient has had an office visit with the authorizing provider or a provider within the authorizing providers department within the previous 12 mos or has a future within next 30 days. See \"Patient Info\" tab in inbasket, or \"Choose Columns\" in Meds & Orders section of the refill encounter.              Passed - Medication is active on med list        Passed - Patient is age 18 or older        Passed - No active pregnancy on record        Passed - Normal serum creatinine on file in past 12 months     Recent Labs   Lab Test 06/08/22  1508   CR 0.80       Ok to refill medication if creatinine is low          Passed - Normal serum potassium on file in past 12 months     Recent Labs   Lab Test 06/08/22  1508   POTASSIUM 4.6             Passed - No positive pregnancy test within past 12 months         Refused Prescriptions Disp Refills     simvastatin (ZOCOR) 5 MG tablet [Pharmacy Med Name: SIMVASTATIN 5 MG TABLET] 90 tablet 4     Sig: TAKE 1 TABLET BY MOUTH EVERY DAY IN THE EVENING       Statins Protocol Passed - 8/9/2022  7:17 AM        Passed - LDL on file in past 12 months     Recent Labs   Lab Test 06/08/22  1508                Passed - No abnormal creatine kinase " "in past 12 months     No lab results found.             Passed - Recent (12 mo) or future (30 days) visit within the authorizing provider's specialty     Patient has had an office visit with the authorizing provider or a provider within the authorizing providers department within the previous 12 mos or has a future within next 30 days. See \"Patient Info\" tab in inbasket, or \"Choose Columns\" in Meds & Orders section of the refill encounter.              Passed - Medication is active on med list        Passed - Patient is age 18 or older        Passed - No active pregnancy on record        Passed - No positive pregnancy test in past 12 months             Tony Irene RN 08/09/22 7:18 AM  "

## 2022-08-10 DIAGNOSIS — E11.9 DIABETES (H): ICD-10-CM

## 2022-08-10 DIAGNOSIS — Z76.0 ENCOUNTER FOR MEDICATION REFILL: ICD-10-CM

## 2022-08-10 RX ORDER — SIMVASTATIN 5 MG
TABLET ORAL
Qty: 90 TABLET | Refills: 4 | Status: SHIPPED | OUTPATIENT
Start: 2022-08-10 | End: 2023-09-28

## 2022-08-10 NOTE — TELEPHONE ENCOUNTER
MD refused yesterday 08/09/2022 for reason of the patient has refills on file.     Dr. DASILVA please note that this is likely a duplicate. Please decline request and close. Thank you!

## 2022-09-26 ASSESSMENT — PATIENT HEALTH QUESTIONNAIRE - PHQ9
SUM OF ALL RESPONSES TO PHQ QUESTIONS 1-9: 19
10. IF YOU CHECKED OFF ANY PROBLEMS, HOW DIFFICULT HAVE THESE PROBLEMS MADE IT FOR YOU TO DO YOUR WORK, TAKE CARE OF THINGS AT HOME, OR GET ALONG WITH OTHER PEOPLE: SOMEWHAT DIFFICULT
SUM OF ALL RESPONSES TO PHQ QUESTIONS 1-9: 19

## 2022-09-28 ENCOUNTER — VIRTUAL VISIT (OUTPATIENT)
Dept: PSYCHIATRY | Facility: CLINIC | Age: 67
End: 2022-09-28
Payer: MEDICARE

## 2022-09-28 DIAGNOSIS — Z76.0 ENCOUNTER FOR MEDICATION REFILL: ICD-10-CM

## 2022-09-28 DIAGNOSIS — F32.89 OTHER DEPRESSION: ICD-10-CM

## 2022-09-28 PROCEDURE — 99214 OFFICE O/P EST MOD 30 MIN: CPT | Mod: 95 | Performed by: NURSE PRACTITIONER

## 2022-09-28 RX ORDER — DULOXETIN HYDROCHLORIDE 30 MG/1
30 CAPSULE, DELAYED RELEASE ORAL DAILY
Qty: 90 CAPSULE | Refills: 0 | Status: SHIPPED | OUTPATIENT
Start: 2022-09-28 | End: 2022-11-28

## 2022-09-28 RX ORDER — LORAZEPAM 1 MG/1
1 TABLET ORAL EVERY 8 HOURS PRN
Refills: 0 | Status: CANCELLED | OUTPATIENT
Start: 2022-09-28
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
## 2022-09-28 NOTE — PROGRESS NOTES
[facility] Post-Appointment Chart -check      Medications ordered this visit were e-scribed.  Verified by order class [x] yes  [] no    List Medications: Duloxetine 30mg    Medication changes or discontinuations were communicated to patient's pharmacy: [] yes  [x] no    UA collected [] yes  [] no  [x] n/a-virtual     Outside referrals / labs, etc support staff to follow up: [] yes  [x] no    Future appointment was made: [] yes  [x] no  [] n/a  Future Appointments 9/28/2022 - 3/27/2023              Date Visit Type Length Department Provider     11/15/2022  2:30 PM OFFICE VISIT 30 min [facility] Daxa Morales MD    Location Instructions:     [facility] is in [address] of the [facility] at [address] [place] [address] in [place]. From [address], exit to turn east on [address], then turn north on [address]. Free parking is available; follow the signs in the lot to drive around the [facility] s [address] building to the front of the [address] [place] [address], which faces the [facility].                    Dictation completed at time of chart check: [x] yes  [] no    I have checked the documentation for today s encounters and the above information has been reviewed and completed.      Ro Flores CMA on September 28, 2022 at 3:43 PM

## 2022-09-28 NOTE — PATIENT INSTRUCTIONS
Continue medications as prescribed  Have your pharmacy contact us for a refill if you are running low on medications (We may ask you to come into clinic to get a refill from the nurse  No Alcohol or drug use  No driving if sedated  Call the clinic with any questions or concerns   Reach out for help if you feel like hurting yourself or others ([facility] [phone]: [address] [facility] [address] [facility] [address] [facility], [address] or [facility] Suicide Hotline   [phone] , call 911 or go to nearest Emergency room     Crisis Resources:    Present to the Emergency Department as needed or call after hours crisis line at [phone] or [phone].   [place] Crisis Text Line: Text [place] to 819837.  Suicide LifeLine Chat: suicidepreventionlifeline.org/chat/.  National Suicide Prevention Lifeline: 762.865.2701 (TTY: 850.703.7015). Call anytime for help.  (www.suicidepreventionlifeline.org)  National [place] on Mental Illness (www.norma.org): 950.696.9910 or 759-067-6318.  Mental Health Association (www.mentalhealth.org): 870.735.6970 or 902-924-5148.       Follow up as directed, for your appointments, per your After Visit Summary Form.
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
## 2022-09-30 ENCOUNTER — TELEPHONE (OUTPATIENT)
Dept: PSYCHIATRY | Facility: CLINIC | Age: 67
End: 2022-09-30

## 2022-09-30 NOTE — TELEPHONE ENCOUNTER
Reason for call:  Other   Patient called regarding (reason for call): call back  Additional comments: Per conversation with patient 9/28 requesting you fill out documentation for court.  Would like it done today- explained did not know if you were in office Lucy.    Phone number to reach patient:  Cell number on file:    Telephone Information:   Mobile [phone]       Best Time:  asap    Can we leave a detailed message on this number?  YES    Travel screening: Not Applicable

## 2022-10-01 ENCOUNTER — HEALTH MAINTENANCE LETTER (OUTPATIENT)
Age: 67
End: 2022-10-01

## 2022-10-20 ENCOUNTER — HOSPITAL ENCOUNTER (EMERGENCY)
Facility: HOSPITAL | Age: 67
Discharge: HOME | End: 2022-10-20
Attending: EMERGENCY MEDICINE | Admitting: EMERGENCY MEDICINE
Payer: MEDICARE

## 2022-10-20 VITALS
HEART RATE: 110 BPM | RESPIRATION RATE: 18 BRPM | HEIGHT: 63 IN | BODY MASS INDEX: 26.58 KG/M2 | WEIGHT: 150 LBS | DIASTOLIC BLOOD PRESSURE: 68 MMHG | SYSTOLIC BLOOD PRESSURE: 130 MMHG | TEMPERATURE: 98.4 F | OXYGEN SATURATION: 96 %

## 2022-10-20 DIAGNOSIS — S00.81XA FOREHEAD ABRASION, INITIAL ENCOUNTER: ICD-10-CM

## 2022-10-20 DIAGNOSIS — S01.111A EYEBROW LACERATION, RIGHT, INITIAL ENCOUNTER: ICD-10-CM
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
## 2022-10-20 PROCEDURE — 99283 EMERGENCY DEPT VISIT LOW MDM: CPT

## 2022-10-20 PROCEDURE — 250N000013 HC RX MED GY IP 250 OP 250 PS 637: Performed by: EMERGENCY MEDICINE

## 2022-10-20 RX ORDER — AZITHROMYCIN 250 MG/1
500 TABLET, FILM COATED ORAL ONCE
Status: COMPLETED | OUTPATIENT
Start: 2022-10-20 | End: 2022-10-20

## 2022-10-20 RX ORDER — AZITHROMYCIN 250 MG/1
250 TABLET, FILM COATED ORAL DAILY
Qty: 4 TABLET | Refills: 0 | Status: SHIPPED | OUTPATIENT
Start: 2022-10-21 | End: 2022-10-25

## 2022-10-20 RX ADMIN — AZITHROMYCIN MONOHYDRATE 500 MG: 250 TABLET ORAL at 17:21

## 2022-10-20 ASSESSMENT — ENCOUNTER SYMPTOMS: WOUND: 1

## 2022-10-20 ASSESSMENT — ACTIVITIES OF DAILY LIVING (ADL): ADLS_ACUITY_SCORE: 35

## 2022-10-20 NOTE — ED PROVIDER NOTES
EMERGENCY DEPARTMENT ENCOUNTER      NAME: Alexus Garcia  AGE: 66 year old female  YOB: 1955  MRN: [id number]  EVALUATION DATE & TIME: 10/20/2022  4:59 PM    PCP: Daxa Morales    ED PROVIDER: Donnie Alvarez M.D.      Chief Complaint   Patient presents with     Cat Bite         IMPRESSION  1. Cat bite, initial encounter    2. Forehead abrasion, initial encounter    3. Eyebrow laceration, right, initial encounter        PLAN  - azithromycin 250mg q24h x4 days  - close PCP follow up  - discharge to home    ED COURSE & MEDICAL DECISION MAKING    ED Course as of 10/20/22 1759   Thu Oct 20, 2022   1746 66yoF with tetanus up to date presenting with cat bite to forehead. Her son's cat (not vaccinated) jumped from height and bit her forehead. Has scratches to left upper forehead & 2cm gaping laceration over medial right eyebrow. Cleansed copiously and gaping laceration closed with steristrip. Given prophylactic azithromycin (multiple antibiotic allergies, tolerated this in the past and recommended by pharmacy). [facility] states no rabies immunization indicated at this time; recommends monitoring cat for 10 days for signs of rabies. Patient agreeable with this plan and discharge home at this time. Return precautions and need for PCP follow up discussed and understood. No further questions at the time of discharge.       --------------------------------------------------------------------------------   --------------------------------------------------------------------------------     5:22 PM I met with the patient for the initial interview and physical examination. Discussed plan for treatment and workup in the ED.  5:30 PM I performed a laceration repair.         This patient involved a high degree of complexity in medical decision making, as significant risks were present and assessed.    Broad differential considered for this patient presenting, including but not limited to:  Cat bite, cat scratch,  cellulitis, laceration    I wore the following PPE during this patient encounter:  N95 mask, face shield w/ eye protection, gloves      MEDICATIONS GIVEN IN THE EMERGENCY DEPARTMENT  Medications   azithromycin (ZITHROMAX) tablet 500 mg (500 mg Oral Given 10/20/22 1721)       NEW PRESCRIPTIONS STARTED AT TODAY'S ER VISIT  Discharge Medication List as of 10/20/2022  5:49 PM      START taking these medications    Details   azithromycin (ZITHROMAX) 250 MG tablet Take 1 tablet (250 mg) by mouth daily for 4 days, Disp-4 tablet, R-0, E-Prescribe         CONTINUE these medications which have NOT CHANGED    Details   acetaminophen (TYLENOL) 500 MG tablet [ACETAMINOPHEN (TYLENOL) 500 MG TABLET] Take 1,000 mg by mouth 3 (three) times a day as needed for pain., Historical      betamethasone, augmented, (DIPROLENE) 0.05 % lotion [BETAMETHASONE, AUGMENTED, (DIPROLENE) 0.05 % LOTION] Apply 1 application topically 2 (two) times a day as needed. Apply to the back of the neck and upper backDisp-60 mL, R-3NormalPlease do not provide refill until patient indicates she needs this      !! blood glucose test (CONTOUR NEXT TEST STRIPS) strips [BLOOD GLUCOSE TEST (CONTOUR NEXT TEST STRIPS) STRIPS] Use 1 each As Directed daily., Disp-100 strip, R-3, Normal      !! blood glucose test strips [BLOOD GLUCOSE TEST STRIPS] Use 1 each As Directed as needed. Dispense brand per patient's insurance at pharmacy discretion., Disp-100 strip, R-2, NormalDiabetes mellitus E11.9      !! blood-glucose meter (CONTOUR NEXT METER) Misc [BLOOD-GLUCOSE METER (CONTOUR NEXT METER) MISC] Check BG twice daily., Disp-1 each, R-0, Normal      calcium citrate-vitamin D (CITRACAL+D) 315-200 mg-unit per tablet [CALCIUM CITRATE-VITAMIN D (CITRACAL+D) 315-200 MG-UNIT PER TABLET] Take 1 tablet by mouth., Historical      Cholecalciferol (VITAMIN D3) 25 MCG TABS TAKE 2 TABLETS (2,000 UNITS TOTAL) BY MOUTH 2 (TWO) TIMES A DAY. *NOT COVERED*, Disp-360 tablet, R-3, E-Prescribe       DULoxetine (CYMBALTA) 30 MG capsule Take 1 capsule (30 mg) by mouth daily, Disp-90 capsule, R-0, E-Prescribe      generic lancets (MICROLET LANCET) [GENERIC LANCETS (MICROLET LANCET)] Use 1 each As Directed daily. Dispense brand per patient's insurance at pharmacy discretion., Disp-100 each, R-11, Normal      lisinopril (ZESTRIL) 2.5 MG tablet Take 1 tablet (2.5 mg) by mouth daily, Disp-90 tablet, R-3, E-Prescribe      LORazepam (ATIVAN) 1 MG tablet Take 1 tablet (1 mg) by mouth every 8 hours as needed for anxiety, Disp-5 tablet, R-0, E-Prescribe      metFORMIN (GLUCOPHAGE XR) 500 MG 24 hr tablet Take 1 tablet (500 mg) by mouth daily (with breakfast), Disp-90 tablet, R-4, E-Prescribe      naproxen sodium (ALEVE) 220 MG tablet [NAPROXEN SODIUM (ALEVE) 220 MG TABLET] Take 440 mg by mouth as needed., Historical      ONETOUCH ULTRA2 [ONETOUCH ULTRA2] USE DAILY AS DIRECTEDR-0, DAWHistorical      oxyCODONE (ROXICODONE) 5 MG immediate release tablet [OXYCODONE (ROXICODONE) 5 MG IMMEDIATE RELEASE TABLET] Take 1-3 tabs PO every 6 hours as needed for pain; do not take lorazepam when taking this medication, Disp-20 tablet, R-0, Normal      polyethylene glycol (MIRALAX) 17 gram packet [POLYETHYLENE GLYCOL (MIRALAX) 17 GRAM PACKET] Take 17 g by mouth., Historical      QUEtiapine (SEROQUEL) 25 MG tablet TAKE 1 AND ONE-HALF TABLETS BY MOUTH AT BEDTIME (NEW DIRECTIONS IN MD NOTES), Disp-135 tablet, R-3, E-Prescribe      simvastatin (ZOCOR) 5 MG tablet TAKE 1 TABLET BY MOUTH EVERY DAY IN THE EVENING, Disp-90 tablet, R-4, E-Prescribe      zolpidem (AMBIEN) 10 MG tablet TAKE 1 TABLET (10 MG) BY MOUTH NIGHTLY AS NEEDED, Disp-30 tablet, R-0, E-PrescribeNot to exceed 4 additional fills before 08/10/2022       !! - Potential duplicate medications found. Please discuss with provider.              =================================================================      HPI  Patient information was obtained from: patient    Use of  : N/A        Alexus Garcia is a 66 year old female with a pertinent history of breast cancer, type 2 diabetes, stage III chronic kidney disease, moderate major depression, and sepsis who presents to this ED via walk in by self for evaluation of cat bites.    Patient presents with lacerations from a cat bite that occurred prior to arrival to ED. Patient reports her son's cat bite her forehead, causing a lot of bleeding. There is a deep laceration above her right eye as well as a couple more lacerations on her forehead, particularly on the left forehead. Per triage note, the bleeding Patient reports the cat is a stray cat that her son picked up 4-5 months ago. The cat is not up-to-date on vaccines, but the patient reports her son's other cats have stayed healthy while being around the cat that attacked her. Patient reports having the antibiotic that will most likely be given to her in the past and has tolerated it well. Patient reports a history of sepsis and cancer.    Per chart review, patient's last Tdap was in 2017.    Patient does not report of any other medical concerns or complaints at this time.       REVIEW OF SYSTEMS   Review of Systems   Skin: Positive for wound (lacerations to forehead).     All other systems reviewed and are negative except as noted above in HPI.        --------------- MEDICAL HISTORY ---------------  PAST MEDICAL HISTORY:  Past Medical History:   Diagnosis Date     Anxiety      Arthritis      Breast cancer (H) 01/01/2016    right     Breast cancer (H) 01/01/2011    left     Colon cancer screening 01/01/2018    FIT test for blood; negative     Depression      Diabetes mellitus, type 2 (H)      History of transfusion      Hx antineoplastic chemotherapy 01/01/2011    left     Hx of radiation therapy 01/01/2011    left     Lymphedema     left chest wall and left arm     Neuropathy     left upper torso and chest     PONV (postoperative nausea and vomiting)      Prediabetes       Rectocele      Urination disorder     has to manually push to get urine out     Patient Active Problem List   Diagnosis     Neuralgia     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)     Malignant neoplasm of breast (H)     Posttraumatic hematoma of right breast     Postoperative pain     Pain in right axilla     Constipation due to outlet dysfunction     Logorrhea     Type 2 diabetes mellitus without complication, without long-term current use of insulin (H)     Rotator cuff tear     Anxiety     Scalp irritation     Abnormal MRI     Diabetes 1.5, managed as type 2 (H)     H/O breast surgery     Advanced directives, counseling/discussion     Vitamin D deficiency disease     Moderate major depression (H)     Concussion with loss of consciousness     CKD (chronic kidney disease) stage 3, GFR 30-59 ml/min (H)     Concussion without loss of consciousness       PAST SURGICAL HISTORY:  Past Surgical History:   Procedure Laterality Date     BIOPSY BREAST Left 2011     BIOPSY BREAST Right 2016     BIOPSY OF BREAST, INCISIONAL Left 8/9/2019    Procedure: Left Breast Biopsy;  Surgeon: Veronique Danielle MD;  Location: [facility];  Service: General     EXCISE BREAST CYST/FIBROADENOMA/TUMOR/DUCT LESION/NIPPLE LESION/AREOLAR LESION Left 2011    Description: Breast Surgery Lumpectomy;  Recorded: 09/25/2013;     HC BREAST RECONSTRUC W TISS EXPANDR Bilateral 8/18/2016    Procedure: BILATERAL BREAST RECONSTRUCTION, REMOVAL OF LESION RIGHT AXILLA, REMOVAL OF LESION RIGHT NECK;  Surgeon: Josr Alcantara MD;  Location: [facility];  Service: Plastics     MAMMOPLASTY AUGMENTATION Left 2013    TRAM flap with implant     MAMMOPLASTY AUGMENTATION Right 2013    implant placed     MASTECTOMY Right 2016    reconstructive expanders bilaterally     MS ARTHRODESIS ANT INTERBODY MIN DISCECTOMY,LUMBAR      Description: Lumbar Vertebral Fusion;  Recorded: 09/25/2013;     MS EXCISE EXCESS SKIN TISSUE,ABDOMEN N/A  3/3/2017    Procedure: BILATERAL FAT GRAFTING FROM ABDOMEN TO BREASTS;  Surgeon: Josr Alcantara MD;  Location: [facility] OR;  Service: Plastics     AZ LAP,DIAGNOSTIC ABDOMEN      Description: Laparoscopy (Diagnostic);  Recorded: 09/25/2013;     AZ MASTECTOMY, SIMPLE, COMPLETE Right 8/18/2016    Procedure: RIGHT MASTECTOMY;  Surgeon: Veronique Danielle MD;  Location: [facility] OR;  Service: General     AZ REPLACEMENT TISSUE EXPANDER W/PERMANENT IMPLANT Bilateral 3/3/2017    Procedure: BILATERAL TISSUE EXPANDER REMOVAL FOR PERMANENT IMPLANTS;  Surgeon: Josr Alcantara MD;  Location: [facility] OR;  Service: Plastics     REMOVE IMPLANT BREAST Right 9/20/2016    Procedure: EVACUATION RIGHT BREAST HEMATOMA;  Surgeon: Josr Alcantara MD;  Location: [facility] OR;  Service:      ZZPR BREAST RECONSTRUCTION W/LATISSIMUS DORSI FLAP      Description: Breast Surgery Reconstruction With Latissimus Dorsi Flap;  Recorded: 09/25/2013;       CURRENT MEDICATIONS:    No current facility-administered medications for this encounter.    Current Outpatient Medications:      [START ON 10/21/2022] azithromycin (ZITHROMAX) 250 MG tablet, Take 1 tablet (250 mg) by mouth daily for 4 days, Disp: 4 tablet, Rfl: 0     acetaminophen (TYLENOL) 500 MG tablet, [ACETAMINOPHEN (TYLENOL) 500 MG TABLET] Take 1,000 mg by mouth 3 (three) times a day as needed for pain., Disp: , Rfl:      betamethasone, augmented, (DIPROLENE) 0.05 % lotion, [BETAMETHASONE, AUGMENTED, (DIPROLENE) 0.05 % LOTION] Apply 1 application topically 2 (two) times a day as needed. Apply to the back of the neck and upper back, Disp: 60 mL, Rfl: 3     blood glucose test (CONTOUR NEXT TEST STRIPS) strips, [BLOOD GLUCOSE TEST (CONTOUR NEXT TEST STRIPS) STRIPS] Use 1 each As Directed daily., Disp: 100 strip, Rfl: 3     blood glucose test strips, [BLOOD GLUCOSE TEST STRIPS] Use 1 each As Directed as needed. Dispense brand per patient's insurance at pharmacy discretion.,  Disp: 100 strip, Rfl: 2     blood-glucose meter (CONTOUR NEXT METER) Misc, [BLOOD-GLUCOSE METER (CONTOUR NEXT METER) MISC] Check BG twice daily., Disp: 1 each, Rfl: 0     calcium citrate-vitamin D (CITRACAL+D) 315-200 mg-unit per tablet, [CALCIUM CITRATE-VITAMIN D (CITRACAL+D) 315-200 MG-UNIT PER TABLET] Take 1 tablet by mouth., Disp: , Rfl:      Cholecalciferol (VITAMIN D3) 25 MCG TABS, TAKE 2 TABLETS (2,000 UNITS TOTAL) BY MOUTH 2 (TWO) TIMES A DAY. *NOT COVERED*, Disp: 360 tablet, Rfl: 3     DULoxetine (CYMBALTA) 30 MG capsule, Take 1 capsule (30 mg) by mouth daily, Disp: 90 capsule, Rfl: 0     generic lancets (MICROLET LANCET), [GENERIC LANCETS (MICROLET LANCET)] Use 1 each As Directed daily. Dispense brand per patient's insurance at pharmacy discretion., Disp: 100 each, Rfl: 11     lisinopril (ZESTRIL) 2.5 MG tablet, Take 1 tablet (2.5 mg) by mouth daily, Disp: 90 tablet, Rfl: 3     LORazepam (ATIVAN) 1 MG tablet, Take 1 tablet (1 mg) by mouth every 8 hours as needed for anxiety, Disp: 5 tablet, Rfl: 0     metFORMIN (GLUCOPHAGE XR) 500 MG 24 hr tablet, Take 1 tablet (500 mg) by mouth daily (with breakfast), Disp: 90 tablet, Rfl: 4     naproxen sodium (ALEVE) 220 MG tablet, [NAPROXEN SODIUM (ALEVE) 220 MG TABLET] Take 440 mg by mouth as needed., Disp: , Rfl:      ONETOUCH ULTRA2, [ONETOUCH ULTRA2] USE DAILY AS DIRECTED, Disp: , Rfl: 0     oxyCODONE (ROXICODONE) 5 MG immediate release tablet, [OXYCODONE (ROXICODONE) 5 MG IMMEDIATE RELEASE TABLET] Take 1-3 tabs PO every 6 hours as needed for pain; do not take lorazepam when taking this medication, Disp: 20 tablet, Rfl: 0     polyethylene glycol (MIRALAX) 17 gram packet, [POLYETHYLENE GLYCOL (MIRALAX) 17 GRAM PACKET] Take 17 g by mouth., Disp: , Rfl:      QUEtiapine (SEROQUEL) 25 MG tablet, TAKE 1 AND ONE-HALF TABLETS BY MOUTH AT BEDTIME (NEW DIRECTIONS IN MD NOTES), Disp: 135 tablet, Rfl: 3     simvastatin (ZOCOR) 5 MG tablet, TAKE 1 TABLET BY MOUTH EVERY DAY IN  THE EVENING, Disp: 90 tablet, Rfl: 4     zolpidem (AMBIEN) 10 MG tablet, TAKE 1 TABLET (10 MG) BY MOUTH NIGHTLY AS NEEDED, Disp: 30 tablet, Rfl: 0    ALLERGIES:  Allergies   Allergen Reactions     Aztreonam Hives     Castor Oil Hives     Cefaclor Anaphylaxis     Cephalexin Anaphylaxis     Cephalexin Monohydrate [Cephalexin] Hives     Chlorhexidine Hives     White clear stuff that you lather during surgery that dries unto the skin and stays on the skin that's almost like a superglue. It was very itchy and skin gets very red.     Ciprofloxacin Anaphylaxis     Clarithromycin Anaphylaxis, Anxiety, Dizziness, Hives, Itching, Nausea and Vomiting, Rash and Shortness Of Breath     Clindamycin Hives     10/20/22 reported at [facility] that she can tolerate clindamycin     Penicillins Anaphylaxis     Propofol Nausea and Headache     Scopolamine Anaphylaxis, Hives and Swelling     Swollen tongue, eye swelled closed, Eye and tongue swelling      Sulfa (Sulfonamide Antibiotics) [Sulfa Drugs] Anaphylaxis and Hives     Sulfasalazine Hives     Tetracyclines Anaphylaxis     Vancomycin Hives     Adhesive [Mecrylate] Itching     Irritation where the tape had contact     Cytoxan [Cyclophosphamide] Hives     severe     Erythromycin Base [Erythromycin] Hives     Hydrocodone Nausea and Vomiting     Neupogen [Filgrastim] Hives     severe     Paper Tape [Adhesive Tape] Other (See Comments)     Surgical paper tape - redness. States if left on too long leaves little cuts on her skin     Tapentadol Other (See Comments)     bleeding     Taxol [Paclitaxel] Hives     severe     Taxotere [Docetaxel] Hives       FAMILY HISTORY:  Family History   Adopted: Yes   Problem Relation Age of Onset     No Known Problems Mother      No Known Problems Father      No Known Problems Sister      No Known Problems Daughter      No Known Problems Maternal Grandmother      No Known Problems Maternal Grandfather      No Known Problems Paternal Grandmother      No  "Known Problems Paternal Grandfather      No Known Problems Maternal Aunt      No Known Problems Paternal Aunt      Hereditary Breast and Ovarian Cancer Syndrome No family hx of      Breast Cancer No family hx of      Cancer No family hx of      Colon Cancer No family hx of      Endometrial Cancer No family hx of      Ovarian Cancer No family hx of        SOCIAL HISTORY:   Social History     Socioeconomic History     Marital status: Single     Spouse name: None     Number of children: None     Years of education: None     Highest education level: None   Tobacco Use     Smoking status: Former     Packs/day: 1.00     Types: Cigarettes     Quit date: 5/3/2011     Years since quittin.4     Smokeless tobacco: Never     Tobacco comments:     No passive exposure   Vaping Use     Vaping Use: Never used   Substance and Sexual Activity     Alcohol use: No     Drug use: No     Sexual activity: Not Currently     Partners: Male       --------------- PHYSICAL EXAM ---------------  Nursing notes and vitals reviewed by me.  VITALS:  Vitals:    10/20/22 1521 10/20/22 1748   BP: 119/69 130/68   Pulse: 114 110   Resp: 18 18   Temp: 98.4  F (36.9  C)    TempSrc: Oral    SpO2: 95% 96%   Weight: 68 kg (150 lb)    Height: 1.6 m (5' 3\")        PHYSICAL EXAM:    General:  alert, interactive, no distress  Eyes:  conjunctivae clear, conjugate gaze  HENT:  scratches to left upper forehead & 2cm gaping laceration over medial right eyebrow, nose with no rhinorrhea, oropharynx clear  Neck:  no meningismus  Cardiovascular:  HR 100s during exam, regular rhythm, no murmurs, brisk cap refill  Chest:  no chest wall tenderness  Pulmonary:  no stridor, normal phonation, normal work of breathing, clear lungs bilaterally  Abdomen:  soft, nondistended, nontender  :  no CVA tenderness  Back:  no midline spinal tenderness  Musculoskeletal:  no pretibial edema, no calf tenderness. Gross ROM intact to joints of extremities with no obvious " deformities.  Skin:  warm, dry, no rash  Neuro:  awake, alert, answers questions appropriately, follows commands, moves all limbs  Psych:  calm, normal affect      --------------- RESULTS ---------------  PROCEDURES:   [facility]    -Laceration Repair    Date/Time: 10/20/2022 5:28 PM  Performed by: Donnie Alvarez MD  Authorized by: Donnie Alvarez MD     Risks, benefits and alternatives discussed.      UNIVERSAL PROTOCOL   Site Marked: NA  Prior Images Obtained and Reviewed:  NA  Required items: Required blood products, implants, devices and special equipment available    Patient identity confirmed:  Verbally with patient and arm band  NA - No sedation, light sedation, or local anesthesia  Confirmation Checklist:  Patient's identity using two indicators and relevant allergies  Universal Protocol: the Joint Commission Universal Protocol was followed    LACERATION DETAILS     Location:  Face    Face location:  R eyebrow    Length (cm):  2    Depth (mm):  2    REPAIR TYPE:     Repair type:  Simple      EXPLORATION:     Wound exploration: wound explored through full range of motion and entire depth of wound probed and visualized      Wound extent: areolar tissue violated      Wound extent: fascia not violated, no foreign body, no signs of injury, no nerve damage, no tendon damage, no underlying fracture and no vascular damage      Contaminated: no      TREATMENT:     Area cleansed with:  Mandi    Amount of cleaning:  Extensive    Irrigation solution:  Sterile saline    Irrigation method:  Syringe    Visualized foreign bodies/material removed: no      SKIN REPAIR     Repair method:  Steri-Strips    Number of Steri-Strips:  2    APPROXIMATION     Approximation:  Close    POST-PROCEDURE DETAILS     Dressing:  Non-adherent dressing        PROCEDURE    Patient Tolerance:  Patient tolerated the procedure well with no immediate complications         AKIKO WILSON am serving as a  scribe to document services personally performed by Dr. Donnie Alvarez based on my observation and the provider's statements to me. I, Donnie Alvarez MD attest that AKIKO MEJIA is acting in a scribe capacity, has observed my performance of the services and has documented them in accordance with my direction.      Donnie Alvarez MD  10/20/22  Emergency Medicine  [facility] EMERGENCY DEPARTMENT  [address] [place] [id number]  [phone]  Dept: [phone]     Donnie Alvarez MD  10/20/22 6936

## 2022-10-20 NOTE — ED NOTES
"ED Triage Provider Note  [facility] EMERGENCY DEPARTMENT  Encounter Date: Oct 20, 2022    History:  Chief Complaint   Patient presents with     Cat Bite     Alexus Garcia is a 66 year old female who presents to the ED with attacked by cat  tdap 2017  Cat is NOT up to date on vaccinated  Was doing fine prior  MULTIPLE ALLERGIES TO ABX        Review of Systems:  Pain on forehead  Bleeding      Exam:  /69   Pulse 114   Temp 98.4  F (36.9  C) (Oral)   Resp 18   Ht 1.6 m (5' 3\")   Wt 68 kg (150 lb)   SpO2 95%   BMI 26.57 kg/m    General: No acute distress. Appears stated age.   Cardio: normal rate, extremities well perfused  Resp: Normal work of breathing, grossly normal respiratory rate  Neuro: Alert. Facial movement grossly symmetric. Grossly intact strength.       Medical Decision Making:  Patient arriving to the ED with problem as above. A medical screening exam was performed. Initial differential diagnosis includes but not limited to FB.    [facility] pharmacy consult orders initiated from Triage. The patient is most appropriate to return to the waiting room.     4:31 PM  [facility] states observe CAT for 10 days  No need for rabbies prophylasix  Spoke with Panda    Pharmacy  recommends azithromycin  See note        Asmita Higgins MD  10/20/2022 at 3:25 PM     Asmita Higgins MD  10/20/22 1541       Asmita Higgins MD  10/20/22 1632       Asmita Higgins MD  10/20/22 1655    "

## 2022-10-20 NOTE — DISCHARGE INSTRUCTIONS
Take the antibiotic (azithromycin) as prescribed to prevent any infection.    Monitor the cat for the next 10 days for any signs of rabies. If these develop, call the [facility].    Follow up with your Primary Care provider in 2 days for a recheck.    Return to the Emergency Department for any pus draining, streaking skin redness, or any other concerns.

## 2022-10-20 NOTE — ED TRIAGE NOTES
"Presents to triage alone for c/o cat bite that occurred today at 1415.  Cat is a stray that's been staying with her son, son and pt do not live together.  Cat's vaccines are NOT up to date.  Cat bit her \"deep\" on her right forehead, \"it took a while to get bleeding to stop.\"  Pt is DM, hx of triple negative breast CA, not in treatment currently.  Per pt, gets sepsis \"easily.\"  No active bleed at this time.  Last documented tetanus is 1/2017.         Triage Assessment     Row Name 10/20/22 1517       Triage Assessment (Adult)    Airway WDL WDL       Respiratory WDL    Respiratory WDL WDL       Skin Circulation/Temperature WDL    Skin Circulation/Temperature WDL WDL       Cardiac WDL    Cardiac WDL WDL       Peripheral/Neurovascular WDL    Peripheral Neurovascular WDL WDL       Cognitive/Neuro/Behavioral WDL    Cognitive/Neuro/Behavioral WDL WDL              "

## 2022-10-20 NOTE — PHARMACY-CONSULT NOTE
"Consulted in ER \"Briefly describe the reason for this consult:  please help me pick an antibiotic after cat bit/scratch. Has multiple allergies\" Consulted by Asmita Higgins MD.    Review of Care Everywhere records show that pt tolerated azithromycin 10/30/2017.    [facility] has + listed for P multocida vs azithromycin.    Per [employer] \"For definitive therapy of monomicrobial infections due to Pasteurella, we suggest penicillin (Grade 2C). Amoxicillin, ampicillin, beta-lactam-beta-lactamase inhibitor combinations, extended spectrum cephalosporins, quinolones, trimethoprim-sulfamethoxazole, doxycycline, and azithromycin are potential alternatives (table 2). Semi-synthetic penicillins (eg, oxacillin or dicloxacillin), first-generation cephalosporins, clindamycin, and erythromycin have poor activity and should be avoided.\"    Thank you, Shaye Cerrato, PharmD, 10/20/2022, 4:40 PM        "

## 2022-10-21 ENCOUNTER — TELEPHONE (OUTPATIENT)
Dept: FAMILY MEDICINE | Facility: CLINIC | Age: 67
End: 2022-10-21

## 2022-10-21 NOTE — TELEPHONE ENCOUNTER
Reason for Call:  Appointment Request    Patient requesting this type of appt:  Follow up on cat bite - had gone to ED    Requested provider: any provider    Reason patient unable to be scheduled: scheduled for a video visit with Dr. Kim on 10/25/22    When does patient want to be seen/preferred time: 1-2 days    Could we send this information to you in [place] or would you prefer to receive a phone call?:   No preference     Okay to leave a detailed message?: No task completed. Thanks    Call taken on 10/21/2022 at 11:41 AM by ELISEO Araya

## 2022-10-25 ENCOUNTER — VIRTUAL VISIT (OUTPATIENT)
Dept: FAMILY MEDICINE | Facility: CLINIC | Age: 67
End: 2022-10-25
Payer: MEDICARE
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
## 2022-10-25 PROCEDURE — 99213 OFFICE O/P EST LOW 20 MIN: CPT | Mod: 95 | Performed by: FAMILY MEDICINE

## 2022-10-25 NOTE — PROGRESS NOTES
Anny is a 66 year old who is being evaluated via a billable video visit.      How would you like to obtain your AVS? MyChart  If the video visit is dropped, the invitation should be resent by: Text to cell phone: [phone]  Will anyone else be joining your video visit? No          Assessment & Plan     Cat bite, subsequent encounter  EHR reviewed.   Past medical history, problem list, past surgical history, family history, social history, medications reviewed, updated, reconciled.   Reviewed the limitations of virtual visit for her concerns.   From her history and appearance over video, the area appears to be healing.   May apply warm compress as needed to promote drainage. We discussed sign and symptoms of worsening infection or development of abscess.   She will monitor the area closely. Will complete her antibiotics.   Consider follow up with PCP by Friday if there is any changes or worsening of symptoms.       Return in about 3 days (around 10/28/2022) for Follow up.    Titi Kim MD  [facility]    Subjective   Anny is a 66 year old presenting for the following health issues:  Follow up cat bite    Was seen in the ED on 10/20/22.   Her son's cat bit and scratched her.   She has a wound on her forehead and a few other minor scratches.   She notes the cuts were closed with steristrips only. They cleaned the wounds well.   She was told to take azithromycin. She has been taking her medications as directed.   She notes a lot of drainage from the cuts. She has been cleaning them. There is not much pain, but it is itchy. No bleeding. No warmth. She feels well otherwise. No nausea, vomiting, dizziness, fever.   She notes history of breast cancer, radiation and chemo several years ago.  She remembers a time she had sepsis back then.   Of note, history of diabetes, well controlled.       HPI           Objective           Vitals:  No vitals were obtained today due to virtual  visit.    Physical Exam   GENERAL: Healthy, alert and no distress  EYES: Eyes grossly normal to inspection.  No discharge or erythema, or obvious scleral/conjunctival abnormalities.  RESP: No audible wheeze, cough, or visible cyanosis.  No visible retractions or increased work of breathing.    SKIN: forehead with 2 steri strips in place. No bleeding or drainage noted.   NEURO: Cranial nerves grossly intact.  Mentation and speech appropriate for age.  PSYCH: Mentation appears normal, affect normal/bright, judgement and insight intact, normal speech and appearance well-groomed.      Video-Visit Details    Video Start Time: 12:24 PM    Type of service:  Video Visit    Video End Time:12:35 PM    Originating Location (pt. Location): Home    Distant Location (provider location):  Off-site    Platform used for Video Visit: ProMed

## 2022-11-14 ASSESSMENT — ANXIETY QUESTIONNAIRES
5. BEING SO RESTLESS THAT IT IS HARD TO SIT STILL: NOT AT ALL
7. FEELING AFRAID AS IF SOMETHING AWFUL MIGHT HAPPEN: SEVERAL DAYS
1. FEELING NERVOUS, ANXIOUS, OR ON EDGE: SEVERAL DAYS
GAD7 TOTAL SCORE: 7
7. FEELING AFRAID AS IF SOMETHING AWFUL MIGHT HAPPEN: SEVERAL DAYS
GAD7 TOTAL SCORE: 7
2. NOT BEING ABLE TO STOP OR CONTROL WORRYING: SEVERAL DAYS
IF YOU CHECKED OFF ANY PROBLEMS ON THIS QUESTIONNAIRE, HOW DIFFICULT HAVE THESE PROBLEMS MADE IT FOR YOU TO DO YOUR WORK, TAKE CARE OF THINGS AT HOME, OR GET ALONG WITH OTHER PEOPLE: SOMEWHAT DIFFICULT
4. TROUBLE RELAXING: SEVERAL DAYS
8. IF YOU CHECKED OFF ANY PROBLEMS, HOW DIFFICULT HAVE THESE MADE IT FOR YOU TO DO YOUR WORK, TAKE CARE OF THINGS AT HOME, OR GET ALONG WITH OTHER PEOPLE?: SOMEWHAT DIFFICULT
6. BECOMING EASILY ANNOYED OR IRRITABLE: NOT AT ALL
GAD7 TOTAL SCORE: 7
3. WORRYING TOO MUCH ABOUT DIFFERENT THINGS: NEARLY EVERY DAY

## 2022-11-15 ENCOUNTER — OFFICE VISIT (OUTPATIENT)
Dept: FAMILY MEDICINE | Facility: CLINIC | Age: 67
End: 2022-11-15
Payer: MEDICARE

## 2022-11-15 VITALS
WEIGHT: 154 LBS | BODY MASS INDEX: 27.29 KG/M2 | HEIGHT: 63 IN | TEMPERATURE: 98.3 F | OXYGEN SATURATION: 100 % | RESPIRATION RATE: 14 BRPM | HEART RATE: 92 BPM | DIASTOLIC BLOOD PRESSURE: 78 MMHG | SYSTOLIC BLOOD PRESSURE: 112 MMHG

## 2022-11-15 DIAGNOSIS — E55.9 VITAMIN D DEFICIENCY DISEASE: ICD-10-CM
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
## 2022-11-15 DIAGNOSIS — E11.9 TYPE 2 DIABETES MELLITUS WITHOUT COMPLICATION, WITHOUT LONG-TERM CURRENT USE OF INSULIN (H): ICD-10-CM

## 2022-11-15 DIAGNOSIS — N18.31 STAGE 3A CHRONIC KIDNEY DISEASE (H): ICD-10-CM

## 2022-11-15 DIAGNOSIS — Z00.00 PREVENTATIVE HEALTH CARE: ICD-10-CM

## 2022-11-15 DIAGNOSIS — F32.1 MODERATE MAJOR DEPRESSION (H): ICD-10-CM

## 2022-11-15 DIAGNOSIS — C50.919 MALIGNANT NEOPLASM OF FEMALE BREAST, UNSPECIFIED ESTROGEN RECEPTOR STATUS, UNSPECIFIED LATERALITY, UNSPECIFIED SITE OF BREAST (H): ICD-10-CM

## 2022-11-15 DIAGNOSIS — Z12.11 SCREEN FOR COLON CANCER: ICD-10-CM

## 2022-11-15 DIAGNOSIS — G47.00 PERSISTENT INSOMNIA: ICD-10-CM

## 2022-11-15 LAB — HBA1C MFR BLD: 7.9 % (ref 0–5.6)

## 2022-11-15 PROCEDURE — 99215 OFFICE O/P EST HI 40 MIN: CPT | Performed by: FAMILY MEDICINE

## 2022-11-15 PROCEDURE — 83036 HEMOGLOBIN GLYCOSYLATED A1C: CPT | Performed by: FAMILY MEDICINE

## 2022-11-15 PROCEDURE — 36415 COLL VENOUS BLD VENIPUNCTURE: CPT | Performed by: FAMILY MEDICINE

## 2022-11-15 RX ORDER — AZITHROMYCIN 500 MG/1
500 TABLET, FILM COATED ORAL DAILY
Qty: 5 TABLET | Refills: 0 | Status: SHIPPED | OUTPATIENT
Start: 2022-11-15

## 2022-11-15 ASSESSMENT — ANXIETY QUESTIONNAIRES: GAD7 TOTAL SCORE: 7

## 2022-11-15 ASSESSMENT — PATIENT HEALTH QUESTIONNAIRE - PHQ9
SUM OF ALL RESPONSES TO PHQ QUESTIONS 1-9: 12
10. IF YOU CHECKED OFF ANY PROBLEMS, HOW DIFFICULT HAVE THESE PROBLEMS MADE IT FOR YOU TO DO YOUR WORK, TAKE CARE OF THINGS AT HOME, OR GET ALONG WITH OTHER PEOPLE: SOMEWHAT DIFFICULT

## 2022-11-15 NOTE — PROGRESS NOTES
Assessment & Plan     Cat bite, subsequent encounter  She will keep soaking the wounds but will take another 5 day course of azithromycin to try to help it. I was uncertain if there was pus under the swelling  - azithromycin (ZITHROMAX) 500 MG tablet  Dispense: 5 tablet; Refill: 0  - Adult Mental Health  Referral    Vitamin D deficiency disease  Continue supplement    Screen for colon cancer  ordered    Type 2 diabetes mellitus without complication, without long-term current use of insulin (H)  Continue meds,   - Adult Mental Health  Referral  - Hemoglobin A1c  - Hemoglobin A1c    Preventative health care  reviewed  - REVIEW OF HEALTH MAINTENANCE PROTOCOL ORDERS    Persistent insomnia  Discussed sleep hygiene, roberta stretching for a while before bed  - Adult Mental Health  Referral    Moderate major depression (H)  She agreed to let me put a referral in if she can have a native English speaker and if she can choose if/when she talks about past trauma  - Adult Mental Health  Referral    Stage 3a chronic kidney disease (H)  following  - Adult Mental Health  Referral    Malignant neoplasm of female breast, unspecified estrogen receptor status, unspecified laterality, unspecified site of breast (H)  She's followed for breast cancer  - [facility]      Review of external notes as documented elsewhere in note  Ordering of each unique test  Prescription drug management  45 minutes spent on the date of the encounter doing chart review, history and exam, documentation and further activities per the note    Return in about 3 months (around 2/15/2023) for Follow up.    Daxa Morales MD  [facility] JASON Mccormick is a 67 year old, presenting for the following health issues:  cough    Attacked by son's stray cat. Has scars on forehead - one spot feels infected- is a bit swollen. Went to [facility]. They put  steri-strips on. Later there was swelling and pus coming out of 2 of the smaller spots.   Brother had a heart attack.   Grandson   20mo granddaughter survived car accident;  [facility]   Sister is on hospice. A year ago went into memory care. She's in [place], [place] - 3 hours away.    Also has cough - getting better    Counseling? remembers being traumatized by it because it forced her to recount past trauma. She does not want to do that again. Also, the counselor was nice but spoke with an accent that made it hard for Anny. Lucy kelley sees her.    Back troubles are made worse by unpacking.    History of Present Illness       Back Pain:  She presents for follow up of back pain. Patient's back pain is a chronic problem.  Location of back pain:  Right lower back, left lower back, right buttock, right hip, left hip and other  Description of back pain: sharp and stabbing  Back pain spreads: right buttocks    Since patient first noticed back pain, pain is: unchanged  Does back pain interfere with her job:  Not applicable      CKD: She uses over the counter pain medication, including Aleve and Tylenol Extra Strength, a few times a week.    Mental Health Follow-up:  Patient presents to follow-up on Depression & Anxiety.Patient's depression since last visit has been:  Medium  The patient is having other symptoms associated with depression.  Patient's anxiety since last visit has been:  No change  The patient is not having other symptoms associated with anxiety.  Any significant life events: other  Patient is feeling anxious or having panic attacks.  Patient has no concerns about alcohol or drug use.    Diabetes:   She presents for follow up of diabetes.  She is not checking blood glucose. She is concerned about other. She is having blurry vision and weight gain. The patient has not had a diabetic eye exam in the last 12 months.         Headaches:   Since the patient's last clinic visit,  "headaches are: worsened  The patient is getting headaches:  Weekly  She is not able to do normal daily activities when she has a migraine.  The patient is taking the following rescue/relief medications:  Naproxyn (Aleve)   Patient states \"I get only a small amount of relief\" from the rescue/relief medications.   The patient is taking the following medications to prevent migraines:  No medications to prevent migraines  In the past 4 weeks, the patient has gone to an Urgent Care or Emergency Room 0 times times due to headaches.    Reason for visit:  Follow up for Cat Attack, current Cough, and symptom concerns    She eats 0-1 servings of fruits and vegetables daily.She consumes 0 sweetened beverage(s) daily.She exercises with enough effort to increase her heart rate 9 or less minutes per day.  She exercises with enough effort to increase her heart rate 3 or less days per week.   She is taking medications regularly.    Today's PHQ-9         PHQ-9 Total Score: 12    PHQ-9 Q9 Thoughts of better off dead/self-harm past 2 weeks :   Not at all    How difficult have these problems made it for you to do your work, take care of things at home, or get along with other people: Somewhat difficult  Today's ROBERT-7 Score: 7       Review of Systems         Objective    /78   Pulse 92   Temp 98.3  F (36.8  C) (Oral)   Resp 14   Ht 1.6 m (5' 3\")   Wt 69.9 kg (154 lb)   SpO2 100%   BMI 27.28 kg/m    Body mass index is 27.28 kg/m .  Physical Exam   GENERAL: alert, no distress, frail and fatigued  NECK: no adenopathy, no asymmetry, masses, or scars and thyroid normal to palpation  RESP: lungs clear to auscultation - no rales, rhonchi or wheezes  CV: regular rate and rhythm, normal S1 S2, no S3 or S4, no murmur, click or rub, no peripheral edema and peripheral pulses strong  SKIN: no suspicious lesions or rashes and laceration through right eyebrow has slight pink color and puffiness near it. It is tender  PSYCH: mentation " appears normal, affect flat, anxious, fatigued, judgement and insight intact, appearance well groomed and talks a lot- always putting things in the extreme    Results for orders placed or performed in visit on 11/15/22 (from the past 24 hour(s))   Hemoglobin A1c   Result Value Ref Range    Hemoglobin A1C 7.9 (H) 0.0 - 5.6 %

## 2022-11-28 ENCOUNTER — VIRTUAL VISIT (OUTPATIENT)
Dept: PSYCHIATRY | Facility: CLINIC | Age: 67
End: 2022-11-28
Payer: MEDICARE

## 2022-11-28 DIAGNOSIS — F32.89 OTHER DEPRESSION: ICD-10-CM

## 2022-11-28 PROCEDURE — 99214 OFFICE O/P EST MOD 30 MIN: CPT | Mod: 95 | Performed by: NURSE PRACTITIONER

## 2022-11-28 RX ORDER — DULOXETIN HYDROCHLORIDE 30 MG/1
30 CAPSULE, DELAYED RELEASE ORAL DAILY
Qty: 90 CAPSULE | Refills: 0 | Status: SHIPPED | OUTPATIENT
Start: 2022-11-28 | End: 2023-01-30

## 2022-11-28 NOTE — PROGRESS NOTES
This video/telephone visit will be conducted via a call between you and your physician/provider. We have found that certain health care needs can be provided without the need for an in-person physical exam. This service lets us provide the care you need with a video /telephone conversation. If a prescription is necessary we can send it directly to your pharmacy. If lab work is needed we can place an order for that and you can then stop by our lab to have the test done at a later time.    Just as we bill insurance for in-person visits, we also bill insurance for video/telephone visits. If you have questions about your insurance coverage, we recommend that you speak with your insurance company.    Patient has given verbal consent for video/Telephone visit? yes    Patient would like a phone visit, please connect/call : [phone]  Reason for visit: Rescheck  Anything the provider should be aware of for today's appointment: Pt said she has a sore throat. Still recovering from the cat attack in October    Patient verified allergies, medications and pharmacy via phone.     Patient states they are ready for visit.    Sulema Morris November 28, 2022 2:32 PM    MH&A Post-Appointment Chart -check      Medications ordered this visit were e-scribed.  Verified by order class [x] yes  [] no    List Medications:     DULoxetine (CYMBALTA) 30 MG capsule  Class: E-Prescribe      Medication changes or discontinuations were communicated to patient's pharmacy: [] yes  [x] no    UA collected [] yes  [] no  [x] n/a-virtual     Outside referrals / labs, etc support staff to follow up: [] yes  [x] no    Future appointment was made: [] yes  [x] no  [] n/a      Dictation completed at time of chart check: [x] yes  [] no    I have checked the documentation for today s encounters and the above information has been reviewed and completed.      Sulema Morris on November 28, 2022 at 3:42 PM    Patient notified via portal.      Erika Fatima RN

## 2022-11-28 NOTE — PATIENT INSTRUCTIONS
Continue medications as prescribed  Have your pharmacy contact us for a refill if you are running low on medications (We may ask you to come into clinic to get a refill from the nurse  No Alcohol or drug use  No driving if sedated  Call the clinic with any questions or concerns   Reach out for help if you feel like hurting yourself or others ([facility] [phone]: [address] [facility], [address] or [facility] Suicide Hotline   [phone] , call 911 or go to nearest Emergency room     Crisis Resources:    Present to the Emergency Department as needed or call after hours crisis line at [phone] or [phone].   [place] Crisis Text Line: Text [place] to 348906.  Suicide LifeLine Chat: suicidepreventionlifeline.org/chat/.  National Suicide Prevention Lifeline: 874.926.6807 (TTY: 907.153.2333). Call anytime for help.  (www.suicidepreventionlifeline.org)  National [place] on Mental Illness (www.norma.org): 383.339.8242 or 684-125-3379.  Mental Health Association (www.mentalhealth.org): 715.966.7638 or 484-861-9902.       Follow up as directed, for your appointments, per your After Visit Summary Form.

## 2022-11-28 NOTE — PROGRESS NOTES
"  The patient has been notified of following:      \"This telephone visit will be conducted via a call between you and your physician/provider. We have found that certain health care needs can be provided without the need for a physical exam.  This service lets us provide the care you need with a short phone conversation.  If a prescription is necessary we can send it directly to your pharmacy.  If lab work is needed we can place an order for that and you can then stop by our lab to have the test done at a later time.     Telephone visits are billed at different rates depending on your insurance coverage. During this emergency period, for some insurers they may be billed the same as an in-person visit.  Please reach out to your insurance provider with any questions.     If during the course of the call the physician/provider feels a telephone visit is not appropriate, you will not be charged for this service.\"     Patient has given verbal consent to a Telephone visit? Yes        Patient would like to receive their AVS by AVS P/reference: Mail a copy          Psychiatric  Out patient Follow Up Progress Note  Date of visit:         Discussion of Care and Treatment Recommendations:   This is a 67 year old female with a  history of depression and PTSD presenting to our virtual clinic for a follow up appointment     PCP managing sleep :  Prescribing  Lorazepam ,Seroquel and Ambien   PCP managing Gabapentin for neurological issues          Last visit  09/28/2022  Recommendation at last visit .  1.MDD/Anxiety/PTSD : Had stopped seeing a therapist but agreeable to restart ambulatory referral to psychotherapy made.  2.MDD/Anxiety /PTSDHighly recommend : Community Support groups -  3.MDD/Anxiety : Continue Cymbalta  down to  30 mg daily    4. RTC- 8  weeks, call in between visit with any question     Patient and I reviewed diagnosis and treatment plan and patient agrees with following recommendations:  Ongoing education given " regarding diagnostic and treatment options with adequate verbalization of understanding.  Plan   1.MDD/Anxiety/PTSD : Had stopped seeing a therapist but agreeable to restart ambulatory referral to psychotherapy made.  2.MDD/Anxiety /PTSDHighly recommend : Community Support groups -  3.MDD/Anxiety : Continue Cymbalta  down to  30 mg daily    4. 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)     Malignant neoplasm of breast (H)     Posttraumatic hematoma of right breast     Postoperative pain     Pain in right axilla     Constipation due to outlet dysfunction     Logorrhea     Type 2 diabetes mellitus without complication, without long-term current use of insulin (H)     Rotator cuff tear     Anxiety     Scalp irritation     Abnormal MRI     Diabetes 1.5, managed as type 2 (H)     H/O breast surgery     Advanced directives, counseling/discussion     Vitamin D deficiency disease     Moderate major depression (H)     Concussion with loss of consciousness     CKD (chronic kidney disease) stage 3, GFR 30-59 ml/min (H)     Concussion without loss of consciousness             Chief Complaint / Subjective:    Chief complaint: Depression     History of Present Illness:  Per patient's statement : She is currently sick with a cold after thanksgiving gathering with family . She also had at  Cat bite that was infected but she was treated  and is doing better .She is also worried about her sister who need [place] move [place] . He brother also had a heart attack .  She is dealing with quite a bit but she is handling things okay per her statement.  Medications have been helpful.  She is also been praying and meditating which distracts her from all the issues going on around her.  She denies suicidal homicidal  "ideations.  She states she feels safe and verbally contracts for safety.  She would like to continue on current medications.       Mental Status Examination:     Appearance: unable to assess  Orientation: Patient alert and oriented to person, place, time, and situation  Reliability:  Patient appears to be an adequate historian.    Behavior: calm and coperative   Speech: Speech is spontaneous and coherent, with a normal rate, rhythm and tone.    Language:There are no difficulties with expressive or receptive language as observed throughout the interview.    Mood: Described as \"ok\".    Affect: unable to assess  Judgement: Able to make basic decision regarding safety.  Insight: Good awareness of physical and mental health conditions and aware of needs around care for these.  Gait and station: unable to assess  Thought process: Logical   Hallucinations : No evidence of any hallucination  Thought content: No evidence of delusions or paranoia.   Suicidal /Homical Ideations:  No thoughts of self harm or suicide. No thoughts of harming others.  Associations: Connected  Fund of knowledge: Average  Attention / Concentration: Able to remain focused during the interview with minimal distractibility or need for redirection.  Short Term Memory: Grossly intact as evidence by client recalling themes and ideas discussed.  Long Term Memory: Intact  Motor Status: unable to asses      Drug/treatment history and current pattern of use:   Denies     Medication changes: See Above   Medication adherence: compliant  Medication side effects: absent  Information about medications: Side effects, benefits and alternative treatments discussed and patient agrees .    Psychotherapy: Supportive therapy day-to-day living    Education: Diet, exercise, abstinence from drugs and alcohol, patient will not drive if sedated and medications or  under influence of any substance    Lab Results:  Personally reviewed and discussed with the patient    Lab " Results   Component Value Date    WBC 7.8 06/08/2022    HGB 12.9 06/08/2022    HCT 40.1 06/08/2022     06/08/2022    CHOL 213 (H) 06/08/2022    TRIG 102 06/08/2022    HDL 66 06/08/2022    ALT 28 06/08/2022    AST 20 06/08/2022     06/08/2022    BUN 14 06/08/2022    CO2 21 (L) 06/08/2022    TSH 0.84 12/06/2019    MICROALBUR 0.79 06/08/2022       Vital signs:  There were no vitals taken for this visit.  Unable to assess telephone visit  Allergies: Aztreonam, Castor oil, Cefaclor, Cephalexin, Cephalexin monohydrate [cephalexin], Chlorhexidine, Ciprofloxacin, Clarithromycin, Clindamycin, Penicillins, Propofol, Scopolamine, Sulfa (sulfonamide antibiotics) [sulfa drugs], Sulfasalazine, Tetracyclines, Vancomycin, Adhesive [mecrylate], Cytoxan [cyclophosphamide], Erythromycin base [erythromycin], Hydrocodone, Neupogen [filgrastim], Paper tape [adhesive tape], Tapentadol, Taxol [paclitaxel], and Taxotere [docetaxel]           Review of Systems:      ROS:  Subjective data only - Tele-Health  Visit   10 point ROS was negative except for the items listed in HPI-              Medications:     Current Outpatient Medications   Medication     azithromycin (ZITHROMAX) 500 MG tablet     DULoxetine (CYMBALTA) 30 MG capsule     LORazepam (ATIVAN) 1 MG tablet     zolpidem (AMBIEN) 10 MG tablet     acetaminophen (TYLENOL) 500 MG tablet     betamethasone, augmented, (DIPROLENE) 0.05 % lotion     blood glucose test (CONTOUR NEXT TEST STRIPS) strips     blood glucose test strips     blood-glucose meter (CONTOUR NEXT METER) Misc     calcium citrate-vitamin D (CITRACAL+D) 315-200 mg-unit per tablet     Cholecalciferol (VITAMIN D3) 25 MCG TABS     generic lancets (MICROLET LANCET)     lisinopril (ZESTRIL) 2.5 MG tablet     metFORMIN (GLUCOPHAGE XR) 500 MG 24 hr tablet     naproxen sodium (ALEVE) 220 MG tablet     oxyCODONE (ROXICODONE) 5 MG immediate release tablet     polyethylene glycol (MIRALAX) 17 gram packet     QUEtiapine  (SEROQUEL) 25 MG tablet     simvastatin (ZOCOR) 5 MG tablet     No current facility-administered medications for this visit.         Medication adherence: Reviewed risk/benefits of medication , Patient able to verbalize understanding of side effects and Patient verbally consents to taking medications        Crisis Resources:    1. Present to the Emergency Department as needed or call after hours crisis line at [phone] or [phone].   2. [place] Crisis Text Line: Text [place] to 384242.  3. Suicide LifeLine Chat: suicidepreMyCoop.org/chat/.  4. National Suicide Prevention Lifeline: 568.764.6557 (TTY: 639.248.2798). Call anytime for help.  (www.suicidepreventionApaceWave Technologiesline.org)  5. National [place] on Mental Illness (www.norma.org): 497.586.6755 or 604-714-6942.  6. Mental Health Association (www.mentalhealth.org): 702.600.2207 or 273-094-4462.      Coordination of Care:   More than 30 minutes spent on this visit  with more than 50% of time spent on coordination of care including: Educating patient about diagnosis, prognosis, side effects and benefits of medications, diet, exercise.  Time also spent providing supportive therapy regarding above issues.    This note was created using a dictation system. All typing errors or contextual distortion is unintentional and software inherent.  Start Time : 1430  End time : 1500

## 2022-12-07 DIAGNOSIS — F32.89 OTHER DEPRESSION: ICD-10-CM

## 2022-12-08 RX ORDER — DULOXETIN HYDROCHLORIDE 30 MG/1
CAPSULE, DELAYED RELEASE ORAL
Qty: 90 CAPSULE | Refills: 0 | OUTPATIENT
Start: 2022-12-08

## 2022-12-08 NOTE — TELEPHONE ENCOUNTER
RN received refill request from [facility] at [id number] [facility] for DULoxetine (CYMBALTA) 30 MG capsule. Writer called this pharmacy who verified that this is a duplicate request. This request will therefore be DENIED.     Sintia Gee RN on 12/8/2022 at 11:01 AM

## 2022-12-15 DIAGNOSIS — Z76.0 ENCOUNTER FOR MEDICATION REFILL: ICD-10-CM

## 2022-12-15 DIAGNOSIS — G47.00 PERSISTENT INSOMNIA: ICD-10-CM

## 2022-12-15 RX ORDER — QUETIAPINE FUMARATE 25 MG/1
TABLET, FILM COATED ORAL
Qty: 135 TABLET | Refills: 3 | OUTPATIENT
Start: 2022-12-15

## 2022-12-19 DIAGNOSIS — G47.00 PERSISTENT INSOMNIA: ICD-10-CM

## 2022-12-19 DIAGNOSIS — Z76.0 ENCOUNTER FOR MEDICATION REFILL: ICD-10-CM

## 2022-12-20 RX ORDER — QUETIAPINE FUMARATE 25 MG/1
TABLET, FILM COATED ORAL
Qty: 135 TABLET | Refills: 3 | OUTPATIENT
Start: 2022-12-20

## 2023-01-08 DIAGNOSIS — Z76.0 ENCOUNTER FOR MEDICATION REFILL: Primary | ICD-10-CM

## 2023-01-08 DIAGNOSIS — G47.00 PERSISTENT INSOMNIA: ICD-10-CM

## 2023-01-09 RX ORDER — QUETIAPINE FUMARATE 25 MG/1
TABLET, FILM COATED ORAL
Qty: 135 TABLET | Refills: 3 | Status: SHIPPED | OUTPATIENT
Start: 2023-01-09 | End: 2023-01-24

## 2023-01-20 DIAGNOSIS — Z76.0 ENCOUNTER FOR MEDICATION REFILL: ICD-10-CM

## 2023-01-20 DIAGNOSIS — E11.9 TYPE 2 DIABETES MELLITUS WITHOUT COMPLICATION, WITHOUT LONG-TERM CURRENT USE OF INSULIN (H): ICD-10-CM

## 2023-01-22 RX ORDER — METFORMIN HCL 500 MG
TABLET, EXTENDED RELEASE 24 HR ORAL
Qty: 360 TABLET | Refills: 4 | OUTPATIENT
Start: 2023-01-22

## 2023-01-22 RX ORDER — METFORMIN HCL 500 MG
500 TABLET, EXTENDED RELEASE 24 HR ORAL
Qty: 90 TABLET | Refills: 1 | Status: SHIPPED | OUTPATIENT
Start: 2023-01-22 | End: 2023-03-14

## 2023-01-22 NOTE — TELEPHONE ENCOUNTER
"Last Written Prescription Date:  6/8/2022  Last Fill Quantity: 90,  # refills: 4   Last office visit provider: 11/15/2022 with PCP Dr JUAN Morales    Dosage changed 6/8/2022--message sent to pharmacy.    Requested Prescriptions   Pending Prescriptions Disp Refills     metFORMIN (GLUCOPHAGE XR) 500 MG 24 hr tablet [Pharmacy Med Name: METFORMIN HCL  MG TABLET] 360 tablet 4     Sig: TAKE 4 TABLETS (2,000 MG TOTAL) BY MOUTH DAILY WITH SUPPER.       Biguanide Agents Passed - 1/20/2023  6:57 PM        Passed - Patient is age 10 or older        Passed - Patient has documented A1c within the specified period of time.     If HgbA1C is 8 or greater, it needs to be on file within the past 3 months.  If less than 8, must be on file within the past 6 months.     Recent Labs   Lab Test 11/15/22  1534   A1C 7.9*             Passed - Patient's CR is NOT>1.4 OR Patient's EGFR is NOT<45 within past 12 mos.     Recent Labs   Lab Test 06/08/22  1508 12/16/20  1530   GFRESTIMATED 81 47*   GFRESTBLACK  --  57*       Recent Labs   Lab Test 06/08/22  1508   CR 0.80             Passed - Patient does NOT have a diagnosis of CHF.        Passed - Medication is active on med list        Passed - Patient is not pregnant        Passed - Patient has not had a positive pregnancy test within the past 12 mos.         Passed - Recent (6 mo) or future (30 days) visit within the authorizing provider's specialty     Patient had office visit in the last 6 months or has a visit in the next 30 days with authorizing provider or within the authorizing provider's specialty.  See \"Patient Info\" tab in inbasket, or \"Choose Columns\" in Meds & Orders section of the refill encounter.                 Glendy Hargrove RN 01/22/23 3:04 PM  "

## 2023-01-24 DIAGNOSIS — G47.00 PERSISTENT INSOMNIA: ICD-10-CM

## 2023-01-24 DIAGNOSIS — Z76.0 ENCOUNTER FOR MEDICATION REFILL: Primary | ICD-10-CM

## 2023-01-24 RX ORDER — QUETIAPINE FUMARATE 25 MG/1
TABLET, FILM COATED ORAL
Qty: 135 TABLET | Refills: 3 | Status: SHIPPED | OUTPATIENT
Start: 2023-01-24

## 2023-01-30 ENCOUNTER — VIRTUAL VISIT (OUTPATIENT)
Dept: PSYCHIATRY | Facility: CLINIC | Age: 68
End: 2023-01-30
Payer: MEDICARE

## 2023-01-30 DIAGNOSIS — F32.89 OTHER DEPRESSION: ICD-10-CM

## 2023-01-30 PROCEDURE — 99214 OFFICE O/P EST MOD 30 MIN: CPT | Mod: 95 | Performed by: NURSE PRACTITIONER

## 2023-01-30 RX ORDER — DULOXETIN HYDROCHLORIDE 30 MG/1
30 CAPSULE, DELAYED RELEASE ORAL DAILY
Qty: 90 CAPSULE | Refills: 0 | Status: SHIPPED | OUTPATIENT
Start: 2023-01-30 | End: 2023-06-01

## 2023-01-30 NOTE — PROGRESS NOTES
"Alexus Garcia is a 67 year old who is being evaluated via a billable telephone visit.      What phone number would you prefer to be contacted at?: Mobile phone number on file:   Telephone Information:   Mobile [phone]       Reason for telehealth visit: Patient has requested telehealth visit    Originating location (patient location): Patient's home    Will anyone else be joining the visit? No        The patient has been notified of following:      \"This telephone visit will be conducted via a call between you and your physician/provider. We have found that certain health care needs can be provided without the need for a physical exam.  This service lets us provide the care you need with a short phone conversation.  If a prescription is necessary we can send it directly to your pharmacy.  If lab work is needed we can place an order for that and you can then stop by our lab to have the test done at a later time.     Telephone visits are billed at different rates depending on your insurance coverage. During this emergency period, for some insurers they may be billed the same as an in-person visit.  Please reach out to your insurance provider with any questions.     If during the course of the call the physician/provider feels a telephone visit is not appropriate, you will not be charged for this service.\"     Patient has given verbal consent to a Telephone visit? Yes        Patient would like to receive their AVS by AVS P/reference: Mail a copy    Family members who have been ill over the past few months.      Psychiatric  Out patient Follow Up Progress Note  Date of visit:1/30/2023           Discussion of Care and Treatment Recommendations:   This is a 67 year old female with  a  history of depression and PTSD presenting to our virtual clinic for a follow up appointment     PCP managing sleep :  Prescribing  Lorazepam ,Seroquel and Ambien   PCP managing Gabapentin for neurological issues          Last visit  " 11/28/2022.  Recommendation at last visit .  1.MDD/Anxiety/PTSD : Had stopped seeing a therapist but agreeable to restart ambulatory referral to psychotherapy made.  2.MDD/Anxiety /PTSDHighly recommend : Community Support groups -  3.MDD/Anxiety : Continue Cymbalta  down to  30 mg daily    4. RTC- 8  weeks, call in between visit with any question     Patient and I reviewed diagnosis and treatment plan and patient agrees with following recommendations:  Ongoing education given regarding diagnostic and treatment options with adequate verbalization of understanding.  Plan   1.MDD/Anxiety/PTSD : Had stopped seeing a therapist but agreeable to restart ambulatory referral to psychotherapy made.  2.MDD/Anxiety /PTSDHighly recommend : Community Support groups -  3.MDD/Anxiety : Continue Cymbalta  down to  30 mg daily    4. 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)     Malignant neoplasm of breast (H)     Posttraumatic hematoma of right breast     Postoperative pain     Pain in right axilla     Constipation due to outlet dysfunction     Logorrhea     Type 2 diabetes mellitus without complication, without long-term current use of insulin (H)     Rotator cuff tear     Anxiety     Scalp irritation     Abnormal MRI     Diabetes 1.5, managed as type 2 (H)     H/O breast surgery     Advanced directives, counseling/discussion     Vitamin D deficiency disease     Moderate major depression (H)     Concussion with loss of consciousness     CKD (chronic kidney disease) stage 3, GFR 30-59 ml/min (H)     Concussion without loss of consciousness             Chief Complaint / Subjective:    Chief complaint:  Depression     History of Present Illness:   Per patient's statement : A few of her family members been sick in the past few months.  Her brother  "had a stroke and she lost 1 sister and the other sister is in hospice currently and consistently needs.  She is handling the situation well although she can be angry at times.  Primary care has been managing her lorazepam and recently started her on Seroquel to manage sleep issues.  She states that taking several she noticed occasional auditory hallucinations.  She has since stopped taking Seroquel.  She finds her strength in prayer and meditation.  She denies SI.  She is currently on duloxetine 30 mg.  We discussed increasing the dose to 60 mg to target depression and anxiety but she is a bit reluctant to do so right now but states she will try taking 2 and if she finds relief she will give me a call so we can make it official.    Mental Status Examination:     Appearance: unable to assess  Orientation: Patient alert and oriented to person, place, time, and situation  Reliability:  Patient appears to be an adequate historian.    Behavior: calm and coperative   Speech: Speech is spontaneous and coherent, with a normal rate, rhythm and tone.    Language:There are no difficulties with expressive or receptive language as observed throughout the interview.    Mood: Described as \"ok\".    Affect: unable to assess  Judgement: Able to make basic decision regarding safety.  Insight: Good awareness of physical and mental health conditions and aware of needs around care for these.  Gait and station: unable to assess  Thought process: Logical   Hallucinations : No evidence of any hallucination  Thought content: No evidence of delusions or paranoia.   Suicidal /Homical Ideations:  No thoughts of self harm or suicide. No thoughts of harming others.  Associations: Connected  Fund of knowledge: Average  Attention / Concentration: Able to remain focused during the interview with minimal distractibility or need for redirection.  Short Term Memory: Grossly intact as evidence by client recalling themes and ideas discussed.  Long Term " Memory: Intact  Motor Status: unable to asses      Drug/treatment history and current pattern of use:   Denies     Medication changes: See Above   Medication adherence: compliant  Medication side effects: absent  Information about medications: Side effects, benefits and alternative treatments discussed and patient agrees .    Psychotherapy: Supportive therapy day-to-day living    Education: Diet, exercise, abstinence from drugs and alcohol, patient will not drive if sedated and medications or  under influence of any substance    Lab Results:   Personally reviewed and discussed with the patient    Lab Results   Component Value Date    WBC 7.8 06/08/2022    HGB 12.9 06/08/2022    HCT 40.1 06/08/2022     06/08/2022    CHOL 213 (H) 06/08/2022    TRIG 102 06/08/2022    HDL 66 06/08/2022    ALT 28 06/08/2022    AST 20 06/08/2022     06/08/2022    BUN 14 06/08/2022    CO2 21 (L) 06/08/2022    TSH 0.84 12/06/2019    MICROALBUR 0.79 06/08/2022       Vital signs:  There were no vitals taken for this visit.  Unable to assess telephone visit  Allergies: Aztreonam, Castor oil, Cefaclor, Cephalexin, Cephalexin monohydrate [cephalexin], Chlorhexidine, Ciprofloxacin, Clarithromycin, Clindamycin, Penicillins, Propofol, Scopolamine, Sulfa (sulfonamide antibiotics) [sulfa drugs], Sulfasalazine, Tetracyclines, Vancomycin, Adhesive [mecrylate], Cytoxan [cyclophosphamide], Erythromycin base [erythromycin], Hydrocodone, Neupogen [filgrastim], Paper tape [adhesive tape], Tapentadol, Taxol [paclitaxel], and Taxotere [docetaxel]           Review of Systems:      ROS:  Subjective data only - Tele-Health  Visit   10 point ROS was negative except for the items listed in HPI-              Medications:     Current Outpatient Medications   Medication     acetaminophen (TYLENOL) 500 MG tablet     azithromycin (ZITHROMAX) 500 MG tablet     betamethasone, augmented, (DIPROLENE) 0.05 % lotion     blood glucose test (CONTOUR NEXT TEST  STRIPS) strips     blood glucose test strips     blood-glucose meter (CONTOUR NEXT METER) Misc     calcium citrate-vitamin D (CITRACAL+D) 315-200 mg-unit per tablet     Cholecalciferol (VITAMIN D3) 25 MCG TABS     DULoxetine (CYMBALTA) 30 MG capsule     generic lancets (MICROLET LANCET)     lisinopril (ZESTRIL) 2.5 MG tablet     LORazepam (ATIVAN) 1 MG tablet     metFORMIN (GLUCOPHAGE XR) 500 MG 24 hr tablet     naproxen sodium (ALEVE) 220 MG tablet     oxyCODONE (ROXICODONE) 5 MG immediate release tablet     polyethylene glycol (MIRALAX) 17 gram packet     QUEtiapine (SEROQUEL) 25 MG tablet     simvastatin (ZOCOR) 5 MG tablet     zolpidem (AMBIEN) 10 MG tablet     No current facility-administered medications for this visit.         Medication adherence: Reviewed risk/benefits of medication , Patient able to verbalize understanding of side effects and Patient verbally consents to taking medications      Crisis Resources:    1. Present to the Emergency Department as needed or call after hours crisis line at [phone] or [phone].   2. [place] Crisis Text Line: Text [place] to 881169.  3. Suicide LifeLine Chat: suicideprec3 creationsline.org/chat/.  4. National Suicide Prevention Lifeline: 894.720.8652 (TTY: 175.902.3043). Call anytime for help.  (www.suicidepreventionlifeline.org)  5. National [place] on Mental Illness (www.norma.org): 161.587.7113 or 932-083-4248.  6. Mental Health Association (www.mentalhealth.org): 655.876.4807 or 257-654-0241.    elephone -Visit Details    Type of service:  Telephone Visit    Originating Location (pt. Location): Home    Distant Location (provider location):  Providers Remote Office       Coordination of Care:   More than 30 minutes spent on this visit  with more than 50% of time spent on coordination of care including: Educating patient about diagnosis, prognosis, side effects and benefits of medications, diet, exercise.  Time also spent providing supportive therapy regarding  above issues.    This note was created using a dictation system. All typing errors or contextual distortion is unintentional and software inherent.  Start Time : 1430  End time : 1500

## 2023-03-14 ENCOUNTER — TELEPHONE (OUTPATIENT)
Dept: FAMILY MEDICINE | Facility: CLINIC | Age: 68
End: 2023-03-14
Payer: MEDICARE

## 2023-03-14 DIAGNOSIS — Z76.0 ENCOUNTER FOR MEDICATION REFILL: ICD-10-CM

## 2023-03-14 DIAGNOSIS — E11.9 TYPE 2 DIABETES MELLITUS WITHOUT COMPLICATION, WITHOUT LONG-TERM CURRENT USE OF INSULIN (H): ICD-10-CM

## 2023-03-14 RX ORDER — METFORMIN HCL 500 MG
1500 TABLET, EXTENDED RELEASE 24 HR ORAL
Qty: 270 TABLET | Refills: 4 | Status: SHIPPED | OUTPATIENT
Start: 2023-03-14 | End: 2024-04-08

## 2023-03-14 NOTE — TELEPHONE ENCOUNTER
Drug Change Request    Contacts       Type Contact Phone/Fax    03/14/2023 07:49 AM CDT Fax (Incoming) [id number] [place] - [address] (Pharmacy) [phone]        What is the current medication?:  metFORMIN (GLUCOPHAGE XR) 500 MG 24 hr tablet  Take 1 tablet (500 mg) by mouth daily (with breakfast).     What alternative is being requested?: metFORMIN (GLUCOPHAGE XR) 500 MG 24 hr tablet  Take 3 tablet (500 mg) by mouth daily (with breakfast).     Why the request to change?:  Patient requests new prescription: is patient taking 3 tabs a day? Please send new prescription.     Chart reviewed. Please review findings below.     Taking 4 metformin causes diarrhea, even if dividing them. If she does two, then the diarrhea isn't bad. Anny is currently taking 4 per day.    Preferred Pharmacy:   [id number] - [facility], [place] - [address] [address]  Phone: [phone] Fax: [phone]    [facility] - HIRA Cardenas - [facility] AT Portal to Registered [facility] Sites  [facility]  [place] [address]  Phone: [phone] Fax: [phone]

## 2023-03-14 NOTE — TELEPHONE ENCOUNTER
Called pt in an attempt to inform her that new rx was sent. Left VM to call back.    - if pt call back, please inform her that rx was sent. Thanks    Jona Bland, BSN RN  [facility]

## 2023-03-15 NOTE — TELEPHONE ENCOUNTER
Patient returns call. Writer relay RN/provider's message below. Caller verbalizes understanding and has no further questions.     Effie Reynoso CMA ,  [facility]  March 15, 2023 3:24 PM

## 2023-04-18 ENCOUNTER — MYC MEDICAL ADVICE (OUTPATIENT)
Dept: FAMILY MEDICINE | Facility: CLINIC | Age: 68
End: 2023-04-18
Payer: MEDICARE

## 2023-04-19 NOTE — TELEPHONE ENCOUNTER
Called pt in an attempt to assist in scheduling appt with Dr. Morales. Left message to call back.    - if pt calls back please assist in scheduling appt. thanks    Jona Bland, BSN RN  [facility]

## 2023-04-19 NOTE — TELEPHONE ENCOUNTER
Pt returned call and appt scheduled for 5/9/23.    Jona Bland BSN RN  [facility]

## 2023-05-31 ENCOUNTER — MYC MEDICAL ADVICE (OUTPATIENT)
Dept: PSYCHIATRY | Facility: CLINIC | Age: 68
End: 2023-05-31
Payer: MEDICARE

## 2023-05-31 ASSESSMENT — PATIENT HEALTH QUESTIONNAIRE - PHQ9: SUM OF ALL RESPONSES TO PHQ QUESTIONS 1-9: 8

## 2023-06-01 ENCOUNTER — VIRTUAL VISIT (OUTPATIENT)
Dept: PSYCHIATRY | Facility: CLINIC | Age: 68
End: 2023-06-01
Payer: MEDICARE

## 2023-06-01 DIAGNOSIS — F32.89 OTHER DEPRESSION: ICD-10-CM

## 2023-06-01 PROCEDURE — 99442 PR PHYSICIAN TELEPHONE EVALUATION 11-20 MIN: CPT | Mod: 95 | Performed by: NURSE PRACTITIONER

## 2023-06-01 RX ORDER — DULOXETIN HYDROCHLORIDE 30 MG/1
30 CAPSULE, DELAYED RELEASE ORAL DAILY
Qty: 90 CAPSULE | Refills: 0 | Status: SHIPPED | OUTPATIENT
Start: 2023-06-01 | End: 2023-12-28

## 2023-06-01 ASSESSMENT — PATIENT HEALTH QUESTIONNAIRE - PHQ9
SUM OF ALL RESPONSES TO PHQ QUESTIONS 1-9: 8
10. IF YOU CHECKED OFF ANY PROBLEMS, HOW DIFFICULT HAVE THESE PROBLEMS MADE IT FOR YOU TO DO YOUR WORK, TAKE CARE OF THINGS AT HOME, OR GET ALONG WITH OTHER PEOPLE: SOMEWHAT DIFFICULT
SUM OF ALL RESPONSES TO PHQ QUESTIONS 1-9: 8

## 2023-06-01 NOTE — PROGRESS NOTES
"  The patient has been notified of following:      \"This telephone visit will be conducted via a call between you and your physician/provider. We have found that certain health care needs can be provided without the need for a physical exam.  This service lets us provide the care you need with a short phone conversation.  If a prescription is necessary we can send it directly to your pharmacy.  If lab work is needed we can place an order for that and you can then stop by our lab to have the test done at a later time.     Telephone visits are billed at different rates depending on your insurance coverage. During this emergency period, for some insurers they may be billed the same as an in-person visit.  Please reach out to your insurance provider with any questions.     If during the course of the call the physician/provider feels a telephone visit is not appropriate, you will not be charged for this service.\"     Patient has given verbal consent to a Telephone visit? Yes     Will anyone else be joining the visit? No        Patient would like to receive their AVS by AVS P/reference: Mail a copy      Psychiatric  Out patient Follow Up Progress Note  Date of visit:6/1/2023         Discussion of Care and Treatment Recommendations:   This is a 67 year old female witha  history of depression and PTSD presenting to our virtual clinic for a follow up appointment     PCP managing sleep :  Prescribing  Lorazepam ,Seroquel and Ambien   PCP managing Gabapentin for neurological issues          Last visit  01/30/202.  Recommendation at last visit.  1.MDD/Anxiety/PTSD : Had stopped seeing a therapist but agreeable to restart ambulatory referral to psychotherapy made.  2.MDD/Anxiety /PTSDHighly recommend : Community Support groups -  3.MDD/Anxiety : Continue Cymbalta  down to  30 mg daily    4. RTC- 8  weeks, call in between visit with any question     Patient and I reviewed diagnosis and treatment plan and patient agrees with " following recommendations:  Ongoing education given regarding diagnostic and treatment options with adequate verbalization of understanding.  Plan     1.MDD/Anxiety/PTSD : Had stopped seeing a therapist but agreeable to restart ambulatory referral to psychotherapy made.  2.MDD/Anxiety /PTSDHighly recommend : Community Support groups -  3.MDD/Anxiety : Continue Cymbalta  down to  30 mg daily    4. 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)     Malignant neoplasm of breast (H)     Posttraumatic hematoma of right breast     Postoperative pain     Pain in right axilla     Constipation due to outlet dysfunction     Logorrhea     Type 2 diabetes mellitus without complication, without long-term current use of insulin (H)     Rotator cuff tear     Anxiety     Scalp irritation     Abnormal MRI     Diabetes 1.5, managed as type 2 (H)     H/O breast surgery     Advanced directives, counseling/discussion     Vitamin D deficiency disease     Moderate major depression (H)     Concussion with loss of consciousness     CKD (chronic kidney disease) stage 3, GFR 30-59 ml/min (H)     Concussion without loss of consciousness             Chief Complaint / Subjective:    Chief complaint: Depression    History of Present Illness:  Per patient's statement : She is doing well.  Compliant with current medications.  Symptoms manageable at this time.  Offers no new concerns.  Mental Status Examination:     Appearance: unable to assess  Orientation: Patient alert and oriented to person, place, time, and situation  Reliability:  Patient appears to be an adequate historian.    Behavior: calm and coperative   Speech: Speech is spontaneous and coherent, with a normal rate, rhythm and tone.    Language:There are no difficulties with expressive or receptive language as observed  "throughout the interview.    Mood: Described as \"ok\".    Affect: unable to assess  Judgement: Able to make basic decision regarding safety.  Insight: Good awareness of physical and mental health conditions and aware of needs around care for these.  Gait and station: unable to assess  Thought process: Logical   Hallucinations : No evidence of any hallucination  Thought content: No evidence of delusions or paranoia.   Suicidal /Homical Ideations:  No thoughts of self harm or suicide. No thoughts of harming others.  Associations: Connected  Fund of knowledge: Average  Attention / Concentration: Able to remain focused during the interview with minimal distractibility or need for redirection.  Short Term Memory: Grossly intact as evidence by client recalling themes and ideas discussed.  Long Term Memory: Intact  Motor Status: unable to asses    Answers for HPI/ROS submitted by the patient on 6/1/2023  If you checked off any problems, how difficult have these problems made it for you to do your work, take care of things at home, or get along with other people?: Somewhat difficult  PHQ9 TOTAL SCORE: 8      Drug/treatment history and current pattern of use:   Denies     Medication changes: See Above   Medication adherence: compliant  Medication side effects: absent  Information about medications: Side effects, benefits and alternative treatments discussed and patient agrees .    Psychotherapy: Supportive therapy day-to-day living    Education: Diet, exercise, abstinence from drugs and alcohol, patient will not drive if sedated and medications or  under influence of any substance    Lab Results:   Personally reviewed and discussed with the patient    Lab Results   Component Value Date    WBC 7.8 06/08/2022    HGB 12.9 06/08/2022    HCT 40.1 06/08/2022     06/08/2022    CHOL 213 (H) 06/08/2022    TRIG 102 06/08/2022    HDL 66 06/08/2022    ALT 28 06/08/2022    AST 20 06/08/2022     06/08/2022    BUN 14 06/08/2022 "    CO2 21 (L) 06/08/2022    TSH 0.84 12/06/2019    MICROALBUR 0.79 06/08/2022       Vital signs:  There were no vitals taken for this visit.  Unable to assess telephone visit  Allergies: Aztreonam, Castor oil, Cefaclor, Cephalexin, Cephalexin monohydrate [cephalexin], Chlorhexidine, Ciprofloxacin, Clarithromycin, Clindamycin, Penicillins, Propofol, Scopolamine, Sulfa (sulfonamide antibiotics) [sulfa antibiotics], Sulfasalazine, Tetracyclines & related, Vancomycin, Adhesive [cyanoacrylate], Cytoxan [cyclophosphamide], Erythromycin base [erythromycin], Hydrocodone, Neupogen [filgrastim], Paper tape [adhesive tape], Tapentadol, Taxol [paclitaxel], and Taxotere [docetaxel]           Review of Systems:      ROS:  Subjective data only - Tele-Health  Visit   10 point ROS was negative except for the items listed in HPI-              Medications:     Current Outpatient Medications   Medication     acetaminophen (TYLENOL) 500 MG tablet     betamethasone, augmented, (DIPROLENE) 0.05 % lotion     blood glucose test (CONTOUR NEXT TEST STRIPS) strips     blood-glucose meter (CONTOUR NEXT METER) Misc     Cholecalciferol (VITAMIN D3) 25 MCG TABS     DULoxetine (CYMBALTA) 30 MG capsule     generic lancets (MICROLET LANCET)     lisinopril (ZESTRIL) 2.5 MG tablet     LORazepam (ATIVAN) 1 MG tablet     metFORMIN (GLUCOPHAGE XR) 500 MG 24 hr tablet     naproxen sodium (ALEVE) 220 MG tablet     oxyCODONE (ROXICODONE) 5 MG immediate release tablet     QUEtiapine (SEROQUEL) 25 MG tablet     simvastatin (ZOCOR) 5 MG tablet     zolpidem (AMBIEN) 10 MG tablet     azithromycin (ZITHROMAX) 500 MG tablet     blood glucose test strips     calcium citrate-vitamin D (CITRACAL+D) 315-200 mg-unit per tablet     polyethylene glycol (MIRALAX) 17 gram packet     No current facility-administered medications for this visit.         Medication adherence: Reviewed risk/benefits of medication , Patient able to verbalize understanding of side effects and  Patient verbally consents to taking medications    PSYCHOEDUCATION:  Medication side effects and alternatives reviewed. Health promotion activities recommended and reviewed today. All questions addressed. Education and counseling completed regarding risks and benefits of medications and psychotherapy options.  Consent provided by patient/guardian  Call the psychiatric nurse line with medication questions or concerns at [phone].  SmartMenuCardhart may be used to communicate with your provider, but this is not intended to be used for emergencies.  SEROTONIN SYNDROME:  Discussed risks of Serotonin syndrome (ie, serotonin toxicity) which is a potentially life-threatening condition associated with increased serotonergic activity in the central nervous system (CNS). It is seen with therapeutic medication use, inadvertent interactions between drugs, and intentional self-poisoning. Serotonin syndrome may involve a spectrum of clinical findings, which often include mental status changes, autonomic hyperactivity, and neuromuscular abnormalities.    STIMULANT THERAPY: Side effects discussed including but not limited to cardiac (including HTN, tachycardia, sudden death), motor/tic, appetite/growth, mood lability and sleep disruption. This is a controlled substance with risk for abuse, need to keep in a safe keep place and cannot replace lost scripts  HARM REDUCTION:  Discussions regarding effects of mood altering substances, alcohol and cannabis, on mood and that approach is harm reduction, will continue to prescribe meds as they work to cut back use.    SAFETY:  We all care about your loved one's safety. To reduce the risk of self-harm, remove access to all:  Firearms, Medicines (both prescribed and over-the-counter), Knives and other sharp objects, Ropes and like materials, and Alcohol  SLEEP HYGIENE: establish a sleep routine, limit screen time 1 hour prior to bed, use bed for sleep only, take sleep/medications on time (including  sleepy time tea, trazadone or herbal treatments such as melatonin), aroma therapy, limit caffeine/sugar, yoga, guided imagery, stretch, meditation, limit naps to 20 minutes, make a temperature change in the room, white noise, be mindful of slowing down breathing, take a warm bath/shower, frequently wash sheets, and journaling.   Medlineplus.gov is information for patients.  It is run by the [employer] and it contains information about all disorders, diseases and all medications.       Crisis Resources:    1. Present to the Emergency Department as needed or call after hours crisis line at [phone] or [phone].   2. [place] Crisis Text Line: Text [place] to 552079.  3. Suicide LifeLine Chat: suicidepre[place].org/chat/.  4. National Suicide Prevention Lifeline: 879.699.7502 (TTY: 631.970.6162). Call anytime for help.  (www.suicidepreventionlifeline.org)  5. National [place] on Mental Illness (www.norma.org): 205.362.4432 or 672-035-7155.  6. Mental Health Association (www.mentalhealth.org): 243.892.4780 or 119-908-9178.      Coordination of Care:   More than 30 minutes spent on this visit  with more than 50% of time spent on coordination of care including: Educating patient about diagnosis, prognosis, side effects and benefits of medications, diet, exercise.  Time also spent providing supportive therapy regarding above issues.      Telephone -Visit Details    Type of service:  Telephone Visit    Originating Location (pt. Location): Home    Distant Location (provider location):  Providers Remote Office       This note was created using a dictation system. All typing errors or contextual distortion is unintentional and software inherent.  Start Time : 1530  End time : 1542

## 2023-06-01 NOTE — NURSING NOTE
Is the patient currently in the state of [place]? YES - at home.    Visit mode:TELEPHONE    If the visit is dropped, the patient can be reconnected by: TELEPHONE VISIT: Phone number:   Telephone Information:   Mobile [phone]       Will anyone else be joining the visit? No  (If patient encounters technical issues they should call [phone])    How would you like to obtain your AVS? MyChart    Are changes needed to the allergy or medication list? NO    Rooming Documentation: Assigned questionnaire(s) completed.     Attendance Guidelines - Care team has reviewed attendance agreement with patient. Patient advised that two failed appointments within 6 months may lead to termination of current episode of care.        Reason for visit: MICHELE Barboza, VVF

## 2023-06-01 NOTE — PATIENT INSTRUCTIONS
"[facility]  What to Expect  Here's what to expect in the Panel Psychiatry Program.   About the program  You'll be meeting with a psychiatric doctor to check your mental health. A psychiatric doctor helps you deal with troubling thoughts and feelings by giving you medicine. They'll make sure you know the plan for your care. You may see them for a long time. When you're feeling better, they may refer you back to seeing your family doctor.   If you have any questions, we'll be glad to talk to you.  About visits  Be open  At your visits, please talk openly about your problems. It may feel hard, but it's the best way for us to help you.  Cancelling visits  If you can't come to your visit, please call us right away at [phone]. If you don't cancel at least 24 hours (1 full day) before your visit, that's \"late cancellation.\"  Not showing up for your visits  Being very late is the same as not showing up. You'll be a \"no show\" if:  You're more than 15 minutes late for a 30-minute (half hour) visit.  You're more than 30 minutes late for a 60-minute (full hour) visit.  If you cancel late or don't show up 2 times within 6 months, we may end your care.  Getting help between visits  If you need help between visits, you can call us Monday to Friday from 8 a.m. to 4:30 p.m. at [phone].  Emergency care  Call 911 or go to the nearest emergency department if your life or someone else's life is in danger.  Call 988 anytime to reach the national Suicide and Crisis hotline.  Medicine refills  To refill your medicine, call your pharmacy. You can also call [facility] at [phone], Monday to Friday, 8 a.m. to 4:30 p.m. It can take 1 to 3 business days to get a refill.   Forms, letters, and tests  You may have papers to fill out, like FMLA, short-term disability, and workability. We can help you with these forms at your visits, but you must have an appointment. You may need more " than 1 visit for this, to be in an intensive therapy program, or both.  Before we can give you medicine for ADHD, we may refer you to get tested for it or confirm it another way.  We may not be able to give you an emotional support animal letter.  We don't do mental health checks ordered by the court.   We don't do mental health testing, but we can refer you to get tested.   Thank you for choosing us for your care.  For informational purposes only. Not to replace the advice of your health care provider. Copyright   2022 [facility]. All rights reserved. [employer] [id number] - 12/22      After Visit Summary   Continue medications as prescribed  Have your pharmacy contact us for a refill if you are running low on medications (We may ask you to come into clinic to get a refill from the nurse  No Alcohol or drug use  No driving if sedated  Call the clinic with any questions or concerns   Reach out for help if you feel like hurting yourself or others ([facility] [phone]: [address], [address] or [facility] Suicide Hotline   [phone] , call 911 or go to nearest Emergency room     Crisis Resources:    Present to the Emergency Department as needed or call after hours crisis line at [phone] or [phone].   [place] Crisis Text Line: Text [place] to 395794.  Suicide LifeLine Chat: suicidepreventionlifeline.org/chat/.  National Suicide Prevention Lifeline: 454.609.9729 (TTY: 896.315.2030). Call anytime for help.  (www.suicidepreventionlifeline.org)  National [place] on Mental Illness (www.norma.org): 615.492.8567 or 877-189-5743.  Mental Health Association (www.mentalhealth.org): 200.507.8900 or 088-981-3276.       Follow up as directed, for your appointments, per your After Visit Summary Form.

## 2023-06-03 LAB — NONINV COLON CA DNA+OCC BLD SCRN STL QL: NEGATIVE

## 2023-06-04 ENCOUNTER — HEALTH MAINTENANCE LETTER (OUTPATIENT)
Age: 68
End: 2023-06-04

## 2023-06-22 DIAGNOSIS — Z76.0 ENCOUNTER FOR MEDICATION REFILL: ICD-10-CM

## 2023-06-22 RX ORDER — ZOLPIDEM TARTRATE 10 MG/1
10 TABLET ORAL
Qty: 30 TABLET | Refills: 0 | Status: SHIPPED | OUTPATIENT
Start: 2023-06-22 | End: 2023-09-28

## 2023-07-20 ASSESSMENT — ANXIETY QUESTIONNAIRES
6. BECOMING EASILY ANNOYED OR IRRITABLE: NOT AT ALL
IF YOU CHECKED OFF ANY PROBLEMS ON THIS QUESTIONNAIRE, HOW DIFFICULT HAVE THESE PROBLEMS MADE IT FOR YOU TO DO YOUR WORK, TAKE CARE OF THINGS AT HOME, OR GET ALONG WITH OTHER PEOPLE: SOMEWHAT DIFFICULT
3. WORRYING TOO MUCH ABOUT DIFFERENT THINGS: NEARLY EVERY DAY
2. NOT BEING ABLE TO STOP OR CONTROL WORRYING: MORE THAN HALF THE DAYS
4. TROUBLE RELAXING: MORE THAN HALF THE DAYS
GAD7 TOTAL SCORE: 11
GAD7 TOTAL SCORE: 11
1. FEELING NERVOUS, ANXIOUS, OR ON EDGE: MORE THAN HALF THE DAYS
5. BEING SO RESTLESS THAT IT IS HARD TO SIT STILL: NOT AT ALL
7. FEELING AFRAID AS IF SOMETHING AWFUL MIGHT HAPPEN: MORE THAN HALF THE DAYS

## 2023-07-26 ASSESSMENT — PATIENT HEALTH QUESTIONNAIRE - PHQ9
SUM OF ALL RESPONSES TO PHQ QUESTIONS 1-9: 20
SUM OF ALL RESPONSES TO PHQ QUESTIONS 1-9: 20
10. IF YOU CHECKED OFF ANY PROBLEMS, HOW DIFFICULT HAVE THESE PROBLEMS MADE IT FOR YOU TO DO YOUR WORK, TAKE CARE OF THINGS AT HOME, OR GET ALONG WITH OTHER PEOPLE: SOMEWHAT DIFFICULT

## 2023-07-27 ENCOUNTER — VIRTUAL VISIT (OUTPATIENT)
Dept: PSYCHIATRY | Facility: CLINIC | Age: 68
End: 2023-07-27
Payer: MEDICARE

## 2023-07-27 ASSESSMENT — PAIN SCALES - GENERAL: PAINLEVEL: SEVERE PAIN (7)

## 2023-07-27 NOTE — NURSING NOTE
Is the patient currently in the state of [place]? YES    Visit mode:TELEPHONE    If the visit is dropped, the patient can be reconnected by: TELEPHONE VISIT: Phone number:   Telephone Information:   Mobile [phone]       Will anyone else be joining the visit? No  (If patient encounters technical issues they should call [phone])    How would you like to obtain your AVS? MyChart    Are changes needed to the allergy or medication list? Yes Remove glucose test strips    Rooming Documentation: Assigned questionnaire(s) completed .    Reason for visit: RECHECK     AMITA Bejarano
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
## 2023-07-31 ASSESSMENT — PATIENT HEALTH QUESTIONNAIRE - PHQ9: SUM OF ALL RESPONSES TO PHQ QUESTIONS 1-9: 19

## 2023-08-01 ENCOUNTER — TELEPHONE (OUTPATIENT)
Dept: FAMILY MEDICINE | Facility: CLINIC | Age: 68
End: 2023-08-01

## 2023-08-01 NOTE — TELEPHONE ENCOUNTER
Reason for Call:  Appointment Request    Patient requesting this type of appt: Chronic Diease Management/Medication/Follow-Up    Requested provider: Daxa Morales    Reason patient unable to be scheduled: Not within requested timeframe    When does patient want to be seen/preferred time:  sooner appt then 10/31.     Comments: Patient had to cancel appt due to death of brother. Patient reschedule appt to 10/31/2023 and too far out. Patient is requesting for a sooner appt before 10/31/23. Dr. Morales will you be willing to add patient in sometime in August or September schedule? Patient prefer afternoon appt around 3pm. Please call patient to schedule appt.    Could we send this information to you in [employer] or would you prefer to receive a phone call?:   Patient would prefer a phone call   Okay to leave a detailed message?: Yes at Cell number on file:    Telephone Information:   Mobile [phone]       Call taken on 8/1/2023 at 12:21 PM by Tierra Garcia

## 2023-08-07 NOTE — TELEPHONE ENCOUNTER
Anny didn't return my calls on Friday, if patient returns call please advise where to add on your schedule.

## 2023-08-12 ENCOUNTER — HEALTH MAINTENANCE LETTER (OUTPATIENT)
Age: 68
End: 2023-08-12

## 2023-08-29 ASSESSMENT — PATIENT HEALTH QUESTIONNAIRE - PHQ9
SUM OF ALL RESPONSES TO PHQ QUESTIONS 1-9: 9
10. IF YOU CHECKED OFF ANY PROBLEMS, HOW DIFFICULT HAVE THESE PROBLEMS MADE IT FOR YOU TO DO YOUR WORK, TAKE CARE OF THINGS AT HOME, OR GET ALONG WITH OTHER PEOPLE: SOMEWHAT DIFFICULT
SUM OF ALL RESPONSES TO PHQ QUESTIONS 1-9: 9

## 2023-08-30 ENCOUNTER — VIRTUAL VISIT (OUTPATIENT)
Dept: PSYCHIATRY | Facility: CLINIC | Age: 68
End: 2023-08-30
Payer: MEDICARE

## 2023-08-30 DIAGNOSIS — F32.89 OTHER DEPRESSION: Primary | ICD-10-CM

## 2023-08-30 PROCEDURE — 99214 OFFICE O/P EST MOD 30 MIN: CPT | Mod: 95 | Performed by: NURSE PRACTITIONER

## 2023-08-30 NOTE — PROGRESS NOTES
"  The patient has been notified of following:      \"This telephone visit will be conducted via a call between you and your physician/provider. We have found that certain health care needs can be provided without the need for a physical exam.  This service lets us provide the care you need with a short phone conversation.  If a prescription is necessary we can send it directly to your pharmacy.  If lab work is needed we can place an order for that and you can then stop by our lab to have the test done at a later time.     Telephone visits are billed at different rates depending on your insurance coverage. During this emergency period, for some insurers they may be billed the same as an in-person visit.  Please reach out to your insurance provider with any questions.     If during the course of the call the physician/provider feels a telephone visit is not appropriate, you will not be charged for this service.\"     Patient has given verbal consent to a Telephone visit? Yes     Will anyone else be joining the visit? No        Patient would like to receive their AVS by AVS P/reference: Mail a copy      Psychiatric  Out patient Follow Up Progress Note  Date of visit:8/30/2023         Discussion of Care and Treatment Recommendations:   This is a 67 year old female with  a  history of depression and PTSD presenting to our virtual clinic for a follow up appointment     PCP managing sleep :  Prescribing  Lorazepam ,Seroquel and Ambien   PCP managing Gabapentin for neurological issues              Last visit 07/27/2023  Recommendation at last visit   1.MDD/Anxiety/PTSD : Had stopped seeing a therapist but agreeable to restart ambulatory referral to psychotherapy made.  2.MDD/Anxiety /PTSDHighly recommend : Community Support groups -  3.MDD/Anxiety : Continue Cymbalta  down to  30 mg daily    4. RTC- 8  weeks, call in between visit with any question  Patient and I reviewed diagnosis and treatment plan and patient agrees with " "following recommendations:  Ongoing education given regarding diagnostic and treatment options with adequate verbalization of understanding.  Plan   1.MDD/Anxiety/PTSD : Had stopped seeing a therapist but agreeable to restart ambulatory referral to psychotherapy made.  2.MDD/Anxiety /PTSDHighly recommend : Community Support groups -  3.MDD/Anxiety : Continue Cymbalta  down to  30 mg daily    4. 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)    Malignant neoplasm of breast (H)    Posttraumatic hematoma of right breast    Postoperative pain    Pain in right axilla    Constipation due to outlet dysfunction    Logorrhea    Type 2 diabetes mellitus without complication, without long-term current use of insulin (H)    Rotator cuff tear    Anxiety    Scalp irritation    Abnormal MRI    Diabetes 1.5, managed as type 2 (H)    H/O breast surgery    Advanced directives, counseling/discussion    Vitamin D deficiency disease    Moderate major depression (H)    Concussion with loss of consciousness    CKD (chronic kidney disease) stage 3, GFR 30-59 ml/min (H)    Concussion without loss of consciousness             Chief Complaint / Subjective:    Chief complaint: Grief     History of Present Illness:   Per patient's statement : Her brother committed suicide - shot himself after struggling with depression . This happened ~ 7 weeks ago and the  knocked the door to tell her.She is still dealing with the stages of grief and although doing better now she is in the \" anger \" stage .  She has been taking current medications and denies side effects.  She has not established cotherapy care yet but a referral was made during her last visit.  States she is just not ready to start talking to someone right now and she is feeling like she can manage this on her " "own.  She denies suicidal homicidal ideations.  She states she feels safe and verbally contracts for safety.  I spent a lot of time today listening empathetically to patient and validating her feelings.  Crisis numbers have been provided.  Patient states she feels safe.  She denies suicidal homicidal ideations.  She has been compliant with current medications.  She would like to continue on current medications.    Mental Status Examination:     Appearance: unable to assess  Orientation: Patient alert and oriented to person, place, time, and situation  Reliability:  Patient appears to be an adequate historian.    Behavior: calm and coperative   Speech: Speech is spontaneous and coherent, with a normal rate, rhythm and tone.    Language:There are no difficulties with expressive or receptive language as observed throughout the interview.    Mood: Described as \"ok\".    Affect: unable to assess  Judgement: Able to make basic decision regarding safety.  Insight: Good awareness of physical and mental health conditions and aware of needs around care for these.  Gait and station: unable to assess  Thought process: Logical   Hallucinations : No evidence of any hallucination  Thought content: No evidence of delusions or paranoia.   Suicidal /Homical Ideations:  No thoughts of self harm or suicide. No thoughts of harming others.  Associations: Connected  Fund of knowledge: Average  Attention / Concentration: Able to remain focused during the interview with minimal distractibility or need for redirection.  Short Term Memory: Grossly intact as evidence by client recalling themes and ideas discussed.  Long Term Memory: Intact  Motor Status: unable to asses      Answers submitted by the patient for this visit:  Patient Health Questionnaire (Submitted on 8/29/2023)  If you checked off any problems, how difficult have these problems made it for you to do your work, take care of things at home, or get along with other people?: " Somewhat difficult  PHQ9 TOTAL SCORE: 9    Drug/treatment history and current pattern of use:   Denies     Medication changes: See Above   Medication adherence: compliant  Medication side effects: absent  Information about medications: Side effects, benefits and alternative treatments discussed and patient agrees .    Psychotherapy: Supportive therapy day-to-day living    Education: Diet, exercise, abstinence from drugs and alcohol, patient will not drive if sedated and medications or  under influence of any substance    Lab Results:   Personally reviewed and discussed with the patient    Lab Results   Component Value Date    WBC 7.8 06/08/2022    HGB 12.9 06/08/2022    HCT 40.1 06/08/2022     06/08/2022    CHOL 213 (H) 06/08/2022    TRIG 102 06/08/2022    HDL 66 06/08/2022    ALT 28 06/08/2022    AST 20 06/08/2022     06/08/2022    BUN 14 06/08/2022    CO2 21 (L) 06/08/2022    TSH 0.84 12/06/2019    MICROALBUR 0.79 06/08/2022       Vital signs:  There were no vitals taken for this visit.  Unable to assess telephone visit  Allergies: Aztreonam, Castor oil, Cefaclor, Cephalexin, Cephalexin monohydrate [cephalexin], Chlorhexidine, Ciprofloxacin, Clarithromycin, Clindamycin, Penicillins, Propofol, Scopolamine, Sulfa (sulfonamide antibiotics) [sulfa antibiotics], Sulfasalazine, Tetracyclines & related, Vancomycin, Adhesive [cyanoacrylate], Cytoxan [cyclophosphamide], Erythromycin base [erythromycin], Hydrocodone, Neupogen [filgrastim], Paper tape [adhesive tape], Tapentadol, Taxol [paclitaxel], and Taxotere [docetaxel]           Review of Systems:      ROS:  Subjective data only - Tele-Health  Visit   10 point ROS was negative except for the items listed in HPI-              Medications:     Current Outpatient Medications   Medication    acetaminophen (TYLENOL) 500 MG tablet    azithromycin (ZITHROMAX) 500 MG tablet    betamethasone, augmented, (DIPROLENE) 0.05 % lotion    blood glucose test (CONTOUR NEXT  TEST STRIPS) strips    blood-glucose meter (CONTOUR NEXT METER) Misc    calcium citrate-vitamin D (CITRACAL+D) 315-200 mg-unit per tablet    Cholecalciferol (VITAMIN D3) 25 MCG TABS    DULoxetine (CYMBALTA) 30 MG capsule    generic lancets (MICROLET LANCET)    lisinopril (ZESTRIL) 2.5 MG tablet    LORazepam (ATIVAN) 1 MG tablet    metFORMIN (GLUCOPHAGE XR) 500 MG 24 hr tablet    naproxen sodium (ALEVE) 220 MG tablet    oxyCODONE (ROXICODONE) 5 MG immediate release tablet    polyethylene glycol (MIRALAX) 17 gram packet    QUEtiapine (SEROQUEL) 25 MG tablet    simvastatin (ZOCOR) 5 MG tablet    zolpidem (AMBIEN) 10 MG tablet     No current facility-administered medications for this visit.           Medication adherence: Reviewed risk/benefits of medication , Patient able to verbalize understanding of side effects and Patient verbally consents to taking medications    PSYCHOEDUCATION:  Medication side effects and alternatives reviewed. Health promotion activities recommended and reviewed today. All questions addressed. Education and counseling completed regarding risks and benefits of medications and psychotherapy options.  Consent provided by patient/guardian  Call the psychiatric nurse line with medication questions or concerns at [phone].  MyChart may be used to communicate with your provider, but this is not intended to be used for emergencies.  SEROTONIN SYNDROME:  Discussed risks of Serotonin syndrome (ie, serotonin toxicity) which is a potentially life-threatening condition associated with increased serotonergic activity in the central nervous system (CNS). It is seen with therapeutic medication use, inadvertent interactions between drugs, and intentional self-poisoning. Serotonin syndrome may involve a spectrum of clinical findings, which often include mental status changes, autonomic hyperactivity, and neuromuscular abnormalities.    STIMULANT THERAPY: Side effects discussed including but not limited to  cardiac (including HTN, tachycardia, sudden death), motor/tic, appetite/growth, mood lability and sleep disruption. This is a controlled substance with risk for abuse, need to keep in a safe keep place and cannot replace lost scripts  HARM REDUCTION:  Discussions regarding effects of mood altering substances, alcohol and cannabis, on mood and that approach is harm reduction, will continue to prescribe meds as they work to cut back use.    SAFETY:  We all care about your loved one's safety. To reduce the risk of self-harm, remove access to all:  Firearms, Medicines (both prescribed and over-the-counter), Knives and other sharp objects, Ropes and like materials, and Alcohol  SLEEP HYGIENE: establish a sleep routine, limit screen time 1 hour prior to bed, use bed for sleep only, take sleep/medications on time (including sleepy time tea, trazadone or herbal treatments such as melatonin), aroma therapy, limit caffeine/sugar, yoga, guided imagery, stretch, meditation, limit naps to 20 minutes, make a temperature change in the room, white noise, be mindful of slowing down breathing, take a warm bath/shower, frequently wash sheets, and journaling.   Medlineplus.gov is information for patients.  It is run by the [employer] and it contains information about all disorders, diseases and all medications.       Crisis Resources:    Present to the Emergency Department as needed or call after hours crisis line at [phone] or [phone].   [place] Crisis Text Line: Text [place] to 411137.  Suicide LifeLine Chat: suicidepreventionlifeline.org/chat/.  National Suicide Prevention Lifeline: 126.422.5911 (TTY: 532.742.5929). Call anytime for help.  (www.suicidepreventionlifeline.org)  National [place] on Mental Illness (www.norma.org): 353.612.1702 or 302-072-8464.  Mental Health Association (www.mentalhealth.org): 916.872.6201 or 941-254-4515.      Coordination of Care:   More than 30 minutes spent on this visit   with more than 50% of time spent on coordination of care including: Educating patient about diagnosis, prognosis, side effects and benefits of medications, diet, exercise.  Time also spent providing supportive therapy regarding above issues.      Telephone -Visit Details    Type of service:  Telephone Visit    Originating Location (pt. Location): Home    Distant Location (provider location):  Providers Remote Office       This note was created using a dictation system. All typing errors or contextual distortion is unintentional and software inherent.  Start Time : 1530  End time :  1600

## 2023-08-30 NOTE — NURSING NOTE
Is the patient currently in the state of [place]? YES - at home.    Visit mode:VIDEO    If the visit is dropped, the patient can be reconnected by: TELEPHONE VISIT: Phone number:   Telephone Information:   Mobile [phone]       Will anyone else be joining the visit? No  (If patient encounters technical issues they should call [phone])    How would you like to obtain your AVS? MyChart    Are changes needed to the allergy or medication list? No    Rooming Documentation: Assigned questionnaire(s) completed .    Reason for visit: RECHECK     Irina Barboza, CATERINAF

## 2023-08-30 NOTE — PATIENT INSTRUCTIONS
"[facility]  What to Expect  Here's what to expect in the [facility] Psych[facility] Program.   About the program  You'll be meeting with a psychiatric doctor to check your mental health. A psychiatric doctor helps you deal with troubling thoughts and feelings by giving you medicine. They'll make sure you know the plan for your care. You may see them for a long time. When you're feeling better, they may refer you back to seeing your family doctor.   If you have any questions, we'll be glad to talk to you.  About visits  Be open  At your visits, please talk openly about your problems. It may feel hard, but it's the best way for us to help you.  Cancelling visits  If you can't come to your visit, please call us right away at [phone]. If you don't cancel at least 24 hours (1 full day) before your visit, that's \"late cancellation.\"  Not showing up for your visits  Being very late is the same as not showing up. You'll be a \"no show\" if:  You're more than 15 minutes late for a 30-minute (half hour) visit.  You're more than 30 minutes late for a 60-minute (full hour) visit.  If you cancel late or don't show up 2 times within 6 months, we may end your care.  Getting help between visits  If you need help between visits, you can call us Monday to Friday from 8 a.m. to 4:30 p.m. at [phone].  Emergency care  Call 911 or go to the nearest emergency department if your life or someone else's life is in danger.  Call 988 anytime to reach the national Suicide and Crisis hotline.  Medicine refills  To refill your medicine, call your pharmacy. You can also call [facility]'s [facility] at [phone], Monday to Friday, 8 a.m. to 4:30 p.m. It can take 1 to 3 business days to get a refill.   Forms, letters, and tests  You may have papers to fill out, like FMLA, short-term disability, and workability. We can help you with these forms at your visits, but you must have an appointment. You may need more " Patient alert and oriented x4, VSS, sitting up in chair. Patient c/o SOB, denies n/v, diarrhea, pain. Patient denies needs at this time. Chair in locked position with call light in reach. Non-slip socks on. than 1 visit for this, to be in an intensive therapy program, or both.  Before we can give you medicine for ADHD, we may refer you to get tested for it or confirm it another way.  We may not be able to give you an emotional support animal letter.  We don't do mental health checks ordered by the court.   We don't do mental health testing, but we can refer you to get tested.   Thank you for choosing us for your care.  For informational purposes only. Not to replace the advice of your health care provider. Copyright   2022 [facility]. All rights reserved. [employer] [id number] - 12/22      After Visit Summary   Continue medications as prescribed  Have your pharmacy contact us for a refill if you are running low on medications (We may ask you to come into clinic to get a refill from the nurse  No Alcohol or drug use  No driving if sedated  Call the clinic with any questions or concerns   Reach out for help if you feel like hurting yourself or others ([facility] [phone]: [address], [address] or [facility] Suicide Hotline   [phone] , call 911 or go to nearest Emergency room     Crisis Resources:    Present to the Emergency Department as needed or call after hours crisis line at [phone] or [phone].   [place] Crisis Text Line: Text [place] to 099733.  Suicide LifeLine Chat: suicidepreventionlifeline.org/chat/.  National Suicide Prevention Lifeline: 237.196.7891 (TTY: 517.857.9195). Call anytime for help.  (www.suicidepreventionlifeline.org)  National [place] on Mental Illness (www.norma.org): 349.122.8879 or 832-860-9948.  Mental Health Association (www.mentalhealth.org): 547.703.8496 or 218-040-5884.       Follow up as directed, for your appointments, per your After Visit Summary Form.

## 2023-08-30 NOTE — PROGRESS NOTES
Virtual Visit Details    Type of service:  Telephone Visit         Answers submitted by the patient for this visit:  Patient Health Questionnaire (Submitted on 8/29/2023)  If you checked off any problems, how difficult have these problems made it for you to do your work, take care of things at home, or get along with other people?: Somewhat difficult  PHQ9 TOTAL SCORE: 9

## 2023-09-19 ENCOUNTER — TELEPHONE (OUTPATIENT)
Dept: FAMILY MEDICINE | Facility: CLINIC | Age: 68
End: 2023-09-19
Payer: MEDICARE

## 2023-09-19 NOTE — TELEPHONE ENCOUNTER
Patient Quality Outreach    Patient is due for the following:   Diabetes -  LDL (Fasting), Eye Exam, Microalbumin, and Foot Exam  Physical Annual Wellness Visit    Next Steps:   Schedule a Annual Wellness Visit    Type of outreach:    Phone, left message for patient/parent to call back.    Next Steps:  Reach out within 90 days via Phone.    Max number of attempts reached: No. Will try again in 90 days if patient still on fail list.    Questions for provider review:    None           Rita Huddleston

## 2023-09-27 DIAGNOSIS — Z76.0 ENCOUNTER FOR MEDICATION REFILL: ICD-10-CM

## 2023-09-27 DIAGNOSIS — E11.9 DIABETES (H): ICD-10-CM

## 2023-09-28 RX ORDER — SIMVASTATIN 5 MG
TABLET ORAL
Qty: 90 TABLET | Refills: 4 | Status: SHIPPED | OUTPATIENT
Start: 2023-09-28

## 2023-09-28 RX ORDER — LORAZEPAM 1 MG/1
1 TABLET ORAL EVERY 8 HOURS PRN
Qty: 5 TABLET | Refills: 0 | Status: SHIPPED | OUTPATIENT
Start: 2023-09-28

## 2023-09-28 RX ORDER — ZOLPIDEM TARTRATE 10 MG/1
10 TABLET ORAL
Qty: 30 TABLET | Refills: 0 | Status: SHIPPED | OUTPATIENT
Start: 2023-09-28

## 2023-10-15 DIAGNOSIS — E11.9 TYPE 2 DIABETES MELLITUS WITHOUT COMPLICATION, WITHOUT LONG-TERM CURRENT USE OF INSULIN (H): ICD-10-CM

## 2023-10-16 RX ORDER — LISINOPRIL 2.5 MG/1
2.5 TABLET ORAL DAILY
Qty: 30 TABLET | Refills: 0 | Status: SHIPPED | OUTPATIENT
Start: 2023-10-16 | End: 2023-11-20

## 2023-10-24 ASSESSMENT — PATIENT HEALTH QUESTIONNAIRE - PHQ9
10. IF YOU CHECKED OFF ANY PROBLEMS, HOW DIFFICULT HAVE THESE PROBLEMS MADE IT FOR YOU TO DO YOUR WORK, TAKE CARE OF THINGS AT HOME, OR GET ALONG WITH OTHER PEOPLE: SOMEWHAT DIFFICULT
SUM OF ALL RESPONSES TO PHQ QUESTIONS 1-9: 9
SUM OF ALL RESPONSES TO PHQ QUESTIONS 1-9: 9

## 2023-10-25 ENCOUNTER — VIRTUAL VISIT (OUTPATIENT)
Dept: PSYCHIATRY | Facility: CLINIC | Age: 68
End: 2023-10-25
Payer: MEDICARE

## 2023-10-25 DIAGNOSIS — F32.89 OTHER DEPRESSION: Primary | ICD-10-CM

## 2023-10-25 PROCEDURE — 99442 PR PHYSICIAN TELEPHONE EVALUATION 11-20 MIN: CPT | Mod: 95 | Performed by: NURSE PRACTITIONER

## 2023-10-25 ASSESSMENT — PAIN SCALES - GENERAL: PAINLEVEL: EXTREME PAIN (8)

## 2023-10-25 NOTE — NURSING NOTE
Is the patient currently in the state of [place]? YES    Visit mode:TELEPHONE    If the visit is dropped, the patient can be reconnected by: TELEPHONE VISIT: Phone number: [phone]    Will anyone else be joining the visit? NO  (If patient encounters technical issues they should call [phone]    How would you like to obtain your AVS? MyChart    Are changes needed to the allergy or medication list? No    Reason for visit: RECHECK    Mely LEVI

## 2023-10-25 NOTE — PROGRESS NOTES
"  The patient has been notified of following:      \"This telephone visit will be conducted via a call between you and your physician/provider. We have found that certain health care needs can be provided without the need for a physical exam.  This service lets us provide the care you need with a short phone conversation.  If a prescription is necessary we can send it directly to your pharmacy.  If lab work is needed we can place an order for that and you can then stop by our lab to have the test done at a later time.     Telephone visits are billed at different rates depending on your insurance coverage. During this emergency period, for some insurers they may be billed the same as an in-person visit.  Please reach out to your insurance provider with any questions.     If during the course of the call the physician/provider feels a telephone visit is not appropriate, you will not be charged for this service.\"     Patient has given verbal consent to a Telephone visit? Yes     Will anyone else be joining the visit? No        Patient would like to receive their AVS by AVS P/reference: Mail a copy      Psychiatric  Out patient Follow Up Progress Note  Date of visit:10/25/2023           Discussion of Care and Treatment Recommendations:   This is a 67 year old female with history of depression and PTSD presenting to our virtual clinic for a follow up appointment     PCP managing sleep :  Prescribing  Lorazepam ,Seroquel and Ambien   PCP managing Gabapentin for neurological issues .      Last visit 06/01/2023.  Recommendation at last visit .  1.MDD/Anxiety/PTSD : Had stopped seeing a therapist but agreeable to restart ambulatory referral to psychotherapy made.  2.MDD/Anxiety /PTSDHighly recommend : Community Support groups -  3.MDD/Anxiety : Continue Cymbalta  down to  30 mg daily    4. RTC- 8  weeks, call in between visit with any question  Patient and I reviewed diagnosis and treatment plan and patient agrees with " following recommendations:  Ongoing education given regarding diagnostic and treatment options with adequate verbalization of understanding.  Plan   1.MDD/Anxiety/PTSD : Had stopped seeing a therapist but agreeable to restart ambulatory referral to psychotherapy made.  2.MDD/Anxiety /PTSDHighly recommend : Community Support groups -  3.MDD/Anxiety : Continue Cymbalta  down to  30 mg daily    4. 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)    Malignant neoplasm of breast (H)    Posttraumatic hematoma of right breast    Postoperative pain    Pain in right axilla    Constipation due to outlet dysfunction    Logorrhea    Type 2 diabetes mellitus without complication, without long-term current use of insulin (H)    Rotator cuff tear    Anxiety    Scalp irritation    Abnormal MRI    Diabetes 1.5, managed as type 2 (H)    H/O breast surgery    Advanced directives, counseling/discussion    Vitamin D deficiency disease    Moderate major depression (H)    Concussion with loss of consciousness    CKD (chronic kidney disease) stage 3, GFR 30-59 ml/min (H)    Concussion without loss of consciousness             Chief Complaint / Subjective:    Chief complaint: Depression     History of Present Illness:   Per patient's statement : States she is doing well. Looking forward to making dinner and cake for her son's birthday. Looking forward to celebrating to [facility] .Doing better as far as depression is concerned and the handling the grief of losing her brother with support of her friends and family .  Denies suicidal homicidal ideations.  States she feels safe.  Wants to continue current medication        Answers submitted by the patient for this visit:  Patient Health Questionnaire (Submitted on 10/24/2023)  If you checked off any problems, how  "difficult have these problems made it for you to do your work, take care of things at home, or get along with other people?: Somewhat difficult  PHQ9 TOTAL SCORE: 9        Mental Status Examination:     Appearance: unable to assess  Orientation: Patient alert and oriented to person, place, time, and situation  Reliability:  Patient appears to be an adequate historian.    Behavior: calm and coperative   Speech: Speech is spontaneous and coherent, with a normal rate, rhythm and tone.    Language:There are no difficulties with expressive or receptive language as observed throughout the interview.    Mood: Described as \"ok\".    Affect: unable to assess  Judgement: Able to make basic decision regarding safety.  Insight: Good awareness of physical and mental health conditions and aware of needs around care for these.  Gait and station: unable to assess  Thought process: Logical   Hallucinations : No evidence of any hallucination  Thought content: No evidence of delusions or paranoia.   Suicidal /Homical Ideations:  No thoughts of self harm or suicide. No thoughts of harming others.  Associations: Connected  Fund of knowledge: Average  Attention / Concentration: Able to remain focused during the interview with minimal distractibility or need for redirection.  Short Term Memory: Grossly intact as evidence by client recalling themes and ideas discussed.  Long Term Memory: Intact  Motor Status: unable to asses      Drug/treatment history and current pattern of use:   Denies     Medication changes: See Above   Medication adherence: compliant  Medication side effects: absent  Information about medications: Side effects, benefits and alternative treatments discussed and patient agrees .    Psychotherapy: Supportive therapy day-to-day living    Education: Diet, exercise, abstinence from drugs and alcohol, patient will not drive if sedated and medications or  under influence of any substance    Lab Results:   Personally reviewed " and discussed with the patient    Lab Results   Component Value Date    WBC 7.8 06/08/2022    HGB 12.9 06/08/2022    HCT 40.1 06/08/2022     06/08/2022    CHOL 213 (H) 06/08/2022    TRIG 102 06/08/2022    HDL 66 06/08/2022    ALT 28 06/08/2022    AST 20 06/08/2022     06/08/2022    BUN 14 06/08/2022    CO2 21 (L) 06/08/2022    TSH 0.84 12/06/2019    MICROALBUR 0.79 06/08/2022       Vital signs:  There were no vitals taken for this visit.  Unable to assess telephone visit  Allergies: Aztreonam, Castor oil, Cefaclor, Cephalexin, Cephalexin monohydrate [cephalexin], Chlorhexidine, Ciprofloxacin, Clarithromycin, Clindamycin, Penicillins, Propofol, Scopolamine, Sulfa (sulfonamide antibiotics) [sulfa antibiotics], Sulfasalazine, Tetracyclines & related, Vancomycin, Adhesive [cyanoacrylate], Cytoxan [cyclophosphamide], Erythromycin base [erythromycin], Hydrocodone, Neupogen [filgrastim], Paper tape [adhesive tape], Tapentadol, Taxol [paclitaxel], and Taxotere [docetaxel]           Review of Systems:      ROS:  Subjective data only - Tele-Health  Visit   10 point ROS was negative except for the items listed in HPI-              Medications:     Current Outpatient Medications   Medication    acetaminophen (TYLENOL) 500 MG tablet    betamethasone, augmented, (DIPROLENE) 0.05 % lotion    blood glucose test (CONTOUR NEXT TEST STRIPS) strips    blood-glucose meter (CONTOUR NEXT METER) Misc    Cholecalciferol (VITAMIN D3) 25 MCG TABS    DULoxetine (CYMBALTA) 30 MG capsule    generic lancets (MICROLET LANCET)    lisinopril (ZESTRIL) 2.5 MG tablet    LORazepam (ATIVAN) 1 MG tablet    metFORMIN (GLUCOPHAGE XR) 500 MG 24 hr tablet    naproxen sodium (ALEVE) 220 MG tablet    oxyCODONE (ROXICODONE) 5 MG immediate release tablet    polyethylene glycol (MIRALAX) 17 gram packet    QUEtiapine (SEROQUEL) 25 MG tablet    simvastatin (ZOCOR) 5 MG tablet    zolpidem (AMBIEN) 10 MG tablet    azithromycin (ZITHROMAX) 500 MG tablet     calcium citrate-vitamin D (CITRACAL+D) 315-200 mg-unit per tablet     No current facility-administered medications for this visit.                 Medication adherence: Reviewed risk/benefits of medication , Patient able to verbalize understanding of side effects and Patient verbally consents to taking medications    PSYCHOEDUCATION:  Medication side effects and alternatives reviewed. Health promotion activities recommended and reviewed today. All questions addressed. Education and counseling completed regarding risks and benefits of medications and psychotherapy options.  Consent provided by patient/guardian  Call the psychiatric nurse line with medication questions or concerns at [phone].  [employer] may be used to communicate with your provider, but this is not intended to be used for emergencies.  SEROTONIN SYNDROME:  Discussed risks of Serotonin syndrome (ie, serotonin toxicity) which is a potentially life-threatening condition associated with increased serotonergic activity in the central nervous system (CNS). It is seen with therapeutic medication use, inadvertent interactions between drugs, and intentional self-poisoning. Serotonin syndrome may involve a spectrum of clinical findings, which often include mental status changes, autonomic hyperactivity, and neuromuscular abnormalities.    STIMULANT THERAPY: Side effects discussed including but not limited to cardiac (including HTN, tachycardia, sudden death), motor/tic, appetite/growth, mood lability and sleep disruption. This is a controlled substance with risk for abuse, need to keep in a safe keep place and cannot replace lost scripts  HARM REDUCTION:  Discussions regarding effects of mood altering substances, alcohol and cannabis, on mood and that approach is harm reduction, will continue to prescribe meds as they work to cut back use.    SAFETY:  We all care about your loved one's safety. To reduce the risk of self-harm, remove access to all:   Firearms, Medicines (both prescribed and over-the-counter), Knives and other sharp objects, Ropes and like materials, and Alcohol  SLEEP HYGIENE: establish a sleep routine, limit screen time 1 hour prior to bed, use bed for sleep only, take sleep/medications on time (including sleepy time tea, trazadone or herbal treatments such as melatonin), aroma therapy, limit caffeine/sugar, yoga, guided imagery, stretch, meditation, limit naps to 20 minutes, make a temperature change in the room, white noise, be mindful of slowing down breathing, take a warm bath/shower, frequently wash sheets, and journaling.   Medlineplus.gov is information for patients.  It is run by the [employer] and it contains information about all disorders, diseases and all medications.       Crisis Resources:    Present to the Emergency Department as needed or call after hours crisis line at [phone] or [phone].   [place] Crisis Text Line: Text [place] to 567975.  Suicide LifeLine Chat: suicidepreMymCartline.org/chat/.  [place] Suicide Prevention Lifeline: 901.962.3660 (TTY: 545.795.9647). Call anytime for help.  (www.suicidepreventionlifeline.org)  National [place] on Mental Illness (www.norma.org): 382.847.4601 or 228-954-0211.  Mental Health Association (www.mentalhealth.org): 708.237.8574 or 823-006-5603.      Coordination of Care:   More than 30 minutes spent on this visit  with more than 50% of time spent on coordination of care including: Educating patient about diagnosis, prognosis, side effects and benefits of medications, diet, exercise.  Time also spent providing supportive therapy regarding above issues.      Telephone -Visit Details    Type of service:  Telephone Visit    Originating Location (pt. Location): Home    Distant Location (provider location):  Providers Remote Office       This note was created using a dictation system. All typing errors or contextual distortion is unintentional and software  inherent.  Start Time : 1530  End time :  1546

## 2023-10-25 NOTE — PATIENT INSTRUCTIONS
"[facility]  What to Expect  Here's what to expect in the Panel Psych[facility] Program.   About the program  You'll be meeting with a psychiatric doctor to check your mental health. A psychiatric doctor helps you deal with troubling thoughts and feelings by giving you medicine. They'll make sure you know the plan for your care. You may see them for a long time. When you're feeling better, they may refer you back to seeing your family doctor.   If you have any questions, we'll be glad to talk to you.  About visits  Be open  At your visits, please talk openly about your problems. It may feel hard, but it's the best way for us to help you.  Cancelling visits  If you can't come to your visit, please call us right away at [phone]. If you don't cancel at least 24 hours (1 full day) before your visit, that's \"late cancellation.\"  Not showing up for your visits  Being very late is the same as not showing up. You'll be a \"no show\" if:  You're more than 15 minutes late for a 30-minute (half hour) visit.  You're more than 30 minutes late for a 60-minute (full hour) visit.  If you cancel late or don't show up 2 times within 6 months, we may end your care.  Getting help between visits  If you need help between visits, you can call us Monday to Friday from 8 a.m. to 4:30 p.m. at [phone].  Emergency care  Call 911 or go to the nearest emergency department if your life or someone else's life is in danger.  Call 988 anytime to reach the national Suicide and Crisis hotline.  Medicine refills  To refill your medicine, call your pharmacy. You can also call [facility]'s [facility] at [phone], Monday to Friday, 8 a.m. to 4:30 p.m. It can take 1 to 3 business days to get a refill.   Forms, letters, and tests  You may have papers to fill out, like FMLA, short-term disability, and workability. We can help you with these forms at your visits, but you must have an appointment. You may need more " than 1 visit for this, to be in an intensive therapy program, or both.  Before we can give you medicine for ADHD, we may refer you to get tested for it or confirm it another way.  We may not be able to give you an emotional support animal letter.  We don't do mental health checks ordered by the court.   We don't do mental health testing, but we can refer you to get tested.   Thank you for choosing us for your care.  For informational purposes only. Not to replace the advice of your health care provider. Copyright   2022 [facility]. All rights reserved. [place] [id number] - 12/22      After Visit Summary   Continue medications as prescribed  Have your pharmacy contact us for a refill if you are running low on medications (We may ask you to come into clinic to get a refill from the nurse  No Alcohol or drug use  No driving if sedated  Call the clinic with any questions or concerns   Reach out for help if you feel like hurting yourself or others ([facility] [phone]: [address] [facility], [address] or [facility] Suicide Hotline   [phone] , call 911 or go to nearest Emergency room     Crisis Resources:    Present to the Emergency Department as needed or call after hours crisis line at [phone] or [phone].   [place] Crisis Text Line: Text [place] to 538741.  Suicide LifeLine Chat: suicidepreventionlifeline.org/chat/.  National Suicide Prevention Lifeline: 990.210.6029 (TTY: 929.171.1154). Call anytime for help.  (www.suicidepreventionlifeline.org)  National [place] on Mental Illness (www.norma.org): 524.938.6520 or 420-485-9679.  Mental Health Association (www.mentalhealth.org): 984.449.9530 or 672-268-3324.       Follow up as directed, for your appointments, per your After Visit Summary Form.

## 2023-10-28 ASSESSMENT — PATIENT HEALTH QUESTIONNAIRE - PHQ9
SUM OF ALL RESPONSES TO PHQ QUESTIONS 1-9: 11
SUM OF ALL RESPONSES TO PHQ QUESTIONS 1-9: 11
10. IF YOU CHECKED OFF ANY PROBLEMS, HOW DIFFICULT HAVE THESE PROBLEMS MADE IT FOR YOU TO DO YOUR WORK, TAKE CARE OF THINGS AT HOME, OR GET ALONG WITH OTHER PEOPLE: SOMEWHAT DIFFICULT

## 2023-10-29 ASSESSMENT — PATIENT HEALTH QUESTIONNAIRE - PHQ9: SUM OF ALL RESPONSES TO PHQ QUESTIONS 1-9: 11

## 2023-10-30 DIAGNOSIS — N18.31 STAGE 3A CHRONIC KIDNEY DISEASE (H): ICD-10-CM

## 2023-10-30 DIAGNOSIS — E11.9 TYPE 2 DIABETES MELLITUS WITHOUT COMPLICATION, WITHOUT LONG-TERM CURRENT USE OF INSULIN (H): Primary | ICD-10-CM

## 2023-10-31 ENCOUNTER — OFFICE VISIT (OUTPATIENT)
Dept: FAMILY MEDICINE | Facility: CLINIC | Age: 68
End: 2023-10-31
Payer: MEDICARE

## 2023-10-31 VITALS
HEIGHT: 64 IN | OXYGEN SATURATION: 99 % | TEMPERATURE: 97.5 F | DIASTOLIC BLOOD PRESSURE: 74 MMHG | WEIGHT: 150.25 LBS | RESPIRATION RATE: 18 BRPM | HEART RATE: 96 BPM | BODY MASS INDEX: 25.65 KG/M2 | SYSTOLIC BLOOD PRESSURE: 114 MMHG

## 2023-10-31 DIAGNOSIS — C50.211 MALIGNANT NEOPLASM OF UPPER-INNER QUADRANT OF RIGHT FEMALE BREAST, UNSPECIFIED ESTROGEN RECEPTOR STATUS (H): ICD-10-CM

## 2023-10-31 DIAGNOSIS — Z00.00 PREVENTATIVE HEALTH CARE: ICD-10-CM

## 2023-10-31 DIAGNOSIS — K62.3 RECTAL PROLAPSE: ICD-10-CM

## 2023-10-31 DIAGNOSIS — K59.02 CONSTIPATION DUE TO OUTLET DYSFUNCTION: ICD-10-CM

## 2023-10-31 DIAGNOSIS — F33.9 EPISODE OF RECURRENT MAJOR DEPRESSIVE DISORDER, UNSPECIFIED DEPRESSION EPISODE SEVERITY (H): ICD-10-CM

## 2023-10-31 DIAGNOSIS — E55.9 VITAMIN D DEFICIENCY DISEASE: Primary | ICD-10-CM

## 2023-10-31 DIAGNOSIS — Z29.11 NEED FOR VACCINATION AGAINST RESPIRATORY SYNCYTIAL VIRUS: ICD-10-CM

## 2023-10-31 DIAGNOSIS — Z23 NEED FOR VACCINATION: ICD-10-CM

## 2023-10-31 DIAGNOSIS — Z98.1 H/O SPINAL FUSION: ICD-10-CM

## 2023-10-31 DIAGNOSIS — N18.31 STAGE 3A CHRONIC KIDNEY DISEASE (H): ICD-10-CM

## 2023-10-31 DIAGNOSIS — E11.9 TYPE 2 DIABETES MELLITUS WITHOUT COMPLICATION, WITHOUT LONG-TERM CURRENT USE OF INSULIN (H): ICD-10-CM

## 2023-10-31 LAB
ANION GAP SERPL CALCULATED.3IONS-SCNC: 10 MMOL/L (ref 7–15)
BUN SERPL-MCNC: 17.9 MG/DL (ref 8–23)
CALCIUM SERPL-MCNC: 9.3 MG/DL (ref 8.8–10.2)
CHLORIDE SERPL-SCNC: 104 MMOL/L (ref 98–107)
CHOLEST SERPL-MCNC: 199 MG/DL
CREAT SERPL-MCNC: 0.7 MG/DL (ref 0.51–0.95)
DEPRECATED HCO3 PLAS-SCNC: 24 MMOL/L (ref 22–29)
EGFRCR SERPLBLD CKD-EPI 2021: >90 ML/MIN/1.73M2
ERYTHROCYTE [DISTWIDTH] IN BLOOD BY AUTOMATED COUNT: 13.7 % (ref 10–15)
GLUCOSE SERPL-MCNC: 165 MG/DL (ref 70–99)
HBA1C MFR BLD: 7.2 % (ref 0–5.6)
HCT VFR BLD AUTO: 40.9 % (ref 35–47)
HDLC SERPL-MCNC: 58 MG/DL
HGB BLD-MCNC: 13.4 G/DL (ref 11.7–15.7)
LDLC SERPL CALC-MCNC: 121 MG/DL
MCH RBC QN AUTO: 28.3 PG (ref 26.5–33)
MCHC RBC AUTO-ENTMCNC: 32.8 G/DL (ref 31.5–36.5)
MCV RBC AUTO: 86 FL (ref 78–100)
NONHDLC SERPL-MCNC: 141 MG/DL
PLATELET # BLD AUTO: 298 10E3/UL (ref 150–450)
POTASSIUM SERPL-SCNC: 4.3 MMOL/L (ref 3.4–5.3)
RBC # BLD AUTO: 4.74 10E6/UL (ref 3.8–5.2)
SODIUM SERPL-SCNC: 138 MMOL/L (ref 135–145)
TRIGL SERPL-MCNC: 102 MG/DL
WBC # BLD AUTO: 5.1 10E3/UL (ref 4–11)

## 2023-10-31 PROCEDURE — G0008 ADMIN INFLUENZA VIRUS VAC: HCPCS | Performed by: FAMILY MEDICINE

## 2023-10-31 PROCEDURE — 80048 BASIC METABOLIC PNL TOTAL CA: CPT | Performed by: FAMILY MEDICINE

## 2023-10-31 PROCEDURE — 91320 SARSCV2 VAC 30MCG TRS-SUC IM: CPT | Performed by: FAMILY MEDICINE

## 2023-10-31 PROCEDURE — 99215 OFFICE O/P EST HI 40 MIN: CPT | Mod: 25 | Performed by: FAMILY MEDICINE

## 2023-10-31 PROCEDURE — 82570 ASSAY OF URINE CREATININE: CPT | Performed by: FAMILY MEDICINE

## 2023-10-31 PROCEDURE — 90662 IIV NO PRSV INCREASED AG IM: CPT | Performed by: FAMILY MEDICINE

## 2023-10-31 PROCEDURE — 83036 HEMOGLOBIN GLYCOSYLATED A1C: CPT | Performed by: FAMILY MEDICINE

## 2023-10-31 PROCEDURE — 82043 UR ALBUMIN QUANTITATIVE: CPT | Performed by: FAMILY MEDICINE

## 2023-10-31 PROCEDURE — 80061 LIPID PANEL: CPT | Performed by: FAMILY MEDICINE

## 2023-10-31 PROCEDURE — 90480 ADMN SARSCOV2 VAC 1/ONLY CMP: CPT | Performed by: FAMILY MEDICINE

## 2023-10-31 PROCEDURE — 36415 COLL VENOUS BLD VENIPUNCTURE: CPT | Performed by: FAMILY MEDICINE

## 2023-10-31 PROCEDURE — 85027 COMPLETE CBC AUTOMATED: CPT | Performed by: FAMILY MEDICINE

## 2023-10-31 RX ORDER — LIDOCAINE 50 MG/G
1 PATCH TOPICAL EVERY 24 HOURS
Qty: 60 PATCH | Refills: 4 | Status: SHIPPED | OUTPATIENT
Start: 2023-10-31 | End: 2023-11-01

## 2023-10-31 NOTE — PROGRESS NOTES
Assessment & Plan     Type 2 diabetes mellitus without complication, without long-term current use of insulin (H)  Improved control with lower A1-C at 7.2. great! Keep up good work.  - CBC with platelets  - Basic metabolic panel  (Ca, Cl, CO2, Creat, Gluc, K, Na, BUN)  - Hemoglobin A1c  - Lipid panel reflex to direct LDL Non-fasting  - Albumin Random Urine Quantitative with Creat Ratio    Stage 3a chronic kidney disease (H)  She is curious about this. Added once the lab results were in - GFR>90!  - CBC with platelets  - Basic metabolic panel  (Ca, Cl, CO2, Creat, Gluc, K, Na, BUN)  - Albumin Random Urine Quantitative with Creat Ratio    Vitamin D deficiency disease  Continue same supplement    Rectal prolapse  She is at the point where this is unacceptable and unmanageable. She wants to see a surgeon. Because of her grief and mental health needs, I was not able to examine her today.   - Adult Colorectal Surgery  Referral    Malignant neoplasm of upper-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, unspecified estrogen receptor status (H)  She has completed treatment for this and is being monitored. She notes that at this point she is not at all sure she wants to treat if there is a recurrence.    Constipation due to outlet dysfunction  Her reporting on constipation is variable/not great. While I pushed her to get on a good bowel regimen, it is clear she will do what she wants/feels she needs, even if I tell her this will help with what she reports as rectal prolapse.     H/O spinal fusion  She believes she is at the point of needing more surgery, but I want her assessed by a back specialist since I do not have time to do a back-focused visit today  - Spine  Referral    Episode of recurrent major depressive disorder, unspecified depression episode severity (H24)  This is much worse due to lots of loss in the past year - many family have  - in particular her brother by suicide. She also found therapy  "not helpful - she WANTS to bury her needs. This is only going to exacerbate her psychosomatic problems. I will continue to encourage her to see a therapist again.     Need for vaccination  given  - INFLUENZA VACCINE 65+ (FLUZONE HD)  - COVID-19 12+ (2023-24) (PFIZER)    Preventative health care  reviewed  - REVIEW OF HEALTH MAINTENANCE PROTOCOL ORDERS    Need for vaccination against respiratory syncytial virus  Declined- she IS potentially at risk of exposure. She will consider it      Review of external notes as documented elsewhere in note  Ordering of each unique test  Prescription drug management  50 minutes spent by me on the date of the encounter doing chart review, history and exam, documentation and further activities per the note     BMI:   Estimated body mass index is 25.97 kg/m  as calculated from the following:    Height as of this encounter: 1.62 m (5' 3.78\").    Weight as of this encounter: 68.2 kg (150 lb 4 oz).       Daxa Morales MD  [facility] JASON Mccormick is a 67 year old, presenting for the following health issues:  Diabetes (F/u) and Imm/Inj (covid)      10/31/2023     2:25 PM   Additional Questions   Roomed by Missy Sullivan RN   Accompanied by none         10/31/2023     2:25 PM   Patient Reported Additional Medications   Patient reports taking the following new medications no       Imm/Inj    History of Present Illness       Back Pain:  She presents for follow up of back pain. Patient's back pain is a chronic problem.  Location of back pain:  Right lower back, left lower back and right middle of back    Does back pain interfere with her job:  Not applicable       CKD: She is not using over the counter pain medicine.     Diabetes:   She presents for follow up of diabetes.    She is not checking blood glucose.         She has no concerns regarding her diabetes at this time.  She is having blurry vision and weight gain.  The patient has not had a " "diabetic eye exam in the last 12 months.          Hyperlipidemia:  She presents for follow up of hyperlipidemia.   She is not taking medication to lower cholesterol. She is not having myalgia or other side effects to statin medications.    Headaches:   Since the patient's last clinic visit, headaches are: worsened  The patient is getting headaches:  3 or more times weekly  She is not able to do normal daily activities when she has a migraine.  The patient is taking the following rescue/relief medications:  Naproxyn (Aleve)   Patient states \"The relief is inconsistent\" from the rescue/relief medications.   The patient is taking the following medications to prevent migraines:  No medications to prevent migraines  In the past 4 weeks, the patient has gone to an Urgent Care or Emergency Room 0 times times due to headaches.    Reason for visit:  Follow up for chronic diseases and Flu and Covid shots.    She eats 2-3 servings of fruits and vegetables daily.She consumes 0 sweetened beverage(s) daily. She exercises with enough effort to increase her heart rate 3 or less days per week.   She is taking medications regularly.     Has lost several family members this past year   This was very hard.  She spoke to her brother 5 hrs before he shot himself- and she had no idea he would do that. They would talk daily. He gave no hint of his plan to kill himself.  His partner Greg was working outside and heard the shot and found him dead. This is hard to get over.    Sister with dementia - she is in memory care and has detereorated.    Tried seeing a counselor - it did not help. It forced her to focus on stuff. She has to bury it - that is the only way she can survive.    All of the deaths fell on Anny's shoulders to do the funerals. Some family helped.    Has a new grandson, 7 weeks old. He is in [place]. She's seen him and that is nice.    Praying helps her; she knows they (those who passed) are all in a better place; they " "visit in her dreams; she talks to them.    She gets weird symptoms that make her think her cancer is coming back. She was stressed over her son's birthday celebration - her rectum gets inflamed and pushes itself out. Today is the first day it is staying in. Or back pain - it flairs easily.  She is walking daily.   Does stretches she remembers from past PT. Those help.    She promised herself if the cancer comes back she won't fight it - now she's wondering.    What do I do when I am incapacitated for 3 days from the pain? Vertebrae are fused together. Has not seen a spine doctor in many years.  Had a back brace - TLSO    Says any physical exertion makes feces come out. Thus she no longer does anything.    Review of Systems       Objective    /74 (BP Location: Left arm, Patient Position: Sitting, Cuff Size: Adult Regular)   Pulse 96   Temp 97.5  F (36.4  C) (Temporal)   Resp 18   Ht 1.62 m (5' 3.78\")   Wt 68.2 kg (150 lb 4 oz)   SpO2 99%   BMI 25.97 kg/m    Body mass index is 25.97 kg/m .  Physical Exam   GENERAL: alert, no distress, and fatigued  NECK: no adenopathy, no asymmetry, masses, or scars and thyroid normal to palpation  RESP: lungs clear to auscultation - no rales, rhonchi or wheezes  CV: regular rate and rhythm, normal S1 S2, no S3 or S4, no murmur, click or rub, no peripheral edema and peripheral pulses strong  MS: no gross musculoskeletal defects noted, no edema  SKIN: no suspicious lesions or rashes  PSYCH: mentation appears normal, affect flat, fatigued, judgement and insight intact, and appearance well groomed    Results for orders placed or performed in visit on 10/31/23   CBC with platelets     Status: Normal   Result Value Ref Range    WBC Count 5.1 4.0 - 11.0 10e3/uL    RBC Count 4.74 3.80 - 5.20 10e6/uL    Hemoglobin 13.4 11.7 - 15.7 g/dL    Hematocrit 40.9 35.0 - 47.0 %    MCV 86 78 - 100 fL    MCH 28.3 26.5 - 33.0 pg    MCHC 32.8 31.5 - 36.5 g/dL    RDW 13.7 10.0 - 15.0 %    " Platelet Count 298 150 - 450 10e3/uL   Basic metabolic panel  (Ca, Cl, CO2, Creat, Gluc, K, Na, BUN)     Status: Abnormal   Result Value Ref Range    Sodium 138 135 - 145 mmol/L    Potassium 4.3 3.4 - 5.3 mmol/L    Chloride 104 98 - 107 mmol/L    Carbon Dioxide (CO2) 24 22 - 29 mmol/L    Anion Gap 10 7 - 15 mmol/L    Urea Nitrogen 17.9 8.0 - 23.0 mg/dL    Creatinine 0.70 0.51 - 0.95 mg/dL    GFR Estimate >90 >60 mL/min/1.73m2    Calcium 9.3 8.8 - 10.2 mg/dL    Glucose 165 (H) 70 - 99 mg/dL   Hemoglobin A1c     Status: Abnormal   Result Value Ref Range    Hemoglobin A1C 7.2 (H) 0.0 - 5.6 %   Lipid panel reflex to direct LDL Non-fasting     Status: Abnormal   Result Value Ref Range    Cholesterol 199 <200 mg/dL    Triglycerides 102 <150 mg/dL    Direct Measure HDL 58 >=50 mg/dL    LDL Cholesterol Calculated 121 (H) <=100 mg/dL    Non HDL Cholesterol 141 (H) <130 mg/dL    Narrative    Cholesterol  Desirable:  <200 mg/dL    Triglycerides  Normal:  Less than 150 mg/dL  Borderline High:  150-199 mg/dL  High:  200-499 mg/dL  Very High:  Greater than or equal to 500 mg/dL    Direct Measure HDL  Female:  Greater than or equal to 50 mg/dL   Male:  Greater than or equal to 40 mg/dL    LDL Cholesterol  Desirable:  <100mg/dL  Above Desirable:  100-129 mg/dL   Borderline High:  130-159 mg/dL   High:  160-189 mg/dL   Very High:  >= 190 mg/dL    Non HDL Cholesterol  Desirable:  130 mg/dL  Above Desirable:  130-159 mg/dL  Borderline High:  160-189 mg/dL  High:  190-219 mg/dL  Very High:  Greater than or equal to 220 mg/dL   Albumin Random Urine Quantitative with Creat Ratio     Status: Abnormal   Result Value Ref Range    Creatinine Urine mg/dL 172.0 mg/dL    Albumin Urine mg/L 84.9 mg/L    Albumin Urine mg/g Cr 49.36 (H) 0.00 - 25.00 mg/g Cr

## 2023-11-01 DIAGNOSIS — Z98.1 H/O SPINAL FUSION: ICD-10-CM

## 2023-11-01 LAB
CREAT UR-MCNC: 172 MG/DL
MICROALBUMIN UR-MCNC: 84.9 MG/L
MICROALBUMIN/CREAT UR: 49.36 MG/G CR (ref 0–25)

## 2023-11-01 RX ORDER — LIDOCAINE 50 MG/G
1 PATCH TOPICAL EVERY 24 HOURS
Qty: 60 PATCH | Refills: 4 | Status: SHIPPED | OUTPATIENT
Start: 2023-11-01 | End: 2023-11-02

## 2023-11-01 RX ORDER — LIDOCAINE 50 MG/G
1 PATCH TOPICAL EVERY 24 HOURS
Qty: 60 PATCH | Refills: 4 | Status: SHIPPED | OUTPATIENT
Start: 2023-11-01 | End: 2023-11-01

## 2023-11-02 ENCOUNTER — TELEPHONE (OUTPATIENT)
Dept: FAMILY MEDICINE | Facility: CLINIC | Age: 68
End: 2023-11-02
Payer: MEDICARE

## 2023-11-02 DIAGNOSIS — Z98.1 H/O SPINAL FUSION: ICD-10-CM

## 2023-11-02 RX ORDER — LIDOCAINE 50 MG/G
1 PATCH TOPICAL EVERY 24 HOURS
Qty: 60 PATCH | Refills: 4 | Status: SHIPPED | OUTPATIENT
Start: 2023-11-02 | End: 2023-11-02

## 2023-11-02 RX ORDER — LIDOCAINE 50 MG/G
1 PATCH TOPICAL EVERY 24 HOURS
Qty: 60 PATCH | Refills: 4 | OUTPATIENT
Start: 2023-11-02

## 2023-11-02 RX ORDER — LIDOCAINE 50 MG/G
1 PATCH TOPICAL EVERY 24 HOURS
Qty: 60 PATCH | Refills: 4 | Status: SHIPPED | OUTPATIENT
Start: 2023-11-02 | End: 2024-07-13

## 2023-11-02 NOTE — TELEPHONE ENCOUNTER
Priscila Brenner, RN   to [facility]   AT      11/2/23  2:26 PM  Please see note from pharmacy; medication requires prior authorization.  Thank you!  BEBA Salomon, RN-[facility]  ---------------------------------------------------------------------------------------    It appears a Prior Authorization is needed for this medication. Will route to [facility] to start this.    Prior Authorization Retail Medication Request  See Chart    Medication/Dose: Lidocaine (LIDODERM) 5% patch  ICD code (if different than what is on RX):    Previously Tried and Failed:    Rationale:      Insurance Name:    Insurance ID:        Pharmacy Information (if different than what is on RX)  Name:  [facility]/pharmacy #[id number] - [facility], [place] - [address]   Phone:  [phone]        BEBA Delatorre, RN   [facility]

## 2023-11-02 NOTE — TELEPHONE ENCOUNTER
Central Prior Authorization Team   Phone: [phone]    Patricia Garrido RN         11/2/23  3:08 PM  Note  Priscila Brenner, RN   to [facility]   AT      11/2/23  2:26 PM  Please see note from pharmacy; medication requires prior authorization.  Thank you!  BEBA Salomon, RN-[facility]  ---------------------------------------------------------------------------------------     It appears a Prior Authorization is needed for this medication. Will route to [facility] to start this.     Prior Authorization Retail Medication Request  See Chart     Medication/Dose: Lidocaine (LIDODERM) 5% patch  ICD code (if different than what is on RX):    Previously Tried and Failed:    Rationale:       Insurance Name:    Insurance ID:          Pharmacy Information (if different than what is on RX)  Name:  [facility]/pharmacy #[id number] - [facility], [place] - [address]   Phone:  [phone]           BEBA Delatorre, RN              [facility]

## 2023-11-02 NOTE — TELEPHONE ENCOUNTER
Transmission to pharmacy failed.    Medication resent.    MARIN Brenner, DIANAN, RN-BC  [facility]

## 2023-11-04 ENCOUNTER — TELEPHONE (OUTPATIENT)
Dept: FAMILY MEDICINE | Facility: CLINIC | Age: 68
End: 2023-11-04
Payer: MEDICARE

## 2023-11-04 NOTE — TELEPHONE ENCOUNTER
Please call Anny -    1) I see her referrals all happen by the end of January. Therefore I'd like to see her sometime in February. If she agrees, please help her to schedule a Feb follow up appointment (for stress, rectum, etc).    2) let her know her labs show her kidney function is great/normal!    3) as best she can, Dr. Morales recommends she get some stretching/activity daily.

## 2023-11-06 NOTE — TELEPHONE ENCOUNTER
Unable to reach patient. Left message that author would send via [employer].     [employer] message sent to patient.

## 2023-11-07 NOTE — TELEPHONE ENCOUNTER
Central Prior Authorization Team   Phone: [phone]    [place] Initiation    Medication: lidocaine (LIDODERM) 5 % patch  Insurance Company: [employer] - Phone [phone] Fax [phone]  Pharmacy Filling the Rx: [id number] - [place], [place] - [address]  Filling Pharmacy Phone: [phone]  Filling Pharmacy Fax:    Start Date: 11/7/2023

## 2023-11-08 NOTE — TELEPHONE ENCOUNTER
Prior Authorization Approval    Authorization Effective Date: 10/8/2023  Authorization Expiration Date: 12/31/2099  Medication: lidocaine (LIDODERM) 5 % patch  Reference #:     Insurance Company: [employer] - Phone [phone] Fax [phone]  Which Pharmacy is filling the prescription (Not needed for infusion/clinic administered): [id number] - [facility], [place] - [address] [place] [address]  Pharmacy Notified: Yes  Patient Notified: Instructed pharmacy to notify patient when script is ready to /ship.

## 2023-11-10 NOTE — TELEPHONE ENCOUNTER
Diagnosis, Referred by & from: Rectal Prolapse   Appt date: 1/30/2024   NOTES STATUS DETAILS   OFFICE NOTE from referring provider Internal [facility]:  10/31/23,11/15/22 - PCC OV with Dr. Morales   OFFICE NOTE from other specialist [facility]:  12/27/23 - PMR OV with Dr. Collins   DISCHARGE SUMMARY from hospital N/A    DISCHARGE REPORT from the ER N/A    OPERATIVE REPORT N/A    MEDICATION LIST Internal    LABS N/A    DIAGNOSTIC PROCEDURES N/A    IMAGING (DISC & REPORT)      XRAY [facility]:  12/16/20 - XR Abdomen

## 2023-11-18 DIAGNOSIS — E11.9 TYPE 2 DIABETES MELLITUS WITHOUT COMPLICATION, WITHOUT LONG-TERM CURRENT USE OF INSULIN (H): ICD-10-CM

## 2023-11-20 RX ORDER — LISINOPRIL 2.5 MG/1
2.5 TABLET ORAL DAILY
Qty: 90 TABLET | Refills: 0 | Status: SHIPPED | OUTPATIENT
Start: 2023-11-20 | End: 2024-02-19

## 2023-12-27 ENCOUNTER — OFFICE VISIT (OUTPATIENT)
Dept: PHYSICAL MEDICINE AND REHAB | Facility: CLINIC | Age: 68
End: 2023-12-27
Attending: FAMILY MEDICINE
Payer: MEDICARE

## 2023-12-27 VITALS
WEIGHT: 148 LBS | DIASTOLIC BLOOD PRESSURE: 87 MMHG | BODY MASS INDEX: 25.58 KG/M2 | SYSTOLIC BLOOD PRESSURE: 155 MMHG | HEART RATE: 103 BPM

## 2023-12-27 DIAGNOSIS — R91.8 MASS OF RIGHT LUNG: ICD-10-CM

## 2023-12-27 DIAGNOSIS — K59.9 BOWEL DYSFUNCTION: ICD-10-CM

## 2023-12-27 DIAGNOSIS — N31.9 BLADDER DYSFUNCTION: ICD-10-CM

## 2023-12-27 DIAGNOSIS — Z98.1 HISTORY OF LUMBAR SPINAL FUSION: ICD-10-CM

## 2023-12-27 DIAGNOSIS — M54.50 LUMBAR SPINE PAIN: Primary | ICD-10-CM

## 2023-12-27 DIAGNOSIS — M79.18 MYOFASCIAL PAIN: ICD-10-CM

## 2023-12-27 DIAGNOSIS — M53.3 SACROILIAC JOINT PAIN: ICD-10-CM

## 2023-12-27 PROCEDURE — 99204 OFFICE O/P NEW MOD 45 MIN: CPT | Performed by: PHYSICAL MEDICINE & REHABILITATION

## 2023-12-27 RX ORDER — LORAZEPAM 0.5 MG/1
TABLET ORAL
Qty: 2 TABLET | Refills: 0 | Status: SHIPPED | OUTPATIENT
Start: 2023-12-27 | End: 2024-02-22

## 2023-12-27 RX ORDER — BACLOFEN 10 MG/1
5-10 TABLET ORAL 2 TIMES DAILY PRN
Qty: 30 TABLET | Refills: 1 | Status: SHIPPED | OUTPATIENT
Start: 2023-12-27 | End: 2024-03-04

## 2023-12-27 ASSESSMENT — PAIN SCALES - GENERAL: PAINLEVEL: SEVERE PAIN (7)
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
## 2023-12-27 NOTE — PATIENT INSTRUCTIONS
An MRI was ordered for you today.  You will be contacted by scheduling within 3 days.    If you are not contacted, please call Radiology at [phone].   Lorazepam prescribed for you to take prior to MRI. Take 1 tab 1 hour prior to procedure and may repeat. Do not take until you arrive at appointment and are instructed to do so. Do not drive.   baclofen (muscle relaxant medication) has been prescribed today. Please take 5-10mg twice daily as needed. This medication may cause drowsiness. Please do not work or drive while taking this medication until you know how it effects you. If it does make you drowsy, you should only take it before bedtime or at times that you do not have to work/drive.   A physical therapy order was provided for you today.  You will be contacted by physical therapy.  If nobody contacts you within 3 to 5 days, please contact the clinic at [phone].  It will be very important for you to do your physical therapy exercises on a regular basis to decrease your pain and prevent future pain flares.
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
## 2023-12-28 DIAGNOSIS — F32.89 OTHER DEPRESSION: ICD-10-CM

## 2023-12-28 RX ORDER — DULOXETIN HYDROCHLORIDE 30 MG/1
30 CAPSULE, DELAYED RELEASE ORAL DAILY
Qty: 30 CAPSULE | Refills: 0 | Status: SHIPPED | OUTPATIENT
Start: 2023-12-28 | End: 2024-03-06

## 2023-12-28 NOTE — TELEPHONE ENCOUNTER
Date of Last Office Visit: 10/25/23  Date of Next Office Visit: 2/1/24  No shows since last visit: 0  Cancellations since last visit: 0      Medication requested: DULoxetine (CYMBALTA) 30 MG capsule  Date last ordered: 6/1/23 Qty: 90 Refills: 0     Review of [place] ?: NA    Lapse in medication adherence greater than 5 days?: Possibly  If yes, call patient and gather details: RN attempted to reach patient and reached Voice mail.    Medication refill request verified as identical to current order?: Yes  Result of Last DAM, VPA, Li+ Level, CBC, or Carbamazepine Level (at or since last visit): N/A    Last visit treatment plan:   Psychiatric  Out patient Follow Up Progress Note  Date of visit:10/25/2023             Discussion of Care and Treatment Recommendations:   This is a 67 year old female with history of depression and PTSD presenting to our virtual clinic for a follow up appointment     PCP managing sleep :  Prescribing  Lorazepam ,Seroquel and Ambien   PCP managing Gabapentin for neurological issues .        Last visit 06/01/2023.  Recommendation at last visit .  1.MDD/Anxiety/PTSD : Had stopped seeing a therapist but agreeable to restart ambulatory referral to psychotherapy made.  2.MDD/Anxiety /PTSDHighly recommend : Community Support groups -  3.MDD/Anxiety : Continue Cymbalta  down to  30 mg daily    4. RTC- 8  weeks, call in between visit with any question  Patient and I reviewed diagnosis and treatment plan and patient agrees with following recommendations:  Ongoing education given regarding diagnostic and treatment options with adequate verbalization of understanding.  Plan   1.MDD/Anxiety/PTSD : Had stopped seeing a therapist but agreeable to restart ambulatory referral to psychotherapy made.  2.MDD/Anxiety /PTSDHighly recommend : Community Support groups -  3.MDD/Anxiety : Continue Cymbalta  down to  30 mg daily    4. RTC- 8  weeks, call in between visit with any question    []Medication refilled per   Medication Refill in Ambulatory Care  policy.  [x]Medication unable to be refilled by RN due to criteria not met as indicated below:    []Eligibility - not seen in the last year   []Supervision - no future appointment   []Compliance - no shows, cancellations or lapse in therapy   []Verification - order discrepancy   []Controlled medication   []Medication not included in policy   [x]90-day supply request   []Other

## 2024-01-12 NOTE — LETTER
Letter by Daxa Morales MD at      Author: Daxa Morales MD Service: -- Author Type: --    Filed:  Encounter Date: 4/26/2021 Status: (Other)         Alexus Garcia  [address] [place]  [place] [address]                     April 26, 2021    Dear Alexus:    At the [facility], we care about you and your health. When diagnosed early, many diseases and illness can be treated and possibly prevented. That's why it is important to see your primary care provider regularly for routine examinations and to maintain your overall health.We are trying to contact you to ensure you receive the best level of care. Our records show that you are overdue for your Annual Wellness visit.  Please contact our office at [phone] to schedule an appointment.  If you are receiving care elsewhere, please contact us so that we can update our records.   Thank you for trusting us with your care.     If you have any questions or concerns, please don't hesitate to call.    Sincerely,        Electronically signed by Daxa Morales MD           [FreeTextEntry3] : I, Dr. Mclaughlin personally performed the evaluation and management (E/M) services , including all procedures, for this established patient who presents today with (a) new problem(s)/exacerbation of (an) existing condition(s). That E/M includes conducting the clinically appropriate interval history &/or exam, assessing all new/exacerbated conditions, and establishing a new plan of care. Today, my ARMANDO, Celina Cartwright, was here to observe &/or participate in the visit & follow plan of care established by me.

## 2024-01-24 ENCOUNTER — HOSPITAL ENCOUNTER (OUTPATIENT)
Dept: MRI IMAGING | Facility: HOSPITAL | Age: 69
Discharge: HOME | End: 2024-01-24
Attending: PHYSICAL MEDICINE & REHABILITATION | Admitting: PHYSICAL MEDICINE & REHABILITATION
Payer: MEDICARE

## 2024-01-24 DIAGNOSIS — Z98.1 HISTORY OF LUMBAR SPINAL FUSION: ICD-10-CM

## 2024-01-24 DIAGNOSIS — K59.9 BOWEL DYSFUNCTION: ICD-10-CM

## 2024-01-24 DIAGNOSIS — M54.50 LUMBAR SPINE PAIN: ICD-10-CM

## 2024-01-24 DIAGNOSIS — N31.9 BLADDER DYSFUNCTION: ICD-10-CM

## 2024-01-24 PROCEDURE — 72148 MRI LUMBAR SPINE W/O DYE: CPT | Mod: MF

## 2024-01-24 PROCEDURE — 72146 MRI CHEST SPINE W/O DYE: CPT | Mod: MG

## 2024-01-30 ENCOUNTER — PRE VISIT (OUTPATIENT)
Dept: SURGERY | Facility: CLINIC | Age: 69
End: 2024-01-30

## 2024-01-30 NOTE — TELEPHONE ENCOUNTER
Diagnosis, Referred by & from: Rectal Prolapse   Appt date: 4/10/2024   NOTES STATUS DETAILS   OFFICE NOTE from referring provider Internal [facility]:  10/31/23,11/15/22 - PCC OV with Dr. Morales   OFFICE NOTE from other specialist [facility]:  2/2/24, 12/27/23 - PMR OV with Dr. Collins   DISCHARGE SUMMARY from hospital N/A    DISCHARGE REPORT from the ER N/A    OPERATIVE REPORT N/A    MEDICATION LIST Internal    LABS N/A    DIAGNOSTIC PROCEDURES N/A    IMAGING (DISC & REPORT)      ULTRASOUND  (ENDOANAL/ENDORECTAL) Internal [facility]:  12/16/20 - XR Abdomen

## 2024-01-31 ASSESSMENT — PATIENT HEALTH QUESTIONNAIRE - PHQ9: SUM OF ALL RESPONSES TO PHQ QUESTIONS 1-9: 8

## 2024-02-01 ENCOUNTER — VIRTUAL VISIT (OUTPATIENT)
Dept: PSYCHIATRY | Facility: CLINIC | Age: 69
End: 2024-02-01
Payer: MEDICARE

## 2024-02-01 DIAGNOSIS — F41.1 GAD (GENERALIZED ANXIETY DISORDER): ICD-10-CM

## 2024-02-01 DIAGNOSIS — F43.10 PTSD (POST-TRAUMATIC STRESS DISORDER): Primary | ICD-10-CM

## 2024-02-01 DIAGNOSIS — F32.1 CURRENT MODERATE EPISODE OF MAJOR DEPRESSIVE DISORDER, UNSPECIFIED WHETHER RECURRENT (H): ICD-10-CM

## 2024-02-01 PROCEDURE — 99214 OFFICE O/P EST MOD 30 MIN: CPT | Mod: 95 | Performed by: NURSE PRACTITIONER

## 2024-02-01 ASSESSMENT — PATIENT HEALTH QUESTIONNAIRE - PHQ9
SUM OF ALL RESPONSES TO PHQ QUESTIONS 1-9: 8
10. IF YOU CHECKED OFF ANY PROBLEMS, HOW DIFFICULT HAVE THESE PROBLEMS MADE IT FOR YOU TO DO YOUR WORK, TAKE CARE OF THINGS AT HOME, OR GET ALONG WITH OTHER PEOPLE: NOT DIFFICULT AT ALL

## 2024-02-01 NOTE — NURSING NOTE
Is the patient currently in the state of [place]? YES    Visit mode:TELEPHONE    If the visit is dropped, the patient can be reconnected by: TELEPHONE VISIT: Phone number: [phone]    Will anyone else be joining the visit? No  (If patient encounters technical issues they should call [phone])    How would you like to obtain your AVS? MyChart    Are changes needed to the allergy or medication list? Yes Medication flagged for removal and Pt stated no changes to allergies    Unable to complete PHQ and PROMIS qnrs due to time.    Reason for visit: RECHKONG Barboza, VVF

## 2024-02-01 NOTE — PATIENT INSTRUCTIONS
"The [facility] Program  What to Expect  Here's what to expect in the Panel Psychiatry Program.   About the program  You'll be meeting with a psychiatric doctor to check your mental health. A psychiatric doctor helps you deal with troubling thoughts and feelings by giving you medicine. They'll make sure you know the plan for your care. You may see them for a long time. When you're feeling better, they may refer you back to seeing your family doctor.   If you have any questions, we'll be glad to talk to you.  About visits  Be open  At your visits, please talk openly about your problems. It may feel hard, but it's the best way for us to help you.  Cancelling visits  If you can't come to your visit, please call us right away at [phone]. If you don't cancel at least 24 hours (1 full day) before your visit, that's \"late cancellation.\"  Not showing up for your visits  Being very late is the same as not showing up. You'll be a \"no show\" if:  You're more than 15 minutes late for a 30-minute (half hour) visit.  You're more than 30 minutes late for a 60-minute (full hour) visit.  If you cancel late or don't show up 2 times within 6 months, we may end your care.  Getting help between visits  If you need help between visits, you can call us Monday to Friday from 8 a.m. to 4:30 p.m. at [phone].  Emergency care  Call 911 or go to the nearest emergency department if your life or someone else's life is in danger.  Call 988 anytime to reach the national Suicide and Crisis hotline.  Medicine refills  To refill your medicine, call your pharmacy. You can also call [facility]'s [facility] at [phone], Monday to Friday, 8 a.m. to 4:30 p.m. It can take 1 to 3 business days to get a refill.   Forms, letters, and tests  You may have papers to fill out, like FMLA, short-term disability, and workability. We can help you with these forms at your visits, but you must have an appointment. You may need more " than 1 visit for this, to be in an intensive therapy program, or both.  Before we can give you medicine for ADHD, we may refer you to get tested for it or confirm it another way.  We may not be able to give you an emotional support animal letter.  We don't do mental health checks ordered by the court.   We don't do mental health testing, but we can refer you to get tested.   Thank you for choosing us for your care.  For informational purposes only. Not to replace the advice of your health care provider. Copyright   2022 [facility]. All rights reserved. [employer] [id number] - 12/22      After Visit Summary   Continue medications as prescribed  Have your pharmacy contact us for a refill if you are running low on medications (We may ask you to come into clinic to get a refill from the nurse  No Alcohol or drug use  No driving if sedated  Call the clinic with any questions or concerns   Reach out for help if you feel like hurting yourself or others ([facility] [phone]: [address], [address] or [facility] Suicide Hotline   [phone] , call 911 or go to nearest Emergency room     Crisis Resources:    Present to the Emergency Department as needed or call after hours crisis line at [phone] or [phone].   [place] Crisis Text Line: Text [place] to 472513.  Suicide LifeLine Chat: suicidepreventionlifeline.org/chat/.  National Suicide Prevention Lifeline: 807.290.4186 (TTY: 246.782.3603). Call anytime for help.  (www.suicidepreventionlifeline.org)  National [place] [employer] Mental Illness (www.norma.org): 435.739.3671 or 086-499-7221.  Mental Health Association (www.mentalhealth.org): 607.362.4468 or 417-101-0032.       Follow up as directed, for your appointments, per your After Visit Summary Form.

## 2024-02-01 NOTE — PROGRESS NOTES
Virtual Visit Details    Type of service:  Video Visit       Answers submitted by the patient for this visit:  Patient Health Questionnaire (Submitted on 1/31/2024)  If you checked off any problems, how difficult have these problems made it for you to do your work, take care of things at home, or get along with other people?: Not difficult at all  PHQ9 TOTAL SCORE: 8

## 2024-02-01 NOTE — PROGRESS NOTES
"  The patient has been notified of following:      \"This virtual  visit will be conducted via a call between you and your physician/provider. We have found that certain health care needs can be provided without the need for a physical exam.  This service lets us provide the care you need virtually/via video   If a prescription is necessary we can send it directly to your pharmacy.  If lab work is needed we can place an order for that and you can then stop by our lab to have the test done at a later time.     Virtual/Video visits are billed at different rates depending on your insurance coverage.Some insurers they may be billed the same as an in-person visit.  Please reach out to your insurance provider with any questions.    Patient has given verbal consent for virtual  visit : Yes      Ho w would you like to obtain your AVS? Mail a copy  If the video visit is dropped, the invitation should be resent by: Send to e-mail at: [email or web address]  Will anyone else be joining your video visit? No            Psychiatric  Out- Patient  Follow Up Progress Note  Date of visit:2/1/2024           Discussion of Care and Treatment Recommendations:   This is a 68 year old female with history of depression and PTSD presenting to our virtual clinic for a follow up appointment     PCP managing sleep :  Prescribing  Lorazepam ,Seroquel and Ambien   PCP managing Gabapentin for neurological issues .   .      Last visit 10/25/2023.  Recommendation at last visit .  1.MDD/Anxiety/PTSD : Had stopped seeing a therapist but agreeable to restart ambulatory referral to psychotherapy made.  2.MDD/Anxiety /PTSDHighly recommend : Community Support groups -  3.MDD/Anxiety : Continue Cymbalta  down to  30 mg daily    4. RTC- 8  weeks, call in between visit with any question  Patient and I reviewed diagnosis and treatment plan and patient agrees with following recommendations:  Ongoing education given regarding diagnostic and treatment options with " adequate verbalization of understanding.  Plan   1.MDD/Anxiety/PTSD : Had stopped seeing a therapist but agreeable to restart ambulatory referral to psychotherapy made.  2.MDD/Anxiety /PTSDHighly recommend : Community Support groups -  3.MDD/Anxiety : Continue Cymbalta  down to  30 mg daily    4. 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)    Malignant neoplasm of breast (H)    Posttraumatic hematoma of right breast    Postoperative pain    Pain in right axilla    Constipation due to outlet dysfunction    Logorrhea    Type 2 diabetes mellitus without complication, without long-term current use of insulin (H)    Rotator cuff tear    Anxiety    Scalp irritation    Abnormal MRI    Diabetes 1.5, managed as type 2 (H)    H/O breast surgery    Advanced directives, counseling/discussion    Vitamin D deficiency disease    Moderate major depression (H)    Concussion with loss of consciousness    CKD (chronic kidney disease) stage 3, GFR 30-59 ml/min (H)    Concussion without loss of consciousness    Major depression, recurrent (H)             Chief Complaint / Subjective:    Chief complaint: Depression     History of Present Illness:   Per patient's statement : Reports doing better than she did during our last visit.  Taking current medications denies side effects.  Some anxiety about upcoming appointments but handling everything well.  Feels safe.  Denies SI.        Answers submitted by the patient for this visit:  Patient Health Questionnaire (Submitted on 2/1/2024)  If you checked off any problems, how difficult have these problems made it for you to do your work, take care of things at home, or get along with other people?: Not difficult at all  PHQ9 TOTAL SCORE: 8    Mental Status Examination:   Appearance: Well groomed, good eye contact  "  Orientation: Patient alert and oriented to person, place, time, and situation  Reliability:  Patient appears to be an adequate historian.    Behavior: cooperative   Speech: Speech is spontaneous and coherent, with a normal rate, rhythm and tone.    Language:There are no difficulties with expressive or receptive language as observed throughout the interview.    Mood: Described as \"ok\".    Affect: congruent   Judgement: Able to make basic decision regarding safety.  Insight: Good awareness of physical and mental health conditions and aware of needs around care for these.  Gait and station: unable to assess  Thought process: Logical   Thought content: No evidence of delusions or paranoia.    Hallucinations : No evidence of any hallucination  Thought content: No evidence of delusions or paranoia.   Suicidal /Homical Ideations:  No thoughts of self harm or suicide. No thoughts of harming others.  Associations: Connected  Fund of knowledge: Average  Attention / Concentration: Able to remain focused during the interview with minimal distractibility or need for redirection.  Short Term Memory: Grossly intact as evidence by client recalling themes and ideas discussed.  Long Term Memory: Intact  Motor Status: unable to asse    Drug/treatment history and current pattern of use:   Denies      Medication changes: See Above   Medication adherence: compliant  Medication side effects: absent  Information about medications: Side effects, benefits and alternative treatments discussed and patient agrees .    Psychotherapy: Supportive therapy day-to-day living    Education: Diet, exercise, abstinence from drugs and alcohol, patient will not drive if sedated and medications or  under influence of any substance    Lab Results:   Personally reviewed and discussed with the patient    Lab Results   Component Value Date    WBC 5.1 10/31/2023    HGB 13.4 10/31/2023    HCT 40.9 10/31/2023     10/31/2023    CHOL 199 10/31/2023    TRIG " 102 10/31/2023    HDL 58 10/31/2023    ALT 28 06/08/2022    AST 20 06/08/2022     10/31/2023    BUN 17.9 10/31/2023    CO2 24 10/31/2023    TSH 0.84 12/06/2019    MICROALBUR 0.79 06/08/2022       Vital signs:  There were no vitals taken for this visit.  Telemedicine visit-no vital signs completed  Allergies: Aztreonam, Castor oil, Cefaclor, Cephalexin, Cephalexin monohydrate [cephalexin], Chlorhexidine, Ciprofloxacin, Clarithromycin, Clindamycin, Penicillins, Propofol, Scopolamine, Sulfa (sulfonamide antibiotics) [sulfa antibiotics], Sulfasalazine, Tetracyclines & related, Vancomycin, Adhesive [cyanoacrylate], Cytoxan [cyclophosphamide], Erythromycin base [erythromycin], Hydrocodone, Neupogen [filgrastim], Paper tape [adhesive tape], Tapentadol, Taxol [paclitaxel], and Taxotere [docetaxel]         Medications:     Current Outpatient Medications   Medication    acetaminophen (TYLENOL) 500 MG tablet    azithromycin (ZITHROMAX) 500 MG tablet    baclofen (LIORESAL) 10 MG tablet    betamethasone, augmented, (DIPROLENE) 0.05 % lotion    blood glucose test (CONTOUR NEXT TEST STRIPS) strips    blood-glucose meter (CONTOUR NEXT METER) Misc    calcium citrate-vitamin D (CITRACAL+D) 315-200 mg-unit per tablet    Cholecalciferol (VITAMIN D3) 25 MCG TABS    DULoxetine (CYMBALTA) 30 MG capsule    generic lancets (MICROLET LANCET)    lidocaine (LIDODERM) 5 % patch    lisinopril (ZESTRIL) 2.5 MG tablet    LORazepam (ATIVAN) 0.5 MG tablet    LORazepam (ATIVAN) 1 MG tablet    metFORMIN (GLUCOPHAGE XR) 500 MG 24 hr tablet    naproxen sodium (ALEVE) 220 MG tablet    oxyCODONE (ROXICODONE) 5 MG immediate release tablet    QUEtiapine (SEROQUEL) 25 MG tablet    simvastatin (ZOCOR) 5 MG tablet    zolpidem (AMBIEN) 10 MG tablet     No current facility-administered medications for this visit.               Medication adherence: Reviewed risk/benefits of medication , Patient able to verbalize understanding of side effects and Patient  verbally consents to taking medications      PSYCHOEDUCATION:  Medication side effects and alternatives reviewed. Health promotion activities recommended and reviewed today. All questions addressed. Education and counseling completed regarding risks and benefits of medications and psychotherapy options.  Consent provided by patient/guardian  Call the psychiatric nurse line with medication questions or concerns at [phone].  Kalistickhart may be used to communicate with your provider, but this is not intended to be used for emergencies.  SEROTONIN SYNDROME:  Discussed risks of Serotonin syndrome (ie, serotonin toxicity) which is a potentially life-threatening condition associated with increased serotonergic activity in the central nervous system (CNS). It is seen with therapeutic medication use, inadvertent interactions between drugs, and intentional self-poisoning. Serotonin syndrome may involve a spectrum of clinical findings, which often include mental status changes, autonomic hyperactivity, and neuromuscular abnormalities.    STIMULANT THERAPY: Side effects discussed including but not limited to cardiac (including HTN, tachycardia, sudden death), motor/tic, appetite/growth, mood lability and sleep disruption. This is a controlled substance with risk for abuse, need to keep in a safe keep place and cannot replace lost scripts  HARM REDUCTION:  Discussions regarding effects of mood altering substances, alcohol and cannabis, on mood and that approach is harm reduction, will continue to prescribe meds as they work to cut back use.    SAFETY:  We all care about your loved one's safety. To reduce the risk of self-harm, remove access to all:  Firearms, Medicines (both prescribed and over-the-counter), Knives and other sharp objects, Ropes and like materials, and Alcohol  SLEEP HYGIENE: establish a sleep routine, limit screen time 1 hour prior to bed, use bed for sleep only, take sleep/medications on time (including  sleepy time tea, trazadone or herbal treatments such as melatonin), aroma therapy, limit caffeine/sugar, yoga, guided imagery, stretch, meditation, limit naps to 20 minutes, make a temperature change in the room, white noise, be mindful of slowing down breathing, take a warm bath/shower, frequently wash sheets, and journaling.   Medlineplus.gov is information for patients.  It is run by the [employer] and it contains information about all disorders, diseases and all medications.              Review of Systems:      ROS:    Subjective Data Only- Tele-Health Visit    10 point ROS was negative except for the items listed in HPI.      Crisis Resources:    Present to the Emergency Department as needed or call after hours crisis line at [phone] or [phone].   [place] Crisis Text Line: Text [place] to 254090.  Suicide LifeLine Chat: suicidepreBeehiveID.org/chat/.  National Suicide Prevention Lifeline: 878.924.3976 (TTY: 975.383.9951). Call anytime for help.  (www.suicidepreventionlifeline.org)  National [place] on Mental Illness (www.norma.org): 185.511.6245 or 122-490-1313.  Mental Health Association (www.mentalhealth.org): 378.161.9070 or 935-554-7200.      Coordination of Care:   More than 30 minutes spent on this visit  with more than 50% of time spent on coordination of care including: Educating patient about diagnosis, prognosis, side effects and benefits of medications, diet, exercise.  Time also spent providing supportive therapy regarding above issues.    Video-Visit Details    Type of service:  Video Visit    Originating Location (pt. Location): Home    Distant Location (provider location): Providers Remote Office     Platform used for Video Visit: Marco      This note was created using a dictation system. All typing errors or contextual distortion is unintentional and software inherent.  Start Time : 1600  End time :  1630

## 2024-02-02 ENCOUNTER — OFFICE VISIT (OUTPATIENT)
Dept: PHYSICAL MEDICINE AND REHAB | Facility: CLINIC | Age: 69
End: 2024-02-02
Payer: MEDICARE

## 2024-02-02 VITALS — HEART RATE: 121 BPM | SYSTOLIC BLOOD PRESSURE: 115 MMHG | DIASTOLIC BLOOD PRESSURE: 66 MMHG

## 2024-02-02 DIAGNOSIS — M79.18 MYOFASCIAL PAIN: ICD-10-CM

## 2024-02-02 DIAGNOSIS — R91.8 MASS OF RIGHT LUNG: ICD-10-CM

## 2024-02-02 DIAGNOSIS — M47.816 LUMBAR FACET ARTHROPATHY: ICD-10-CM

## 2024-02-02 DIAGNOSIS — M54.50 LUMBAR SPINE PAIN: Primary | ICD-10-CM

## 2024-02-02 DIAGNOSIS — Z98.1 HISTORY OF LUMBAR SPINAL FUSION: ICD-10-CM

## 2024-02-02 DIAGNOSIS — M54.2 CERVICAL SPINE PAIN: ICD-10-CM

## 2024-02-02 DIAGNOSIS — R20.2 PARESTHESIA OF BOTH HANDS: ICD-10-CM

## 2024-02-02 PROCEDURE — 99214 OFFICE O/P EST MOD 30 MIN: CPT | Performed by: PHYSICAL MEDICINE & REHABILITATION

## 2024-02-02 ASSESSMENT — PAIN SCALES - GENERAL: PAINLEVEL: EXTREME PAIN (8)

## 2024-02-02 NOTE — PATIENT INSTRUCTIONS
A physical therapy order was provided for you today.  You will be contacted by physical therapy.  If nobody contacts you within 3 to 5 days, please contact the clinic at [phone].  It will be very important for you to do your physical therapy exercises on a regular basis to decrease your pain and prevent future pain flares.   A Lumbar medial branch block has been ordered today. Please schedule this injection at least  2 weeks from now to allow time for insurance prior authorization. On the day of your injection, you cannot be sick or taking antibiotics. If you become sick and are prescribed, please call the clinic so your injection can be rescheduled for once you have completed your antibiotics. You will need to bring a  with you for your injection. If you have any questions or concerns prior to your injection, please do not hesitate to call the nurse navigation line at [phone].   An CT was ordered for you   You will be contacted by scheduling within 3 days.    If you are not contacted, please call Radiology at [phone].
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
## 2024-02-13 ENCOUNTER — HOSPITAL ENCOUNTER (OUTPATIENT)
Dept: CT IMAGING | Facility: HOSPITAL | Age: 69
Discharge: HOME | End: 2024-02-13
Attending: PHYSICAL MEDICINE & REHABILITATION | Admitting: PHYSICAL MEDICINE & REHABILITATION
Payer: MEDICARE

## 2024-02-13 DIAGNOSIS — R91.8 MASS OF RIGHT LUNG: ICD-10-CM

## 2024-02-13 PROCEDURE — 71250 CT THORAX DX C-: CPT

## 2024-02-17 DIAGNOSIS — E11.9 TYPE 2 DIABETES MELLITUS WITHOUT COMPLICATION, WITHOUT LONG-TERM CURRENT USE OF INSULIN (H): ICD-10-CM

## 2024-02-19 RX ORDER — LISINOPRIL 2.5 MG/1
2.5 TABLET ORAL DAILY
Qty: 90 TABLET | Refills: 0 | Status: SHIPPED | OUTPATIENT
Start: 2024-02-19 | End: 2024-05-30

## 2024-02-21 ENCOUNTER — THERAPY VISIT (OUTPATIENT)
Dept: PHYSICAL THERAPY | Facility: REHABILITATION | Age: 69
End: 2024-02-21
Attending: PHYSICAL MEDICINE & REHABILITATION
Payer: MEDICARE

## 2024-02-21 DIAGNOSIS — M54.50 LUMBAR SPINE PAIN: Primary | ICD-10-CM

## 2024-02-21 DIAGNOSIS — Z98.1 HISTORY OF LUMBAR SPINAL FUSION: ICD-10-CM

## 2024-02-21 DIAGNOSIS — M47.816 LUMBAR FACET ARTHROPATHY: ICD-10-CM

## 2024-02-21 DIAGNOSIS — M79.18 MYOFASCIAL PAIN: ICD-10-CM

## 2024-02-21 PROCEDURE — 97161 PT EVAL LOW COMPLEX 20 MIN: CPT | Mod: GP | Performed by: PHYSICAL THERAPIST

## 2024-02-21 PROCEDURE — 97535 SELF CARE MNGMENT TRAINING: CPT | Mod: GP | Performed by: PHYSICAL THERAPIST

## 2024-02-21 PROCEDURE — 97110 THERAPEUTIC EXERCISES: CPT | Mod: GP | Performed by: PHYSICAL THERAPIST
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
## 2024-02-22 ENCOUNTER — RADIOLOGY INJECTION OFFICE VISIT (OUTPATIENT)
Dept: PHYSICAL MEDICINE AND REHAB | Facility: CLINIC | Age: 69
End: 2024-02-22
Attending: PHYSICAL MEDICINE & REHABILITATION
Payer: MEDICARE

## 2024-02-22 VITALS
OXYGEN SATURATION: 94 % | RESPIRATION RATE: 16 BRPM | SYSTOLIC BLOOD PRESSURE: 124 MMHG | TEMPERATURE: 97.5 F | HEART RATE: 100 BPM | DIASTOLIC BLOOD PRESSURE: 78 MMHG

## 2024-02-22 DIAGNOSIS — M47.816 LUMBAR FACET ARTHROPATHY: ICD-10-CM

## 2024-02-22 PROCEDURE — 64494 INJ PARAVERT F JNT L/S 2 LEV: CPT | Mod: 50 | Performed by: PAIN MEDICINE

## 2024-02-22 PROCEDURE — 64493 INJ PARAVERT F JNT L/S 1 LEV: CPT | Mod: 50 | Performed by: PAIN MEDICINE

## 2024-02-22 RX ORDER — BUPIVACAINE HYDROCHLORIDE 5 MG/ML
INJECTION, SOLUTION EPIDURAL; INTRACAUDAL
Status: COMPLETED | OUTPATIENT
Start: 2024-02-22 | End: 2024-02-22

## 2024-02-22 RX ORDER — LIDOCAINE HYDROCHLORIDE 10 MG/ML
INJECTION, SOLUTION EPIDURAL; INFILTRATION; INTRACAUDAL; PERINEURAL
Status: COMPLETED | OUTPATIENT
Start: 2024-02-22 | End: 2024-02-22

## 2024-02-22 RX ADMIN — BUPIVACAINE HYDROCHLORIDE 3 ML: 5 INJECTION, SOLUTION EPIDURAL; INTRACAUDAL at 14:47

## 2024-02-22 RX ADMIN — LIDOCAINE HYDROCHLORIDE 5 ML: 10 INJECTION, SOLUTION EPIDURAL; INFILTRATION; INTRACAUDAL; PERINEURAL at 14:46

## 2024-02-22 ASSESSMENT — PAIN SCALES - GENERAL
PAINLEVEL: SEVERE PAIN (7)
PAINLEVEL: MODERATE PAIN (4)

## 2024-02-22 NOTE — PATIENT INSTRUCTIONS
Please complete your pain diary and return the diary to the Spine Center at your earliest convenience.  The Spine Center will contact you to schedule your next appointment after your pain diary is reviewed by your doctor.  Thank you.    DISCHARGE INSTRUCTIONS    During office hours (8:00 a.m.- 4:00 p.m.) questions or concerns may be answered  by calling Spine Center Navigation Nurses at  [phone].  Messages received after hours will be returned the following business day.      In the case of an emergency, please dial 911 or seek assistance at the nearest Emergency Room/Urgent Care facility.     All Patients:  You may experience an increase in your symptoms for the first 2 days, once the numbing medication wears off.    You may resume your regular medication, no pain medication until you have completed your diary    You may shower. No swimming, tub bath or hot tub for 24 hours; remove bandage after 4 hours    Continue your activities that can cause you pain to test the blocks.                         You should not drive for the next 3 to 5 hours (have someone drive you)           POSSIBLE PROCEDURE SIDE EFFECTS  -Call Spine Center if concerned-    Increased Pain  Increased numbness/tingling     Nausea/Vomiting  Bruising/bleeding at site (hematoma)             Swelling at site (edema) Headache  Difficulty walking  Infection        Fever greater than 100.5

## 2024-02-23 ENCOUNTER — TELEPHONE (OUTPATIENT)
Dept: PHYSICAL MEDICINE AND REHAB | Facility: CLINIC | Age: 69
End: 2024-02-23
Payer: MEDICARE

## 2024-02-23 DIAGNOSIS — K59.9 BOWEL DYSFUNCTION: ICD-10-CM

## 2024-02-23 DIAGNOSIS — N31.9 BLADDER DYSFUNCTION: Primary | ICD-10-CM

## 2024-02-23 NOTE — TELEPHONE ENCOUNTER
I been in contact with the patient's physical therapist who recommended pelvic floor/women's health PT to help with some of the bowel and bladder dysfunction.  Order has been placed.

## 2024-02-27 ENCOUNTER — TELEPHONE (OUTPATIENT)
Dept: PHYSICAL MEDICINE AND REHAB | Facility: CLINIC | Age: 69
End: 2024-02-27
Payer: MEDICARE

## 2024-02-27 NOTE — TELEPHONE ENCOUNTER
Attempted to contact patient, but she was unreachable at this time.  A message was left encouraging the patient to call back at her earliest convenience to discuss her response to the lumbar medial branch blocks that were recently completed by Dr. Kelley at the Spine Center.  This information will be needed in order for Dr. Collins to make the proper care plan recommendations going forward.  The patient, unfortunately, left her pain diary at the Spine Center following the procedure appointment on 2/22/24.

## 2024-02-29 NOTE — TELEPHONE ENCOUNTER
Patient returned call and was able to provide pain ratings for pain diary from recent medial branch block procedure.  Form was scanned in and forwarded to Dr. Collins for his review and recommendations.

## 2024-03-01 ENCOUNTER — TELEPHONE (OUTPATIENT)
Dept: PHYSICAL MEDICINE AND REHAB | Facility: CLINIC | Age: 69
End: 2024-03-01
Payer: MEDICARE

## 2024-03-01 NOTE — TELEPHONE ENCOUNTER
----- Message from Kadeem Kelley DO sent at 2/29/2024  3:30 PM CST -----  Regarding: RE: MBB/RF Process  I recommend the patient follow-up with Dr. Collins as she did not meet criteria for moving forward.  ----- Message -----  From: Breyer, Nathan J, RN  Sent: 2/29/2024   3:17 PM CST  To: Chapito Collins DO; #  Subject: FW: MBB/RF Process                               Pain diary received and scanned into chart.  Please review and forward recommendation to Procedure Support Pool in-basket.  Thanks!    ----- Message -----  From: Breyer, Nathan J, RN  Sent: 2/27/2024   2:43 PM CST  To: [facility] Procedure Support Pool  Subject: FW: MBB/RF Process                               Message left for patient to call back to discuss MBB results as she left her paperwork at the clinic after her procedure.    ----- Message -----  From: Breyer, Nathan J, RN  Sent: 2/22/2024   2:04 PM CST  To: [facility] Procedure Support Pool  Subject: MBB/RF Process                                   Patient had 1st diagnostic bilateral L3, L4, L5 MBBs on 2/22/24 by Dr. Kelley as ordered by Dr. Collins.  Awaiting return of pain diary at this time.

## 2024-03-01 NOTE — TELEPHONE ENCOUNTER
Sent [employer] message to pt with Dr. Kelley recommendation. Provided number for [facility] and  to schedule follow up.

## 2024-03-04 DIAGNOSIS — M79.18 MYOFASCIAL PAIN: ICD-10-CM

## 2024-03-04 DIAGNOSIS — M54.50 LUMBAR SPINE PAIN: ICD-10-CM

## 2024-03-04 DIAGNOSIS — F32.89 OTHER DEPRESSION: ICD-10-CM

## 2024-03-04 RX ORDER — BACLOFEN 10 MG/1
5-10 TABLET ORAL 2 TIMES DAILY PRN
Qty: 30 TABLET | Refills: 1 | Status: SHIPPED | OUTPATIENT
Start: 2024-03-04 | End: 2024-03-12

## 2024-03-05 NOTE — TELEPHONE ENCOUNTER
Date of Last Office Visit: 2/1/24  Date of Next Office Visit: None - RN forwarding encounter to [facility] for scheduling   No shows since last visit: 0  Cancellations since last visit: 0      Medication requested: DULoxetine (CYMBALTA) 30 MG capsule  Date last ordered: 12/28/23 Qty: 30 Refills: 0     Review of MN ?: NA     Lapse in medication adherence greater than 5 days?: Possibly 39 days  If yes, call patient and gather details: RN called and reached Voice mail without identifier. RN left [employer] message for patient.    Medication refill request verified as identical to current order?: Yes  Result of Last DAM, VPA, Li+ Level, CBC, or Carbamazepine Level (at or since last visit): N/A    Last visit treatment plan: 2//1/24  Psychiatric  Out- Patient  Follow Up Progress Note  Date of visit:2/1/2024             Discussion of Care and Treatment Recommendations:   This is a 68 year old female with history of depression and PTSD presenting to our virtual clinic for a follow up appointment     PCP managing sleep :  Prescribing  Lorazepam ,Seroquel and Ambien   PCP managing Gabapentin for neurological issues .   .        Last visit 10/25/2023.  Recommendation at last visit .  1.MDD/Anxiety/PTSD : Had stopped seeing a therapist but agreeable to restart ambulatory referral to psychotherapy made.  2.MDD/Anxiety /PTSDHighly recommend : Community Support groups -  3.MDD/Anxiety : Continue Cymbalta  down to  30 mg daily    4. RTC- 8  weeks, call in between visit with any question  Patient and I reviewed diagnosis and treatment plan and patient agrees with following recommendations:  Ongoing education given regarding diagnostic and treatment options with adequate verbalization of understanding.  Plan   1.MDD/Anxiety/PTSD : Had stopped seeing a therapist but agreeable to restart ambulatory referral to psychotherapy made.  2.MDD/Anxiety /PTSDHighly recommend : Community Support groups -  3.MDD/Anxiety : Continue Cymbalta  down to  30  mg daily    4. RTC- 8  weeks, call in between visit with any question    []Medication refilled per  Medication Refill in Ambulatory Care  policy.  [x]Medication unable to be refilled by RN due to criteria not met as indicated below:    []Eligibility - not seen in the last year   [x]Supervision - no future appointment   [x]Compliance - no shows, cancellations or lapse in therapy   []Verification - order discrepancy   []Controlled medication   []Medication not included in policy   [x]90-day supply request   []Other

## 2024-03-06 RX ORDER — DULOXETIN HYDROCHLORIDE 30 MG/1
30 CAPSULE, DELAYED RELEASE ORAL DAILY
Qty: 90 CAPSULE | Refills: 0 | Status: SHIPPED | OUTPATIENT
Start: 2024-03-06 | End: 2024-04-17

## 2024-03-08 ENCOUNTER — THERAPY VISIT (OUTPATIENT)
Dept: PHYSICAL THERAPY | Facility: REHABILITATION | Age: 69
End: 2024-03-08
Payer: MEDICARE

## 2024-03-08 DIAGNOSIS — M54.50 LUMBAR SPINE PAIN: Primary | ICD-10-CM

## 2024-03-08 DIAGNOSIS — M47.816 LUMBAR FACET ARTHROPATHY: ICD-10-CM

## 2024-03-08 DIAGNOSIS — Z98.1 HISTORY OF LUMBAR SPINAL FUSION: ICD-10-CM

## 2024-03-08 DIAGNOSIS — M79.18 MYOFASCIAL PAIN: ICD-10-CM

## 2024-03-08 PROCEDURE — 97112 NEUROMUSCULAR REEDUCATION: CPT | Mod: GP

## 2024-03-08 PROCEDURE — 97110 THERAPEUTIC EXERCISES: CPT | Mod: GP

## 2024-03-12 DIAGNOSIS — M54.50 LUMBAR SPINE PAIN: ICD-10-CM

## 2024-03-12 DIAGNOSIS — M79.18 MYOFASCIAL PAIN: ICD-10-CM

## 2024-03-12 RX ORDER — BACLOFEN 10 MG/1
5-10 TABLET ORAL 2 TIMES DAILY PRN
Qty: 180 TABLET | Refills: 1 | Status: SHIPPED | OUTPATIENT
Start: 2024-03-12 | End: 2024-09-06

## 2024-03-12 NOTE — TELEPHONE ENCOUNTER
PSP: Dr. Collins    Pharmacy is requesting for a 90 day supply of baclofen, #180.     Pharmacy sent refill request for baclofen   --Med last Rx 3/4/24 #30, 1 refill   --Last OV 2/2/24 with Dr. Collins   --Future appt: none  --Please advise

## 2024-03-25 ENCOUNTER — MYC MEDICAL ADVICE (OUTPATIENT)
Dept: SURGERY | Facility: CLINIC | Age: 69
End: 2024-03-25

## 2024-03-28 ENCOUNTER — THERAPY VISIT (OUTPATIENT)
Dept: PHYSICAL THERAPY | Facility: REHABILITATION | Age: 69
End: 2024-03-28
Payer: MEDICARE

## 2024-03-28 DIAGNOSIS — M54.50 LUMBAR SPINE PAIN: Primary | ICD-10-CM

## 2024-03-28 PROCEDURE — 97110 THERAPEUTIC EXERCISES: CPT | Mod: GP | Performed by: PHYSICAL THERAPIST

## 2024-04-08 DIAGNOSIS — Z76.0 ENCOUNTER FOR MEDICATION REFILL: ICD-10-CM

## 2024-04-08 DIAGNOSIS — E11.9 TYPE 2 DIABETES MELLITUS WITHOUT COMPLICATION, WITHOUT LONG-TERM CURRENT USE OF INSULIN (H): ICD-10-CM

## 2024-04-08 RX ORDER — METFORMIN HCL 500 MG
1500 TABLET, EXTENDED RELEASE 24 HR ORAL
Qty: 270 TABLET | Refills: 0 | Status: SHIPPED | OUTPATIENT
Start: 2024-04-08 | End: 2024-06-20

## 2024-04-10 ENCOUNTER — PRE VISIT (OUTPATIENT)
Dept: SURGERY | Facility: CLINIC | Age: 69
End: 2024-04-10

## 2024-04-10 NOTE — TELEPHONE ENCOUNTER
Diagnosis, Referred by & from: Rectal Prolapse   Appt date: 7/3/2024   NOTES STATUS DETAILS   OFFICE NOTE from referring provider Internal [facility]:  10/31/23,11/15/22 - PCC OV with Dr. Morales   OFFICE NOTE from other specialist Internal [facility]:  2/2/24, 12/27/23 - PMR OV with Dr. Collins   DISCHARGE SUMMARY from hospital Received    DISCHARGE REPORT from the ER N/A    OPERATIVE REPORT N/A    MEDICATION LIST Internal    LABS N/A    DIAGNOSTIC PROCEDURES N/A    IMAGING (DISC & REPORT)      XRAY Internal [facility]:  12/16/20 - XR Abdomen

## 2024-04-16 ASSESSMENT — PATIENT HEALTH QUESTIONNAIRE - PHQ9
SUM OF ALL RESPONSES TO PHQ QUESTIONS 1-9: 7
10. IF YOU CHECKED OFF ANY PROBLEMS, HOW DIFFICULT HAVE THESE PROBLEMS MADE IT FOR YOU TO DO YOUR WORK, TAKE CARE OF THINGS AT HOME, OR GET ALONG WITH OTHER PEOPLE: NOT DIFFICULT AT ALL
SUM OF ALL RESPONSES TO PHQ QUESTIONS 1-9: 7

## 2024-04-17 ENCOUNTER — VIRTUAL VISIT (OUTPATIENT)
Dept: PSYCHIATRY | Facility: CLINIC | Age: 69
End: 2024-04-17
Payer: MEDICARE

## 2024-04-17 DIAGNOSIS — F41.1 GAD (GENERALIZED ANXIETY DISORDER): Primary | ICD-10-CM

## 2024-04-17 DIAGNOSIS — F43.10 PTSD (POST-TRAUMATIC STRESS DISORDER): ICD-10-CM

## 2024-04-17 DIAGNOSIS — F32.1 CURRENT MODERATE EPISODE OF MAJOR DEPRESSIVE DISORDER, UNSPECIFIED WHETHER RECURRENT (H): ICD-10-CM

## 2024-04-17 PROCEDURE — 99442 PR PHYSICIAN TELEPHONE EVALUATION 11-20 MIN: CPT | Mod: FQ | Performed by: NURSE PRACTITIONER

## 2024-04-17 RX ORDER — DULOXETIN HYDROCHLORIDE 30 MG/1
30 CAPSULE, DELAYED RELEASE ORAL DAILY
Qty: 90 CAPSULE | Refills: 0 | Status: SHIPPED | OUTPATIENT
Start: 2024-04-17 | End: 2024-06-12

## 2024-04-17 ASSESSMENT — PAIN SCALES - GENERAL: PAINLEVEL: MODERATE PAIN (5)

## 2024-04-17 NOTE — PROGRESS NOTES
"  The patient has been notified of following:      \"This telephone visit will be conducted via a call between you and your physician/provider. We have found that certain health care needs can be provided without the need for a physical exam.  This service lets us provide the care you need with a short phone conversation.  If a prescription is necessary we can send it directly to your pharmacy.  If lab work is needed we can place an order for that and you can then stop by our lab to have the test done at a later time.     Telephone visits are billed at different rates depending on your insurance coverage. During this emergency period, for some insurers they may be billed the same as an in-person visit.  Please reach out to your insurance provider with any questions.     If during the course of the call the physician/provider feels a telephone visit is not appropriate, you will not be charged for this service.\"     Patient has given verbal consent to a Telephone visit? Yes     Will anyone else be joining the visit? No        Patient would like to receive their AVS by AVS P/reference: Mail a copy      Psychiatric  Out patient Follow Up Progress Note  Date of visit:4/17/2024           Discussion of Care and Treatment Recommendations:   This is a 68 year old female with history of depression and PTSD presenting to our virtual clinic for a follow up appointment     PCP managing sleep :  Prescribing  Lorazepam ,Seroquel and Ambien   PCP managing Gabapentin for neurological issues .   ..      1.MDD/Anxiety/PTSD : Had stopped seeing a therapist but agreeable to restart ambulatory referral to psychotherapy made.  2.MDD/Anxiety /PTSDHighly recommend : Community Support groups -  3.MDD/Anxiety : Continue Cymbalta  down to  30 mg daily    4. RTC- 8  weeks, call in between visit with any question    Patient and I reviewed diagnosis and treatment plan and patient agrees with following recommendations:  Ongoing education given " regarding diagnostic and treatment options with adequate verbalization of understanding.  Plan   1.MDD/Anxiety/PTSD : Had stopped seeing a therapist but agreeable to restart ambulatory referral to psychotherapy made.  2.MDD/Anxiety /PTSDHighly recommend : Community Support groups -  3.MDD/Anxiety : Continue Cymbalta  down to  30 mg daily    4. 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)    Malignant neoplasm of breast (H)    Posttraumatic hematoma of right breast    Postoperative pain    Pain in right axilla    Constipation due to outlet dysfunction    Logorrhea    Type 2 diabetes mellitus without complication, without long-term current use of insulin (H)    Rotator cuff tear    Anxiety    Scalp irritation    Abnormal MRI    Diabetes 1.5, managed as type 2 (H)    H/O breast surgery    Advanced directives, counseling/discussion    Vitamin D deficiency disease    Moderate major depression (H)    Concussion with loss of consciousness    CKD (chronic kidney disease) stage 3, GFR 30-59 ml/min (H)    Concussion without loss of consciousness    Major depression, recurrent (H)             Chief Complaint / Subjective:    Chief complaint: Depression    History of Present Illness:   Per patient's statement : She reports compliance with current medications and denies side effects.  Discussed plan for the anniversary of her brother's death and she  had several days or 1 of good.  She however was able to overcome the sadness and has been doing well.  She is currently nursing a cold and is therefore feeling under the weather but as far as her mental health issues are concerned she reports doing fairly well and stable on current medication.  She would like to continue on current plan of care.    Mental Status Examination:     AppearAnswers submitted  "by the patient for this visit:  Patient Health Questionnaire (Submitted on 4/16/2024)  If you checked off any problems, how difficult have these problems made it for you to do your work, take care of things at home, or get along with other people?: Not difficult at all  PHQ9 TOTAL SCORE: 7  ance: unable to assess  Orientation: Patient alert and oriented to person, place, time, and situation  Reliability:  Patient appears to be an adequate historian.    Behavior: calm and coperative   Speech: Speech is spontaneous and coherent, with a normal rate, rhythm and tone.    Language:There are no difficulties with expressive or receptive language as observed throughout the interview.    Mood: Described as \"ok\".    Affect: unable to assess  Judgement: Able to make basic decision regarding safety.  Insight: Good awareness of physical and mental health conditions and aware of needs around care for these.  Gait and station: unable to assess  Thought process: Logical   Hallucinations : No evidence of any hallucination  Thought content: No evidence of delusions or paranoia.   Suicidal /Homical Ideations:  No thoughts of self harm or suicide. No thoughts of harming others.  Associations: Connected  Fund of knowledge: Average  Attention / Concentration: Able to remain focused during the interview with minimal distractibility or need for redirection.  Short Term Memory: Grossly intact as evidence by client recalling themes and ideas discussed.  Long Term Memory: Intact  Motor Status: unable to asses      Drug/treatment history and current pattern of use:   Denies     Medication changes: See Above   Medication adherence: compliant  Medication side effects: absent  Information about medications: Side effects, benefits and alternative treatments discussed and patient agrees .    Psychotherapy: Supportive therapy day-to-day living    Education: Diet, exercise, abstinence from drugs and alcohol, patient will not drive if sedated and " medications or  under influence of any substance    Lab Results:   Personally reviewed and discussed with the patient    Lab Results   Component Value Date    WBC 5.1 10/31/2023    HGB 13.4 10/31/2023    HCT 40.9 10/31/2023     10/31/2023    CHOL 199 10/31/2023    TRIG 102 10/31/2023    HDL 58 10/31/2023    ALT 28 06/08/2022    AST 20 06/08/2022     10/31/2023    BUN 17.9 10/31/2023    CO2 24 10/31/2023    TSH 0.84 12/06/2019    MICROALBUR 0.79 06/08/2022       Vital signs:  There were no vitals taken for this visit.  Unable to assess telephone visit  Allergies: Aztreonam, Castor oil, Cefaclor, Cephalexin, Cephalexin monohydrate [cephalexin], Chlorhexidine, Ciprofloxacin, Clarithromycin, Clindamycin, Penicillins, Propofol, Scopolamine, Sulfa (sulfonamide antibiotics) [sulfa antibiotics], Sulfasalazine, Tetracyclines & related, Vancomycin, Adhesive [cyanoacrylate], Cytoxan [cyclophosphamide], Erythromycin base [erythromycin], Hydrocodone, Neupogen [filgrastim], Paper tape [adhesive tape], Tapentadol, Taxol [paclitaxel], and Taxotere [docetaxel]           Review of Systems:      ROS:  Subjective data only - Tele-Health  Visit   10 point ROS was negative except for the items listed in HPI-              Medications:       No current facility-administered medications for this visit.     No current facility-administered medications for this visit.       Current Outpatient Medications   Medication Sig Dispense Refill    acetaminophen (TYLENOL) 500 MG tablet [ACETAMINOPHEN (TYLENOL) 500 MG TABLET] Take 1,000 mg by mouth 3 (three) times a day as needed for pain.      azithromycin (ZITHROMAX) 500 MG tablet Take 1 tablet (500 mg) by mouth daily 5 tablet 0    baclofen (LIORESAL) 10 MG tablet TAKE 0.5-1 TABLETS (5-10 MG) BY MOUTH 2 TIMES DAILY AS NEEDED FOR MUSCLE SPASMS 180 tablet 1    betamethasone, augmented, (DIPROLENE) 0.05 % lotion [BETAMETHASONE, AUGMENTED, (DIPROLENE) 0.05 % LOTION] Apply 1 application  topically 2 (two) times a day as needed. Apply to the back of the neck and upper back 60 mL 3    blood glucose test (CONTOUR NEXT TEST STRIPS) strips [BLOOD GLUCOSE TEST (CONTOUR NEXT TEST STRIPS) STRIPS] Use 1 each As Directed daily. 100 strip 3    blood-glucose meter (CONTOUR NEXT METER) Misc [BLOOD-GLUCOSE METER (CONTOUR NEXT METER) MISC] Check BG twice daily. 1 each 0    calcium citrate-vitamin D (CITRACAL+D) 315-200 mg-unit per tablet Take 1 tablet by mouth      Cholecalciferol (VITAMIN D3) 25 MCG TABS TAKE 2 TABLETS (2,000 UNITS TOTAL) BY MOUTH 2 (TWO) TIMES A DAY. *NOT COVERED* 360 tablet 3    DULoxetine (CYMBALTA) 30 MG capsule TAKE 1 CAPSULE BY MOUTH EVERY DAY 90 capsule 0    generic lancets (MICROLET LANCET) [GENERIC LANCETS (MICROLET LANCET)] Use 1 each As Directed daily. Dispense brand per patient's insurance at pharmacy discretion. 100 each 11    lisinopril (ZESTRIL) 2.5 MG tablet TAKE 1 TABLET BY MOUTH EVERY DAY 90 tablet 0    LORazepam (ATIVAN) 1 MG tablet TAKE 1 TABLET BY MOUTH EVERY 8 HOURS AS NEEDED FOR ANXIETY. 5 tablet 0    metFORMIN (GLUCOPHAGE XR) 500 MG 24 hr tablet TAKE 3 TABLETS (1,500 MG) BY MOUTH DAILY (WITH BREAKFAST) 270 tablet 0    naproxen sodium (ALEVE) 220 MG tablet [NAPROXEN SODIUM (ALEVE) 220 MG TABLET] Take 440 mg by mouth as needed.      oxyCODONE (ROXICODONE) 5 MG immediate release tablet [OXYCODONE (ROXICODONE) 5 MG IMMEDIATE RELEASE TABLET] Take 1-3 tabs PO every 6 hours as needed for pain; do not take lorazepam when taking this medication 20 tablet 0    QUEtiapine (SEROQUEL) 25 MG tablet TAKE 1 AND 1/2 TABLETS AT  BEDTIME 135 tablet 3    simvastatin (ZOCOR) 5 MG tablet TAKE 1 TABLET BY MOUTH EVERY DAY IN THE EVENING 90 tablet 4    zolpidem (AMBIEN) 10 MG tablet TAKE 1 TABLET BY MOUTH NIGHTLY AS NEEDED 30 tablet 0    lidocaine (LIDODERM) 5 % patch PLACE 1 PATCH ONTO THE SKIN EVERY 24 HOURS TO PREVENT LIDOCAINE TOXICITY, PATIENT SHOULD BE PATCH FREE FOR 12 HRS DAILY. 60 patch 4      No current facility-administered medications for this visit.         Medication adherence: Reviewed risk/benefits of medication , Patient able to verbalize understanding of side effects and Patient verbally consents to taking medications    PSYCHOEDUCATION:  Medication side effects and alternatives reviewed. Health promotion activities recommended and reviewed today. All questions addressed. Education and counseling completed regarding risks and benefits of medications and psychotherapy options.  Consent provided by patient/guardian  Call the psychiatric nurse line with medication questions or concerns at [phone].  [facility] may be used to communicate with your provider, but this is not intended to be used for emergencies.  SEROTONIN SYNDROME:  Discussed risks of Serotonin syndrome (ie, serotonin toxicity) which is a potentially life-threatening condition associated with increased serotonergic activity in the central nervous system (CNS). It is seen with therapeutic medication use, inadvertent interactions between drugs, and intentional self-poisoning. Serotonin syndrome may involve a spectrum of clinical findings, which often include mental status changes, autonomic hyperactivity, and neuromuscular abnormalities.    STIMULANT THERAPY: Side effects discussed including but not limited to cardiac (including HTN, tachycardia, sudden death), motor/tic, appetite/growth, mood lability and sleep disruption. This is a controlled substance with risk for abuse, need to keep in a safe keep place and cannot replace lost scripts  HARM REDUCTION:  Discussions regarding effects of mood altering substances, alcohol and cannabis, on mood and that approach is harm reduction, will continue to prescribe meds as they work to cut back use.    SAFETY:  We all care about your loved one's safety. To reduce the risk of self-harm, remove access to all:  Firearms, Medicines (both prescribed and over-the-counter), Knives and other sharp  objects, Ropes and like materials, and Alcohol  SLEEP HYGIENE: establish a sleep routine, limit screen time 1 hour prior to bed, use bed for sleep only, take sleep/medications on time (including sleepy time tea, trazadone or herbal treatments such as melatonin), aroma therapy, limit caffeine/sugar, yoga, guided imagery, stretch, meditation, limit naps to 20 minutes, make a temperature change in the room, white noise, be mindful of slowing down breathing, take a warm bath/shower, frequently wash sheets, and journaling.   Medlineplus.gov is information for patients.  It is run by the [employer] and it contains information about all disorders, diseases and all medications.       Crisis Resources:    Present to the Emergency Department as needed or call after hours crisis line at [phone] or [phone].   [place] Crisis Text Line: Text [place] to 880048.  Suicide LifeLine Chat: suicideCar in the Cloud.org/chat/.  [place] Suicide Prevention Lifeline: 494.939.8753 (TTY: 617.183.1536). Call anytime for help.  (www.suicidepreventionlifeline.org)  National Wall on Mental Illness (www.norma.org): 235.991.7395 or 273-011-6218.  Mental Health Association (www.mentalhealth.org): 478.192.7488 or 507-186-9371.      Coordination of Care:   More than 30 minutes spent on this visit  with more than 50% of time spent on coordination of care including: Educating patient about diagnosis, prognosis, side effects and benefits of medications, diet, exercise.  Time also spent providing supportive therapy regarding above issues.      Telephone -Visit Details    Type of service:  Telephone Visit    Originating Location (pt. Location): Home    Distant Location (provider location):  Providers Remote Office     Disclaimer: This note consists of symbols derived from keyboarding, dictation and/or voice recognition software. As a result, there may be errors in the script that have gone undetected. Please consider this when  interpreting information found in this chart.     Start Time : 1530  End time :  1546

## 2024-04-17 NOTE — PATIENT INSTRUCTIONS
"[facility]  What to Expect  Here's what to expect in the [facility] Program.   About the program  You'll be meeting with a psychiatric doctor to check your mental health. A psychiatric doctor helps you deal with troubling thoughts and feelings by giving you medicine. They'll make sure you know the plan for your care. You may see them for a long time. When you're feeling better, they may refer you back to seeing your family doctor.   If you have any questions, we'll be glad to talk to you.  About visits  Be open  At your visits, please talk openly about your problems. It may feel hard, but it's the best way for us to help you.  Cancelling visits  If you can't come to your visit, please call us right away at [phone]. If you don't cancel at least 24 hours (1 full day) before your visit, that's \"late cancellation.\"  Not showing up for your visits  Being very late is the same as not showing up. You'll be a \"no show\" if:  You're more than 15 minutes late for a 30-minute (half hour) visit.  You're more than 30 minutes late for a 60-minute (full hour) visit.  If you cancel late or don't show up 2 times within 6 months, we may end your care.  Getting help between visits  If you need help between visits, you can call us Monday to Friday from 8 a.m. to 4:30 p.m. at [phone].  Emergency care  Call 911 or go to the nearest emergency department if your life or someone else's life is in danger.  Call 988 anytime to reach the national Suicide and Crisis hotline.  Medicine refills  To refill your medicine, call your pharmacy. You can also call [facility]'s [facility] at [phone], Monday to Friday, 8 a.m. to 4:30 p.m. It can take 1 to 3 business days to get a refill.   Forms, letters, and tests  You may have papers to fill out, like FMLA, short-term disability, and workability. We can help you with these forms at your visits, but you must have an appointment. You may need more " than 1 visit for this, to be in an intensive therapy program, or both.  Before we can give you medicine for ADHD, we may refer you to get tested for it or confirm it another way.  We may not be able to give you an emotional support animal letter.  We don't do mental health checks ordered by the court.   We don't do mental health testing, but we can refer you to get tested.   Thank you for choosing us for your care.  For informational purposes only. Not to replace the advice of your health care provider. Copyright   2022 [facility]. All rights reserved. [employer] [id number] - 12/22      After Visit Summary   Continue medications as prescribed  Have your pharmacy contact us for a refill if you are running low on medications (We may ask you to come into clinic to get a refill from the nurse  No Alcohol or drug use  No driving if sedated  Call the clinic with any questions or concerns   Reach out for help if you feel like hurting yourself or others ([facility] [phone]: [address] [place], [address] or [facility] Suicide Hotline   [phone] , call 911 or go to nearest Emergency room     Crisis Resources:    Present to the Emergency Department as needed or call after hours crisis line at [phone] or [phone].   [place] Crisis Text Line: Text [place] to 368033.  Suicide LifeLine Chat: suicidepreventionlifeline.org/chat/.  National Suicide Prevention Lifeline: 371.756.6265 (TTY: 645.240.8615). Call anytime for help.  (www.suicidepreventionlifeline.org)  National [place] on Mental Illness (www.norma.org): 665.261.7825 or 164-254-5179.  Mental Health Association (www.mentalhealth.org): 752.732.3844 or 371-735-6348.       Follow up as directed, for your appointments, per your After Visit Summary Form.

## 2024-04-17 NOTE — NURSING NOTE
Is the patient currently in the state of [place]? YES    Visit mode:TELEPHONE    If the visit is dropped, the patient can be reconnected by: TELEPHONE VISIT: Phone number:   Telephone Information:   Mobile [phone]       Will anyone else be joining the visit? NO  (If patient encounters technical issues they should call [phone] :[phone])    How would you like to obtain your AVS? MyChart    Are changes needed to the allergy or medication list? No    Are refills needed on medications prescribed by this physician? YES DULoxetine (CYMBALTA) 30 MG capsule     Reason for visit: RECHECK    Mely LEVI

## 2024-04-22 ENCOUNTER — TELEPHONE (OUTPATIENT)
Dept: PHYSICAL THERAPY | Facility: REHABILITATION | Age: 69
End: 2024-04-22

## 2024-04-22 NOTE — TELEPHONE ENCOUNTER
Called patient regarding today's same day cancellation via Si[employer], needed to leave voicemail. Informed patient about previous same day cancellations, and inquired whether this is still a good time to be having therapy or if she would still like to remain on the schedule. Reminded patient about next appointment and asked that patient call clinic or cancel/reschedule via Si[employer]et at least 24 hours in advance if necessary.

## 2024-04-25 ENCOUNTER — THERAPY VISIT (OUTPATIENT)
Dept: PHYSICAL THERAPY | Facility: REHABILITATION | Age: 69
End: 2024-04-25
Payer: MEDICARE

## 2024-04-25 DIAGNOSIS — M54.50 LUMBAR SPINE PAIN: Primary | ICD-10-CM

## 2024-04-25 PROCEDURE — 97535 SELF CARE MNGMENT TRAINING: CPT | Mod: GP | Performed by: PHYSICAL THERAPIST

## 2024-04-25 PROCEDURE — 97110 THERAPEUTIC EXERCISES: CPT | Mod: GP | Performed by: PHYSICAL THERAPIST

## 2024-05-12 ENCOUNTER — HEALTH MAINTENANCE LETTER (OUTPATIENT)
Age: 69
End: 2024-05-12

## 2024-05-30 DIAGNOSIS — E11.9 TYPE 2 DIABETES MELLITUS WITHOUT COMPLICATION, WITHOUT LONG-TERM CURRENT USE OF INSULIN (H): ICD-10-CM

## 2024-05-30 RX ORDER — LISINOPRIL 2.5 MG/1
2.5 TABLET ORAL DAILY
Qty: 90 TABLET | Refills: 0 | Status: SHIPPED | OUTPATIENT
Start: 2024-05-30 | End: 2024-08-23

## 2024-06-05 ENCOUNTER — THERAPY VISIT (OUTPATIENT)
Dept: PHYSICAL THERAPY | Facility: REHABILITATION | Age: 69
End: 2024-06-05
Payer: MEDICARE

## 2024-06-05 DIAGNOSIS — M54.50 LUMBAR SPINE PAIN: Primary | ICD-10-CM

## 2024-06-05 PROCEDURE — 97110 THERAPEUTIC EXERCISES: CPT | Mod: GP | Performed by: PHYSICAL THERAPIST

## 2024-06-12 ENCOUNTER — VIRTUAL VISIT (OUTPATIENT)
Dept: PSYCHIATRY | Facility: CLINIC | Age: 69
End: 2024-06-12
Payer: MEDICARE

## 2024-06-12 ENCOUNTER — THERAPY VISIT (OUTPATIENT)
Dept: PHYSICAL THERAPY | Facility: REHABILITATION | Age: 69
End: 2024-06-12
Payer: MEDICARE

## 2024-06-12 DIAGNOSIS — M54.50 LUMBAR SPINE PAIN: Primary | ICD-10-CM

## 2024-06-12 DIAGNOSIS — F32.2 MODERATELY SEVERE MAJOR DEPRESSION (H): Primary | ICD-10-CM

## 2024-06-12 PROCEDURE — 99214 OFFICE O/P EST MOD 30 MIN: CPT | Mod: 95 | Performed by: NURSE PRACTITIONER

## 2024-06-12 PROCEDURE — 97140 MANUAL THERAPY 1/> REGIONS: CPT | Mod: GP | Performed by: PHYSICAL THERAPIST

## 2024-06-12 PROCEDURE — 97110 THERAPEUTIC EXERCISES: CPT | Mod: GP | Performed by: PHYSICAL THERAPIST

## 2024-06-12 PROCEDURE — G2211 COMPLEX E/M VISIT ADD ON: HCPCS | Mod: 95 | Performed by: NURSE PRACTITIONER

## 2024-06-12 RX ORDER — DULOXETIN HYDROCHLORIDE 30 MG/1
30 CAPSULE, DELAYED RELEASE ORAL DAILY
Qty: 90 CAPSULE | Refills: 0 | Status: SHIPPED | OUTPATIENT
Start: 2024-06-12

## 2024-06-12 NOTE — PROGRESS NOTES
"  The patient has been notified of following:      \"This virtual  visit will be conducted via a call between you and your physician/provider. We have found that certain health care needs can be provided without the need for a physical exam.  This service lets us provide the care you need virtually/via video   If a prescription is necessary we can send it directly to your pharmacy.  If lab work is needed we can place an order for that and you can then stop by our lab to have the test done at a later time.     Virtual/Video visits are billed at different rates depending on your insurance coverage.Some insurers they may be billed the same as an in-person visit.  Please reach out to your insurance provider with any questions.    Patient has given verbal consent for virtual  visit : Yes      Ho w would you like to obtain your AVS? Mail a copy  If the video visit is dropped, the invitation should be resent by: Send to e-mail at: [email or web address]  Will anyone else be joining your video visit? No            Psychiatric  Out- Patient  Follow Up Progress Note  Date of visit:6/12/2024           Discussion of Care and Treatment Recommendations:   This is a 68 year old female with a  history of depression and PTSD presenting to our virtual clinic for a follow up appointment     PCP managing sleep :  Prescribing  Lorazepam ,Seroquel and Ambien   PCP managing Gabapentin for neurological issues ..      Last visit 04/17/2024.  Recommendation at last visit .  1.MDD/Anxiety/PTSD : Had stopped seeing a therapist but agreeable to restart ambulatory referral to psychotherapy made.  2.MDD/Anxiety /PTSDHighly recommend : Community Support groups -  3.MDD/Anxiety : Continue Cymbalta  down to  30 mg daily    4. RTC- 8  weeks, call in between visit with any question  Patient and I reviewed diagnosis and treatment plan and patient agrees with following recommendations:  Ongoing education given regarding diagnostic and treatment options with " adequate verbalization of understanding.  Plan   1.MDD/Anxiety/PTSD : Had stopped seeing a therapist but agreeable to restart ambulatory referral to psychotherapy made.  2.MDD/Anxiety /PTSDHighly recommend : Community Support groups -  3.MDD/Anxiety : Continue Cymbalta  down to  30 mg daily    4. 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)    Malignant neoplasm of breast (H)    Posttraumatic hematoma of right breast    Postoperative pain    Pain in right axilla    Constipation due to outlet dysfunction    Logorrhea    Type 2 diabetes mellitus without complication, without long-term current use of insulin (H)    Rotator cuff tear    Anxiety    Scalp irritation    Abnormal MRI    Diabetes 1.5, managed as type 2 (H)    H/O breast surgery    Advanced directives, counseling/discussion    Vitamin D deficiency disease    Moderate major depression (H)    Concussion with loss of consciousness    CKD (chronic kidney disease) stage 3, GFR 30-59 ml/min (H)    Concussion without loss of consciousness    Major depression, recurrent (H)             Chief Complaint / Subjective:    Chief complaint: Depression    History of Present Illness  Patient reports compliance with current medications and denies side effects.  She had family visiting with her the past 2 days and has not had good sleep.  She otherwise enjoyed the company and is looking forward to the next family gathering     Mental Status Examination:   Appearance: Well groomed, good eye contact   Orientation: Patient alert and oriented to person, place, time, and situation  Reliability:  Patient appears to be an adequate historian.    Behavior: cooperative   Speech: Speech is spontaneous and coherent, with a normal rate, rhythm and tone.    Language:There are no difficulties with expressive or  "receptive language as observed throughout the interview.    Mood: Described as \"ok\".    Affect: congruent   Judgement: Able to make basic decision regarding safety.  Insight: Good awareness of physical and mental health conditions and aware of needs around care for these.  Gait and station: unable to assess  Thought process: Logical   Thought content: No evidence of delusions or paranoia.    Hallucinations : No evidence of any hallucination  Thought content: No evidence of delusions or paranoia.   Suicidal /Homical Ideations:  No thoughts of self harm or suicide. No thoughts of harming others.  Associations: Connected  Fund of knowledge: Average  Attention / Concentration: Able to remain focused during the interview with minimal distractibility or need for redirection.  Short Term Memory: Grossly intact as evidence by client recalling themes and ideas discussed.  Long Term Memory: Intact  Motor Status: unable to asse    Drug/treatment history and current pattern of use:   Denies     Medication changes: See Above   Medication adherence: compliant  Medication side effects: absent  Information about medications: Side effects, benefits and alternative treatments discussed and patient agrees .    Psychotherapy: Supportive therapy day-to-day living    Education: Diet, exercise, abstinence from drugs and alcohol, patient will not drive if sedated and medications or  under influence of any substance    Lab Results:   Personally reviewed and discussed with the patient    Lab Results   Component Value Date    WBC 5.1 10/31/2023    HGB 13.4 10/31/2023    HCT 40.9 10/31/2023     10/31/2023    CHOL 199 10/31/2023    TRIG 102 10/31/2023    HDL 58 10/31/2023    ALT 28 06/08/2022    AST 20 06/08/2022     10/31/2023    BUN 17.9 10/31/2023    CO2 24 10/31/2023    TSH 0.84 12/06/2019    MICROALBUR 0.79 06/08/2022       Vital signs:  There were no vitals taken for this visit.  Telemedicine visit-no vital signs " completed  Allergies: Aztreonam, Castor oil, Cefaclor, Cephalexin, Cephalexin monohydrate [cephalexin], Chlorhexidine, Ciprofloxacin, Clarithromycin, Clindamycin, Penicillins, Propofol, Scopolamine, Sulfa (sulfonamide antibiotics) [sulfa antibiotics], Sulfasalazine, Tetracyclines & related, Vancomycin, Adhesive [cyanoacrylate], Cytoxan [cyclophosphamide], Erythromycin base [erythromycin], Hydrocodone, Neupogen [filgrastim], Paper tape [adhesive tape], Tapentadol, Taxol [paclitaxel], and Taxotere [docetaxel]         Medications:     Current Outpatient Medications   Medication Sig Dispense Refill    acetaminophen (TYLENOL) 500 MG tablet [ACETAMINOPHEN (TYLENOL) 500 MG TABLET] Take 1,000 mg by mouth 3 (three) times a day as needed for pain.      azithromycin (ZITHROMAX) 500 MG tablet Take 1 tablet (500 mg) by mouth daily 5 tablet 0    baclofen (LIORESAL) 10 MG tablet TAKE 0.5-1 TABLETS (5-10 MG) BY MOUTH 2 TIMES DAILY AS NEEDED FOR MUSCLE SPASMS 180 tablet 1    betamethasone, augmented, (DIPROLENE) 0.05 % lotion [BETAMETHASONE, AUGMENTED, (DIPROLENE) 0.05 % LOTION] Apply 1 application topically 2 (two) times a day as needed. Apply to the back of the neck and upper back 60 mL 3    blood glucose test (CONTOUR NEXT TEST STRIPS) strips [BLOOD GLUCOSE TEST (CONTOUR NEXT TEST STRIPS) STRIPS] Use 1 each As Directed daily. 100 strip 3    blood-glucose meter (CONTOUR NEXT METER) Misc [BLOOD-GLUCOSE METER (CONTOUR NEXT METER) MISC] Check BG twice daily. 1 each 0    calcium citrate-vitamin D (CITRACAL+D) 315-200 mg-unit per tablet Take 1 tablet by mouth      Cholecalciferol (VITAMIN D3) 25 MCG TABS TAKE 2 TABLETS (2,000 UNITS TOTAL) BY MOUTH 2 (TWO) TIMES A DAY. *NOT COVERED* 360 tablet 3    DULoxetine (CYMBALTA) 30 MG capsule Take 1 capsule (30 mg) by mouth daily 90 capsule 0    generic lancets (MICROLET LANCET) [GENERIC LANCETS (MICROLET LANCET)] Use 1 each As Directed daily. Dispense brand per patient's insurance at pharmacy  discretion. 100 each 11    lidocaine (LIDODERM) 5 % patch PLACE 1 PATCH ONTO THE SKIN EVERY 24 HOURS TO PREVENT LIDOCAINE TOXICITY, PATIENT SHOULD BE PATCH FREE FOR 12 HRS DAILY. 60 patch 4    lisinopril (ZESTRIL) 2.5 MG tablet TAKE 1 TABLET BY MOUTH EVERY DAY 90 tablet 0    LORazepam (ATIVAN) 1 MG tablet TAKE 1 TABLET BY MOUTH EVERY 8 HOURS AS NEEDED FOR ANXIETY. 5 tablet 0    metFORMIN (GLUCOPHAGE XR) 500 MG 24 hr tablet TAKE 3 TABLETS (1,500 MG) BY MOUTH DAILY (WITH BREAKFAST) 270 tablet 0    naproxen sodium (ALEVE) 220 MG tablet [NAPROXEN SODIUM (ALEVE) 220 MG TABLET] Take 440 mg by mouth as needed.      oxyCODONE (ROXICODONE) 5 MG immediate release tablet [OXYCODONE (ROXICODONE) 5 MG IMMEDIATE RELEASE TABLET] Take 1-3 tabs PO every 6 hours as needed for pain; do not take lorazepam when taking this medication 20 tablet 0    QUEtiapine (SEROQUEL) 25 MG tablet TAKE 1 AND 1/2 TABLETS AT  BEDTIME 135 tablet 3    simvastatin (ZOCOR) 5 MG tablet TAKE 1 TABLET BY MOUTH EVERY DAY IN THE EVENING 90 tablet 4    zolpidem (AMBIEN) 10 MG tablet TAKE 1 TABLET BY MOUTH NIGHTLY AS NEEDED 30 tablet 0     No current facility-administered medications for this visit.         No current facility-administered medications for this visit.     No current facility-administered medications for this visit.         Medication adherence: Reviewed risk/benefits of medication , Patient able to verbalize understanding of side effects and Patient verbally consents to taking medications      PSYCHOEDUCATION:  Medication side effects and alternatives reviewed. Health promotion activities recommended and reviewed today. All questions addressed. Education and counseling completed regarding risks and benefits of medications and psychotherapy options.  Consent provided by patient/guardian  Call the psychiatric nurse line with medication questions or concerns at [phone].  MyChart may be used to communicate with your provider, but this is not  intended to be used for emergencies.  SEROTONIN SYNDROME:  Discussed risks of Serotonin syndrome (ie, serotonin toxicity) which is a potentially life-threatening condition associated with increased serotonergic activity in the central nervous system (CNS). It is seen with therapeutic medication use, inadvertent interactions between drugs, and intentional self-poisoning. Serotonin syndrome may involve a spectrum of clinical findings, which often include mental status changes, autonomic hyperactivity, and neuromuscular abnormalities.    STIMULANT THERAPY: Side effects discussed including but not limited to cardiac (including HTN, tachycardia, sudden death), motor/tic, appetite/growth, mood lability and sleep disruption. This is a controlled substance with risk for abuse, need to keep in a safe keep place and cannot replace lost scripts  HARM REDUCTION:  Discussions regarding effects of mood altering substances, alcohol and cannabis, on mood and that approach is harm reduction, will continue to prescribe meds as they work to cut back use.    SAFETY:  We all care about your loved one's safety. To reduce the risk of self-harm, remove access to all:  Firearms, Medicines (both prescribed and over-the-counter), Knives and other sharp objects, Ropes and like materials, and Alcohol  SLEEP HYGIENE: establish a sleep routine, limit screen time 1 hour prior to bed, use bed for sleep only, take sleep/medications on time (including sleepy time tea, trazadone or herbal treatments such as melatonin), aroma therapy, limit caffeine/sugar, yoga, guided imagery, stretch, meditation, limit naps to 20 minutes, make a temperature change in the room, white noise, be mindful of slowing down breathing, take a warm bath/shower, frequently wash sheets, and journaling.   Medlineplus.gov is information for patients.  It is run by the [employer] of Medicine and it contains information about all disorders, diseases and all medications.               Review of Systems:      ROS:    Subjective Data Only- Tele-Health Visit    10 point ROS was negative except for the items listed in HPI.      Crisis Resources:    Present to the Emergency Department as needed or call after hours crisis line at [phone] or [phone].   [place] Crisis Text Line: Text [place] to 641158.  Suicide LifeLine Chat: [employer]org/chat/.  [place] Suicide Prevention Lifeline: 849.183.3420 (TTY: 718.486.2910). Call anytime for help.  (www.suicidepreventionCumuluxline.org)  National [place] on Mental Illness (www.norma.org): 265.519.7044 or 313-457-8503.  Mental Health Association (www.mentalhealth.org): 211.753.7504 or 924-743-8099.      Coordination of Care:   More than 30 minutes spent on this visit  with more than 50% of time spent on coordination of care including: Educating patient about diagnosis, prognosis, side effects and benefits of medications, diet, exercise.  Time also spent providing supportive therapy regarding above issues.    Video-Visit Details    Type of service:  Video Visit    Originating Location (pt. Location): Home    Distant Location (provider location): Providers Remote Office     Platform used for Video Visit: [employer]      Disclaimer: This note consists of symbols derived from keyboarding, dictation and/or voice recognition software. As a result, there may be errors in the script that have gone undetected. Please consider this when interpreting information found in this chart.    Start Time : 1430  End time :  1453

## 2024-06-12 NOTE — NURSING NOTE
Is the patient currently in the state of [place]? YES    Visit mode:TELEPHONE    If the visit is dropped, the patient can be reconnected by: TELEPHONE VISIT: Phone number: [phone]    Will anyone else be joining the visit? NO  (If patient encounters technical issues they should call [phone] :[phone])    How would you like to obtain your AVS? MyChart    Are changes needed to the allergy or medication list? Pt stated no changes to allergies and Pt stated no med changes    Are refills needed on medications prescribed by this physician? YES    Reason for visit: RECHKONG LEVI

## 2024-06-12 NOTE — PATIENT INSTRUCTIONS
"[facility]  What to Expect  Here's what to expect in the [facility].   About the program  You'll be meeting with a psychiatric doctor to check your mental health. A psychiatric doctor helps you deal with troubling thoughts and feelings by giving you medicine. They'll make sure you know the plan for your care. You may see them for a long time. When you're feeling better, they may refer you back to seeing your family doctor.   If you have any questions, we'll be glad to talk to you.  About visits  Be open  At your visits, please talk openly about your problems. It may feel hard, but it's the best way for us to help you.  Cancelling visits  If you can't come to your visit, please call us right away at [phone]. If you don't cancel at least 24 hours (1 full day) before your visit, that's \"late cancellation.\"  Not showing up for your visits  Being very late is the same as not showing up. You'll be a \"no show\" if:  You're more than 15 minutes late for a 30-minute (half hour) visit.  You're more than 30 minutes late for a 60-minute (full hour) visit.  If you cancel late or don't show up 2 times within 6 months, we may end your care.  Getting help between visits  If you need help between visits, you can call us Monday to Friday from 8 a.m. to 4:30 p.m. at [phone].  Emergency care  Call 911 or go to the nearest emergency department if your life or someone else's life is in danger.  Call 988 anytime to reach the national Suicide and Crisis hotline.  Medicine refills  To refill your medicine, call your pharmacy. You can also call [facility]'s [facility] at [phone], Monday to Friday, 8 a.m. to 4:30 p.m. It can take 1 to 3 business days to get a refill.   Forms, letters, and tests  You may have papers to fill out, like FMLA, short-term disability, and workability. We can help you with these forms at your visits, but you must have an appointment. You may need more " than 1 visit for this, to be in an intensive therapy program, or both.  Before we can give you medicine for ADHD, we may refer you to get tested for it or confirm it another way.  We may not be able to give you an emotional support animal letter.  We don't do mental health checks ordered by the court.   We don't do mental health testing, but we can refer you to get tested.   Thank you for choosing us for your care.  For informational purposes only. Not to replace the advice of your health care provider. Copyright   2022 [facility]. All rights reserved. [employer] [id number] - 12/22      After Visit Summary   Continue medications as prescribed  Have your pharmacy contact us for a refill if you are running low on medications (We may ask you to come into clinic to get a refill from the nurse  No Alcohol or drug use  No driving if sedated  Call the clinic with any questions or concerns   Reach out for help if you feel like hurting yourself or others ([facility] [phone]: [address], [address] or [facility] Suicide Hotline   [phone] , call 911 or go to nearest Emergency room     Crisis Resources:    Present to the Emergency Department as needed or call after hours crisis line at [phone] or [phone].   [place] Crisis Text Line: Text [place] to 398720.  Suicide LifeLine Chat: suicidepreventionlifeline.org/chat/.  National Suicide Prevention Lifeline: 788.495.9749 (TTY: 875.783.6250). Call anytime for help.  (www.suicidepreventionlifeline.org)  National [place] on Mental Illness (www.norma.org): 506.993.9012 or 866-414-8695.  Mental Health Association (www.mentalhealth.org): 833.695.4861 or 407-366-1057.       Follow up as directed, for your appointments, per your After Visit Summary Form.

## 2024-06-17 PROBLEM — Z71.89 ADVANCED DIRECTIVES, COUNSELING/DISCUSSION: Status: RESOLVED | Noted: 2019-12-07 | Resolved: 2024-06-17

## 2024-06-19 ENCOUNTER — THERAPY VISIT (OUTPATIENT)
Dept: PHYSICAL THERAPY | Facility: REHABILITATION | Age: 69
End: 2024-06-19
Payer: MEDICARE

## 2024-06-19 DIAGNOSIS — M54.50 LUMBAR SPINE PAIN: Primary | ICD-10-CM

## 2024-06-19 PROCEDURE — 97110 THERAPEUTIC EXERCISES: CPT | Mod: GP | Performed by: PHYSICAL THERAPIST

## 2024-06-20 DIAGNOSIS — Z76.0 ENCOUNTER FOR MEDICATION REFILL: ICD-10-CM

## 2024-06-20 DIAGNOSIS — E11.9 TYPE 2 DIABETES MELLITUS WITHOUT COMPLICATION, WITHOUT LONG-TERM CURRENT USE OF INSULIN (H): ICD-10-CM

## 2024-06-20 RX ORDER — METFORMIN HCL 500 MG
TABLET, EXTENDED RELEASE 24 HR ORAL
Qty: 270 TABLET | Refills: 4 | Status: SHIPPED | OUTPATIENT
Start: 2024-06-20

## 2024-06-20 RX ORDER — LISINOPRIL 2.5 MG/1
2.5 TABLET ORAL DAILY
Qty: 90 TABLET | Refills: 0 | OUTPATIENT
Start: 2024-06-20

## 2024-06-26 ENCOUNTER — TELEPHONE (OUTPATIENT)
Dept: FAMILY MEDICINE | Facility: CLINIC | Age: 69
End: 2024-06-26

## 2024-06-26 NOTE — TELEPHONE ENCOUNTER
Reason for Call:  Appointment Request    Patient requesting this type of appt:  Medication Refill/Recheck    Requested provider: Daxa Morales or covering    Reason patient unable to be scheduled:  Did not want to be transferred to [facility], wanted to be called by clinic to be scheduled    When does patient want to be seen/preferred time: 1-2 weeks    Comments: Was scheduled today for virtual visit with [place] provider, unable to to do medication check for pain virtually, please assist patient in scheduling F2F visit with PCP or covering provider     Could we send this information to you in [place] or would you prefer to receive a phone call?:   Patient would prefer a phone call   Okay to leave a detailed message?: Yes at Cell number on file:    Telephone Information:   Mobile [phone]       Call taken on 6/26/2024 at 10:32 AM by Vesna Atwood

## 2024-07-03 ENCOUNTER — PRE VISIT (OUTPATIENT)
Dept: SURGERY | Facility: CLINIC | Age: 69
End: 2024-07-03

## 2024-07-03 ENCOUNTER — OFFICE VISIT (OUTPATIENT)
Dept: SURGERY | Facility: CLINIC | Age: 69
End: 2024-07-03
Attending: FAMILY MEDICINE
Payer: MEDICARE

## 2024-07-03 ENCOUNTER — TELEPHONE (OUTPATIENT)
Dept: SURGERY | Facility: CLINIC | Age: 69
End: 2024-07-03

## 2024-07-03 VITALS — HEART RATE: 118 BPM | OXYGEN SATURATION: 92 %

## 2024-07-03 DIAGNOSIS — K59.09 OTHER CONSTIPATION: ICD-10-CM

## 2024-07-03 DIAGNOSIS — Z12.11 ENCOUNTER FOR SCREENING COLONOSCOPY: ICD-10-CM

## 2024-07-03 DIAGNOSIS — K62.3 RECTAL PROLAPSE: Primary | ICD-10-CM

## 2024-07-03 PROCEDURE — 99205 OFFICE O/P NEW HI 60 MIN: CPT | Performed by: NURSE PRACTITIONER

## 2024-07-03 RX ORDER — POLYETHYLENE GLYCOL 3350 17 G/17G
1 POWDER, FOR SOLUTION ORAL 2 TIMES DAILY
Qty: 850 G | Refills: 11 | Status: SHIPPED | OUTPATIENT
Start: 2024-07-03

## 2024-07-03 RX ORDER — METHYLCELLULOSE 2 G/19G
POWDER, FOR SOLUTION ORAL
Qty: 479 G | Refills: 3 | Status: SHIPPED | OUTPATIENT
Start: 2024-07-03 | End: 2024-09-12

## 2024-07-03 ASSESSMENT — PAIN SCALES - GENERAL: PAINLEVEL: NO PAIN (0)

## 2024-07-03 NOTE — NURSING NOTE
Chief Complaint   Patient presents with    Consult       Vitals:    07/03/24 1308   Pulse: 118   SpO2: 92%       There is no height or weight on file to calculate BMI.    Alfie Amato EMT-P
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
## 2024-07-03 NOTE — TELEPHONE ENCOUNTER
Patient confirmed scheduled appointment:  Date: 9/30/24  Time: 2:30 pm  Visit type: RTN PATIENT  Provider: Dr Boucher  [place] [facility] [place]  Testing/imaging: n/a  Additional notes: n/a
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
## 2024-07-03 NOTE — PATIENT INSTRUCTIONS
Colonoscopy  Xray defecography  Meet with urogynecology  Start pelvic floor physical therapy  Meet with surgeon-Dr. Foreman, Dr. Reyes, or Dr. Boucher  Start a daily fiber supplement such as Citrucel powder. Start with once a day and slowly increase up to three times a day, if needed, over the next 4-6 weeks  Miralax up to three times a day for constipation

## 2024-07-13 ENCOUNTER — TELEPHONE (OUTPATIENT)
Dept: FAMILY MEDICINE | Facility: CLINIC | Age: 69
End: 2024-07-13
Payer: MEDICARE

## 2024-07-13 NOTE — TELEPHONE ENCOUNTER
Please call Anny. Let her know that Dr. Morales has reviewed her chart and will not prescribe the requested pain medication. If that is needed, especially for the rectal prolapse, then the GI doctor she is seeing, or the surgeon, should prescribe it. However, given their recommendations for pelvic floor therapy and a bowel regimen, Dr. Morales does not think they would give a narcotic as it will exacerbate the prolapse problem. Dr. Morales does understand there is significant pain, but in the long run, working with the doctors and therapists to get the problem treated is the right and best option.

## 2024-07-15 NOTE — TELEPHONE ENCOUNTER
1st attempt---Called patient, unable to reach patient, left voicemail. Please relay provider message if patient calls back. Thank you.       Missy Sullivan, MSN, RN   [facility]

## 2024-07-17 ENCOUNTER — MYC MEDICAL ADVICE (OUTPATIENT)
Dept: FAMILY MEDICINE | Facility: CLINIC | Age: 69
End: 2024-07-17
Payer: MEDICARE

## 2024-07-17 NOTE — TELEPHONE ENCOUNTER
Made 3rd attempt to call pt and relay Dr. Morales's recommendation regarding medication request. Could not reach pt. Left message for pt to call clinic back.      Per protocol, 3rd attempt made and mychart message sent to pt.      DIANA Lacy CemN RN  [facility]

## 2024-07-25 ENCOUNTER — ANCILLARY PROCEDURE (OUTPATIENT)
Dept: GENERAL RADIOLOGY | Facility: CLINIC | Age: 69
End: 2024-07-25
Attending: NURSE PRACTITIONER
Payer: MEDICARE

## 2024-07-25 ENCOUNTER — PRE VISIT (OUTPATIENT)
Dept: UROLOGY | Facility: CLINIC | Age: 69
End: 2024-07-25

## 2024-07-25 DIAGNOSIS — K62.3 RECTAL PROLAPSE: ICD-10-CM

## 2024-07-25 PROCEDURE — 74270 X-RAY XM COLON 1CNTRST STD: CPT | Performed by: RADIOLOGY

## 2024-07-26 NOTE — TELEPHONE ENCOUNTER
Reason for visit: consult     Relevant information: urinary incontinence, bladder dysfunction, rectal prolapse    Records/imaging/labs/orders: in epic    Pt called: No need for a call    At Rooming: if stress/mixed incontinence, leave bladder full, if urge, urine + PVR    Allan Romero  7/25/2024  9:31 PM

## 2024-07-31 NOTE — TELEPHONE ENCOUNTER
Attempted to contact Anny regarding visit feedback from Imaging visit of 7/25/2024. Left voice message with my contact information should she have further questions.    LOREE Han, RVT, RDMS  Imaging Manager  [facility]  [phone]

## 2024-08-20 NOTE — TELEPHONE ENCOUNTER
MEDICAL RECORDS REQUEST   [place] [facility]  Urology Clinic  [address] [place] [address]  PHONE: [phone]  Fax: [phone]        FUTURE VISIT INFORMATION                                                   Alexus WENDY Garcia, : 1955 scheduled for future visit at [facility]    APPOINTMENT INFORMATION:  Date: 2024  Provider:  Iman Hammer MD  Reason for Visit/Diagnosis: Prolapse    REFERRAL INFORMATION:  Referring provider:  Augustina Adair APRN CNP in [facility] COLON & RECTAL SURGERY      RECORDS REQUESTED FOR VISIT                                                     NOTES  STATUS/DETAILS   OFFICE NOTE from referring provider  yes, 2024 -- Augustina Adair APRN CNP in [facility] COLON & RECTAL SURGERY   OFFICE NOTE from other specialist  yes   MEDICATION LIST  yes   LABS     URINALYSIS (UA)  no   IMAGES  yes, 2024 -- XR DEFECOGRAPHY     PRE-VISIT CHECKLIST      Joint diagnostic appointment coordinated correctly          (ensure right order & amount of time) Yes   RECORD COLLECTION COMPLETE Yes

## 2024-08-21 ENCOUNTER — PRE VISIT (OUTPATIENT)
Dept: UROLOGY | Facility: CLINIC | Age: 69
End: 2024-08-21

## 2024-08-21 ASSESSMENT — ANXIETY QUESTIONNAIRES
1. FEELING NERVOUS, ANXIOUS, OR ON EDGE: MORE THAN HALF THE DAYS
4. TROUBLE RELAXING: SEVERAL DAYS
7. FEELING AFRAID AS IF SOMETHING AWFUL MIGHT HAPPEN: SEVERAL DAYS
3. WORRYING TOO MUCH ABOUT DIFFERENT THINGS: SEVERAL DAYS
6. BECOMING EASILY ANNOYED OR IRRITABLE: NOT AT ALL
GAD7 TOTAL SCORE: 6
2. NOT BEING ABLE TO STOP OR CONTROL WORRYING: SEVERAL DAYS
GAD7 TOTAL SCORE: 6
8. IF YOU CHECKED OFF ANY PROBLEMS, HOW DIFFICULT HAVE THESE MADE IT FOR YOU TO DO YOUR WORK, TAKE CARE OF THINGS AT HOME, OR GET ALONG WITH OTHER PEOPLE?: NOT DIFFICULT AT ALL
5. BEING SO RESTLESS THAT IT IS HARD TO SIT STILL: NOT AT ALL
IF YOU CHECKED OFF ANY PROBLEMS ON THIS QUESTIONNAIRE, HOW DIFFICULT HAVE THESE PROBLEMS MADE IT FOR YOU TO DO YOUR WORK, TAKE CARE OF THINGS AT HOME, OR GET ALONG WITH OTHER PEOPLE: NOT DIFFICULT AT ALL

## 2024-08-23 DIAGNOSIS — E11.9 TYPE 2 DIABETES MELLITUS WITHOUT COMPLICATION, WITHOUT LONG-TERM CURRENT USE OF INSULIN (H): ICD-10-CM

## 2024-08-23 RX ORDER — LISINOPRIL 2.5 MG/1
2.5 TABLET ORAL DAILY
Qty: 90 TABLET | Refills: 3 | Status: SHIPPED | OUTPATIENT
Start: 2024-08-23

## 2024-08-25 ASSESSMENT — PATIENT HEALTH QUESTIONNAIRE - PHQ9
10. IF YOU CHECKED OFF ANY PROBLEMS, HOW DIFFICULT HAVE THESE PROBLEMS MADE IT FOR YOU TO DO YOUR WORK, TAKE CARE OF THINGS AT HOME, OR GET ALONG WITH OTHER PEOPLE: NOT DIFFICULT AT ALL
SUM OF ALL RESPONSES TO PHQ QUESTIONS 1-9: 9
SUM OF ALL RESPONSES TO PHQ QUESTIONS 1-9: 9

## 2024-08-26 ENCOUNTER — VIRTUAL VISIT (OUTPATIENT)
Dept: PSYCHIATRY | Facility: CLINIC | Age: 69
End: 2024-08-26
Payer: MEDICARE

## 2024-08-26 VITALS — WEIGHT: 147 LBS | BODY MASS INDEX: 25.41 KG/M2

## 2024-08-26 DIAGNOSIS — F32.2 MODERATELY SEVERE MAJOR DEPRESSION (H): Primary | ICD-10-CM

## 2024-08-26 DIAGNOSIS — F41.1 GAD (GENERALIZED ANXIETY DISORDER): ICD-10-CM

## 2024-08-26 PROCEDURE — 99443 PR PHYSICIAN TELEPHONE EVALUATION 21-30 MIN: CPT | Mod: 93 | Performed by: NURSE PRACTITIONER

## 2024-08-26 ASSESSMENT — PAIN SCALES - GENERAL: PAINLEVEL: NO PAIN (0)

## 2024-08-26 NOTE — PATIENT INSTRUCTIONS
"The Panel Psychiatry Program  What to Expect  Here's what to expect in the Panel Psychiatry Program.   About the program  You'll be meeting with a psychiatric doctor to check your mental health. A psychiatric doctor helps you deal with troubling thoughts and feelings by giving you medicine. They'll make sure you know the plan for your care. You may see them for a long time. When you're feeling better, they may refer you back to seeing your family doctor.   If you have any questions, we'll be glad to talk to you.  About visits  Be open  At your visits, please talk openly about your problems. It may feel hard, but it's the best way for us to help you.  Cancelling visits  If you can't come to your visit, please call us right away at [phone]. If you don't cancel at least 24 hours (1 full day) before your visit, that's \"late cancellation.\"  Not showing up for your visits  Being very late is the same as not showing up. You'll be a \"no show\" if:  You're more than 15 minutes late for a 30-minute (half hour) visit.  You're more than 30 minutes late for a 60-minute (full hour) visit.  If you cancel late or don't show up 2 times within 6 months, we may end your care.  Getting help between visits  If you need help between visits, you can call us Monday to Friday from 8 a.m. to 4:30 p.m. at [phone].  Emergency care  Call 911 or go to the nearest emergency department if your life or someone else's life is in danger.  Call 988 anytime to reach the national Suicide and Crisis hotline.  Medicine refills  To refill your medicine, call your pharmacy. You can also call [facility]'s [facility] at [phone], Monday to Friday, 8 a.m. to 4:30 p.m. It can take 1 to 3 business days to get a refill.   Forms, letters, and tests  You may have papers to fill out, like FMLA, short-term disability, and workability. We can help you with these forms at your visits, but you must have an appointment. You may need more " than 1 visit for this, to be in an intensive therapy program, or both.  Before we can give you medicine for ADHD, we may refer you to get tested for it or confirm it another way.  We may not be able to give you an emotional support animal letter.  We don't do mental health checks ordered by the court.   We don't do mental health testing, but we can refer you to get tested.   Thank you for choosing us for your care.  For informational purposes only. Not to replace the advice of your health care provider. Copyright   2022 [facility]. All rights reserved. [employer] 122561 - 12/22      After Visit Summary   Continue medications as prescribed  Have your pharmacy contact us for a refill if you are running low on medications (We may ask you to come into clinic to get a refill from the nurse  No Alcohol or drug use  No driving if sedated  Call the clinic with any questions or concerns   Reach out for help if you feel like hurting yourself or others ([facility] [phone]: [address] [facility], [address] or [facility] Suicide Hotline   [phone] , call 911 or go to nearest Emergency room     Crisis Resources:    Present to the Emergency Department as needed or call after hours crisis line at [phone] or [phone].   [place] Crisis Text Line: Text [place] to 214585.  Suicide LifeLine Chat: suicidepreventionlifeline.org/chat/.  National Suicide Prevention Lifeline: 604.670.4857 (TTY: 743.512.7871). Call anytime for help.  (www.suicidepreventionlifeline.org)  National [place] on Mental Illness (www.norma.org): 722.435.8156 or 563-250-0747.  Mental Health Association (www.mentalhealth.org): 496.753.6131 or 369-188-2335.       Follow up as directed, for your appointments, per your After Visit Summary Form.

## 2024-08-26 NOTE — PROGRESS NOTES
"  The patient has been notified of following:      \"This telephone visit will be conducted via a call between you and your physician/provider. We have found that certain health care needs can be provided without the need for a physical exam.  This service lets us provide the care you need with a short phone conversation.  If a prescription is necessary we can send it directly to your pharmacy.  If lab work is needed we can place an order for that and you can then stop by our lab to have the test done at a later time.     Telephone visits are billed at different rates depending on your insurance coverage. During this emergency period, for some insurers they may be billed the same as an in-person visit.  Please reach out to your insurance provider with any questions.     If during the course of the call the physician/provider feels a telephone visit is not appropriate, you will not be charged for this service.\"     Patient has given verbal consent to a Telephone visit? Yes     Will anyone else be joining the visit? No        Patient would like to receive their AVS by AVS P/reference: Mail a copy      Psychiatric  Out patient Follow Up Progress Note  Date of visit:8/26/2024           Discussion of Care and Treatment Recommendations:   This is a 68 year old female with  a  history of depression and PTSD presenting to our virtual clinic for a follow up appointment     PCP managing sleep :  Prescribing  Lorazepam ,Seroquel and Ambien   PCP managing Gabapentin for neurological issues.      Last visit 06/12/2024.  Recommendation at last visit .  1.MDD/Anxiety/PTSD : Had stopped seeing a therapist but agreeable to restart ambulatory referral to psychotherapy made.  2.MDD/Anxiety /PTSDHighly recommend : Community Support groups -  3.MDD/Anxiety : Continue Cymbalta  down to  30 mg daily    4. RTC- 8  weeks, call in between visit with any question  Patient and I reviewed diagnosis and treatment plan and patient agrees with " following recommendations:  Ongoing education given regarding diagnostic and treatment options with adequate verbalization of understanding.  Plan   1.MDD/Anxiety/PTSD : Had stopped seeing a therapist but agreeable to restart ambulatory referral to psychotherapy made.  2.MDD/Anxiety /PTSDHighly recommend : Community Support groups -  3.MDD/Anxiety : Continue Cymbalta  down to  30 mg daily    4. 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)    Malignant neoplasm of breast (H)    Posttraumatic hematoma of right breast    Postoperative pain    Pain in right axilla    Constipation due to outlet dysfunction    Logorrhea    Type 2 diabetes mellitus without complication, without long-term current use of insulin (H)    Rotator cuff tear    Anxiety    Scalp irritation    Abnormal MRI    Diabetes 1.5, managed as type 2 (H)    H/O breast surgery    Vitamin D deficiency disease    Moderate major depression (H)    Concussion with loss of consciousness    CKD (chronic kidney disease) stage 3, GFR 30-59 ml/min (H)    Concussion without loss of consciousness    Major depression, recurrent (H)             Chief Complaint / Subjective:    Chief complaint: Depression     History of Present Illness:   Per patient's statement : She reports compliance with current medications and denies side effects.  She is enjoying her summer and today she is babysitting her grandchildren.  Occasional anxiety related to medical issues but otherwise doing well.  No other concerns        Answers submitted by the patient for this visit  Patient Health Questionnaire (Submitted on 8/25/2024)  If you checked off any problems, how difficult have these problems made it for you to do your work, take care of things at home, or get along with other people?: Not difficult at all  PHQ9  "TOTAL SCORE: 9  Patient Health Questionnaire (G7) (Submitted on 8/21/2024)  ROBERT 7 TOTAL SCORE: 6    Mental Status Examination:     Appearance: unable to assess  Orientation: Patient alert and oriented to person, place, time, and situation  Reliability:  Patient appears to be an adequate historian.    Behavior: calm and coperative   Speech: Speech is spontaneous and coherent, with a normal rate, rhythm and tone.    Language:There are no difficulties with expressive or receptive language as observed throughout the interview.    Mood: Described as \"ok\".    Affect: unable to assess  Judgement: Able to make basic decision regarding safety.  Insight: Good awareness of physical and mental health conditions and aware of needs around care for these.  Gait and station: unable to assess  Thought process: Logical   Hallucinations : No evidence of any hallucination  Thought content: No evidence of delusions or paranoia.   Suicidal /Homical Ideations:  No thoughts of self harm or suicide. No thoughts of harming others.  Associations: Connected  Fund of knowledge: Average  Attention / Concentration: Able to remain focused during the interview with minimal distractibility or need for redirection.  Short Term Memory: Grossly intact as evidence by client recalling themes and ideas discussed.  Long Term Memory: Intact  Motor Status: unable to asses      Drug/treatment history and current pattern of use:   Denies     Medication changes: See Above   Medication adherence: compliant  Medication side effects: absent  Information about medications: Side effects, benefits and alternative treatments discussed and patient agrees .    Psychotherapy: Supportive therapy day-to-day living    Education: Diet, exercise, abstinence from drugs and alcohol, patient will not drive if sedated and medications or  under influence of any substance    Lab Results:   Personally reviewed and discussed with the patient    Lab Results   Component Value Date    " WBC 5.1 10/31/2023    HGB 13.4 10/31/2023    HCT 40.9 10/31/2023     10/31/2023    CHOL 199 10/31/2023    TRIG 102 10/31/2023    HDL 58 10/31/2023    ALT 28 06/08/2022    AST 20 06/08/2022     10/31/2023    BUN 17.9 10/31/2023    CO2 24 10/31/2023    TSH 0.84 12/06/2019    MICROALBUR 0.79 06/08/2022       Vital signs:  Wt 66.7 kg (147 lb)   BMI 25.41 kg/m    Unable to assess telephone visit  Allergies: Aztreonam, Castor oil, Cefaclor, Cephalexin, Cephalexin monohydrate [cephalexin], Chlorhexidine, Ciprofloxacin, Clarithromycin, Clindamycin, Penicillins, Propofol, Scopolamine, Sulfa (sulfonamide antibiotics) [sulfa antibiotics], Sulfasalazine, Tetracyclines & related, Vancomycin, Adhesive [cyanoacrylate], Cytoxan [cyclophosphamide], Erythromycin base [erythromycin], Hydrocodone, Neupogen [filgrastim], Paper tape [adhesive tape], Tapentadol, Taxol [paclitaxel], and Taxotere [docetaxel]           Review of Systems:      ROS:  Subjective data only - Tele-Health  Visit   10 point ROS was negative except for the items listed in HPI-              Medications:     Current Outpatient Medications   Medication Sig Dispense Refill    acetaminophen (TYLENOL) 500 MG tablet [ACETAMINOPHEN (TYLENOL) 500 MG TABLET] Take 1,000 mg by mouth 3 (three) times a day as needed for pain.      azithromycin (ZITHROMAX) 500 MG tablet Take 1 tablet (500 mg) by mouth daily 5 tablet 0    baclofen (LIORESAL) 10 MG tablet TAKE 0.5-1 TABLETS (5-10 MG) BY MOUTH 2 TIMES DAILY AS NEEDED FOR MUSCLE SPASMS 180 tablet 1    betamethasone, augmented, (DIPROLENE) 0.05 % lotion [BETAMETHASONE, AUGMENTED, (DIPROLENE) 0.05 % LOTION] Apply 1 application topically 2 (two) times a day as needed. Apply to the back of the neck and upper back 60 mL 3    blood glucose test (CONTOUR NEXT TEST STRIPS) strips [BLOOD GLUCOSE TEST (CONTOUR NEXT TEST STRIPS) STRIPS] Use 1 each As Directed daily. 100 strip 3    blood-glucose meter (CONTOUR NEXT METER) Misc  [BLOOD-GLUCOSE METER (CONTOUR NEXT METER) MISC] Check BG twice daily. 1 each 0    calcium citrate-vitamin D (CITRACAL+D) 315-200 mg-unit per tablet Take 1 tablet by mouth      Cholecalciferol (VITAMIN D3) 25 MCG TABS TAKE 2 TABLETS (2,000 UNITS TOTAL) BY MOUTH 2 (TWO) TIMES A DAY. *NOT COVERED* 360 tablet 3    DULoxetine (CYMBALTA) 30 MG capsule Take 1 capsule (30 mg) by mouth daily 90 capsule 0    generic lancets (MICROLET LANCET) [GENERIC LANCETS (MICROLET LANCET)] Use 1 each As Directed daily. Dispense brand per patient's insurance at pharmacy discretion. 100 each 11    lisinopril (ZESTRIL) 2.5 MG tablet TAKE 1 TABLET BY MOUTH EVERY DAY 90 tablet 3    LORazepam (ATIVAN) 1 MG tablet TAKE 1 TABLET BY MOUTH EVERY 8 HOURS AS NEEDED FOR ANXIETY. 5 tablet 0    metFORMIN (GLUCOPHAGE XR) 500 MG 24 hr tablet TAKE 3 TABLETS (1,500 MG) BY MOUTH DAILY (WITH BREAKFAST). DUE FOR DIABETES VISIT THIS MONTH 270 tablet 4    methylcellulose (CITRUCEL) powder Start with 1 heaping tablespoon. Increase as needed, 1 heaping tablespoon at a time, up to 3 times per day. 479 g 3    naproxen sodium (ALEVE) 220 MG tablet [NAPROXEN SODIUM (ALEVE) 220 MG TABLET] Take 440 mg by mouth as needed.      oxyCODONE (ROXICODONE) 5 MG immediate release tablet [OXYCODONE (ROXICODONE) 5 MG IMMEDIATE RELEASE TABLET] Take 1-3 tabs PO every 6 hours as needed for pain; do not take lorazepam when taking this medication 20 tablet 0    polyethylene glycol (MIRALAX) 17 GM/Dose powder Take 17 g (1 Capful) by mouth 2 times daily 850 g 11    QUEtiapine (SEROQUEL) 25 MG tablet TAKE 1 AND 1/2 TABLETS AT  BEDTIME 135 tablet 3    simvastatin (ZOCOR) 5 MG tablet TAKE 1 TABLET BY MOUTH EVERY DAY IN THE EVENING 90 tablet 4    zolpidem (AMBIEN) 10 MG tablet TAKE 1 TABLET BY MOUTH NIGHTLY AS NEEDED 30 tablet 0     No current facility-administered medications for this visit.       No current facility-administered medications for this visit.     No current  facility-administered medications for this visit.           Medication adherence: Reviewed risk/benefits of medication , Patient able to verbalize understanding of side effects and Patient verbally consents to taking medications    PSYCHOEDUCATION:  Medication side effects and alternatives reviewed. Health promotion activities recommended and reviewed today. All questions addressed. Education and counseling completed regarding risks and benefits of medications and psychotherapy options.  Consent provided by patient/guardian  Call the psychiatric nurse line with medication questions or concerns at [phone].  iZettlehart may be used to communicate with your provider, but this is not intended to be used for emergencies.  SEROTONIN SYNDROME:  Discussed risks of Serotonin syndrome (ie, serotonin toxicity) which is a potentially life-threatening condition associated with increased serotonergic activity in the central nervous system (CNS). It is seen with therapeutic medication use, inadvertent interactions between drugs, and intentional self-poisoning. Serotonin syndrome may involve a spectrum of clinical findings, which often include mental status changes, autonomic hyperactivity, and neuromuscular abnormalities.    STIMULANT THERAPY: Side effects discussed including but not limited to cardiac (including HTN, tachycardia, sudden death), motor/tic, appetite/growth, mood lability and sleep disruption. This is a controlled substance with risk for abuse, need to keep in a safe keep place and cannot replace lost scripts  HARM REDUCTION:  Discussions regarding effects of mood altering substances, alcohol and cannabis, on mood and that approach is harm reduction, will continue to prescribe meds as they work to cut back use.    SAFETY:  We all care about your loved one's safety. To reduce the risk of self-harm, remove access to all:  Firearms, Medicines (both prescribed and over-the-counter), Knives and other sharp objects,  Ropes and like materials, and Alcohol  SLEEP HYGIENE: establish a sleep routine, limit screen time 1 hour prior to bed, use bed for sleep only, take sleep/medications on time (including sleepy time tea, trazadone or herbal treatments such as melatonin), aroma therapy, limit caffeine/sugar, yoga, guided imagery, stretch, meditation, limit naps to 20 minutes, make a temperature change in the room, white noise, be mindful of slowing down breathing, take a warm bath/shower, frequently wash sheets, and journaling.   Medlineplus.gov is information for patients.  It is run by the [employer] and it contains information about all disorders, diseases and all medications.       Crisis Resources:    Present to the Emergency Department as needed or call after hours crisis line at [phone] or [phone].   [place] Crisis Text Line: Text [place] to 999278.  Suicide LifeLine Chat: suicidepreSharegate.org/chat/.  [place] Suicide Prevention Lifeline: 498.106.5821 (TTY: 571.693.3645). Call anytime for help.  (www.suicidepreventionlifeline.org)  National [place] on Mental Illness (www.norma.org): 630-534-2738 or 236-027-2330.  Mental Health Association (www.mentalhealth.org): 500.687.9039 or 370-538-3604.      Coordination of Care:   More than 50% of time spent on coordination of care including: Educating patient about diagnosis, prognosis, side effects and benefits of medications, diet, exercise.  Time also spent providing supportive therapy regarding above issues.      Telephone -Visit Details    Type of service:  Telephone Visit    Originating Location (pt. Location): Home    Distant Location (provider location):  Providers Remote Office     Disclaimer: This note consists of symbols derived from keyboarding, dictation and/or voice recognition software. As a result, there may be errors in the script that have gone undetected. Please consider this when interpreting information found in this chart.     Start  Time : 1500  End time :  1523

## 2024-08-26 NOTE — NURSING NOTE
Current patient location: [address] [place] [id number]    Is the patient currently in the state of [place]? YES    Visit mode:TELEPHONE    If the visit is dropped, the patient can be reconnected by: TELEPHONE VISIT: Phone number:   Telephone Information:   Mobile [phone]       Will anyone else be joining the visit? NO  (If patient encounters technical issues they should call [phone]    How would you like to obtain your AVS? MyChart    Are changes needed to the allergy or medication list? No    Are refills needed on medications prescribed by this physician? NO    Rooming Documentation:  Questionnaire(s) completed      Reason for visit: Video Visit (Recheck)    Radha LEVI

## 2024-08-28 ENCOUNTER — THERAPY VISIT (OUTPATIENT)
Dept: PHYSICAL THERAPY | Facility: REHABILITATION | Age: 69
End: 2024-08-28
Payer: MEDICARE

## 2024-08-28 DIAGNOSIS — M54.50 LUMBAR SPINE PAIN: Primary | ICD-10-CM

## 2024-08-28 PROCEDURE — 97110 THERAPEUTIC EXERCISES: CPT | Mod: GP | Performed by: PHYSICAL THERAPIST

## 2024-09-04 ENCOUNTER — THERAPY VISIT (OUTPATIENT)
Dept: PHYSICAL THERAPY | Facility: REHABILITATION | Age: 69
End: 2024-09-04
Payer: MEDICARE

## 2024-09-04 DIAGNOSIS — M54.50 LUMBAR SPINE PAIN: Primary | ICD-10-CM

## 2024-09-04 PROCEDURE — 97110 THERAPEUTIC EXERCISES: CPT | Mod: GP | Performed by: PHYSICAL THERAPIST

## 2024-09-05 DIAGNOSIS — M79.18 MYOFASCIAL PAIN: ICD-10-CM

## 2024-09-05 DIAGNOSIS — M54.50 LUMBAR SPINE PAIN: ICD-10-CM

## 2024-09-05 NOTE — PROGRESS NOTES
[facility]                                                                                   OUTPATIENT PHYSICAL THERAPY    PLAN OF TREATMENT FOR OUTPATIENT REHABILITATION   Patient's Last Name, First Name, Alexus Gerard YOB: 1955   Provider's Name   [facility]   Medical Record No.  [id number]     Onset Date: (P) 02/21/24  Start of Care Date: (P) 02/21/24     Medical Diagnosis:  (P) M54.50 (ICD-10-CM) - Lumbar spine pain  M47.816 (ICD-10-CM) - Lumbar facet arthropathy  M79.18 (ICD-10-CM) - Myofascial pain  Z98.1 (ICD-10-CM) - History of lumbar spinal fusion      PT Treatment Diagnosis:  (P) LBP with strength and endurance deficits Plan of Treatment  Frequency/Duration: (P) 1x/week for 4 weeks, 1x/every other week/ (P) 8 weeks    Certification date from (P) 05/18/24 to (P) 11/28/24         See note for plan of treatment details and functional goals     Shaheen King PT                         I CERTIFY THE NEED FOR THESE SERVICES FURNISHED UNDER        THIS PLAN OF TREATMENT AND WHILE UNDER MY CARE     (Physician attestation of this document indicates review and certification of the therapy plan).              Referring Provider:  Chapito Collins    Initial Assessment  See Epic Evaluation- Start of Care Date: (P) 02/21/24            PLAN  Continue therapy per current plan of care.    Beginning/End Dates of Progress Note Reporting Period:  (P) 09/05/24 to 09/04/2024    Referring Provider:  Chapito Collins       09/05/24 0500   Appointment Info   Signing clinician's name / credentials Shaheen King PT   Total/Authorized Visits 12   Visits Used 9   Medical Diagnosis M54.50 (ICD-10-CM) - Lumbar spine pain  M47.816 (ICD-10-CM) - Lumbar facet arthropathy  M79.18 (ICD-10-CM) - Myofascial pain  Z98.1 (ICD-10-CM) - History of lumbar spinal fusion   PT Tx Diagnosis LBP with strength and endurance deficits   Precautions/Limitations Hx of  breast cancer with reconstruction, bowl and bladder dysfunction, lumbar fusion   Other pertinent information Cancer related fatigue   Progress Note/Certification   Start of Care Date 02/21/24   Onset of illness/injury or Date of Surgery 02/21/24   Therapy Frequency 1x/week for 4 weeks, 1x/every other week   Predicted Duration 8 weeks   Certification date from 05/18/24   Certification date to 11/28/24   KX Modifier Statement Therapy services meets medical necessity requirements beyond the therapy threshold for ongoing care.   Progress Note Due Date 11/28/24   Progress Note Completed Date 09/05/24   GOALS   PT Goals 2   PT Goal 1   Goal Identifier endurance   Goal Description In 4 weeks, pt will be able to clean for 10 minutes without increase in symptoms.   Rationale to maximize safety and independence with performance of ADLs and functional tasks   Target Date 03/20/24   PT Goal 2   Goal Identifier endurance   Goal Description In 12 weeks, pt will be able to clean her house for 30 minutes without increase in symptoms.   Rationale to maximize safety and independence with performance of ADLs and functional tasks   Target Date 03/20/24   Subjective Report   Subjective Report feeling sore today from taking care of grandkids for 2 days straight. wants to purchase a treadmill or walking deck to promote more walking inside home   Objective Measures   Objective Measures Objective Measure 1;Objective Measure 2   Objective Measure 1   Objective Measure 30 sec STS   Details 17   Objective Measure 2   Objective Measure lumbar ROM   Details extension WFL, flexion WFL   Treatment Interventions (PT)   Interventions Therapeutic Procedure/Exercise;Self Care/Home Management;Neuromuscular Re-education;Manual Therapy   Therapeutic Procedure/Exercise   Therapeutic Procedures Ther Proc 2   Ther Proc 1 treadmill walking 2.0 mph performed for 5 minutes. sit to stands x 10 reps. weighted carry 10# 2 x 30 seconds. supine diaphragmatic  breathing x5 minutes   PTRx Ther Proc 1 Seated Hip Adduction Isometric   PTRx Ther Proc 1 - Details verbal review   PTRx Ther Proc 2 Seated Hip Abduction with Band   PTRx Ther Proc 2 - Details verbal review   PTRx Ther Proc 3 Bridging #1   PTRx Ther Proc 3 - Details x5; x5 with 10 second holds - cues for correct muscle engagement - felt better with holds versus for repetitions   PTRx Ther Proc 4 Clamshell Feet Together   PTRx Ther Proc 4 - Details x12/side; verbal and tactile cues for correct form and muscle engagement    Skilled Intervention Progression of LE strengthening   Patient Response/Progress No increase in symptoms   PTRx Ther Proc 5 Functional Lunge Backward   PTRx Ther Proc 5 - Details No Notes   PTRx Ther Proc 6 Farmer's Walk Single Arm Down with One Kettlebell   PTRx Ther Proc 6 - Details No Notes   PTRx Ther Proc 7 Standing Hip Flexion   PTRx Ther Proc 7 - Details L1 RB x10 reps B progressed to L2 RB x10 reps B   PTRx Ther Proc 8 Side Stepping With Theraband   PTRx Ther Proc 8 - Details L1 RB x10 reps B progressed to L2 RB x10 reps B   PTRx Ther Proc 9 Hip AROM Standing Extension   PTRx Ther Proc 9 - Details L1 RB x10 reps B progressed to L2 RB x10 reps B   Therapeutic Activity   PTRx Ther Act 1 Sit to Stand   PTRx Ther Act 1 - Details No Notes   Neuromuscular Re-education   PTRx Neuro Re-ed 1 Abdominal Brace Transverse Abdominis   PTRx Neuro Re-ed 1 - Details x15; verbal and tactile cues for muscle engagement; x5 with 10 sec hold; verbal cues to maintain TrA engagement    Patient Response/Progress Pt tolerated well without an increase in pain   PTRx Neuro Re-ed 2 Pallof Press   PTRx Neuro Re-ed 2 - Details No Notes   Manual Therapy   Manual Therapy 1 - Details STM lumbar paraspinals (prone), light trigger point release to bilateral quadratus lumborum insertion.   Self Care/home Management   Self Care 1 - Details education regardign benefit of diaphragmatic breathing and mindfullness to promote  parasymathetic shift in nervous system activity which can help mpdulate pain. education to refrain from strenuous activity when still recovering from pneumonia, still okay to perform and carry out ADL's. reviewed HEP including clamshell, seated hip abd/add with resistance, lower trunk rotations, advised to continue until next visit in 4 weeks   Education   Learner/Method Patient   Plan   Home program PTRx   Plan for next session review pallof press. Progress standing lower extremity strengthening. weighted carry 15#.

## 2024-09-06 RX ORDER — BACLOFEN 10 MG/1
5-10 TABLET ORAL 2 TIMES DAILY PRN
Qty: 180 TABLET | Refills: 1 | Status: SHIPPED | OUTPATIENT
Start: 2024-09-06

## 2024-09-06 NOTE — PROGRESS NOTES
[facility]                                                                                   OUTPATIENT PHYSICAL THERAPY    PLAN OF TREATMENT FOR OUTPATIENT REHABILITATION   Patient's Last Name, First Name, Alexus Gerard YOB: 1955   Provider's Name   [facility]   Medical Record No.  [id number]     Onset Date: 02/21/24  Start of Care Date: 02/21/24     Medical Diagnosis:  M54.50 (ICD-10-CM) - Lumbar spine pain  M47.816 (ICD-10-CM) - Lumbar facet arthropathy  M79.18 (ICD-10-CM) - Myofascial pain  Z98.1 (ICD-10-CM) - History of lumbar spinal fusion      PT Treatment Diagnosis:  LBP with strength and endurance deficits Plan of Treatment  Frequency/Duration: 1x/week for 4 weeks, 1x/every other week/ 8 weeks    Certification date from 05/18/24 to 11/28/24         See note for plan of treatment details and functional goals     Shaheen King PT                         I CERTIFY THE NEED FOR THESE SERVICES FURNISHED UNDER        THIS PLAN OF TREATMENT AND WHILE UNDER MY CARE     (Physician attestation of this document indicates review and certification of the therapy plan).              Referring Provider:  Chapito Collins    Initial Assessment  See Epic Evaluation- Start of Care Date: 02/21/24            PLAN  Continue therapy per current plan of care.    Beginning/End Dates of Progress Note Reporting Period:  09/05/24 to 09/04/2024    Referring Provider:  Chapito Collins       09/05/24 0500   Appointment Info   Signing clinician's name / credentials Shaheen King PT   Total/Authorized Visits 12   Visits Used 9   Medical Diagnosis M54.50 (ICD-10-CM) - Lumbar spine pain  M47.816 (ICD-10-CM) - Lumbar facet arthropathy  M79.18 (ICD-10-CM) - Myofascial pain  Z98.1 (ICD-10-CM) - History of lumbar spinal fusion   PT Tx Diagnosis LBP with strength and endurance deficits   Precautions/Limitations Hx of breast cancer with reconstruction, bowl  and bladder dysfunction, lumbar fusion   Other pertinent information Cancer related fatigue   Progress Note/Certification   Start of Care Date 02/21/24   Onset of illness/injury or Date of Surgery 02/21/24   Therapy Frequency 1x/week for 4 weeks, 1x/every other week   Predicted Duration 8 weeks   Certification date from 05/18/24   Certification date to 11/28/24   KX Modifier Statement Therapy services meets medical necessity requirements beyond the therapy threshold for ongoing care.   Progress Note Due Date 11/28/24   Progress Note Completed Date 09/05/24   GOALS   PT Goals 2   PT Goal 1   Goal Identifier endurance   Goal Description In 4 weeks, pt will be able to clean for 10 minutes without increase in symptoms.   Rationale to maximize safety and independence with performance of ADLs and functional tasks   Target Date 03/20/24   PT Goal 2   Goal Identifier endurance   Goal Description In 12 weeks, pt will be able to clean her house for 30 minutes without increase in symptoms.   Rationale to maximize safety and independence with performance of ADLs and functional tasks   Target Date 03/20/24   Subjective Report   Subjective Report feeling sore today from taking care of grandkids for 2 days straight. wants to purchase a treadmill or walking deck to promote more walking inside home   Objective Measures   Objective Measures Objective Measure 1;Objective Measure 2   Objective Measure 1   Objective Measure 30 sec STS   Details 17   Objective Measure 2   Objective Measure lumbar ROM   Details extension WFL, flexion WFL   Treatment Interventions (PT)   Interventions Therapeutic Procedure/Exercise;Self Care/Home Management;Neuromuscular Re-education;Manual Therapy   Therapeutic Procedure/Exercise   Therapeutic Procedures: strength, endurance, ROM, flexibility minutes (45809) 40   Therapeutic Procedures Ther Proc 2   Ther Proc 1 treadmill walking 2.0 mph performed for 5 minutes. sit to stands x 10 reps. weighted carry 10#  2 x 30 seconds. supine diaphragmatic breathing x5 minutes   PTRx Ther Proc 1 Seated Hip Adduction Isometric   PTRx Ther Proc 1 - Details verbal review   PTRx Ther Proc 2 Seated Hip Abduction with Band   PTRx Ther Proc 2 - Details verbal review   PTRx Ther Proc 3 Bridging #1   PTRx Ther Proc 3 - Details x5; x5 with 10 second holds - cues for correct muscle engagement - felt better with holds versus for repetitions   PTRx Ther Proc 4 Clamshell Feet Together   PTRx Ther Proc 4 - Details x12/side; verbal and tactile cues for correct form and muscle engagement    Skilled Intervention Progression of LE strengthening   Patient Response/Progress No increase in symptoms   PTRx Ther Proc 5 Functional Lunge Backward   PTRx Ther Proc 5 - Details No Notes   PTRx Ther Proc 6 Farmer's Walk Single Arm Down with One Kettlebell   PTRx Ther Proc 6 - Details No Notes   PTRx Ther Proc 7 Standing Hip Flexion   PTRx Ther Proc 7 - Details L1 RB x10 reps B progressed to L2 RB x10 reps B   PTRx Ther Proc 8 Side Stepping With Theraband   PTRx Ther Proc 8 - Details L1 RB x10 reps B progressed to L2 RB x10 reps B   PTRx Ther Proc 9 Hip AROM Standing Extension   PTRx Ther Proc 9 - Details L1 RB x10 reps B progressed to L2 RB x10 reps B   Therapeutic Activity   PTRx Ther Act 1 Sit to Stand   PTRx Ther Act 1 - Details No Notes   Neuromuscular Re-education   PTRx Neuro Re-ed 1 Abdominal Brace Transverse Abdominis   PTRx Neuro Re-ed 1 - Details x15; verbal and tactile cues for muscle engagement; x5 with 10 sec hold; verbal cues to maintain TrA engagement    Patient Response/Progress Pt tolerated well without an increase in pain   PTRx Neuro Re-ed 2 Pallof Press   PTRx Neuro Re-ed 2 - Details No Notes   Manual Therapy   Manual Therapy 1 - Details STM lumbar paraspinals (prone), light trigger point release to bilateral quadratus lumborum insertion.   Self Care/home Management   Self Care 1 - Details education regardign benefit of diaphragmatic  breathing and mindfullness to promote parasymathetic shift in nervous system activity which can help mpdulate pain. education to refrain from strenuous activity when still recovering from pneumonia, still okay to perform and carry out ADL's. reviewed HEP including clamshell, seated hip abd/add with resistance, lower trunk rotations, advised to continue until next visit in 4 weeks   Education   Learner/Method Patient   Plan   Home program PTRx   Plan for next session review pallof press. assess 30 second sit to stand. weighted carry 15#.   Comments   Comments Patient returns for follow-up PT appointment to address chronic low back pain. Patient has had some inconsistencies attending appointments throughout the episode of care due to illnesses. I offered to patient that taking a break in appointments might be appropriate however patient wanted to keep appointments scheduled. Patient seems to be recovering in their energy levels after experiencing COVID recently. She remains limited in prolonged standing and walking activities, as well as household chores and taking care of grandchildren. Patient was able to progress her home exercise program for lumbo-pelvic-hip strengthening exercises without increased pain. Patient will benefit from additional skilled PT to further improve activity tolerance and progress towards her goals.   Total Session Time   Timed Code Treatment Minutes 40   Total Treatment Time (sum of timed and untimed services) 40

## 2024-09-11 ENCOUNTER — THERAPY VISIT (OUTPATIENT)
Dept: PHYSICAL THERAPY | Facility: REHABILITATION | Age: 69
End: 2024-09-11
Payer: MEDICARE

## 2024-09-11 ENCOUNTER — NURSE TRIAGE (OUTPATIENT)
Dept: FAMILY MEDICINE | Facility: CLINIC | Age: 69
End: 2024-09-11

## 2024-09-11 DIAGNOSIS — Z20.822 EXPOSURE TO 2019 NOVEL CORONAVIRUS: Primary | ICD-10-CM

## 2024-09-11 DIAGNOSIS — M54.50 LUMBAR SPINE PAIN: Primary | ICD-10-CM

## 2024-09-11 PROCEDURE — 99207 PR NO CHARGE LOS: CPT | Performed by: PHYSICAL THERAPIST

## 2024-09-11 ASSESSMENT — PATIENT HEALTH QUESTIONNAIRE - PHQ9
SUM OF ALL RESPONSES TO PHQ QUESTIONS 1-9: 7
SUM OF ALL RESPONSES TO PHQ QUESTIONS 1-9: 7
10. IF YOU CHECKED OFF ANY PROBLEMS, HOW DIFFICULT HAVE THESE PROBLEMS MADE IT FOR YOU TO DO YOUR WORK, TAKE CARE OF THINGS AT HOME, OR GET ALONG WITH OTHER PEOPLE: SOMEWHAT DIFFICULT

## 2024-09-11 NOTE — TELEPHONE ENCOUNTER
RN COVID TREATMENT VISIT  09/11/24      The patient has been triaged and does not require a higher level of care.    Alexus Garcia  68 year old  Current weight? 147    Has the patient been seen by a primary care provider at an [facility] or [facility] within the past two years? Yes.   Have you been in close proximity to/do you have a known exposure to a person with a confirmed case of influenza? No.     General treatment eligibility:  Date of positive COVID test (PCR or at home)?  9/11/2024    Are you or have you been hospitalized for this COVID-19 infection? No.   Have you received monoclonal antibodies or antiviral treatment for COVID-19 since this positive test? No.   Do you have any of the following conditions that place you at risk of being very sick from COVID-19?   - Age 50 years or older  - Cancer/active malignancy including patients with cancer who are currently receiving chemotherapy or radiation, metastatic cancer, advance cancer and are receiving palliative care  - Chronic kidney disease (mild to moderate; eGFR or GFR 30-59 mL/min)  - Diabetes mellitus, type 1 and type 2  - Mental health disorders including mood disorders, depression, schizophrenia spectrum disorders   Yes, patient has at least one high risk condition as noted above.     Current COVID symptoms:   - cough  - shortness of breath or difficulty breathing  - fatigue  - muscle or body aches  - headache  - sore throat  - congestion or runny nose  - diarrhea  Yes. Patient has at least one symptom as selected.     How many days since symptoms started? 5 days or less. Established patient, 12 years or older weighing at least 88.2 lbs, who has symptoms that started in the past 5 days, has not been hospitalized nor received treatment already, and is at risk for being very sick from COVID-19.     Treatment eligibility by RN:  Are you currently pregnant or nursing? No  Do you have a clinically significant hypersensitivity  to nirmatrelvir or ritonavir, or toxic epidermal necrolysis (TEN) or Carbajal-Bradford Syndrome? No  Do you have a history of hepatitis, any hepatic impairment on the Problem List (such as Child-Palmer Class C, cirrhosis, fatty liver disease, alcoholic liver disease), or was the last liver lab (hepatic panel, ALT, AST, ALK Phos, bilirubin) elevated in the past 6 months? No  Do you have any history of severe renal impairment (eGFR < 30mL/min)? No    Is patient eligible to continue? Yes, patient meets all eligibility requirements for the RN COVID treatment (as denoted by all no responses above).     Current Outpatient Medications   Medication Sig Dispense Refill    acetaminophen (TYLENOL) 500 MG tablet [ACETAMINOPHEN (TYLENOL) 500 MG TABLET] Take 1,000 mg by mouth 3 (three) times a day as needed for pain.      azithromycin (ZITHROMAX) 500 MG tablet Take 1 tablet (500 mg) by mouth daily 5 tablet 0    baclofen (LIORESAL) 10 MG tablet TAKE 0.5-1 TABLETS (5-10 MG) BY MOUTH 2 TIMES DAILY AS NEEDED FOR MUSCLE SPASMS 180 tablet 1    betamethasone, augmented, (DIPROLENE) 0.05 % lotion [BETAMETHASONE, AUGMENTED, (DIPROLENE) 0.05 % LOTION] Apply 1 application topically 2 (two) times a day as needed. Apply to the back of the neck and upper back 60 mL 3    blood glucose test (CONTOUR NEXT TEST STRIPS) strips [BLOOD GLUCOSE TEST (CONTOUR NEXT TEST STRIPS) STRIPS] Use 1 each As Directed daily. 100 strip 3    blood-glucose meter (CONTOUR NEXT METER) Misc [BLOOD-GLUCOSE METER (CONTOUR NEXT METER) MISC] Check BG twice daily. 1 each 0    calcium citrate-vitamin D (CITRACAL+D) 315-200 mg-unit per tablet Take 1 tablet by mouth      Cholecalciferol (VITAMIN D3) 25 MCG TABS TAKE 2 TABLETS (2,000 UNITS TOTAL) BY MOUTH 2 (TWO) TIMES A DAY. *NOT COVERED* 360 tablet 3    DULoxetine (CYMBALTA) 30 MG capsule Take 1 capsule (30 mg) by mouth daily 90 capsule 0    generic lancets (MICROLET LANCET) [GENERIC LANCETS (MICROLET LANCET)] Use 1 each As  Directed daily. Dispense brand per patient's insurance at pharmacy discretion. 100 each 11    lisinopril (ZESTRIL) 2.5 MG tablet TAKE 1 TABLET BY MOUTH EVERY DAY 90 tablet 3    LORazepam (ATIVAN) 1 MG tablet TAKE 1 TABLET BY MOUTH EVERY 8 HOURS AS NEEDED FOR ANXIETY. 5 tablet 0    metFORMIN (GLUCOPHAGE XR) 500 MG 24 hr tablet TAKE 3 TABLETS (1,500 MG) BY MOUTH DAILY (WITH BREAKFAST). DUE FOR DIABETES VISIT THIS MONTH 270 tablet 4    methylcellulose (CITRUCEL) powder Start with 1 heaping tablespoon. Increase as needed, 1 heaping tablespoon at a time, up to 3 times per day. 479 g 3    naproxen sodium (ALEVE) 220 MG tablet [NAPROXEN SODIUM (ALEVE) 220 MG TABLET] Take 440 mg by mouth as needed.      oxyCODONE (ROXICODONE) 5 MG immediate release tablet [OXYCODONE (ROXICODONE) 5 MG IMMEDIATE RELEASE TABLET] Take 1-3 tabs PO every 6 hours as needed for pain; do not take lorazepam when taking this medication 20 tablet 0    polyethylene glycol (MIRALAX) 17 GM/Dose powder Take 17 g (1 Capful) by mouth 2 times daily 850 g 11    QUEtiapine (SEROQUEL) 25 MG tablet TAKE 1 AND 1/2 TABLETS AT  BEDTIME 135 tablet 3    simvastatin (ZOCOR) 5 MG tablet TAKE 1 TABLET BY MOUTH EVERY DAY IN THE EVENING 90 tablet 4    zolpidem (AMBIEN) 10 MG tablet TAKE 1 TABLET BY MOUTH NIGHTLY AS NEEDED 30 tablet 0       Medications from List 1 of the standing order (on medications that exclude the use of Paxlovid) that patient is taking: NONE. Is patient taking [place]'s Wort? No  Is patient taking [place] Wort or any meds from List 1? No.   Medications from List 2 of the standing order (on meds that provider needs to adjust) that patient is taking: quetiapine (Seroquel), explained a provider visit is necessary to discuss medication adjustments while taking Paxlovid. Is patient on any of the meds from List 2? Yes. Patient will be scheduled or transferred to a  at the end of this call.     Telephone virtual appointment assisted through  central scheduling for 9/12/24.     Drake Peter RN

## 2024-09-11 NOTE — TELEPHONE ENCOUNTER
"Answer Assessment - Initial Assessment Questions  1. COVID-19 DIAGNOSIS: \"How do you know that you have COVID?\" (e.g., positive lab test or self-test, diagnosed by doctor or NP/PA, symptoms after exposure).      Home COVID test today, 24.  Home kit . However, huddled Dr. Adler who verbally ok RN to triage patient for Paxlovid.    2. COVID-19 EXPOSURE: \"Was there any known exposure to COVID before the symptoms began?\" CDC Definition of close contact: within 6 feet (2 meters) for a total of 15 minutes or more over a 24-hour period.       Patient reported being with a large crowd over the weekend.     3. ONSET: \"When did the COVID-19 symptoms start?\"       9/10/24    4. WORST SYMPTOM: \"What is your worst symptom?\" (e.g., cough, fever, shortness of breath, muscle aches)      Running nose, sore throat, headache, coughing, body aches and diarrhea.    5. COUGH: \"Do you have a cough?\" If Yes, ask: \"How bad is the cough?\"        Having coughing and getting worse as day progresses.     6. FEVER: \"Do you have a fever?\" If Yes, ask: \"What is your temperature, how was it measured, and when did it start?\"      Uncertain.  Under the cover.     7. RESPIRATORY STATUS: \"Describe your breathing?\" (e.g., normal; shortness of breath, wheezing, unable to speak)       Patient able to communicate to writer but audibly noticed a bit hard of breathing.     8. BETTER-SAME-WORSE: \"Are you getting better, staying the same or getting worse compared to yesterday?\"  If getting worse, ask, \"In what way?\"      Worsening symptoms today.     9. OTHER SYMPTOMS: \"Do you have any other symptoms?\"  (e.g., chills, fatigue, headache, loss of smell or taste, muscle pain, sore throat)      Fatigue, headache, diarrhea, body aches and sore throat.    10. HIGH RISK DISEASE: \"Do you have any chronic medical problems?\" (e.g., asthma, heart or lung disease, weak immune system, obesity, etc.)        Remission of breast cancer, anxiety, depression, CKD, " "diabetes, and a senior.    11. VACCINE: \"Have you had the COVID-19 vaccine?\" If Yes, ask: \"Which one, how many shots, when did you get it?\"        4 doses of all pfizer.    12. PREGNANCY: \"Is there any chance you are pregnant?\" \"When was your last menstrual period?\"        N/a    13. O2 SATURATION MONITOR:  \"Do you use an oxygen saturation monitor (pulse oximeter) at home?\" If Yes, ask \"What is your reading (oxygen level) today?\" \"What is your usual oxygen saturation reading?\" (e.g., 95%)        N/a    Protocols used: Coronavirus (COVID-19) Diagnosed or [id number]    "

## 2024-09-12 ENCOUNTER — VIRTUAL VISIT (OUTPATIENT)
Dept: FAMILY MEDICINE | Facility: CLINIC | Age: 69
End: 2024-09-12
Payer: MEDICARE

## 2024-09-12 DIAGNOSIS — R05.1 ACUTE COUGH: ICD-10-CM

## 2024-09-12 DIAGNOSIS — E11.9 TYPE 2 DIABETES MELLITUS WITHOUT COMPLICATION, WITHOUT LONG-TERM CURRENT USE OF INSULIN (H): ICD-10-CM

## 2024-09-12 DIAGNOSIS — C50.211 MALIGNANT NEOPLASM OF UPPER-INNER QUADRANT OF RIGHT FEMALE BREAST, UNSPECIFIED ESTROGEN RECEPTOR STATUS (H): ICD-10-CM

## 2024-09-12 DIAGNOSIS — F32.2 MODERATELY SEVERE MAJOR DEPRESSION (H): ICD-10-CM

## 2024-09-12 DIAGNOSIS — U07.1 INFECTION DUE TO 2019 NOVEL CORONAVIRUS: Primary | ICD-10-CM

## 2024-09-12 PROCEDURE — 99442 PR PHYSICIAN TELEPHONE EVALUATION 11-20 MIN: CPT | Mod: 93 | Performed by: PHYSICIAN ASSISTANT

## 2024-09-12 RX ORDER — BENZONATATE 200 MG/1
200 CAPSULE ORAL 3 TIMES DAILY PRN
Qty: 30 CAPSULE | Refills: 0 | Status: SHIPPED | OUTPATIENT
Start: 2024-09-12

## 2024-09-12 RX ORDER — GUAIFENESIN 1200 MG/1
1200 TABLET, EXTENDED RELEASE ORAL 2 TIMES DAILY
Qty: 60 TABLET | Refills: 0 | Status: SHIPPED | OUTPATIENT
Start: 2024-09-12 | End: 2024-09-12

## 2024-09-12 RX ORDER — GUAIFENESIN 600 MG/1
1200 TABLET, EXTENDED RELEASE ORAL 2 TIMES DAILY
Qty: 30 TABLET | Refills: 0 | Status: SHIPPED | OUTPATIENT
Start: 2024-09-12

## 2024-09-12 NOTE — PATIENT INSTRUCTIONS
Do not take cholesterol medication or seroquel while taking paxlovid and until has been at least 3 days after completing paxlovid  I sent over the paxlovid as well as the tessalon cough medication   Ok to take mucinex as well    NON PRESCRIPTION TREATMENT    Mucinex 600 mg 2 tabs twice a day   Increase humidity to 30-40% in bedroom at night - vaporizer  Avoid decongestant  Saline nasal spray as needed  Increase fluid intake  Benadryl 25mg 1/2 - 1 hour before bed time  Maintain 8 hr minimum of sleep at night  Robitussin DM cough gels for cough    Go to the emergency department if develop increasing chest pain, shortness of breath or other change in symptoms  Follow up with your primary if symptoms aren't improving over the next 5 days

## 2024-09-12 NOTE — PROGRESS NOTES
"Anny is a 68 year old who is being evaluated via a billable telephone visit.    What phone number would you like to be contacted at? [phone]   How would you like to obtain your AVS? Nabeel  Originating Location (pt. Location): Home    Distant Location (provider location):  Off-site    Assessment & Plan     Infection due to 2019 novel coronavirus  Drug interactions reviewed.  Hold cholesterol and seroquel medication while taking paxlovid and at least 3 days after  - nirmatrelvir and ritonavir (PAXLOVID) 300 mg/100 mg therapy pack  Dispense: 30 tablet; Refill: 0    Acute cough  Trial of mucinex and tessalon  - benzonatate (TESSALON) 200 MG capsule  Dispense: 30 capsule; Refill: 0  - guaiFENesin (MUCINEX) 600 MG 12 hr tablet  Dispense: 30 tablet; Refill: 0    Type 2 diabetes mellitus without complication, without long-term current use of insulin (H)  Last a1C 7.2 10 months ago     Malignant neoplasm of upper-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, unspecified estrogen receptor status (H)  Followed by oncology     Moderately severe major depression (H)  Followed by psychiatry             BMI  Estimated body mass index is 25.41 kg/m  as calculated from the following:    Height as of 10/31/23: 1.62 m (5' 3.78\").    Weight as of 8/26/24: 66.7 kg (147 lb).         Patient Instructions   Do not take cholesterol medication or seroquel while taking paxlovid and until has been at least 3 days after completing paxlovid  I sent over the paxlovid as well as the tessalon cough medication   Ok to take mucinex as well    NON PRESCRIPTION TREATMENT    Mucinex 600 mg 2 tabs twice a day   Increase humidity to 30-40% in bedroom at night - vaporizer  Avoid decongestant  Saline nasal spray as needed  Increase fluid intake  Benadryl 25mg 1/2 - 1 hour before bed time  Maintain 8 hr minimum of sleep at night  Robitussin DM cough gels for cough    Go to the emergency department if develop increasing chest pain, shortness of breath or " "other change in symptoms  Follow up with your primary if symptoms aren't improving over the next 5 days     Subjective   Anny is a 68 year old, presenting for the following health issues:  Covid 19 Testing (Positive )      9/12/2024     1:46 PM   Additional Questions   Roomed by Nancy MARTINEZ     History of Present Illness       Headaches:   Since the patient's last clinic visit, headaches are: worsened  The patient is getting headaches:  Almost Every week, sometimes 2,3 times per week that last for a couple days  She is not able to do normal daily activities when she has a migraine.  The patient is taking the following rescue/relief medications:  Naproxyn (Aleve) and other   Patient states \"The relief is inconsistent\" from the rescue/relief medications.   The patient is taking the following medications to prevent migraines:  No medications to prevent migraines  In the past 4 weeks, the patient has gone to an Urgent Care or Emergency Room 0 times times due to headaches.    Reason for visit:  At home covid test indicated 2 pink lines: Positive  Symptom onset:  1-3 days ago  Symptoms include:  Headaches, cough, runny nose, feel tired, sore throat, hard to swallow.  Symptom intensity:  Moderate  Symptom progression:  Worsening  Had these symptoms before:  No  What makes it worse:  Hacking cough makes breathing difficult.  What makes it better:  So far, not yet.   She is taking medications regularly.           COVID-19 Symptom Review  How many days ago did these symptoms start? Today 3rd day of symptoms     Are any of the following symptoms significant for you?  New or worsening difficulty breathing? Yes  Please describe what kind of difficulty you are having breathing:Mild dyspnea (able to do ADLs without difficulty, mild shortness of breath with activities such as climbing one or two flights of stairs or walking briskly)  Worsening cough? Yes, it's a dry cough.   Fever or chills? I do not know-not checked -feels warm and " lying in front of fan   Headache: YES  Sore throat: YES  Chest pain: YES- pressure in chest after coughing   Diarrhea: YES started yesterday   Body aches? YES    What treatments has patient tried? Acetaminophen, Nonsteroidals, and humidifier    Does patient live in a nursing home, group home, or shelter? No  Does patient have a way to get food/medications during quarantined? NO        Reports that she takes seroquel on a as needed basis   Symptoms started with headache and runny nose as well as postnasal drainage.  Severe headache and sore throat.  Today worse than yesterday.  Coughing feels like throat is raw  First time has had covid   In AM takes metformin and lisinopril.  In evening takes simvastatin, duloxetine and seroquel          Review of Systems  Constitutional, HEENT, cardiovascular, pulmonary, gi and gu systems are negative, except as otherwise noted.      Objective         Vitals:  No vitals were obtained today due to virtual visit.    Physical Exam   General: Alert and no distress //Respiratory: No audible wheeze, has significant cough, no shortness of breath // Psychiatric:  Appropriate affect, tone, and pace of words            Phone call duration: 15 minutes  Signed Electronically by: Nerissa eBckham PA-C

## 2024-09-29 ENCOUNTER — HEALTH MAINTENANCE LETTER (OUTPATIENT)
Age: 69
End: 2024-09-29

## 2024-10-16 ENCOUNTER — TELEPHONE (OUTPATIENT)
Dept: FAMILY MEDICINE | Facility: CLINIC | Age: 69
End: 2024-10-16
Payer: MEDICARE

## 2024-10-16 NOTE — TELEPHONE ENCOUNTER
Patient Quality Outreach    Patient is due for the following:   Diabetes -  A1C, Eye Exam, Microalbumin, and Foot Exam  Physical Annual Wellness Visit    Next Steps:   Schedule a Annual Wellness Visit    Type of outreach:    Sent Rubikloud message.    Next Steps:  Reach out within 90 days via Phone.    Max number of attempts reached: No. Will try again in 90 days if patient still on fail list.    Questions for provider review:    None           Marlene Farnsworth MA  Chart routed to Care Team.

## 2024-10-29 ENCOUNTER — PRE VISIT (OUTPATIENT)
Dept: UROLOGY | Facility: CLINIC | Age: 69
End: 2024-10-29
Payer: MEDICARE

## 2024-10-29 NOTE — TELEPHONE ENCOUNTER
Reason for visit: consult     Relevant information: rectal prolapse, pelvic organ prolapse    Records/imaging/labs/orders: in epic    Pt called: No need for a call    At Rooming: urine + PVR    Allan Romero  10/29/2024  4:38 AM

## 2024-11-08 DIAGNOSIS — E11.9 DIABETES (H): ICD-10-CM

## 2024-11-08 DIAGNOSIS — Z76.0 ENCOUNTER FOR MEDICATION REFILL: ICD-10-CM

## 2024-11-08 RX ORDER — SIMVASTATIN 5 MG
TABLET ORAL
Qty: 90 TABLET | Refills: 4 | Status: SHIPPED | OUTPATIENT
Start: 2024-11-08

## 2024-11-17 ASSESSMENT — PATIENT HEALTH QUESTIONNAIRE - PHQ9
SUM OF ALL RESPONSES TO PHQ QUESTIONS 1-9: 5
SUM OF ALL RESPONSES TO PHQ QUESTIONS 1-9: 5
10. IF YOU CHECKED OFF ANY PROBLEMS, HOW DIFFICULT HAVE THESE PROBLEMS MADE IT FOR YOU TO DO YOUR WORK, TAKE CARE OF THINGS AT HOME, OR GET ALONG WITH OTHER PEOPLE: SOMEWHAT DIFFICULT

## 2024-11-18 ENCOUNTER — VIRTUAL VISIT (OUTPATIENT)
Dept: PSYCHIATRY | Facility: CLINIC | Age: 69
End: 2024-11-18
Payer: MEDICARE

## 2024-11-18 DIAGNOSIS — F43.10 PTSD (POST-TRAUMATIC STRESS DISORDER): ICD-10-CM

## 2024-11-18 DIAGNOSIS — F41.1 GAD (GENERALIZED ANXIETY DISORDER): Primary | ICD-10-CM

## 2024-11-18 DIAGNOSIS — F32.2 MODERATELY SEVERE MAJOR DEPRESSION (H): ICD-10-CM

## 2024-11-18 PROCEDURE — 99214 OFFICE O/P EST MOD 30 MIN: CPT | Mod: 95 | Performed by: NURSE PRACTITIONER

## 2024-11-18 RX ORDER — DULOXETIN HYDROCHLORIDE 30 MG/1
30 CAPSULE, DELAYED RELEASE ORAL DAILY
Qty: 90 CAPSULE | Refills: 0 | Status: SHIPPED | OUTPATIENT
Start: 2024-11-18

## 2024-11-18 ASSESSMENT — PAIN SCALES - GENERAL: PAINLEVEL_OUTOF10: SEVERE PAIN (7)

## 2024-11-18 NOTE — PATIENT INSTRUCTIONS
"The [facility]  What to Expect  Here's what to expect in the Panel Psychiatry Program.   About the program  You'll be meeting with a psychiatric doctor to check your mental health. A psychiatric doctor helps you deal with troubling thoughts and feelings by giving you medicine. They'll make sure you know the plan for your care. You may see them for a long time. When you're feeling better, they may refer you back to seeing your family doctor.   If you have any questions, we'll be glad to talk to you.  About visits  Be open  At your visits, please talk openly about your problems. It may feel hard, but it's the best way for us to help you.  Cancelling visits  If you can't come to your visit, please call us right away at [phone]. If you don't cancel at least 24 hours (1 full day) before your visit, that's \"late cancellation.\"  Not showing up for your visits  Being very late is the same as not showing up. You'll be a \"no show\" if:  You're more than 15 minutes late for a 30-minute (half hour) visit.  You're more than 30 minutes late for a 60-minute (full hour) visit.  If you cancel late or don't show up 2 times within 6 months, we may end your care.  Getting help between visits  If you need help between visits, you can call us Monday to Friday from 8 a.m. to 4:30 p.m. at [phone].  Emergency care  Call 911 or go to the nearest emergency department if your life or someone else's life is in danger.  Call 988 anytime to reach the national Suicide and Crisis hotline.  Medicine refills  To refill your medicine, call your pharmacy. You can also call [facility]'s [facility] at [phone], Monday to Friday, 8 a.m. to 4:30 p.m. It can take 1 to 3 business days to get a refill.   Forms, letters, and tests  You may have papers to fill out, like FMLA, short-term disability, and workability. We can help you with these forms at your visits, but you must have an appointment. You may need more " than 1 visit for this, to be in an intensive therapy program, or both.  Before we can give you medicine for ADHD, we may refer you to get tested for it or confirm it another way.  We may not be able to give you an emotional support animal letter.  We don't do mental health checks ordered by the court.   We don't do mental health testing, but we can refer you to get tested.   Thank you for choosing us for your care.  For informational purposes only. Not to replace the advice of your health care provider. Copyright   2022 [facility]. All rights reserved. [place] [id number] - 12/22      After Visit Summary   Continue medications as prescribed  Have your pharmacy contact us for a refill if you are running low on medications (We may ask you to come into clinic to get a refill from the nurse  No Alcohol or drug use  No driving if sedated  Call the clinic with any questions or concerns   Reach out for help if you feel like hurting yourself or others ([facility] [phone]: [address] [facility], [address] or [facility] Suicide Hotline   [phone] , call 911 or go to nearest Emergency room     Crisis Resources:    Present to the Emergency Department as needed or call after hours crisis line at [phone] or [phone].   [place] Crisis Text Line: Text [place] to 278223.  Suicide LifeLine Chat: suicidepreventionlifeline.org/chat/.  National Suicide Prevention Lifeline: 207.362.1026 (TTY: 422.408.4570). Call anytime for help.  (www.suicidepreventionlifeline.org)  National [place] on Mental Illness (www.norma.org): 839.982.5932 or 699-777-8528.  Mental Health Association (www.mentalhealth.org): 834.864.9013 or 165-176-4759.       Follow up as directed, for your appointments, per your After Visit Summary Form.

## 2024-11-18 NOTE — PROGRESS NOTES
"  The patient has been notified of following:      \"This telephone visit will be conducted via a call between you and your physician/provider. We have found that certain health care needs can be provided without the need for a physical exam.  This service lets us provide the care you need with a short phone conversation.  If a prescription is necessary we can send it directly to your pharmacy.  If lab work is needed we can place an order for that and you can then stop by our lab to have the test done at a later time.     Telephone visits are billed at different rates depending on your insurance coverage. During this emergency period, for some insurers they may be billed the same as an in-person visit.  Please reach out to your insurance provider with any questions.     If during the course of the call the physician/provider feels a telephone visit is not appropriate, you will not be charged for this service.\"     Patient has given verbal consent to a Telephone visit? Yes     Will anyone else be joining the visit? No        Patient would like to receive their AVS by AVS P/reference: Mail a copy      Psychiatric  Out patient Follow Up Progress Note  Date of visit:11/18/2024           Discussion of Care and Treatment Recommendations:   This is a 69 year old female with  a  history of depression and PTSD presenting to our virtual clinic for a follow up appointment .    PCP managing sleep :  Prescribing  Lorazepam ,Seroquel and Ambien   PCP managing Gabapentin for neurological issues.    Last visit 08/26/2024.  Recommendation at last visit .  1.MDD/Anxiety/PTSD : Had stopped seeing a therapist but agreeable to restart ambulatory referral to psychotherapy made.  2.MDD/Anxiety /PTSDHighly recommend : Community Support groups -  3.MDD/Anxiety : Continue Cymbalta  down to  30 mg daily    4. RTC- 8  weeks, call in between visit with any question  Patient and I reviewed diagnosis and treatment plan and patient agrees with " following recommendations:  Ongoing education given regarding diagnostic and treatment options with adequate verbalization of understanding.  Plan   1.MDD/Anxiety/PTSD : Had stopped seeing a therapist but agreeable to restart ambulatory referral to psychotherapy made.  2.MDD/Anxiety /PTSDHighly recommend : Community Support groups -  3.MDD/Anxiety : Continue Cymbalta  down to  30 mg daily    4. 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)    Malignant neoplasm of breast (H)    Posttraumatic hematoma of right breast    Postoperative pain    Pain in right axilla    Constipation due to outlet dysfunction    Logorrhea    Type 2 diabetes mellitus without complication, without long-term current use of insulin (H)    Rotator cuff tear    Anxiety    Scalp irritation    Abnormal MRI    Diabetes 1.5, managed as type 2 (H)    H/O breast surgery    Vitamin D deficiency disease    Moderate major depression (H)    Concussion with loss of consciousness    CKD (chronic kidney disease) stage 3, GFR 30-59 ml/min (H)    Concussion without loss of consciousness    Major depression, recurrent (H)             Chief Complaint / Subjective:    Chief complaint: Depression     History of Present Illness:   Per patient's statement : She reports compliance with current medications and denies side effects.  Recently moved to a 2 bedroom apartment and is still on boxing.  She was looking forward to hosting [facility] and backed currently not able to get more recently.  She however is looking forward to spending time with family outside her home.  No new psychiatric concerns symptoms were manageable at this time.  All present          Answers submitted by the patient for this visit:  Patient Health Questionnaire (Submitted on 11/17/2024)  If you checked off any  "problems, how difficult have these problems made it for you to do your work, take care of things at home, or get along with other people?: Somewhat difficult  PHQ9 TOTAL SCORE: 5    Mental Status Examination:   Appearance: unable to assess  Orientation: Patient alert and oriented to person, place, time, and situation  Reliability:  Patient appears to be an adequate historian.    Behavior: calm and coperative   Speech: Speech is spontaneous and coherent, with a normal rate, rhythm and tone.    Language:There are no difficulties with expressive or receptive language as observed throughout the interview.    Mood: Described as \"ok\".    Affect: unable to assess  Judgement: Able to make basic decision regarding safety.  Insight: Good awareness of physical and mental health conditions and aware of needs around care for these.  Gait and station: unable to assess  Thought process: Logical   Hallucinations : No evidence of any hallucination  Thought content: No evidence of delusions or paranoia.   Suicidal /Homical Ideations:  No thoughts of self harm or suicide. No thoughts of harming others.  Associations: Connected  Fund of knowledge: Average  Attention / Concentration: Able to remain focused during the interview with minimal distractibility or need for redirection.  Short Term Memory: Grossly intact as evidence by client recalling themes and ideas discussed.  Long Term Memory: Intact  Motor Status: unable to asses      Drug/treatment history and current pattern of use:   Denies     Medication changes: See Above   Medication adherence: compliant  Medication side effects: absent  Information about medications: Side effects, benefits and alternative treatments discussed and patient agrees .    Psychotherapy: Supportive therapy day-to-day living    Education: Diet, exercise, abstinence from drugs and alcohol, patient will not drive if sedated and medications or  under influence of any substance    Lab Results:   Personally " reviewed and discussed with the patient    Lab Results   Component Value Date    WBC 5.1 10/31/2023    HGB 13.4 10/31/2023    HCT 40.9 10/31/2023     10/31/2023    CHOL 199 10/31/2023    TRIG 102 10/31/2023    HDL 58 10/31/2023    ALT 28 06/08/2022    AST 20 06/08/2022     10/31/2023    BUN 17.9 10/31/2023    CO2 24 10/31/2023    TSH 0.84 12/06/2019    MICROALBUR 0.79 06/08/2022       Vital signs:  There were no vitals taken for this visit.  Unable to assess telephone visit  Allergies: Aztreonam, Castor oil, Cefaclor, Cephalexin, Cephalexin monohydrate [cephalexin], Chlorhexidine, Ciprofloxacin, Clarithromycin, Clindamycin, Penicillins, Propofol, Scopolamine, Sulfa (sulfonamide antibiotics) [sulfa antibiotics], Sulfasalazine, Tetracyclines & related, Vancomycin, Adhesive [cyanoacrylate], Cytoxan [cyclophosphamide], Erythromycin base [erythromycin], Hydrocodone, Neupogen [filgrastim], Paper tape [adhesive tape], Tapentadol, Taxol [paclitaxel], and Taxotere [docetaxel]           Review of Systems:      ROS:  Subjective data only - Tele-Health  Visit   10 point ROS was negative except for the items listed in HPI-              Medications:       No current facility-administered medications for this visit.     No current facility-administered medications for this visit.           Medication adherence: Reviewed risk/benefits of medication , Patient able to verbalize understanding of side effects and Patient verbally consents to taking medications    PSYCHOEDUCATION:  Medication side effects and alternatives reviewed. Health promotion activities recommended and reviewed today. All questions addressed. Education and counseling completed regarding risks and benefits of medications and psychotherapy options.  Consent provided by patient/guardian  Call the psychiatric nurse line with medication questions or concerns at [phone].  MyChart may be used to communicate with your provider, but this is not intended  to be used for emergencies.  SEROTONIN SYNDROME:  Discussed risks of Serotonin syndrome (ie, serotonin toxicity) which is a potentially life-threatening condition associated with increased serotonergic activity in the central nervous system (CNS). It is seen with therapeutic medication use, inadvertent interactions between drugs, and intentional self-poisoning. Serotonin syndrome may involve a spectrum of clinical findings, which often include mental status changes, autonomic hyperactivity, and neuromuscular abnormalities.    STIMULANT THERAPY: Side effects discussed including but not limited to cardiac (including HTN, tachycardia, sudden death), motor/tic, appetite/growth, mood lability and sleep disruption. This is a controlled substance with risk for abuse, need to keep in a safe keep place and cannot replace lost scripts  HARM REDUCTION:  Discussions regarding effects of mood altering substances, alcohol and cannabis, on mood and that approach is harm reduction, will continue to prescribe meds as they work to cut back use.    SAFETY:  We all care about your loved one's safety. To reduce the risk of self-harm, remove access to all:  Firearms, Medicines (both prescribed and over-the-counter), Knives and other sharp objects, Ropes and like materials, and Alcohol  SLEEP HYGIENE: establish a sleep routine, limit screen time 1 hour prior to bed, use bed for sleep only, take sleep/medications on time (including sleepy time tea, trazadone or herbal treatments such as melatonin), aroma therapy, limit caffeine/sugar, yoga, guided imagery, stretch, meditation, limit naps to 20 minutes, make a temperature change in the room, white noise, be mindful of slowing down breathing, take a warm bath/shower, frequently wash sheets, and journaling.   Medlineplus.gov is information for patients.  It is run by the [employer] and it contains information about all disorders, diseases and all medications.       Crisis  Resources:    Present to the Emergency Department as needed or call after hours crisis line at [phone] or [phone].   [place] Crisis Text Line: Text [place] to 392272.  Suicide LifeLine Chat: suicidepreventionlifeline.org/chat/.  National Suicide Prevention Lifeline: 768.161.3189 (TTY: 216.380.9989). Call anytime for help.  (www.suicidepreventionlifeline.org)  National [place] on Mental Illness (www.norma.org): 385.913.3753 or 763-876-8207.  Mental Health Association (www.mentalhealth.org): 693.901.6722 or 435-900-5410.      Coordination of Care:   More than 50% of time spent on coordination of care including: Educating patient about diagnosis, prognosis, side effects and benefits of medications, diet, exercise.  Time also spent providing supportive therapy regarding above issues.      Telephone -Visit Details    Type of service:  Telephone Visit    Originating Location (pt. Location): Home    Distant Location (provider location):  Providers Remote Office     Disclaimer: This note consists of symbols derived from keyboarding, dictation and/or voice recognition software. As a result, there may be errors in the script that have gone undetected. Please consider this when interpreting information found in this chart.     Start Time : 1500  End time :  1520

## 2024-11-18 NOTE — PROGRESS NOTES
Virtual Visit Details    Type of service:  Telephone Visit      Attending Attestation (For Attendings USE Only)...

## 2024-11-18 NOTE — NURSING NOTE
Current patient location: [address] [place] [id number]    Is the patient currently in the state of [place]? YES    Visit mode:VIDEO    If the visit is dropped, the patient can be reconnected by:VIDEO VISIT: Text to cell phone:   Telephone Information:   Mobile [phone]    and VIDEO VISIT: Send to e-mail at: [email or web address]    Will anyone else be joining the visit? NO  (If patient encounters technical issues they should call [phone])    Are changes needed to the allergy or medication list? No    Are refills needed on medications prescribed by this physician? YES    DULoxetine (CYMBALTA) 30 MG capsule     Rooming Documentation:  Questionnaire(s) completed    Reason for visit: RECHECK    Mely LEVI

## 2024-12-08 ENCOUNTER — HEALTH MAINTENANCE LETTER (OUTPATIENT)
Age: 69
End: 2024-12-08

## 2025-01-09 ASSESSMENT — ANXIETY QUESTIONNAIRES
4. TROUBLE RELAXING: MORE THAN HALF THE DAYS
3. WORRYING TOO MUCH ABOUT DIFFERENT THINGS: MORE THAN HALF THE DAYS
2. NOT BEING ABLE TO STOP OR CONTROL WORRYING: MORE THAN HALF THE DAYS
1. FEELING NERVOUS, ANXIOUS, OR ON EDGE: NEARLY EVERY DAY
8. IF YOU CHECKED OFF ANY PROBLEMS, HOW DIFFICULT HAVE THESE MADE IT FOR YOU TO DO YOUR WORK, TAKE CARE OF THINGS AT HOME, OR GET ALONG WITH OTHER PEOPLE?: SOMEWHAT DIFFICULT
GAD7 TOTAL SCORE: 12
6. BECOMING EASILY ANNOYED OR IRRITABLE: SEVERAL DAYS
5. BEING SO RESTLESS THAT IT IS HARD TO SIT STILL: NOT AT ALL
IF YOU CHECKED OFF ANY PROBLEMS ON THIS QUESTIONNAIRE, HOW DIFFICULT HAVE THESE PROBLEMS MADE IT FOR YOU TO DO YOUR WORK, TAKE CARE OF THINGS AT HOME, OR GET ALONG WITH OTHER PEOPLE: SOMEWHAT DIFFICULT
GAD7 TOTAL SCORE: 12
7. FEELING AFRAID AS IF SOMETHING AWFUL MIGHT HAPPEN: MORE THAN HALF THE DAYS

## 2025-01-12 ASSESSMENT — PATIENT HEALTH QUESTIONNAIRE - PHQ9
SUM OF ALL RESPONSES TO PHQ QUESTIONS 1-9: 6
10. IF YOU CHECKED OFF ANY PROBLEMS, HOW DIFFICULT HAVE THESE PROBLEMS MADE IT FOR YOU TO DO YOUR WORK, TAKE CARE OF THINGS AT HOME, OR GET ALONG WITH OTHER PEOPLE: NOT DIFFICULT AT ALL
SUM OF ALL RESPONSES TO PHQ QUESTIONS 1-9: 6

## 2025-01-13 ENCOUNTER — VIRTUAL VISIT (OUTPATIENT)
Dept: PSYCHIATRY | Facility: CLINIC | Age: 70
End: 2025-01-13
Payer: MEDICARE

## 2025-01-13 DIAGNOSIS — F32.2 MODERATELY SEVERE MAJOR DEPRESSION (H): ICD-10-CM

## 2025-01-13 PROCEDURE — 98013 SYNCH AUDIO-ONLY EST LOW 20: CPT | Performed by: NURSE PRACTITIONER

## 2025-01-13 RX ORDER — DULOXETIN HYDROCHLORIDE 30 MG/1
30 CAPSULE, DELAYED RELEASE ORAL DAILY
Qty: 90 CAPSULE | Refills: 0 | Status: SHIPPED | OUTPATIENT
Start: 2025-01-13

## 2025-01-13 NOTE — NURSING NOTE
Is the patient currently in the state of [place]? YES    Current patient location: [address] [place] [id number]    Visit mode:Telephone    If the visit is dropped, the patient can be reconnected by: TELEPHONE VISIT: Phone number: [phone]    Will anyone else be joining the visit? No  (If patient encounters technical issues they should call [phone])    Are changes needed to the allergy or medication list? No    Are refills needed on medications prescribed by this physician? Discuss with Provider    Rooming Documentation: Questionnaire(s) completed.    Reason for visit: AMITA Gutierrez

## 2025-01-13 NOTE — PROGRESS NOTES
"  The patient has been notified of following:      \"This telephone visit will be conducted via a call between you and your physician/provider. We have found that certain health care needs can be provided without the need for a physical exam.  This service lets us provide the care you need with a short phone conversation.  If a prescription is necessary we can send it directly to your pharmacy.  If lab work is needed we can place an order for that and you can then stop by our lab to have the test done at a later time.     Telephone visits are billed at different rates depending on your insurance coverage. During this emergency period, for some insurers they may be billed the same as an in-person visit.  Please reach out to your insurance provider with any questions.     If during the course of the call the physician/provider feels a telephone visit is not appropriate, you will not be charged for this service.\"     Patient has given verbal consent to a Telephone visit? Yes     Will anyone else be joining the visit? No        Patient would like to receive their AVS by AVS P/reference: Mail a copy      Psychiatric  Out patient Follow Up Progress Note  Date of visit:1/13/2025           Discussion of Care and Treatment Recommendations:   This is a 69 year old female with a  history of depression and PTSD presenting to our virtual clinic for a follow up appointment .      Last visit 11/18/2024.  Recommendation at last visit .  1.MDD/Anxiety/PTSD : Had stopped seeing a therapist but agreeable to restart ambulatory referral to psychotherapy made.  2.MDD/Anxiety /PTSDHighly recommend : Community Support groups -  3.MDD/Anxiety : Continue Cymbalta  down to  30 mg daily    4. RTC- 8  weeks, call in between visit with any question  Patient and I reviewed diagnosis and treatment plan and patient agrees with following recommendations:  Ongoing education given regarding diagnostic and treatment options with adequate verbalization " of understanding.  Plan   1.MDD/Anxiety/PTSD : Had stopped seeing a therapist but agreeable to restart ambulatory referral to psychotherapy made.  2.MDD/Anxiety /PTSDHighly recommend : Community Support groups -  3.MDD/Anxiety : Continue Cymbalta  down to  30 mg daily    4. 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)    Malignant neoplasm of breast (H)    Posttraumatic hematoma of right breast    Postoperative pain    Pain in right axilla    Constipation due to outlet dysfunction    Logorrhea    Type 2 diabetes mellitus without complication, without long-term current use of insulin (H)    Rotator cuff tear    Anxiety    Scalp irritation    Abnormal MRI    Diabetes 1.5, managed as type 2 (H)    H/O breast surgery    Vitamin D deficiency disease    Moderate major depression (H)    Concussion with loss of consciousness    CKD (chronic kidney disease) stage 3, GFR 30-59 ml/min (H)    Concussion without loss of consciousness    Major depression, recurrent (H)             Chief Complaint / Subjective:    Chief complaint: Depression     History of Present Illness:   Per patient's statement : She reports compliance with current medications and denies side effects.  She had a rough holiday season with 2 of her brothers hospitalized.  One of the brothers last discharge but 1 continues to be hospitalized.  She also has some family dynamics involving her son and his girlfriend who and now  which happened on [place] Day and she had to watch.  2 children for 2 weeks which was stressful for her.  Health wise she has been stable .  She is not interested in adjusting medications.  She did not follow through with the referral that I made for psychotherapy states she was overstressed over the holidays but plans on following up with psychotherapy  "in the future  She would like to continue on current dose of duloxetine.    Answers submitted by the patient for this visit:  Patient Health Questionnaire (Submitted on 1/12/2025)  If you checked off any problems, how difficult have these problems made it for you to do your work, take care of things at home, or get along with other people?: Not difficult at all  PHQ9 TOTAL SCORE: 6  Patient Health Questionnaire (G7) (Submitted on 1/9/2025)  ROBERT 7 TOTAL SCORE: 12    Mental Status Examination:     Appearance: unable to assess  Orientation: Patient alert and oriented to person, place, time, and situation  Reliability:  Patient appears to be an adequate historian.    Behavior: calm and coperative   Speech: Speech is spontaneous and coherent, with a normal rate, rhythm and tone.    Language:There are no difficulties with expressive or receptive language as observed throughout the interview.    Mood: Described as \"ok\".    Affect: unable to assess  Judgement: Able to make basic decision regarding safety.  Insight: Good awareness of physical and mental health conditions and aware of needs around care for these.  Gait and station: unable to assess  Thought process: Logical   Hallucinations : No evidence of any hallucination  Thought content: No evidence of delusions or paranoia.   Suicidal /Homical Ideations:  No thoughts of self harm or suicide. No thoughts of harming others.  Associations: Connected  Fund of knowledge: Average  Attention / Concentration: Able to remain focused during the interview with minimal distractibility or need for redirection.  Short Term Memory: Grossly intact as evidence by client recalling themes and ideas discussed.  Long Term Memory: Intact  Motor Status: unable to asses      Drug/treatment history and current pattern of use:   Denies     Medication changes: See Above   Medication adherence: compliant  Medication side effects: absent  Information about medications: Side effects, benefits and " alternative treatments discussed and patient agrees .    Psychotherapy: Supportive therapy day-to-day living    Education: Diet, exercise, abstinence from drugs and alcohol, patient will not drive if sedated and medications or  under influence of any substance    Lab Results:   Personally reviewed and discussed with the patient    Lab Results   Component Value Date    WBC 5.1 10/31/2023    HGB 13.4 10/31/2023    HCT 40.9 10/31/2023     10/31/2023    CHOL 199 10/31/2023    TRIG 102 10/31/2023    HDL 58 10/31/2023    ALT 28 06/08/2022    AST 20 06/08/2022     10/31/2023    BUN 17.9 10/31/2023    CO2 24 10/31/2023    TSH 0.84 12/06/2019    MICROALBUR 0.79 06/08/2022       Vital signs:  There were no vitals taken for this visit.  Unable to assess telephone visit  Allergies: Aztreonam, Castor oil, Cefaclor, Cephalexin, Cephalexin monohydrate [cephalexin], Chlorhexidine, Ciprofloxacin, Clarithromycin, Clindamycin, Penicillins, Propofol, Scopolamine, Sulfa (sulfonamide antibiotics) [sulfa antibiotics], Sulfasalazine, Tetracyclines & related, Vancomycin, Adhesive [cyanoacrylate], Cytoxan [cyclophosphamide], Erythromycin base [erythromycin], Hydrocodone, Neupogen [filgrastim], Paper tape [adhesive tape], Tapentadol, Taxol [paclitaxel], and Taxotere [docetaxel]           Review of Systems:      ROS:  Subjective data only - Tele-Health  Visit   10 point ROS was negative except for the items listed in HPI-              Medications:     Current Outpatient Medications   Medication Sig Dispense Refill    acetaminophen (TYLENOL) 500 MG tablet [ACETAMINOPHEN (TYLENOL) 500 MG TABLET] Take 1,000 mg by mouth 3 (three) times a day as needed for pain.      azithromycin (ZITHROMAX) 500 MG tablet Take 1 tablet (500 mg) by mouth daily 5 tablet 0    baclofen (LIORESAL) 10 MG tablet TAKE 0.5-1 TABLETS (5-10 MG) BY MOUTH 2 TIMES DAILY AS NEEDED FOR MUSCLE SPASMS 180 tablet 1    benzonatate (TESSALON) 200 MG capsule Take 1 capsule  (200 mg) by mouth 3 times daily as needed for cough. 30 capsule 0    betamethasone, augmented, (DIPROLENE) 0.05 % lotion [BETAMETHASONE, AUGMENTED, (DIPROLENE) 0.05 % LOTION] Apply 1 application topically 2 (two) times a day as needed. Apply to the back of the neck and upper back 60 mL 3    blood glucose test (CONTOUR NEXT TEST STRIPS) strips [BLOOD GLUCOSE TEST (CONTOUR NEXT TEST STRIPS) STRIPS] Use 1 each As Directed daily. 100 strip 3    blood-glucose meter (CONTOUR NEXT METER) Misc [BLOOD-GLUCOSE METER (CONTOUR NEXT METER) MISC] Check BG twice daily. 1 each 0    calcium citrate-vitamin D (CITRACAL+D) 315-200 mg-unit per tablet Take 1 tablet by mouth      Cholecalciferol (VITAMIN D3) 25 MCG TABS TAKE 2 TABLETS (2,000 UNITS TOTAL) BY MOUTH 2 (TWO) TIMES A DAY. *NOT COVERED* 360 tablet 3    DULoxetine (CYMBALTA) 30 MG capsule Take 1 capsule (30 mg) by mouth daily. 90 capsule 0    generic lancets (MICROLET LANCET) [GENERIC LANCETS (MICROLET LANCET)] Use 1 each As Directed daily. Dispense brand per patient's insurance at pharmacy discretion. 100 each 11    guaiFENesin (MUCINEX) 600 MG 12 hr tablet Take 2 tablets (1,200 mg) by mouth 2 times daily. 30 tablet 0    lisinopril (ZESTRIL) 2.5 MG tablet TAKE 1 TABLET BY MOUTH EVERY DAY 90 tablet 3    LORazepam (ATIVAN) 1 MG tablet TAKE 1 TABLET BY MOUTH EVERY 8 HOURS AS NEEDED FOR ANXIETY. 5 tablet 0    metFORMIN (GLUCOPHAGE XR) 500 MG 24 hr tablet TAKE 3 TABLETS (1,500 MG) BY MOUTH DAILY (WITH BREAKFAST). DUE FOR DIABETES VISIT THIS MONTH 270 tablet 4    naproxen sodium (ALEVE) 220 MG tablet [NAPROXEN SODIUM (ALEVE) 220 MG TABLET] Take 440 mg by mouth as needed.      oxyCODONE (ROXICODONE) 5 MG immediate release tablet [OXYCODONE (ROXICODONE) 5 MG IMMEDIATE RELEASE TABLET] Take 1-3 tabs PO every 6 hours as needed for pain; do not take lorazepam when taking this medication 20 tablet 0    polyethylene glycol (MIRALAX) 17 GM/Dose powder Take 17 g (1 Capful) by mouth 2 times  daily 850 g 11    QUEtiapine (SEROQUEL) 25 MG tablet TAKE 1 AND 1/2 TABLETS AT  BEDTIME 135 tablet 3    simvastatin (ZOCOR) 5 MG tablet TAKE 1 TABLET BY MOUTH EVERY DAY IN THE EVENING 90 tablet 4    zolpidem (AMBIEN) 10 MG tablet TAKE 1 TABLET BY MOUTH NIGHTLY AS NEEDED 30 tablet 0     No current facility-administered medications for this visit.         No current facility-administered medications for this visit.     No current facility-administered medications for this visit.     Medication adherence: Reviewed risk/benefits of medication , Patient able to verbalize understanding of side effects and Patient verbally consents to taking medications    PSYCHOEDUCATION:  Medication side effects and alternatives reviewed. Health promotion activities recommended and reviewed today. All questions addressed. Education and counseling completed regarding risks and benefits of medications and psychotherapy options.  Consent provided by patient/guardian  Call the psychiatric nurse line with medication questions or concerns at [phone].  [employer] may be used to communicate with your provider, but this is not intended to be used for emergencies.  SEROTONIN SYNDROME:  Discussed risks of Serotonin syndrome (ie, serotonin toxicity) which is a potentially life-threatening condition associated with increased serotonergic activity in the central nervous system (CNS). It is seen with therapeutic medication use, inadvertent interactions between drugs, and intentional self-poisoning. Serotonin syndrome may involve a spectrum of clinical findings, which often include mental status changes, autonomic hyperactivity, and neuromuscular abnormalities.    STIMULANT THERAPY: Side effects discussed including but not limited to cardiac (including HTN, tachycardia, sudden death), motor/tic, appetite/growth, mood lability and sleep disruption. This is a controlled substance with risk for abuse, need to keep in a safe keep place and cannot replace  lost scripts  HARM REDUCTION:  Discussions regarding effects of mood altering substances, alcohol and cannabis, on mood and that approach is harm reduction, will continue to prescribe meds as they work to cut back use.    SAFETY:  We all care about your loved one's safety. To reduce the risk of self-harm, remove access to all:  Firearms, Medicines (both prescribed and over-the-counter), Knives and other sharp objects, Ropes and like materials, and Alcohol  SLEEP HYGIENE: establish a sleep routine, limit screen time 1 hour prior to bed, use bed for sleep only, take sleep/medications on time (including sleepy time tea, trazadone or herbal treatments such as melatonin), aroma therapy, limit caffeine/sugar, yoga, guided imagery, stretch, meditation, limit naps to 20 minutes, make a temperature change in the room, white noise, be mindful of slowing down breathing, take a warm bath/shower, frequently wash sheets, and journaling.   Medlineplus.gov is information for patients.  It is run by the [employer] and it contains information about all disorders, diseases and all medications.       Crisis Resources:    Present to the Emergency Department as needed or call after hours crisis line at [phone] or [phone].   [place] Crisis Text Line: Text [place] to 602836.  Suicide LifeLine Chat: suicidepreventionlifeline.org/chat/.  National Suicide Prevention Lifeline: 746.297.1247 (TTY: 306.958.3340). Call anytime for help.  (www.suicidepreventionlifeline.org)  National [employer] on Mental Illness (www.norma.org): 776.721.8006 or 891-880-0936.  Mental Health Association (www.mentalhealth.org): 958.520.9123 or 124-560-7929.      Coordination of Care:   More than 50% of time spent on coordination of care including: Educating patient about diagnosis, prognosis, side effects and benefits of medications, diet, exercise.  Time also spent providing supportive therapy regarding above issues.      Telephone -Visit  Details    Type of service:  Telephone Visit    Originating Location (pt. Location): Home    Distant Location (provider location):  Providers Remote Office     Disclaimer: This note consists of symbols derived from keyboarding, dictation and/or voice recognition software. As a result, there may be errors in the script that have gone undetected. Please consider this when interpreting information found in this chart.     Start Time : 1530  End time : 1551

## 2025-01-13 NOTE — PATIENT INSTRUCTIONS
"The Panel Psychiatry Program  What to Expect  Here's what to expect in the Panel Psychiatry Program.   About the program  You'll be meeting with a psychiatric doctor to check your mental health. A psychiatric doctor helps you deal with troubling thoughts and feelings by giving you medicine. They'll make sure you know the plan for your care. You may see them for a long time. When you're feeling better, they may refer you back to seeing your family doctor.   If you have any questions, we'll be glad to talk to you.  About visits  Be open  At your visits, please talk openly about your problems. It may feel hard, but it's the best way for us to help you.  Cancelling visits  If you can't come to your visit, please call us right away at [phone]. If you don't cancel at least 24 hours (1 full day) before your visit, that's \"late cancellation.\"  Not showing up for your visits  Being very late is the same as not showing up. You'll be a \"no show\" if:  You're more than 15 minutes late for a 30-minute (half hour) visit.  You're more than 30 minutes late for a 60-minute (full hour) visit.  If you cancel late or don't show up 2 times within 6 months, we may end your care.  Getting help between visits  If you need help between visits, you can call us Monday to Friday from 8 a.m. to 4:30 p.m. at [phone].  Emergency care  Call 911 or go to the nearest emergency department if your life or someone else's life is in danger.  Call 988 anytime to reach the national Suicide and Crisis hotline.  Medicine refills  To refill your medicine, call your pharmacy. You can also call [facility]'s Behavioral Access at [phone], Monday to Friday, 8 a.m. to 4:30 p.m. It can take 1 to 3 business days to get a refill.   Forms, letters, and tests  You may have papers to fill out, like FMLA, short-term disability, and workability. We can help you with these forms at your visits, but you must have an appointment. You may need more " than 1 visit for this, to be in an intensive therapy program, or both.  Before we can give you medicine for ADHD, we may refer you to get tested for it or confirm it another way.  We may not be able to give you an emotional support animal letter.  We don't do mental health checks ordered by the court.   We don't do mental health testing, but we can refer you to get tested.   Thank you for choosing us for your care.  For informational purposes only. Not to replace the advice of your health care provider. Copyright   2022 [facility]. All rights reserved. [employer] [id number] - 12/22      After Visit Summary   Continue medications as prescribed  Have your pharmacy contact us for a refill if you are running low on medications (We may ask you to come into clinic to get a refill from the nurse  No Alcohol or drug use  No driving if sedated  Call the clinic with any questions or concerns   Reach out for help if you feel like hurting yourself or others ([facility] [phone]: [address] [facility], [address] or [facility] Suicide Hotline   [phone] , call 911 or go to nearest Emergency room     Crisis Resources:    Present to the Emergency Department as needed or call after hours crisis line at [phone] or [phone].   [place] Crisis Text Line: Text [place] to 037067.  Suicide LifeLine Chat: suicidepreventionlifeline.org/chat/.  National Suicide Prevention Lifeline: 161.541.8034 (TTY: 988.640.4586). Call anytime for help.  (www.suicidepreventionlifeline.org)  National [place] on Mental Illness (www.norma.org): 107.421.6315 or 704-725-2858.  Mental Health Association (www.mentalhealth.org): 921.413.3362 or 515-974-3940.       Follow up as directed, for your appointments, per your After Visit Summary Form.

## 2025-01-17 ENCOUNTER — HOSPITAL ENCOUNTER (OUTPATIENT)
Dept: GENERAL RADIOLOGY | Facility: HOSPITAL | Age: 70
Discharge: HOME | End: 2025-01-17
Attending: FAMILY MEDICINE | Admitting: FAMILY MEDICINE
Payer: MEDICARE

## 2025-01-17 DIAGNOSIS — M79.672 LEFT FOOT PAIN: ICD-10-CM

## 2025-01-17 PROCEDURE — 73610 X-RAY EXAM OF ANKLE: CPT | Mod: LT

## 2025-01-17 PROCEDURE — 73630 X-RAY EXAM OF FOOT: CPT | Mod: LT

## 2025-03-19 ENCOUNTER — VIRTUAL VISIT (OUTPATIENT)
Dept: PSYCHIATRY | Facility: CLINIC | Age: 70
End: 2025-03-19
Payer: MEDICARE

## 2025-03-19 DIAGNOSIS — F32.2 MODERATELY SEVERE MAJOR DEPRESSION (H): ICD-10-CM

## 2025-03-19 PROCEDURE — 98014 SYNCH AUDIO-ONLY EST MOD 30: CPT | Performed by: NURSE PRACTITIONER

## 2025-03-19 PROCEDURE — 1125F AMNT PAIN NOTED PAIN PRSNT: CPT | Mod: 93 | Performed by: NURSE PRACTITIONER

## 2025-03-19 PROCEDURE — G2211 COMPLEX E/M VISIT ADD ON: HCPCS | Performed by: NURSE PRACTITIONER

## 2025-03-19 RX ORDER — DULOXETIN HYDROCHLORIDE 30 MG/1
30 CAPSULE, DELAYED RELEASE ORAL DAILY
Qty: 90 CAPSULE | Refills: 0 | Status: SHIPPED | OUTPATIENT
Start: 2025-03-19

## 2025-03-19 ASSESSMENT — PAIN SCALES - GENERAL: PAINLEVEL_OUTOF10: SEVERE PAIN (8)

## 2025-03-19 NOTE — PATIENT INSTRUCTIONS
"The Panel Psychiatry Program  What to Expect  Here's what to expect in the Panel Psychiatry Program.   About the program  You'll be meeting with a psychiatric doctor to check your mental health. A psychiatric doctor helps you deal with troubling thoughts and feelings by giving you medicine. They'll make sure you know the plan for your care. You may see them for a long time. When you're feeling better, they may refer you back to seeing your family doctor.   If you have any questions, we'll be glad to talk to you.  About visits  Be open  At your visits, please talk openly about your problems. It may feel hard, but it's the best way for us to help you.  Cancelling visits  If you can't come to your visit, please call us right away at [phone]. If you don't cancel at least 24 hours (1 full day) before your visit, that's \"late cancellation.\"  Not showing up for your visits  Being very late is the same as not showing up. You'll be a \"no show\" if:  You're more than 15 minutes late for a 30-minute (half hour) visit.  You're more than 30 minutes late for a 60-minute (full hour) visit.  If you cancel late or don't show up 2 times within 6 months, we may end your care.  Getting help between visits  If you need help between visits, you can call us Monday to Friday from 8 a.m. to 4:30 p.m. at [phone].  Emergency care  Call 911 or go to the nearest emergency department if your life or someone else's life is in danger.  Call 988 anytime to reach the national Suicide and Crisis hotline.  Medicine refills  To refill your medicine, call your pharmacy. You can also call [facility]'s Behavioral Access at [phone], Monday to Friday, 8 a.m. to 4:30 p.m. It can take 1 to 3 business days to get a refill.   Forms, letters, and tests  You may have papers to fill out, like FMLA, short-term disability, and workability. We can help you with these forms at your visits, but you must have an appointment. You may need more " than 1 visit for this, to be in an intensive therapy program, or both.  Before we can give you medicine for ADHD, we may refer you to get tested for it or confirm it another way.  We may not be able to give you an emotional support animal letter.  We don't do mental health checks ordered by the court.   We don't do mental health testing, but we can refer you to get tested.   Thank you for choosing us for your care.  For informational purposes only. Not to replace the advice of your health care provider. Copyright   2022 [facility]. All rights reserved. [employer] [id number] - 12/22      After Visit Summary   Continue medications as prescribed  Have your pharmacy contact us for a refill if you are running low on medications (We may ask you to come into clinic to get a refill from the nurse  No Alcohol or drug use  No driving if sedated  Call the clinic with any questions or concerns   Reach out for help if you feel like hurting yourself or others ([facility] [phone]: [address] [facility], [address] or [facility] Suicide Hotline   [phone] , call 911 or go to nearest Emergency room     Crisis Resources:    Present to the Emergency Department as needed or call after hours crisis line at [phone] or [phone].   [place] Crisis Text Line: Text [place] to 462632.  Suicide LifeLine Chat: suicidepreventionlifeline.org/chat/.  National Suicide Prevention Lifeline: 919.714.2588 (TTY: 809.286.2018). Call anytime for help.  (www.suicidepreventionlifeline.org)  National [place] on Mental Illness (www.norma.org): 946.943.8528 or 286-306-3047.  Mental Health Association (www.mentalhealth.org): 444.536.5838 or 834-307-1639.       Follow up as directed, for your appointments, per your After Visit Summary Form.

## 2025-03-19 NOTE — NURSING NOTE
Current patient location: [address] [place] [id number]    Is the patient currently in the state of [place]? YES    Visit mode: TELEPHONE    If the visit is dropped, the patient can be reconnected by:TELEPHONE VISIT: Phone number:   Telephone Information:   Mobile [phone]       Will anyone else be joining the visit? NO  (If patient encounters technical issues they should call [phone] :[phone])    Are changes needed to the allergy or medication list? No    Are refills needed on medications prescribed by this physician? Discuss with provider    Rooming Documentation:  Questionnaire(s) completed    Reason for visit: RECHECK    Jorge DIGGSF

## 2025-03-19 NOTE — PROGRESS NOTES
"  The patient has been notified of following:      \"This telephone visit will be conducted via a call between you and your physician/provider. We have found that certain health care needs can be provided without the need for a physical exam.  This service lets us provide the care you need with a short phone conversation.  If a prescription is necessary we can send it directly to your pharmacy.  If lab work is needed we can place an order for that and you can then stop by our lab to have the test done at a later time.     Telephone visits are billed at different rates depending on your insurance coverage. During this emergency period, for some insurers they may be billed the same as an in-person visit.  Please reach out to your insurance provider with any questions.     If during the course of the call the physician/provider feels a telephone visit is not appropriate, you will not be charged for this service.\"     Patient has given verbal consent to a Telephone visit? Yes     Will anyone else be joining the visit? No        Patient would like to receive their AVS by AVS P/reference: Mail a copy      Psychiatric  Out patient Follow Up Progress Note  Date of visit:3/19/2025           Discussion of Care and Treatment Recommendations:   This is a 69 year old female with a  history of depression and PTSD presenting to our virtual clinic for a follow up appointment . .      Last visit 01/13/2025.  Recommendation at last visit .  1.MDD/Anxiety/PTSD : Had stopped seeing a therapist but agreeable to restart ambulatory referral to psychotherapy made.  2.MDD/Anxiety /PTSDHighly recommend : Community Support groups -  3.MDD/Anxiety : Continue Cymbalta  down to  30 mg daily    4. RTC- 8  weeks, call in between visit with any question  Patient and I reviewed diagnosis and treatment plan and patient agrees with following recommendations:  Ongoing education given regarding diagnostic and treatment options with adequate verbalization " of understanding.  Plan   1.MDD/Anxiety/PTSD : Had stopped seeing a therapist but agreeable to restart ambulatory referral to psychotherapy made.  2.MDD/Anxiety /PTSDHighly recommend : Community Support groups -  3.MDD/Anxiety : Continue Cymbalta  down to  30 mg daily    4. 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)    Malignant neoplasm of breast (H)    Posttraumatic hematoma of right breast    Postoperative pain    Pain in right axilla    Constipation due to outlet dysfunction    Logorrhea    Type 2 diabetes mellitus without complication, without long-term current use of insulin (H)    Rotator cuff tear    Anxiety    Scalp irritation    Abnormal MRI    Diabetes 1.5, managed as type 2 (H)    H/O breast surgery    Vitamin D deficiency disease    Moderate major depression (H)    Concussion with loss of consciousness    CKD (chronic kidney disease) stage 3, GFR 30-59 ml/min (H)    Concussion without loss of consciousness    Major depression, recurrent (H)             Chief Complaint / Subjective:    Chief complaint: Depression     History of Present Illness:   Per patient's statement : Patient reports compliance with current medications and denies side effects.  She broke one of her legs while hanging a picture at home and is currently in a cast.  She is however recovering well.  Psychiatric symptoms are currently well-managed.  Her medical issues also currently stable.  No new concerns.  Looking forward to spending the is the holidays with her son, her brother and grandchild.  No new concerns today          Answers submitted by the patient for this visit:  Patient Health Questionnaire (Submitted on 3/19/2025)  If you checked off any problems, how difficult have these problems made it for you to do your work, take care of things at home,  "or get along with other people?: Somewhat difficult  PHQ9 TOTAL SCORE: 9    Mental Status Examination:     Appearance: unable to assess  Orientation: Patient alert and oriented to person, place, time, and situation  Reliability:  Patient appears to be an adequate historian.    Behavior: calm and coperative   Speech: Speech is spontaneous and coherent, with a normal rate, rhythm and tone.    Language:There are no difficulties with expressive or receptive language as observed throughout the interview.    Mood: Described as \"ok\".    Affect: unable to assess  Judgement: Able to make basic decision regarding safety.  Insight: Good awareness of physical and mental health conditions and aware of needs around care for these.  Gait and station: unable to assess  Thought process: Logical   Hallucinations : No evidence of any hallucination  Thought content: No evidence of delusions or paranoia.   Suicidal /Homical Ideations:  No thoughts of self harm or suicide. No thoughts of harming others.  Associations: Connected  Fund of knowledge: Average  Attention / Concentration: Able to remain focused during the interview with minimal distractibility or need for redirection.  Short Term Memory: Grossly intact as evidence by client recalling themes and ideas discussed.  Long Term Memory: Intact  Motor Status: unable to asses      Drug/treatment history and current pattern of use:   Denies     Medication changes: See Above   Medication adherence: compliant  Medication side effects: absent  Information about medications: Side effects, benefits and alternative treatments discussed and patient agrees .    Psychotherapy: Supportive therapy day-to-day living    Education: Diet, exercise, abstinence from drugs and alcohol, patient will not drive if sedated and medications or  under influence of any substance    Lab Results:   Personally reviewed and discussed with the patient    Lab Results   Component Value Date    WBC 5.1 10/31/2023    HGB " 13.4 10/31/2023    HCT 40.9 10/31/2023     10/31/2023    CHOL 199 10/31/2023    TRIG 102 10/31/2023    HDL 58 10/31/2023    ALT 28 06/08/2022    AST 20 06/08/2022     10/31/2023    BUN 17.9 10/31/2023    CO2 24 10/31/2023    TSH 0.84 12/06/2019    MICROALBUR 0.79 06/08/2022       Vital signs:  There were no vitals taken for this visit.  Unable to assess telephone visit  Allergies: Aztreonam, Castor oil, Cefaclor, Cephalexin, Cephalexin monohydrate [cephalexin], Chlorhexidine, Ciprofloxacin, Clarithromycin, Clindamycin, Penicillins, Propofol, Scopolamine, Sulfa (sulfonamide antibiotics) [sulfa antibiotics], Sulfasalazine, Tetracyclines & related, Vancomycin, Adhesive [cyanoacrylate], Cytoxan [cyclophosphamide], Erythromycin base [erythromycin], Hydrocodone, Neupogen [filgrastim], Paper tape [adhesive tape], Tapentadol, Taxol [paclitaxel], and Taxotere [docetaxel]           Review of Systems:      ROS:  Subjective data only - Tele-Health  Visit   10 point ROS was negative except for the items listed in HPI-              Medications:     Current Outpatient Medications   Medication Sig Dispense Refill    acetaminophen (TYLENOL) 500 MG tablet [ACETAMINOPHEN (TYLENOL) 500 MG TABLET] Take 1,000 mg by mouth 3 (three) times a day as needed for pain.      azithromycin (ZITHROMAX) 500 MG tablet Take 1 tablet (500 mg) by mouth daily 5 tablet 0    baclofen (LIORESAL) 10 MG tablet TAKE 0.5-1 TABLETS (5-10 MG) BY MOUTH 2 TIMES DAILY AS NEEDED FOR MUSCLE SPASMS 180 tablet 1    benzonatate (TESSALON) 200 MG capsule Take 1 capsule (200 mg) by mouth 3 times daily as needed for cough. 30 capsule 0    betamethasone, augmented, (DIPROLENE) 0.05 % lotion [BETAMETHASONE, AUGMENTED, (DIPROLENE) 0.05 % LOTION] Apply 1 application topically 2 (two) times a day as needed. Apply to the back of the neck and upper back 60 mL 3    blood glucose test (CONTOUR NEXT TEST STRIPS) strips [BLOOD GLUCOSE TEST (CONTOUR NEXT TEST STRIPS)  STRIPS] Use 1 each As Directed daily. 100 strip 3    blood-glucose meter (CONTOUR NEXT METER) Misc [BLOOD-GLUCOSE METER (CONTOUR NEXT METER) MISC] Check BG twice daily. 1 each 0    calcium citrate-vitamin D (CITRACAL+D) 315-200 mg-unit per tablet Take 1 tablet by mouth      Cholecalciferol (VITAMIN D3) 25 MCG TABS TAKE 2 TABLETS (2,000 UNITS TOTAL) BY MOUTH 2 (TWO) TIMES A DAY. *NOT COVERED* 360 tablet 3    DULoxetine (CYMBALTA) 30 MG capsule Take 1 capsule (30 mg) by mouth daily. 90 capsule 0    generic lancets (MICROLET LANCET) [GENERIC LANCETS (MICROLET LANCET)] Use 1 each As Directed daily. Dispense brand per patient's insurance at pharmacy discretion. 100 each 11    lisinopril (ZESTRIL) 2.5 MG tablet TAKE 1 TABLET BY MOUTH EVERY DAY 90 tablet 3    LORazepam (ATIVAN) 1 MG tablet TAKE 1 TABLET BY MOUTH EVERY 8 HOURS AS NEEDED FOR ANXIETY. 5 tablet 0    metFORMIN (GLUCOPHAGE XR) 500 MG 24 hr tablet TAKE 3 TABLETS (1,500 MG) BY MOUTH DAILY (WITH BREAKFAST). DUE FOR DIABETES VISIT THIS MONTH 270 tablet 4    naproxen sodium (ALEVE) 220 MG tablet [NAPROXEN SODIUM (ALEVE) 220 MG TABLET] Take 440 mg by mouth as needed.      oxyCODONE (ROXICODONE) 5 MG immediate release tablet [OXYCODONE (ROXICODONE) 5 MG IMMEDIATE RELEASE TABLET] Take 1-3 tabs PO every 6 hours as needed for pain; do not take lorazepam when taking this medication 20 tablet 0    polyethylene glycol (MIRALAX) 17 GM/Dose powder Take 17 g (1 Capful) by mouth 2 times daily 850 g 11    QUEtiapine (SEROQUEL) 25 MG tablet TAKE 1 AND 1/2 TABLETS AT  BEDTIME 135 tablet 3    simvastatin (ZOCOR) 5 MG tablet TAKE 1 TABLET BY MOUTH EVERY DAY IN THE EVENING 90 tablet 4    zolpidem (AMBIEN) 10 MG tablet TAKE 1 TABLET BY MOUTH NIGHTLY AS NEEDED 30 tablet 0     No current facility-administered medications for this visit.           Medication adherence: Reviewed risk/benefits of medication , Patient able to verbalize understanding of side effects and Patient verbally  consents to taking medications    PSYCHOEDUCATION:  Medication side effects and alternatives reviewed. Health promotion activities recommended and reviewed today. All questions addressed. Education and counseling completed regarding risks and benefits of medications and psychotherapy options.  Consent provided by patient/guardian  Call the psychiatric nurse line with medication questions or concerns at [phone].  [employer] may be used to communicate with your provider, but this is not intended to be used for emergencies.  SEROTONIN SYNDROME:  Discussed risks of Serotonin syndrome (ie, serotonin toxicity) which is a potentially life-threatening condition associated with increased serotonergic activity in the central nervous system (CNS). It is seen with therapeutic medication use, inadvertent interactions between drugs, and intentional self-poisoning. Serotonin syndrome may involve a spectrum of clinical findings, which often include mental status changes, autonomic hyperactivity, and neuromuscular abnormalities.    STIMULANT THERAPY: Side effects discussed including but not limited to cardiac (including HTN, tachycardia, sudden death), motor/tic, appetite/growth, mood lability and sleep disruption. This is a controlled substance with risk for abuse, need to keep in a safe keep place and cannot replace lost scripts  HARM REDUCTION:  Discussions regarding effects of mood altering substances, alcohol and cannabis, on mood and that approach is harm reduction, will continue to prescribe meds as they work to cut back use.    SAFETY:  We all care about your loved one's safety. To reduce the risk of self-harm, remove access to all:  Firearms, Medicines (both prescribed and over-the-counter), Knives and other sharp objects, Ropes and like materials, and Alcohol  SLEEP HYGIENE: establish a sleep routine, limit screen time 1 hour prior to bed, use bed for sleep only, take sleep/medications on time (including sleepy time tea,  trazadone or herbal treatments such as melatonin), aroma therapy, limit caffeine/sugar, yoga, guided imagery, stretch, meditation, limit naps to 20 minutes, make a temperature change in the room, white noise, be mindful of slowing down breathing, take a warm bath/shower, frequently wash sheets, and journaling.   Medlineplus.gov is information for patients.  It is run by the [employer] Digital [employer] of Medicine and it contains information about all disorders, diseases and all medications.       Crisis Resources:    Present to the Emergency Department as needed or call after hours crisis line at [phone] or [phone].   [place] Crisis Text Line: Text [place] to 543147.  Suicide LifeLine Chat: suicideZaplox.org/chat/.  [place] Suicide Prevention Lifeline: 482.932.7980 (TTY: 613.121.7260). Call anytime for help.  (www.suicidepreventionlifeline.org)  National [place] on Mental Illness (www.norma.org): 542.943.7449 or 521-820-8111.  Mental Health Association (www.mentalhealth.org): 983.343.2109 or 597-806-9707.      Coordination of Care:   More than 50% of time spent on coordination of care including: Educating patient about diagnosis, prognosis, side effects and benefits of medications, diet, exercise.  Time also spent providing supportive therapy regarding above issues.      Telephone -Visit Details    Type of service:  Telephone Visit    Originating Location (pt. Location): Home    Distant Location (provider location):  Providers Remote Office     Disclaimer: This note consists of symbols derived from keyboarding, dictation and/or voice recognition software. As a result, there may be errors in the script that have gone undetected. Please consider this when interpreting information found in this chart.     Start Time : 1520  End time : 1542

## 2025-03-28 NOTE — ADDENDUM NOTE
Addended by: ALLY PICKARD on: 1/25/2024 11:52 AM     Modules accepted: Orders    
Detail Level: Simple

## 2025-04-01 ENCOUNTER — TELEPHONE (OUTPATIENT)
Dept: SURGERY | Facility: CLINIC | Age: 70
End: 2025-04-01
Payer: MEDICARE

## 2025-04-01 NOTE — TELEPHONE ENCOUNTER
I left a message for the patient to return my call regarding questions about upcoming appointment. Pt should be getting colonoscopy, called to see if this is being scheduled.     Ami Purvis, CMA

## 2025-04-02 ENCOUNTER — PRE VISIT (OUTPATIENT)
Dept: UROLOGY | Facility: CLINIC | Age: 70
End: 2025-04-02
Payer: MEDICARE

## 2025-04-02 NOTE — TELEPHONE ENCOUNTER
Reason for visit: Prolapse     Relevant information: Referred by Augustina Adair APRN CNP in [facility] RE[facility]ERY. Defecography done 7/25/24 for rectal prolapse. H/o CKD, DM type 2, breast cancer. Has previously canceled twice.     Records/imaging/labs/orders: all records available    Pt called: no need for a call    At Rooming: Standard.   If time-have patient empty their bladder and PVR.   Get a cath'd urine sample and dip the urine if they have UTI symptoms.   If they are being seen for prolapse, have pessary kit and blue biohazard bin in room.     Leeann Goodman  4/2/2025  1:55 PM

## 2025-04-10 NOTE — TELEPHONE ENCOUNTER
MEDICAL RECORDS REQUEST   [facility]  Urology Clinic  [address] [place] [address]  PHONE: [phone]  Fax: [phone]                                                    FUTURE VISIT INFORMATION                                                   Alexus WENDY Garcia, : 1955 scheduled for future visit at [facility]     APPOINTMENT INFORMATION:  Date: 2024  Provider:  Iman Hammer MD  Reason for Visit/Diagnosis: Prolapse     REFERRAL INFORMATION:  Referring provider:  Augustina Adair APRN CNP in [facility]        RECORDS REQUESTED FOR VISIT                                                      NOTES   STATUS/DETAILS   OFFICE NOTE from referring provider   yes, 2024 -- Augustina Adair APRN CNP in [facility]   OFFICE NOTE from other specialist   yes   MEDICATION LIST   yes   LABS       URINALYSIS (UA)   no   IMAGES   yes, 2024 -- XR DEFECOGRAPHY      PRE-VISIT CHECKLIST        Joint diagnostic appointment coordinated correctly          (ensure right order & amount of time) Yes   RECORD COLLECTION COMPLETE Yes

## 2025-05-22 ENCOUNTER — VIRTUAL VISIT (OUTPATIENT)
Dept: PSYCHIATRY | Facility: CLINIC | Age: 70
End: 2025-05-22
Payer: MEDICARE

## 2025-05-22 DIAGNOSIS — F43.10 PTSD (POST-TRAUMATIC STRESS DISORDER): ICD-10-CM

## 2025-05-22 DIAGNOSIS — F32.1 CURRENT MODERATE EPISODE OF MAJOR DEPRESSIVE DISORDER, UNSPECIFIED WHETHER RECURRENT (H): Primary | ICD-10-CM

## 2025-05-22 DIAGNOSIS — F41.1 GAD (GENERALIZED ANXIETY DISORDER): ICD-10-CM

## 2025-05-22 ASSESSMENT — PAIN SCALES - GENERAL: PAINLEVEL_OUTOF10: SEVERE PAIN (8)

## 2025-05-22 NOTE — NURSING NOTE
Current patient location: [address] [place] [id number]    Is the patient currently in the state of [place]? YES    Visit mode: TELEPHONE    If the visit is dropped, the patient can be reconnected by:TELEPHONE VISIT: Phone number: [phone]    Will anyone else be joining the visit? NO  (If patient encounters technical issues they should call [phone]    Are changes needed to the allergy or medication list? No    Are refills needed on medications prescribed by this physician? Discuss with provider    Rooming Documentation:  Questionnaire(s) completed    Reason for visit: RECHECK    Mely LEVI

## 2025-05-22 NOTE — PROGRESS NOTES
"  The patient has been notified of following:      \"This telephone visit will be conducted via a call between you and your physician/provider. We have found that certain health care needs can be provided without the need for a physical exam.  This service lets us provide the care you need with a short phone conversation.  If a prescription is necessary we can send it directly to your pharmacy.  If lab work is needed we can place an order for that and you can then stop by our lab to have the test done at a later time.     Telephone visits are billed at different rates depending on your insurance coverage. During this emergency period, for some insurers they may be billed the same as an in-person visit.  Please reach out to your insurance provider with any questions.     If during the course of the call the physician/provider feels a telephone visit is not appropriate, you will not be charged for this service.\"     Patient has given verbal consent to a Telephone visit? Yes     Will anyone else be joining the visit? No        Patient would like to receive their AVS by AVS P/reference: Mail a copy      Psychiatric  Out patient Follow Up Progress Note  Date of visit:5/22/2025           Discussion of Care and Treatment Recommendations:   This is a 69 year old female with a  history of depression and PTSD presenting to our virtual clinic for a follow up appointment       Last visit 03/19/2025.  Recommendation at last visit .  1.MDD/Anxiety/PTSD : Had stopped seeing a therapist but agreeable to restart ambulatory referral to psychotherapy made.  2.MDD/Anxiety /PTSDHighly recommend : Community Support groups -  3.MDD/Anxiety : Continue Cymbalta  down to  30 mg daily    4. RTC- 8  weeks, call in between visit with any question  Patient and I reviewed diagnosis and treatment plan and patient agrees with following recommendations:  Ongoing education given regarding diagnostic and treatment options with adequate verbalization of " understanding.  Plan   1.MDD/Anxiety/PTSD : Had stopped seeing a therapist but agreeable to restart ambulatory referral to psychotherapy made.  2.MDD/Anxiety /PTSDHighly recommend : Community Support groups -  3.MDD/Anxiety : Continue Cymbalta  down to  30 mg daily    4. 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)    Malignant neoplasm of breast (H)    Posttraumatic hematoma of right breast    Postoperative pain    Pain in right axilla    Constipation due to outlet dysfunction    Logorrhea    Type 2 diabetes mellitus without complication, without long-term current use of insulin (H)    Rotator cuff tear    Anxiety    Scalp irritation    Abnormal MRI    Diabetes 1.5, managed as type 2 (H)    H/O breast surgery    Vitamin D deficiency disease    Moderate major depression (H)    Concussion with loss of consciousness    CKD (chronic kidney disease) stage 3, GFR 30-59 ml/min (H)    Concussion without loss of consciousness    Major depression, recurrent (H)             Chief Complaint / Subjective:    Chief complaint: Depression    History of Present Illness:   Per patient's statement : She reports compliance with current medications denies side effects.  She took a fall down the stairs in her house while walking with no shoes- states her socks are slippery . She sustained a blister on her statement.  States she did not have a fracture.  She did not go to the doctor to get it checked.  I did recommen get a check of the doctors office but states that she feels well.  Residual depression symptoms seemingly situational due to her brothers illness-and brothers to the hospital.  No other concerns today.  She would like to continue current medications    Answers submitted by the patient for this visit:  Patient Health Questionnaire (Submitted on  "5/21/2025)  If you checked off any problems, how difficult have these problems made it for you to do your work, take care of things at home, or get along with other people?: Somewhat difficult  PHQ9 TOTAL SCORE: 8    Mental Status Examination:     Appearance: unable to assess  Orientation: Patient alert and oriented to person, place, time, and situation  Reliability:  Patient appears to be an adequate historian.    Behavior: calm and coperative   Speech: Speech is spontaneous and coherent, with a normal rate, rhythm and tone.    Language:There are no difficulties with expressive or receptive language as observed throughout the interview.    Mood: Described as \"ok\".    Affect: unable to assess  Judgement: Able to make basic decision regarding safety.  Insight: Good awareness of physical and mental health conditions and aware of needs around care for these.  Gait and station: unable to assess  Thought process: Logical   Hallucinations : No evidence of any hallucination  Thought content: No evidence of delusions or paranoia.   Suicidal /Homical Ideations:  No thoughts of self harm or suicide. No thoughts of harming others.  Associations: Connected  Fund of knowledge: Average  Attention / Concentration: Able to remain focused during the interview with minimal distractibility or need for redirection.  Short Term Memory: Grossly intact as evidence by client recalling themes and ideas discussed.  Long Term Memory: Intact  Motor Status: unable to asses      Drug/treatment history and current pattern of use:   Denies     Medication changes: See Above   Medication adherence: compliant  Medication side effects: absent  Information about medications: Side effects, benefits and alternative treatments discussed and patient agrees .    Psychotherapy: Supportive therapy day-to-day living    Education: Diet, exercise, abstinence from drugs and alcohol, patient will not drive if sedated and medications or  under influence of any " substance    Lab Results:   Personally reviewed and discussed with the patient    Lab Results   Component Value Date    WBC 5.1 10/31/2023    HGB 13.4 10/31/2023    HCT 40.9 10/31/2023     10/31/2023    CHOL 199 10/31/2023    TRIG 102 10/31/2023    HDL 58 10/31/2023    ALT 28 06/08/2022    AST 20 06/08/2022     10/31/2023    BUN 17.9 10/31/2023    CO2 24 10/31/2023    TSH 0.84 12/06/2019    MICROALBUR 0.79 06/08/2022       Vital signs:  There were no vitals taken for this visit.  Unable to assess telephone visit  Allergies: Aztreonam, Castor oil, Cefaclor, Cephalexin, Cephalexin monohydrate [cephalexin], Chlorhexidine, Ciprofloxacin, Clarithromycin, Clindamycin, Penicillins, Propofol, Scopolamine, Sulfa (sulfonamide antibiotics) [sulfa antibiotics], Sulfasalazine, Tetracyclines & related, Vancomycin, Adhesive [cyanoacrylate], Cytoxan [cyclophosphamide], Erythromycin base [erythromycin], Hydrocodone, Neupogen [filgrastim], Paper tape [adhesive tape], Tapentadol, Taxol [paclitaxel], and Taxotere [docetaxel]           Review of Systems:      ROS:  Subjective data only - Tele-Health  Visit   10 point ROS was negative except for the items listed in HPI-              Medications:     Current Outpatient Medications   Medication Sig Dispense Refill    acetaminophen (TYLENOL) 500 MG tablet [ACETAMINOPHEN (TYLENOL) 500 MG TABLET] Take 1,000 mg by mouth 3 (three) times a day as needed for pain.      azithromycin (ZITHROMAX) 500 MG tablet Take 1 tablet (500 mg) by mouth daily 5 tablet 0    baclofen (LIORESAL) 10 MG tablet TAKE 0.5-1 TABLETS (5-10 MG) BY MOUTH 2 TIMES DAILY AS NEEDED FOR MUSCLE SPASMS 180 tablet 1    benzonatate (TESSALON) 200 MG capsule Take 1 capsule (200 mg) by mouth 3 times daily as needed for cough. 30 capsule 0    betamethasone, augmented, (DIPROLENE) 0.05 % lotion [BETAMETHASONE, AUGMENTED, (DIPROLENE) 0.05 % LOTION] Apply 1 application topically 2 (two) times a day as needed. Apply to the  back of the neck and upper back 60 mL 3    blood glucose test (CONTOUR NEXT TEST STRIPS) strips [BLOOD GLUCOSE TEST (CONTOUR NEXT TEST STRIPS) STRIPS] Use 1 each As Directed daily. 100 strip 3    blood-glucose meter (CONTOUR NEXT METER) Misc [BLOOD-GLUCOSE METER (CONTOUR NEXT METER) MISC] Check BG twice daily. 1 each 0    calcium citrate-vitamin D (CITRACAL+D) 315-200 mg-unit per tablet Take 1 tablet by mouth      Cholecalciferol (VITAMIN D3) 25 MCG TABS TAKE 2 TABLETS (2,000 UNITS TOTAL) BY MOUTH 2 (TWO) TIMES A DAY. *NOT COVERED* 360 tablet 3    DULoxetine (CYMBALTA) 30 MG capsule Take 1 capsule (30 mg) by mouth daily. 90 capsule 0    generic lancets (MICROLET LANCET) [GENERIC LANCETS (MICROLET LANCET)] Use 1 each As Directed daily. Dispense brand per patient's insurance at pharmacy discretion. 100 each 11    lisinopril (ZESTRIL) 2.5 MG tablet TAKE 1 TABLET BY MOUTH EVERY DAY 90 tablet 3    LORazepam (ATIVAN) 1 MG tablet TAKE 1 TABLET BY MOUTH EVERY 8 HOURS AS NEEDED FOR ANXIETY. 5 tablet 0    metFORMIN (GLUCOPHAGE XR) 500 MG 24 hr tablet TAKE 3 TABLETS (1,500 MG) BY MOUTH DAILY (WITH BREAKFAST). DUE FOR DIABETES VISIT THIS MONTH 270 tablet 4    naproxen sodium (ALEVE) 220 MG tablet [NAPROXEN SODIUM (ALEVE) 220 MG TABLET] Take 440 mg by mouth as needed.      oxyCODONE (ROXICODONE) 5 MG immediate release tablet [OXYCODONE (ROXICODONE) 5 MG IMMEDIATE RELEASE TABLET] Take 1-3 tabs PO every 6 hours as needed for pain; do not take lorazepam when taking this medication 20 tablet 0    polyethylene glycol (MIRALAX) 17 GM/Dose powder Take 17 g (1 Capful) by mouth 2 times daily 850 g 11    QUEtiapine (SEROQUEL) 25 MG tablet TAKE 1 AND 1/2 TABLETS AT  BEDTIME 135 tablet 3    simvastatin (ZOCOR) 5 MG tablet TAKE 1 TABLET BY MOUTH EVERY DAY IN THE EVENING 90 tablet 4    zolpidem (AMBIEN) 10 MG tablet TAKE 1 TABLET BY MOUTH NIGHTLY AS NEEDED 30 tablet 0     No current facility-administered medications for this visit.       No  current facility-administered medications for this visit.     No current facility-administered medications for this visit.           Medication adherence: Reviewed risk/benefits of medication , Patient able to verbalize understanding of side effects and Patient verbally consents to taking medications    Suicide Risk Assessment:   Feels safe in home: Yes   Suicidal ideation: Denies  History of suicide attempts:  No   Hx of impulsivity: No Impetuous and self-damaging behavior is common and can take many forms. Patients abuse substances, binge eat, engage in unsafe sex, spend money irresponsibly, and drive recklessly. In addition, patients can suddenly quit a job that they need or end a relationship that has the potential to last, thereby sabotaging their own success. Impulsivity can also manifest with immature and regressive behavior and often takes the form of sexually acting out.  Hope for the future: present   Hx of Command hallucinations or current psychosis: No  History of Self-injurious behaviors: No Current:  No  Family member  by suicide:  No  A thorough assessment of risk factors related to suicide and self-harm have been reviewed and are noted above. The patient convincingly denies acute suicidality on several occasions. Local community safety resources reviewed and printed for patient to use if needed. There was no deceit detected, and the patient presented in a manner that was believab          PSYCHOEDUCATION:  Medication side effects and alternatives reviewed. Health promotion activities recommended and reviewed today. All questions addressed. Education and counseling completed regarding risks and benefits of medications and psychotherapy options.  Consent provided by patient/guardian  Call the psychiatric nurse line with medication questions or concerns at [phone].  MyChart may be used to communicate with your provider, but this is not intended to be used for emergencies.  SEROTONIN SYNDROME:   Discussed risks of Serotonin syndrome (ie, serotonin toxicity) which is a potentially life-threatening condition associated with increased serotonergic activity in the central nervous system (CNS). It is seen with therapeutic medication use, inadvertent interactions between drugs, and intentional self-poisoning. Serotonin syndrome may involve a spectrum of clinical findings, which often include mental status changes, autonomic hyperactivity, and neuromuscular abnormalities.    STIMULANT THERAPY: Side effects discussed including but not limited to cardiac (including HTN, tachycardia, sudden death), motor/tic, appetite/growth, mood lability and sleep disruption. This is a controlled substance with risk for abuse, need to keep in a safe keep place and cannot replace lost scripts  HARM REDUCTION:  Discussions regarding effects of mood altering substances, alcohol and cannabis, on mood and that approach is harm reduction, will continue to prescribe meds as they work to cut back use.    SAFETY:  We all care about your loved one's safety. To reduce the risk of self-harm, remove access to all:  Firearms, Medicines (both prescribed and over-the-counter), Knives and other sharp objects, Ropes and like materials, and Alcohol  SLEEP HYGIENE: establish a sleep routine, limit screen time 1 hour prior to bed, use bed for sleep only, take sleep/medications on time (including sleepy time tea, trazadone or herbal treatments such as melatonin), aroma therapy, limit caffeine/sugar, yoga, guided imagery, stretch, meditation, limit naps to 20 minutes, make a temperature change in the room, white noise, be mindful of slowing down breathing, take a warm bath/shower, frequently wash sheets, and journaling.   Medlineplus.gov is information for patients.  It is run by the [employer] and it contains information about all disorders, diseases and all medications.       Crisis Resources:    Present to the Emergency Department as  needed or call after hours crisis line at [phone] or [phone].   [place] Crisis Text Line: Text [place] to 463049.  Suicide LifeLine Chat: suicidepreventionlifeline.org/chat/.  National Suicide Prevention Lifeline: 586.617.7666 (TTY: 291.949.3877). Call anytime for help.  (www.suicidepreventionlifeline.org)  National [place] on Mental Illness (www.norma.org): 939.270.2341 or 868-928-3857.  Mental Health Association (www.mentalhealth.org): 396.101.8600 or 015-183-0998.      Coordination of Care:   More than 50% of time spent on coordination of care including: Educating patient about diagnosis, prognosis, side effects and benefits of medications, diet, exercise.  Time also spent providing supportive therapy regarding above issues.      Telephone -Visit Details    Type of service:  Telephone Visit    Originating Location (pt. Location): Home    Distant Location (provider location):  Providers Remote Office     Disclaimer: This note consists of symbols derived from keyboarding, dictation and/or voice recognition software. As a result, there may be errors in the script that have gone undetected. Please consider this when interpreting information found in this chart.     Start Time : 1530  End time : 1552

## 2025-05-22 NOTE — PATIENT INSTRUCTIONS
"The [facility]  What to Expect  Here's what to expect in the Panel Psychiatry Program.   About the program  You'll be meeting with a psychiatric doctor to check your mental health. A psychiatric doctor helps you deal with troubling thoughts and feelings by giving you medicine. They'll make sure you know the plan for your care. You may see them for a long time. When you're feeling better, they may refer you back to seeing your family doctor.   If you have any questions, we'll be glad to talk to you.  About visits  Be open  At your visits, please talk openly about your problems. It may feel hard, but it's the best way for us to help you.  Cancelling visits  If you can't come to your visit, please call us right away at [phone]. If you don't cancel at least 24 hours (1 full day) before your visit, that's \"late cancellation.\"  Not showing up for your visits  Being very late is the same as not showing up. You'll be a \"no show\" if:  You're more than 15 minutes late for a 30-minute (half hour) visit.  You're more than 30 minutes late for a 60-minute (full hour) visit.  If you cancel late or don't show up 2 times within 6 months, we may end your care.  Getting help between visits  If you need help between visits, you can call us Monday to Friday from 8 a.m. to 4:30 p.m. at [phone].  Emergency care  Call 911 or go to the nearest emergency department if your life or someone else's life is in danger.  Call 988 anytime to reach the national Suicide and Crisis hotline.  Medicine refills  To refill your medicine, call your pharmacy. You can also call [facility] at [phone], Monday to Friday, 8 a.m. to 4:30 p.m. It can take 1 to 3 business days to get a refill.   Forms, letters, and tests  You may have papers to fill out, like FMLA, short-term disability, and workability. We can help you with these forms at your visits, but you must have an appointment. You may need more " than 1 visit for this, to be in an intensive therapy program, or both.  Before we can give you medicine for ADHD, we may refer you to get tested for it or confirm it another way.  We may not be able to give you an emotional support animal letter.  We don't do mental health checks ordered by the court.   We don't do mental health testing, but we can refer you to get tested.   Thank you for choosing us for your care.  For informational purposes only. Not to replace the advice of your health care provider. Copyright   2022 [facility]. All rights reserved. [employer] 831828 - 12/22      After Visit Summary   Continue medications as prescribed  Have your pharmacy contact us for a refill if you are running low on medications (We may ask you to come into clinic to get a refill from the nurse  No Alcohol or drug use  No driving if sedated  Call the clinic with any questions or concerns   Reach out for help if you feel like hurting yourself or others ([facility] [phone]: [address], [address] or [facility] Suicide Hotline   [phone] , call 911 or go to nearest Emergency room     Crisis Resources:    Present to the Emergency Department as needed or call after hours crisis line at [phone] or [phone].   [place] Crisis Text Line: Text [place] to 613412.  Suicide LifeLine Chat: suicidepreventionlifeline.org/chat/.  National Suicide Prevention Lifeline: 974.422.3277 (TTY: 728.439.1237). Call anytime for help.  (www.suicidepreventionlifeline.org)  National [place] on Mental Illness (www.norma.org): 630.682.1124 or 100-435-7743.  Mental Health Association (www.mentalhealth.org): 487.361.2688 or 720-756-4289.       Follow up as directed, for your appointments, per your After Visit Summary Form.

## 2025-05-25 ENCOUNTER — MYC REFILL (OUTPATIENT)
Dept: FAMILY MEDICINE | Facility: CLINIC | Age: 70
End: 2025-05-25
Payer: MEDICARE

## 2025-05-25 DIAGNOSIS — M79.18 MYOFASCIAL PAIN: ICD-10-CM

## 2025-05-25 DIAGNOSIS — G47.00 PERSISTENT INSOMNIA: ICD-10-CM

## 2025-05-25 DIAGNOSIS — M54.50 LUMBAR SPINE PAIN: ICD-10-CM

## 2025-05-25 DIAGNOSIS — Z76.0 ENCOUNTER FOR MEDICATION REFILL: ICD-10-CM

## 2025-05-25 RX ORDER — QUETIAPINE FUMARATE 25 MG/1
TABLET, FILM COATED ORAL
Qty: 135 TABLET | Refills: 3 | Status: CANCELLED | OUTPATIENT
Start: 2025-05-25

## 2025-05-26 NOTE — TELEPHONE ENCOUNTER
Clinic RN: Please investigate patient's chart or contact patient if the information cannot be found because patient should have run out of this medication on January 2024. Confirm patient is taking this medication as prescribed. Document findings and route refill encounter to provider for approval or denial.    Thank you!  DIANA SalomonN, RN-[facility]

## 2025-05-27 RX ORDER — BACLOFEN 10 MG/1
5-10 TABLET ORAL 2 TIMES DAILY PRN
Qty: 180 TABLET | Refills: 1 | Status: SHIPPED | OUTPATIENT
Start: 2025-05-27

## 2025-05-30 ENCOUNTER — MYC REFILL (OUTPATIENT)
Dept: FAMILY MEDICINE | Facility: CLINIC | Age: 70
End: 2025-05-30
Payer: MEDICARE

## 2025-05-30 DIAGNOSIS — G47.00 PERSISTENT INSOMNIA: ICD-10-CM

## 2025-05-30 DIAGNOSIS — Z76.0 ENCOUNTER FOR MEDICATION REFILL: ICD-10-CM

## 2025-05-31 NOTE — TELEPHONE ENCOUNTER
Clinic RN: Please contact patient because patient should have run out of this medication on JANUARY 2024. Confirm patient is taking this medication as prescribed. Document findings and route refill encounter to provider for approval or denial.

## 2025-06-03 NOTE — TELEPHONE ENCOUNTER
See other MyChart Refill encounter. Thanks.    Patricia Garrido, DIANAN, RN, PHN   [facility]

## 2025-06-05 RX ORDER — QUETIAPINE FUMARATE 25 MG/1
25-50 TABLET, FILM COATED ORAL
Qty: 180 TABLET | Refills: 3 | Status: SHIPPED | OUTPATIENT
Start: 2025-06-05

## 2025-06-11 NOTE — TELEPHONE ENCOUNTER
Reason for visit: Establish care: history or rectal Prolase referred by Augustina Eli APRN, RNP (Colon Rectal)    Records/imaging/labs/orders: IN [facility], CARE EVERYWHERE, AND PACS     At Rooming:   Standard rooming - pelvic exam set up   Pessary kit and biohazard bin available     Jackelyn Dwyer  6/11/2025  2:51 PM

## 2025-06-18 ENCOUNTER — PRE VISIT (OUTPATIENT)
Dept: UROLOGY | Facility: CLINIC | Age: 70
End: 2025-06-18

## 2025-06-23 ENCOUNTER — TELEPHONE (OUTPATIENT)
Dept: FAMILY MEDICINE | Facility: CLINIC | Age: 70
End: 2025-06-23
Payer: MEDICARE

## 2025-06-23 ASSESSMENT — PATIENT HEALTH QUESTIONNAIRE - PHQ9
10. IF YOU CHECKED OFF ANY PROBLEMS, HOW DIFFICULT HAVE THESE PROBLEMS MADE IT FOR YOU TO DO YOUR WORK, TAKE CARE OF THINGS AT HOME, OR GET ALONG WITH OTHER PEOPLE: SOMEWHAT DIFFICULT
SUM OF ALL RESPONSES TO PHQ QUESTIONS 1-9: 14
SUM OF ALL RESPONSES TO PHQ QUESTIONS 1-9: 14

## 2025-06-23 NOTE — TELEPHONE ENCOUNTER
S-(situation):   Patient calling for z pack refill for cough  Last prescribed 2023    B-(background):   Last seen by PCP, Dr Morales was on 10/31/23    A-(assessment):   Declined triage  Only wants an Rx for Zithromax  Family members are sick and patient thinks that she has bronchitis  Cough started yesterday, 6/22/25  Yellow mucous  No fever  No SOB    R-(recommendations):   No appointments available in clinic today. Pt declined   Appointment scheduled for tomorrow, 6/24/25    Yuliana Cabrera RN  [facility]

## 2025-06-24 ENCOUNTER — ANCILLARY PROCEDURE (OUTPATIENT)
Dept: GENERAL RADIOLOGY | Facility: CLINIC | Age: 70
End: 2025-06-24
Payer: MEDICARE

## 2025-06-24 ENCOUNTER — OFFICE VISIT (OUTPATIENT)
Dept: FAMILY MEDICINE | Facility: CLINIC | Age: 70
End: 2025-06-24
Payer: MEDICARE

## 2025-06-24 VITALS
HEART RATE: 100 BPM | BODY MASS INDEX: 25.53 KG/M2 | DIASTOLIC BLOOD PRESSURE: 81 MMHG | HEIGHT: 63 IN | TEMPERATURE: 97.7 F | SYSTOLIC BLOOD PRESSURE: 127 MMHG | WEIGHT: 144.08 LBS | RESPIRATION RATE: 20 BRPM | OXYGEN SATURATION: 96 %

## 2025-06-24 DIAGNOSIS — S69.92XA WRIST INJURY, LEFT, INITIAL ENCOUNTER: ICD-10-CM
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
## 2025-06-24 DIAGNOSIS — J02.9 PHARYNGITIS, UNSPECIFIED ETIOLOGY: Primary | ICD-10-CM

## 2025-06-24 LAB
FLUAV RNA SPEC QL NAA+PROBE: NEGATIVE
FLUBV RNA RESP QL NAA+PROBE: NEGATIVE
RSV RNA SPEC NAA+PROBE: NEGATIVE
SARS-COV-2 RNA RESP QL NAA+PROBE: NEGATIVE

## 2025-06-24 PROCEDURE — 3079F DIAST BP 80-89 MM HG: CPT

## 2025-06-24 PROCEDURE — 73110 X-RAY EXAM OF WRIST: CPT | Mod: TC | Performed by: RADIOLOGY

## 2025-06-24 PROCEDURE — 87637 SARSCOV2&INF A&B&RSV AMP PRB: CPT

## 2025-06-24 PROCEDURE — 99214 OFFICE O/P EST MOD 30 MIN: CPT

## 2025-06-24 PROCEDURE — 3074F SYST BP LT 130 MM HG: CPT

## 2025-06-24 RX ORDER — AZITHROMYCIN 500 MG/1
500 TABLET, FILM COATED ORAL DAILY
Qty: 5 TABLET | Refills: 0 | Status: SHIPPED | OUTPATIENT
Start: 2025-06-24 | End: 2025-06-29

## 2025-06-24 NOTE — PROGRESS NOTES
Assessment & Plan     Pharyngitis, unspecified etiology:  - Likely viral infection, possibly sinus infection. Negative COVID test at home.  - States that his immunocompromise and will usually need Z-Edmar (1 a few medications she has not had adverse reactions to) to prevent progression into pneumonia or sinus infection.  - Prescribe azithromycin (Z-Edmar) for 5 days. Perform influenza, RSV, and COVID swab tests.    - Influenza A/B, RSV and SARS-CoV2 PCR (COVID-19) Nasopharyngeal  - azithromycin (ZITHROMAX) 500 MG tablet  Dispense: 5 tablet; Refill: 0    Falls, initial encounter:  Wrist injury, left, initial encounter:  - Recent fall resulting in contusions and wrist pain. No fractures observed on X-ray.  - Left middle DIP with small effusion, no sign of fracture nor limit to ROM  but with some mild pain, should continue to monitor for resolution  - Recommend icing and Tylenol as needed for pain. Will order physical therapy for wrist.    - Physical Therapy  Referral    Has not seen the PCP since 2023.  Reported she would make annual visit appointment on way out at the .  Does not appear this was completed, she should make this appointment when able    Please seek immediate medical attention (go to the emergency room or urgent care) if symptoms worser or for concerning changes.    Follow-up if symptoms do not improve as anticipated and as needed for acute concern  Aashish Vaughan is a 69 year old year old female, presenting for the following health issues:  Chief Complaint   Patient presents with    Pharyngitis    Cough    Fall     06/19/2025 6/24/2025     1:54 PM   Additional Questions   Roomed by JUAN XIE CMA   Accompanied by N/A     HPI        Anny Garcia, a 69-year-old female, reported experiencing a scratchy throat that began a couple of days ago. By Monday, she woke up with significant difficulty swallowing, which kept her awake all night. She also experienced a fever yesterday,  along with a headache, runny nose, and sore throat. She has a compromised immune system due to cancer, which makes her more susceptible to infections. In the past, Dr. Morales administered a shot that lessened the severity and duration of flu-like symptoms, although Anny does not recall the specific medication. She recently fell again, having previously fallen in January after moving to a new town with stairs, resulting in multiple fractures in her left foot. She was in a boot cast for 10 weeks. Two days ago, she fell again, scraping her knee and sustaining contusions. She is concerned about her wrist, which she used to brace her fall, causing pain and swelling, although she can still move it. Anny also mentioned a history of sinus infections, which occur annually when exposed to sick individuals, such as her grandchildren, who were recently ill. She believes she currently has a sinus infection, as she experiences symptoms similar to previous episodes, including postnasal drip that exacerbates her sore throat. She has a history of allergies to many antibiotics, with only a few, including Z-Edmar, erythromycin, and clindamycin, being tolerable without causing hives. Anny also mentioned her brother's recent passing on Friday the 13th due to lung cancer complicated by dementia, which has added significant stress to her life. She has been involved in managing his affairs, which she believes has contributed to her current illness. Anny has a history of cancer, with her last chemotherapy session occurring approximately 5 1/2 years ago, and she has undergone multiple surgeries related to her condition. She also has stage 3 chronic kidney disease and is on simvastatin and lisinopril to manage her cholesterol and protect her heart and kidneys. Additionally, she takes quetiapine to help with insomnia. The stress and illness have affected her sleep and overall stress levels.      Patient submitted visit information  Answers  "submitted by the patient for this visit:  Provider Visit on 6/24/2025  2:15 PM with Derrick Alarcon  Patient Health Questionnaire (Submitted on 6/23/2025)  If you checked off any problems, how difficult have these problems made it for you to do your work, take care of things at home, or get along with other people?: Somewhat difficult  PHQ9 TOTAL SCORE: 14  General Questionnaire (Submitted on 6/23/2025)  Chief Complaint: Chronic problems general questions HPI Form  How many days per week do you miss taking your medication?: 0  General Concern (Submitted on 6/23/2025)  Chief Complaint: Chronic problems general questions HPI Form  What is the reason for your visit today?: Need Zpak  When did your symptoms begin?: 1-3 days ago  What are your symptoms?: Cough, sore throat, headache, runny nose  How would you describe these symptoms?: Moderate  Are your symptoms:: Worsening  Have you had these symptoms before?: Yes  Have you tried or received treatment for these symptoms before?: Yes  Did that treatment work? : Yes  Please describe the treatment you had:: Antibiotic  Is there anything that makes you feel worse?: Yes  Is there anything that makes you feel better?: Yes, antibiotic      Objective     Vital signs: /81 (BP Location: Left arm, Patient Position: Sitting, Cuff Size: Adult Regular)   Pulse 100   Temp 97.7  F (36.5  C) (Oral)   Resp 20   Ht 1.612 m (5' 3.48\")   Wt 65.4 kg (144 lb 1.3 oz)   SpO2 96%   BMI 25.13 kg/m      Body mass index is 25.13 kg/m .    Physical Exam    General appearance: alert, well appearing, and in no distress  Mental status: alert, oriented to person, place, and time  Lungs: clear to auscultation, no wheezes, rales or rhonchi, symmetric air entry  Heart: normal rate, regular rhythm, normal S1, S2, no murmurs, rubs, clicks or gallops  MUSCULOSKELETAL: Wrist examination showed pain on movement and palpation, with edema and joint effusion noted.  No limitations to range of " motion due to pain  GENERAL: Patient appears in mild distress due to stress and illness.     Xray - Reviewed and interpreted by me.  No acute fractures  Results for orders placed or performed during the hospital encounter of 01/17/25   XR Foot Left G/E 3 Views     Status: None    Narrative    EXAM: XR FOOT LEFT G/E 3 VIEWS, XR ANKLE LEFT G/E 3 VIEWS  LOCATION: [facility]  DATE: 1/17/2025    INDICATION:  Left foot pain  COMPARISON: None.      Impression    IMPRESSION: Acute transverse nondisplaced fifth metatarsal base fracture with intra-articular extension. Small avulsion fracture at the distal calcaneus laterally with mild displacement. There is normal joint alignment. Chronic ossicle at the medial   malleolus distally. The ankle mortise is congruent. Soft tissue swelling over the lateral malleolus. There is a 5 mm linear metallic foreign body in the soft tissue adjacent to the second proximal phalanx.   XR Ankle Left G/E 3 Views     Status: None    Narrative    EXAM: XR FOOT LEFT G/E 3 VIEWS, XR ANKLE LEFT G/E 3 VIEWS  LOCATION: [facility]  DATE: 1/17/2025    INDICATION:  Left foot pain  COMPARISON: None.      Impression    IMPRESSION: Acute transverse nondisplaced fifth metatarsal base fracture with intra-articular extension. Small avulsion fracture at the distal calcaneus laterally with mild displacement. There is normal joint alignment. Chronic ossicle at the medial   malleolus distally. The ankle mortise is congruent. Soft tissue swelling over the lateral malleolus. There is a 5 mm linear metallic foreign body in the soft tissue adjacent to the second proximal phalanx.          Patient has been advised that I am a same day provider and that they will need to establish with primary care provider for ongoing management.    Options for treatment and follow-up care were reviewed with the patient. Alexus Garcia and/or guardian was engaged and actively involved in  the decision making process. Alexus Garcia and/or guardian verbalized understanding of the options discussed and was satisfied with the final plan.    Patient consented to use of ambient AI scribe prior to initiation of visit.    Signed Electronically by: MERCEDES Reyes CNP

## 2025-06-25 ENCOUNTER — RESULTS FOLLOW-UP (OUTPATIENT)
Dept: FAMILY MEDICINE | Facility: CLINIC | Age: 70
End: 2025-06-25
Payer: MEDICARE

## 2025-06-25 DIAGNOSIS — S69.92XA WRIST INJURY, LEFT, INITIAL ENCOUNTER: Primary | ICD-10-CM

## 2025-06-25 NOTE — TELEPHONE ENCOUNTER
----- Message from Elena MARTINEZ sent at 6/25/2025 12:41 PM CDT -----  Regarding: Please Add OT Order  Hello,     You entered an order for Physical Therapy with a diagnosis related to this patient's elbow to fingertips. This patient should see a hand therapist for this and our hand therapists are all Occupational Therapists. Please enter a new order for Occupational Therapy and select Hand Therapy under Specialty Services    Thank you,   [facility] Scheduling  [phone]

## 2025-06-29 ENCOUNTER — HEALTH MAINTENANCE LETTER (OUTPATIENT)
Age: 70
End: 2025-06-29

## 2025-07-09 ENCOUNTER — TELEPHONE (OUTPATIENT)
Dept: FAMILY MEDICINE | Facility: CLINIC | Age: 70
End: 2025-07-09
Payer: MEDICARE

## 2025-07-09 NOTE — TELEPHONE ENCOUNTER
Patient Quality Outreach    Patient is due for the following:   Diabetes -  A1C    Action(s) Taken:   If patient calls back, schedule a office visit for      Type of outreach:    Sent [employer] message.    Questions for provider review:    None         Alexus Ngo MA  Chart routed to None  .

## 2025-08-07 DIAGNOSIS — Z76.0 ENCOUNTER FOR MEDICATION REFILL: ICD-10-CM

## 2025-08-07 DIAGNOSIS — E11.9 TYPE 2 DIABETES MELLITUS WITHOUT COMPLICATION, WITHOUT LONG-TERM CURRENT USE OF INSULIN (H): ICD-10-CM

## 2025-08-07 RX ORDER — METFORMIN HYDROCHLORIDE 500 MG/1
TABLET, EXTENDED RELEASE ORAL
Qty: 270 TABLET | Refills: 4 | Status: SHIPPED | OUTPATIENT
Start: 2025-08-07

## 2025-08-12 ENCOUNTER — TELEPHONE (OUTPATIENT)
Dept: SURGERY | Facility: CLINIC | Age: 70
End: 2025-08-12
Payer: MEDICARE

## 2025-08-17 ASSESSMENT — ANXIETY QUESTIONNAIRES
8. IF YOU CHECKED OFF ANY PROBLEMS, HOW DIFFICULT HAVE THESE MADE IT FOR YOU TO DO YOUR WORK, TAKE CARE OF THINGS AT HOME, OR GET ALONG WITH OTHER PEOPLE?: NOT DIFFICULT AT ALL
5. BEING SO RESTLESS THAT IT IS HARD TO SIT STILL: NOT AT ALL
7. FEELING AFRAID AS IF SOMETHING AWFUL MIGHT HAPPEN: MORE THAN HALF THE DAYS
2. NOT BEING ABLE TO STOP OR CONTROL WORRYING: MORE THAN HALF THE DAYS
1. FEELING NERVOUS, ANXIOUS, OR ON EDGE: NEARLY EVERY DAY
GAD7 TOTAL SCORE: 10
6. BECOMING EASILY ANNOYED OR IRRITABLE: NOT AT ALL
GAD7 TOTAL SCORE: 10
4. TROUBLE RELAXING: MORE THAN HALF THE DAYS
3. WORRYING TOO MUCH ABOUT DIFFERENT THINGS: SEVERAL DAYS
IF YOU CHECKED OFF ANY PROBLEMS ON THIS QUESTIONNAIRE, HOW DIFFICULT HAVE THESE PROBLEMS MADE IT FOR YOU TO DO YOUR WORK, TAKE CARE OF THINGS AT HOME, OR GET ALONG WITH OTHER PEOPLE: NOT DIFFICULT AT ALL

## 2025-08-21 ENCOUNTER — VIRTUAL VISIT (OUTPATIENT)
Dept: PSYCHIATRY | Facility: CLINIC | Age: 70
End: 2025-08-21
Payer: MEDICARE

## 2025-08-21 DIAGNOSIS — F41.1 GAD (GENERALIZED ANXIETY DISORDER): ICD-10-CM

## 2025-08-21 DIAGNOSIS — F43.10 PTSD (POST-TRAUMATIC STRESS DISORDER): ICD-10-CM

## 2025-08-21 DIAGNOSIS — F32.2 MODERATELY SEVERE MAJOR DEPRESSION (H): Primary | ICD-10-CM

## 2025-08-21 ASSESSMENT — PATIENT HEALTH QUESTIONNAIRE - PHQ9
SUM OF ALL RESPONSES TO PHQ QUESTIONS 1-9: 12
SUM OF ALL RESPONSES TO PHQ QUESTIONS 1-9: 12
10. IF YOU CHECKED OFF ANY PROBLEMS, HOW DIFFICULT HAVE THESE PROBLEMS MADE IT FOR YOU TO DO YOUR WORK, TAKE CARE OF THINGS AT HOME, OR GET ALONG WITH OTHER PEOPLE: NOT DIFFICULT AT ALL

## 2025-08-21 ASSESSMENT — PAIN SCALES - GENERAL: PAINLEVEL_OUTOF10: SEVERE PAIN (8)

## 2025-08-22 DIAGNOSIS — F32.2 MODERATELY SEVERE MAJOR DEPRESSION (H): ICD-10-CM

## 2025-08-25 RX ORDER — DULOXETIN HYDROCHLORIDE 30 MG/1
30 CAPSULE, DELAYED RELEASE ORAL DAILY
Qty: 90 CAPSULE | Refills: 0 | Status: SHIPPED | OUTPATIENT
Start: 2025-08-25